# Patient Record
Sex: MALE | Race: WHITE | NOT HISPANIC OR LATINO | Employment: OTHER | URBAN - METROPOLITAN AREA
[De-identification: names, ages, dates, MRNs, and addresses within clinical notes are randomized per-mention and may not be internally consistent; named-entity substitution may affect disease eponyms.]

---

## 2017-01-01 ENCOUNTER — APPOINTMENT (OUTPATIENT)
Dept: RADIOLOGY | Facility: MEDICAL CENTER | Age: 77
End: 2017-01-01
Attending: EMERGENCY MEDICINE
Payer: MEDICARE

## 2017-01-01 ENCOUNTER — HOSPITAL ENCOUNTER (EMERGENCY)
Facility: MEDICAL CENTER | Age: 77
End: 2017-11-06
Attending: EMERGENCY MEDICINE
Payer: MEDICARE

## 2017-01-01 ENCOUNTER — PATIENT OUTREACH (OUTPATIENT)
Dept: HEALTH INFORMATION MANAGEMENT | Facility: OTHER | Age: 77
End: 2017-01-01

## 2017-01-01 ENCOUNTER — HOSPITAL ENCOUNTER (OUTPATIENT)
Dept: RADIOLOGY | Facility: MEDICAL CENTER | Age: 77
End: 2017-04-12
Attending: SPECIALIST
Payer: MEDICARE

## 2017-01-01 ENCOUNTER — OFFICE VISIT (OUTPATIENT)
Dept: MEDICAL GROUP | Age: 77
End: 2017-01-01
Payer: MEDICARE

## 2017-01-01 ENCOUNTER — TELEPHONE (OUTPATIENT)
Dept: MEDICAL GROUP | Age: 77
End: 2017-01-01

## 2017-01-01 ENCOUNTER — OFFICE VISIT (OUTPATIENT)
Dept: URGENT CARE | Facility: CLINIC | Age: 77
End: 2017-01-01
Payer: MEDICARE

## 2017-01-01 VITALS
OXYGEN SATURATION: 96 % | BODY MASS INDEX: 29.2 KG/M2 | DIASTOLIC BLOOD PRESSURE: 70 MMHG | HEART RATE: 71 BPM | HEIGHT: 70 IN | WEIGHT: 204 LBS | SYSTOLIC BLOOD PRESSURE: 132 MMHG | TEMPERATURE: 98.1 F

## 2017-01-01 VITALS
WEIGHT: 200 LBS | SYSTOLIC BLOOD PRESSURE: 104 MMHG | OXYGEN SATURATION: 95 % | DIASTOLIC BLOOD PRESSURE: 70 MMHG | BODY MASS INDEX: 28.63 KG/M2 | HEART RATE: 96 BPM | RESPIRATION RATE: 16 BRPM | TEMPERATURE: 98.9 F | HEIGHT: 70 IN

## 2017-01-01 VITALS
HEART RATE: 73 BPM | WEIGHT: 200.4 LBS | BODY MASS INDEX: 28.69 KG/M2 | OXYGEN SATURATION: 96 % | TEMPERATURE: 97.8 F | RESPIRATION RATE: 16 BRPM | DIASTOLIC BLOOD PRESSURE: 99 MMHG | SYSTOLIC BLOOD PRESSURE: 148 MMHG | HEIGHT: 70 IN

## 2017-01-01 VITALS
SYSTOLIC BLOOD PRESSURE: 132 MMHG | HEIGHT: 70 IN | OXYGEN SATURATION: 95 % | BODY MASS INDEX: 28.49 KG/M2 | DIASTOLIC BLOOD PRESSURE: 80 MMHG | WEIGHT: 199 LBS | TEMPERATURE: 97.5 F | HEART RATE: 87 BPM

## 2017-01-01 DIAGNOSIS — I10 ESSENTIAL HYPERTENSION: ICD-10-CM

## 2017-01-01 DIAGNOSIS — E78.2 MIXED HYPERLIPIDEMIA: ICD-10-CM

## 2017-01-01 DIAGNOSIS — M77.9 TENDINITIS: ICD-10-CM

## 2017-01-01 DIAGNOSIS — C82.58 DIFFUSE FOLLICLE CENTER LYMPHOMA OF LYMPH NODES OF MULTIPLE REGIONS (HCC): ICD-10-CM

## 2017-01-01 DIAGNOSIS — M79.671 FOOT PAIN, RIGHT: ICD-10-CM

## 2017-01-01 DIAGNOSIS — E03.4 HYPOTHYROIDISM DUE TO ACQUIRED ATROPHY OF THYROID: ICD-10-CM

## 2017-01-01 DIAGNOSIS — F40.240 CLAUSTROPHOBIA: ICD-10-CM

## 2017-01-01 DIAGNOSIS — J01.11 ACUTE RECURRENT FRONTAL SINUSITIS: ICD-10-CM

## 2017-01-01 DIAGNOSIS — K21.00 GASTROESOPHAGEAL REFLUX DISEASE WITH ESOPHAGITIS: ICD-10-CM

## 2017-01-01 DIAGNOSIS — C82.93 FOLLICULAR LYMPHOMA OF INTRA-ABDOMINAL LYMPH NODES, UNSPECIFIED GRADE (HCC): ICD-10-CM

## 2017-01-01 LAB
ALBUMIN SERPL-MCNC: 4 G/DL (ref 3.5–4.8)
ALBUMIN SERPL-MCNC: 4.3 G/DL (ref 3.5–4.8)
ALBUMIN/GLOB SERPL: 1.9 {RATIO} (ref 1.2–2.2)
ALBUMIN/GLOB SERPL: 2.5 {RATIO} (ref 1.2–2.2)
ALP SERPL-CCNC: 59 IU/L (ref 39–117)
ALP SERPL-CCNC: 65 IU/L (ref 39–117)
ALT SERPL-CCNC: 18 IU/L (ref 0–44)
ALT SERPL-CCNC: 24 IU/L (ref 0–44)
AST SERPL-CCNC: 31 IU/L (ref 0–40)
AST SERPL-CCNC: 38 IU/L (ref 0–40)
BASOPHILS # BLD AUTO: 0 X10E3/UL (ref 0–0.2)
BASOPHILS # BLD AUTO: 0.1 X10E3/UL (ref 0–0.2)
BASOPHILS NFR BLD AUTO: 1 %
BASOPHILS NFR BLD AUTO: 2 %
BILIRUB SERPL-MCNC: 1.8 MG/DL (ref 0–1.2)
BILIRUB SERPL-MCNC: 1.8 MG/DL (ref 0–1.2)
BUN SERPL-MCNC: 14 MG/DL (ref 8–27)
BUN SERPL-MCNC: 20 MG/DL (ref 8–27)
BUN/CREAT SERPL: 11 (ref 10–24)
BUN/CREAT SERPL: 16 (ref 10–24)
CALCIUM SERPL-MCNC: 8.7 MG/DL (ref 8.6–10.2)
CALCIUM SERPL-MCNC: 8.8 MG/DL (ref 8.6–10.2)
CHLORIDE SERPL-SCNC: 102 MMOL/L (ref 96–106)
CHLORIDE SERPL-SCNC: 103 MMOL/L (ref 96–106)
CHOLEST SERPL-MCNC: 235 MG/DL (ref 100–199)
CHOLEST SERPL-MCNC: 264 MG/DL (ref 100–199)
CO2 SERPL-SCNC: 22 MMOL/L (ref 18–29)
CO2 SERPL-SCNC: 22 MMOL/L (ref 18–29)
COMMENT 011824: ABNORMAL
COMMENT 011824: ABNORMAL
CREAT SERPL-MCNC: 1.29 MG/DL (ref 0.76–1.27)
CREAT SERPL-MCNC: 1.29 MG/DL (ref 0.76–1.27)
EOSINOPHIL # BLD AUTO: 0.4 X10E3/UL (ref 0–0.4)
EOSINOPHIL # BLD AUTO: 0.4 X10E3/UL (ref 0–0.4)
EOSINOPHIL NFR BLD AUTO: 10 %
EOSINOPHIL NFR BLD AUTO: 11 %
ERYTHROCYTE [DISTWIDTH] IN BLOOD BY AUTOMATED COUNT: 14.9 % (ref 12.3–15.4)
ERYTHROCYTE [DISTWIDTH] IN BLOOD BY AUTOMATED COUNT: 16.5 % (ref 12.3–15.4)
GFR SERPLBLD CREATININE-BSD FMLA CKD-EPI: 53 ML/MIN/1.73
GFR SERPLBLD CREATININE-BSD FMLA CKD-EPI: 62 ML/MIN/1.73
GLOBULIN SER CALC-MCNC: 1.7 G/DL (ref 1.5–4.5)
GLOBULIN SER CALC-MCNC: 2.1 G/DL (ref 1.5–4.5)
GLUCOSE SERPL-MCNC: 88 MG/DL (ref 65–99)
GLUCOSE SERPL-MCNC: 89 MG/DL (ref 65–99)
HCT VFR BLD AUTO: 46.6 % (ref 37.5–51)
HCT VFR BLD AUTO: 46.6 % (ref 37.5–51)
HDLC SERPL-MCNC: 54 MG/DL
HDLC SERPL-MCNC: 66 MG/DL
HGB BLD-MCNC: 14.8 G/DL (ref 12.6–17.7)
HGB BLD-MCNC: 15.4 G/DL (ref 12.6–17.7)
IMM GRANULOCYTES # BLD: 0 X10E3/UL (ref 0–0.1)
IMM GRANULOCYTES # BLD: 0 X10E3/UL (ref 0–0.1)
IMM GRANULOCYTES NFR BLD: 1 %
IMM GRANULOCYTES NFR BLD: 1 %
IMMATURE CELLS  115398: ABNORMAL
IMMATURE CELLS  115398: ABNORMAL
LDLC SERPL CALC-MCNC: 159 MG/DL (ref 0–99)
LDLC SERPL CALC-MCNC: 170 MG/DL (ref 0–99)
LYMPHOCYTES # BLD AUTO: 0.9 X10E3/UL (ref 0.7–3.1)
LYMPHOCYTES # BLD AUTO: 0.9 X10E3/UL (ref 0.7–3.1)
LYMPHOCYTES NFR BLD AUTO: 26 %
LYMPHOCYTES NFR BLD AUTO: 27 %
MCH RBC QN AUTO: 28.2 PG (ref 26.6–33)
MCH RBC QN AUTO: 28.5 PG (ref 26.6–33)
MCHC RBC AUTO-ENTMCNC: 31.8 G/DL (ref 31.5–35.7)
MCHC RBC AUTO-ENTMCNC: 33 G/DL (ref 31.5–35.7)
MCV RBC AUTO: 86 FL (ref 79–97)
MCV RBC AUTO: 89 FL (ref 79–97)
MONOCYTES # BLD AUTO: 0.3 X10E3/UL (ref 0.1–0.9)
MONOCYTES # BLD AUTO: 0.4 X10E3/UL (ref 0.1–0.9)
MONOCYTES NFR BLD AUTO: 12 %
MONOCYTES NFR BLD AUTO: 9 %
MORPHOLOGY BLD-IMP: ABNORMAL
MORPHOLOGY BLD-IMP: ABNORMAL
NEUTROPHILS # BLD AUTO: 1.8 X10E3/UL (ref 1.4–7)
NEUTROPHILS # BLD AUTO: 1.8 X10E3/UL (ref 1.4–7)
NEUTROPHILS NFR BLD AUTO: 49 %
NEUTROPHILS NFR BLD AUTO: 51 %
NRBC BLD AUTO-RTO: ABNORMAL %
NRBC BLD AUTO-RTO: ABNORMAL %
PLATELET # BLD AUTO: 144 X10E3/UL (ref 150–379)
PLATELET # BLD AUTO: 151 X10E3/UL (ref 150–379)
POTASSIUM SERPL-SCNC: 4.2 MMOL/L (ref 3.5–5.2)
POTASSIUM SERPL-SCNC: 4.7 MMOL/L (ref 3.5–5.2)
PROT SERPL-MCNC: 6 G/DL (ref 6–8.5)
PROT SERPL-MCNC: 6.1 G/DL (ref 6–8.5)
RBC # BLD AUTO: 5.24 X10E6/UL (ref 4.14–5.8)
RBC # BLD AUTO: 5.4 X10E6/UL (ref 4.14–5.8)
SODIUM SERPL-SCNC: 140 MMOL/L (ref 134–144)
SODIUM SERPL-SCNC: 142 MMOL/L (ref 134–144)
TRIGL SERPL-MCNC: 111 MG/DL (ref 0–149)
TRIGL SERPL-MCNC: 142 MG/DL (ref 0–149)
TSH SERPL DL<=0.005 MIU/L-ACNC: 1.24 UIU/ML (ref 0.45–4.5)
TSH SERPL DL<=0.005 MIU/L-ACNC: 6.56 UIU/ML (ref 0.45–4.5)
VLDLC SERPL CALC-MCNC: 22 MG/DL (ref 5–40)
VLDLC SERPL CALC-MCNC: 28 MG/DL (ref 5–40)
WBC # BLD AUTO: 3.4 X10E3/UL (ref 3.4–10.8)
WBC # BLD AUTO: 3.6 X10E3/UL (ref 3.4–10.8)

## 2017-01-01 PROCEDURE — 99214 OFFICE O/P EST MOD 30 MIN: CPT | Performed by: NURSE PRACTITIONER

## 2017-01-01 PROCEDURE — 99283 EMERGENCY DEPT VISIT LOW MDM: CPT

## 2017-01-01 PROCEDURE — 99214 OFFICE O/P EST MOD 30 MIN: CPT | Performed by: INTERNAL MEDICINE

## 2017-01-01 PROCEDURE — 73620 X-RAY EXAM OF FOOT: CPT | Mod: RT

## 2017-01-01 PROCEDURE — 71260 CT THORAX DX C+: CPT

## 2017-01-01 PROCEDURE — 700117 HCHG RX CONTRAST REV CODE 255: Performed by: SPECIALIST

## 2017-01-01 RX ORDER — PAROXETINE HYDROCHLORIDE 20 MG/1
20 TABLET, FILM COATED ORAL
COMMUNITY

## 2017-01-01 RX ORDER — KETOROLAC TROMETHAMINE 10 MG/1
10 TABLET, FILM COATED ORAL EVERY 6 HOURS PRN
Qty: 22 TAB | Refills: 2 | Status: ON HOLD | OUTPATIENT
Start: 2017-01-01 | End: 2018-01-01

## 2017-01-01 RX ORDER — LEVOTHYROXINE SODIUM 0.1 MG/1
100 TABLET ORAL
Qty: 90 TAB | Refills: 4 | Status: SHIPPED | OUTPATIENT
Start: 2017-01-01

## 2017-01-01 RX ORDER — AMOXICILLIN AND CLAVULANATE POTASSIUM 875; 125 MG/1; MG/1
1 TABLET, FILM COATED ORAL 2 TIMES DAILY
Qty: 20 TAB | Refills: 0 | Status: SHIPPED | OUTPATIENT
Start: 2017-01-01 | End: 2018-01-01

## 2017-01-01 RX ADMIN — IOHEXOL 100 ML: 350 INJECTION, SOLUTION INTRAVENOUS at 13:16

## 2017-01-01 ASSESSMENT — ENCOUNTER SYMPTOMS
SINUS PRESSURE: 1
MUSCULOSKELETAL NEGATIVE: 1
GASTROINTESTINAL NEGATIVE: 1
EYE PAIN: 0
RESPIRATORY NEGATIVE: 1
DIZZINESS: 0
SHORTNESS OF BREATH: 0
PSYCHIATRIC NEGATIVE: 1
VOMITING: 0
HYPERTENSION: 1
RESPIRATORY NEGATIVE: 1
EYES NEGATIVE: 1
NEUROLOGICAL NEGATIVE: 1
HEADACHES: 1
GASTROINTESTINAL NEGATIVE: 1
EYES NEGATIVE: 1
PSYCHIATRIC NEGATIVE: 1
CONSTITUTIONAL NEGATIVE: 1
MYALGIAS: 0
NEUROLOGICAL NEGATIVE: 1
SORE THROAT: 0
FEVER: 0
CHILLS: 1
NAUSEA: 0
CONSTITUTIONAL NEGATIVE: 1
CARDIOVASCULAR NEGATIVE: 1
MUSCULOSKELETAL NEGATIVE: 1
SINUS PAIN: 1
CARDIOVASCULAR NEGATIVE: 1

## 2017-01-01 ASSESSMENT — PAIN SCALES - GENERAL: PAINLEVEL_OUTOF10: 8

## 2017-02-01 RX ORDER — LEVOTHYROXINE SODIUM 0.15 MG/1
TABLET ORAL
Qty: 90 TAB | Refills: 4 | Status: SHIPPED | OUTPATIENT
Start: 2017-02-01 | End: 2017-03-17 | Stop reason: SDUPTHER

## 2017-02-13 RX ORDER — PAROXETINE HYDROCHLORIDE 20 MG/1
TABLET, FILM COATED ORAL
Qty: 90 TAB | Refills: 4 | Status: SHIPPED | OUTPATIENT
Start: 2017-02-13 | End: 2017-03-22 | Stop reason: SDUPTHER

## 2017-02-13 RX ORDER — LISINOPRIL 20 MG/1
TABLET ORAL
Qty: 90 TAB | Refills: 4 | Status: ON HOLD | OUTPATIENT
Start: 2017-02-13 | End: 2018-01-01

## 2017-02-20 ENCOUNTER — OFFICE VISIT (OUTPATIENT)
Dept: URGENT CARE | Facility: CLINIC | Age: 77
End: 2017-02-20
Payer: MEDICARE

## 2017-02-20 VITALS
HEART RATE: 72 BPM | WEIGHT: 200 LBS | OXYGEN SATURATION: 96 % | BODY MASS INDEX: 28.7 KG/M2 | TEMPERATURE: 98.3 F | DIASTOLIC BLOOD PRESSURE: 80 MMHG | RESPIRATION RATE: 18 BRPM | SYSTOLIC BLOOD PRESSURE: 120 MMHG

## 2017-02-20 DIAGNOSIS — R05.9 COUGH: ICD-10-CM

## 2017-02-20 DIAGNOSIS — J06.9 URI, ACUTE: ICD-10-CM

## 2017-02-20 PROCEDURE — 1101F PT FALLS ASSESS-DOCD LE1/YR: CPT | Performed by: NURSE PRACTITIONER

## 2017-02-20 PROCEDURE — G8420 CALC BMI NORM PARAMETERS: HCPCS | Performed by: NURSE PRACTITIONER

## 2017-02-20 PROCEDURE — G8484 FLU IMMUNIZE NO ADMIN: HCPCS | Performed by: NURSE PRACTITIONER

## 2017-02-20 PROCEDURE — 1036F TOBACCO NON-USER: CPT | Performed by: NURSE PRACTITIONER

## 2017-02-20 PROCEDURE — 4040F PNEUMOC VAC/ADMIN/RCVD: CPT | Mod: 8P | Performed by: NURSE PRACTITIONER

## 2017-02-20 PROCEDURE — G8432 DEP SCR NOT DOC, RNG: HCPCS | Performed by: NURSE PRACTITIONER

## 2017-02-20 PROCEDURE — 99214 OFFICE O/P EST MOD 30 MIN: CPT | Performed by: NURSE PRACTITIONER

## 2017-02-20 RX ORDER — CODEINE PHOSPHATE AND GUAIFENESIN 10; 100 MG/5ML; MG/5ML
5 SOLUTION ORAL EVERY 6 HOURS PRN
Qty: 120 ML | Refills: 0 | Status: SHIPPED | OUTPATIENT
Start: 2017-02-20 | End: 2017-01-01

## 2017-02-20 RX ORDER — AMOXICILLIN 875 MG/1
875 TABLET, COATED ORAL 2 TIMES DAILY
Qty: 20 TAB | Refills: 0 | Status: SHIPPED | OUTPATIENT
Start: 2017-02-20 | End: 2017-03-22

## 2017-02-20 ASSESSMENT — ENCOUNTER SYMPTOMS
CARDIOVASCULAR NEGATIVE: 1
CHILLS: 0
FEVER: 0
RESPIRATORY NEGATIVE: 1
SINUS PRESSURE: 1
SORE THROAT: 1
HEADACHES: 1
EYES NEGATIVE: 1

## 2017-02-20 NOTE — MR AVS SNAPSHOT
Chang Unger   2017 8:45 AM   Office Visit   MRN: [id number]    Department:  [facility]   Dept Phone:  [phone]    Description:  Male : 1940   Provider:  Cathey J Hamman, A.P.N.           Reason for Visit     Sinus Problem           Allergies as of 2017     Allergen Noted Reactions    Nitroglycerin 2009       Pulse drops to 40      You were diagnosed with     Cough   [786.2.ICD-9-CM]       URI, acute   [222485]         Vital Signs     Blood Pressure Pulse Temperature Respirations Weight Oxygen Saturation    120/80 mmHg 72 36.8 °C (98.3 °F) 18 90.719 kg (200 lb) 96%    Smoking Status                   Never Smoker            Basic Information     Date Of Birth Sex Race Ethnicity Preferred Language    1940 Male White Non- English      Your appointments     2017  3:00 PM   Established Patient with Hector Burnett M.D.   [address] [id number] [phone]           You will be receiving a confirmation call a few days before your appointment from our automated call confirmation system.              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 Review all of your home medications and newly ordered medications with your provider and/or pharmacist. Follow medication instructions as directed by your provider and/or pharmacist. Please keep your medication list with you and share with your provider. Update the information when medications are discontinued, doses are changed, or new medications (including over-the-counter products) are added; and carry medication information at all times in the event of emergency situations     Allergies:  NITROGLYCERIN - (reactions not documented)               Medications  Valid as of: February 20, 2017 -  9:43 AM    Generic Name Brand Name Tablet Size Instructions for use    Amoxicillin (Tab) AMOXIL 875 MG Take 1 Tab by mouth 2 times a day.        Guaifenesin-Codeine (Solution) ROBITUSSIN -10 mg/5mL Take 5 mL by mouth every 6 hours as needed.        Levothyroxine Sodium (Tab) SYNTHROID 150 MCG TAKE 1 TABLET EVERY MORNING ON AN EMPTY STOMACH        Lisinopril (Tab) PRINIVIL 20 MG TAKE 1 TABLET DAILY        PARoxetine HCl (Tab) PAXIL 20 MG TAKE 1 TABLET DAILY        .                 Medicines prescribed today were sent to:     [facility] - [place] - [address]    [address] [place] [address]    Phone: [phone] Fax: [phone]    Open 24 Hours?: No    [facility] - [place], [place] - [address]    [address] [address]    Phone: [phone] Fax: [phone]    Open 24 Hours?: No      Medication refill instructions:       If your prescription  bottle indicates you have medication refills left, it is not necessary to call your provider’s office. Please contact your pharmacy and they will refill your medication.    If your prescription bottle indicates you do not have any refills left, you may request refills at any time through one of the following ways: The online [employer] system (except Urgent Care), by calling your provider’s office, or by asking your pharmacy to contact your provider’s office with a refill request. Medication refills are processed only during regular business hours and may not be available until the next business day. Your provider may request additional information or to have a follow-up visit with you prior to refilling your medication.   *Please Note: Medication refills are assigned a new Rx number when refilled electronically. Your pharmacy may indicate that no refills were authorized even though a new prescription for the same medication is available at the pharmacy. Please request the medicine by name with the pharmacy before contacting your provider for a refill.           [employer] Access Code: Activation code not generated  Current [employer] Status: Active

## 2017-02-20 NOTE — PROGRESS NOTES
Subjective:      Chang Unger is a 76 y.o. male who presents with Sinus Problem    Past Medical History   Diagnosis Date   • HTN    • Hematuria    • GERD (gastroesophageal reflux disease)    • Lymphoma (CMS-HCC)    • Basal cell cancer      Social History     Social History   • Marital Status:      Spouse Name: N/A   • Number of Children: N/A   • Years of Education: N/A     Occupational History   • Not on file.     Social History Main Topics   • Smoking status: Never Smoker    • Smokeless tobacco: Never Used   • Alcohol Use: No      Comment: etoh abuse,   • Drug Use: No   • Sexual Activity: Not on file      Comment: ,2 kids, retired marine     Other Topics Concern   • Not on file     Social History Narrative     Family History   Problem Relation Age of Onset   • Cancer Mother      pancreatic     Allergies: Nitroglycerin    This patient is a 76-year-old male who presents today with complaint of clear sinus drainage without pain. He has had a dry cough with this, and this does wake him up at night at times. He denies chest congestion or shortness of breath. Denies fever, body aches, or chills.        Sinus Problem  This is a new problem. The current episode started 1 to 4 weeks ago. The problem has been gradually worsening since onset. There has been no fever. The pain is moderate. Associated symptoms include congestion, headaches, sinus pressure and a sore throat. Pertinent negatives include no chills. Past treatments include nothing. The treatment provided no relief.       Review of Systems   Constitutional: Positive for malaise/fatigue. Negative for fever and chills.   HENT: Positive for congestion, sinus pressure and sore throat.    Eyes: Negative.    Respiratory: Negative.    Cardiovascular: Negative.    Skin: Negative.    Neurological: Positive for headaches.       All other systems reviewed and are negative      Objective:     /80 mmHg  Pulse 72  Temp(Src) 36.8 °C (98.3 °F)  Resp 18   Wt 90.719 kg (200 lb)  SpO2 96%     Physical Exam   Constitutional: He is oriented to person, place, and time. He appears well-developed and well-nourished. No distress.   HENT:   Head: Normocephalic.   Right Ear: External ear normal.   Left Ear: External ear normal.   Mouth/Throat: Oropharynx is clear and moist. No oropharyngeal exudate.   Eyes: EOM are normal. Pupils are equal, round, and reactive to light.   Neck: Normal range of motion. Neck supple.   Cardiovascular: Normal rate and regular rhythm.    Pulmonary/Chest: Effort normal. No respiratory distress. He has no wheezes. He has no rales. He exhibits no tenderness.   Dry cough   Musculoskeletal: Normal range of motion.   Neurological: He is alert and oriented to person, place, and time.   Skin: Skin is warm and dry. He is not diaphoretic.   Psychiatric: He has a normal mood and affect. His behavior is normal.   Vitals reviewed.              Assessment/Plan:      Cough  Viral upper respiratory infection  -Cheratussin when necessary cough  -Prescription for amoxicillin given, take only for production of purulent drainage.  -Tylenol as needed  -Follow up otherwise for persistent or worsening of symptoms  There are no diagnoses linked to this encounter.

## 2017-02-23 ENCOUNTER — APPOINTMENT (OUTPATIENT)
Dept: MEDICAL GROUP | Age: 77
End: 2017-02-23
Payer: MEDICARE

## 2017-03-17 ENCOUNTER — TELEPHONE (OUTPATIENT)
Dept: MEDICAL GROUP | Age: 77
End: 2017-03-17

## 2017-03-17 DIAGNOSIS — E03.4 HYPOTHYROIDISM DUE TO ACQUIRED ATROPHY OF THYROID: ICD-10-CM

## 2017-03-17 RX ORDER — LEVOTHYROXINE SODIUM 0.15 MG/1
75 TABLET ORAL
Qty: 90 TAB | Refills: 4
Start: 2017-03-17 | End: 2017-03-22 | Stop reason: SDUPTHER

## 2017-03-17 NOTE — TELEPHONE ENCOUNTER
----- Message from Rosa M Ponce M.D. sent at 3/17/2017  8:11 AM PDT -----  Plan to discuss the results with Dr. Burnett in upcoming appointment on 3/22/17.    Rosa M Ponce M.D.

## 2017-03-17 NOTE — TELEPHONE ENCOUNTER
Please tell patient that his thyroid level is about twice what it should be and that he needs to reduce his current thyroid medication dose from 150 µg daily down to 75 µg daily. He can do this by cutting his current dosage in half, i.e. one half pill daily of the 150 µg pills. We can discuss this further at his office visit next week. I will order a repeat thyroid test when I see him then, to be done in 2-3 months from now.

## 2017-03-20 NOTE — TELEPHONE ENCOUNTER
Phone Number Called: [phone] (home)     Message: Left message for the patient to call us back regarding the note below.    Left Message for patient to call back: yes

## 2017-03-21 ENCOUNTER — TELEPHONE (OUTPATIENT)
Dept: MEDICAL GROUP | Age: 77
End: 2017-03-21

## 2017-03-21 NOTE — TELEPHONE ENCOUNTER
----- Message from Hector Burnett M.D. sent at 3/17/2017 11:40 AM PDT -----  Please tell patient that his thyroid level is about twice what it should be and that he needs to reduce his current thyroid medication dose from 150 µg daily down to 75 µg daily. He can do this by cutting his current dosage in half, i.e. one half pill daily of the 150 µg pills. We can discuss this further at his office visit next week. I will order a repeat thyroid test when I see him then, to be done in 2-3 months from now.

## 2017-03-21 NOTE — TELEPHONE ENCOUNTER
Phone Number Called: [phone]    Message: Spoke with pt, informed him of results as stated below in Dr. Burnett's note, he acknowledged. Pt states he will discuss with Dr. Burnett at his appt tomorrow.    Left Message for patient to call back: N\A

## 2017-03-21 NOTE — TELEPHONE ENCOUNTER
Spoke with pt and informed him of Dr. Burnett's note as stated below, he acknowledge. Pt is coming in to see Dr. Burnett tomorrow.

## 2017-03-22 ENCOUNTER — HOSPITAL ENCOUNTER (OUTPATIENT)
Dept: RADIOLOGY | Facility: MEDICAL CENTER | Age: 77
End: 2017-03-22
Attending: INTERNAL MEDICINE
Payer: MEDICARE

## 2017-03-22 ENCOUNTER — OFFICE VISIT (OUTPATIENT)
Dept: MEDICAL GROUP | Age: 77
End: 2017-03-22
Payer: MEDICARE

## 2017-03-22 VITALS
HEART RATE: 60 BPM | OXYGEN SATURATION: 96 % | TEMPERATURE: 98.1 F | BODY MASS INDEX: 28.06 KG/M2 | WEIGHT: 196 LBS | HEIGHT: 70 IN | DIASTOLIC BLOOD PRESSURE: 82 MMHG | SYSTOLIC BLOOD PRESSURE: 146 MMHG

## 2017-03-22 DIAGNOSIS — E03.4 HYPOTHYROIDISM DUE TO ACQUIRED ATROPHY OF THYROID: ICD-10-CM

## 2017-03-22 DIAGNOSIS — F40.240 CLAUSTROPHOBIA: ICD-10-CM

## 2017-03-22 DIAGNOSIS — C82.58 DIFFUSE FOLLICLE CENTER LYMPHOMA OF LYMPH NODES OF MULTIPLE REGIONS (HCC): ICD-10-CM

## 2017-03-22 DIAGNOSIS — J30.9 CHRONIC ALLERGIC RHINITIS: ICD-10-CM

## 2017-03-22 DIAGNOSIS — I10 ESSENTIAL HYPERTENSION: ICD-10-CM

## 2017-03-22 DIAGNOSIS — E78.2 MIXED HYPERLIPIDEMIA: ICD-10-CM

## 2017-03-22 DIAGNOSIS — K21.00 GASTROESOPHAGEAL REFLUX DISEASE WITH ESOPHAGITIS: ICD-10-CM

## 2017-03-22 DIAGNOSIS — J20.9 ACUTE BRONCHITIS, UNSPECIFIED ORGANISM: ICD-10-CM

## 2017-03-22 DIAGNOSIS — J01.00 ACUTE MAXILLARY SINUSITIS, RECURRENCE NOT SPECIFIED: ICD-10-CM

## 2017-03-22 PROCEDURE — 99215 OFFICE O/P EST HI 40 MIN: CPT | Performed by: INTERNAL MEDICINE

## 2017-03-22 PROCEDURE — 71020 DX-CHEST-2 VIEWS: CPT

## 2017-03-22 RX ORDER — AMLODIPINE BESYLATE 5 MG/1
5 TABLET ORAL DAILY
Qty: 90 TAB | Refills: 4 | Status: SHIPPED | OUTPATIENT
Start: 2017-03-22 | End: 2017-01-01

## 2017-03-22 RX ORDER — PAROXETINE HYDROCHLORIDE 20 MG/1
TABLET, FILM COATED ORAL
Qty: 30 TAB | Refills: 4
Start: 2017-03-22 | End: 2017-01-01

## 2017-03-22 RX ORDER — AMLODIPINE BESYLATE 5 MG/1
5 TABLET ORAL DAILY
Qty: 90 TAB | Refills: 4 | Status: SHIPPED | OUTPATIENT
Start: 2017-03-22 | End: 2017-03-22 | Stop reason: SDUPTHER

## 2017-03-22 RX ORDER — FLUTICASONE PROPIONATE 50 MCG
SPRAY, SUSPENSION (ML) NASAL
Qty: 1 BOTTLE | Refills: 11 | Status: SHIPPED | OUTPATIENT
Start: 2017-03-22 | End: 2017-01-01

## 2017-03-22 RX ORDER — LEVOTHYROXINE SODIUM 0.15 MG/1
75 TABLET ORAL
Qty: 90 TAB | Refills: 4
Start: 2017-03-22 | End: 2017-01-01

## 2017-03-22 RX ORDER — AMOXICILLIN AND CLAVULANATE POTASSIUM 875; 125 MG/1; MG/1
1 TABLET, FILM COATED ORAL 2 TIMES DAILY
Qty: 42 TAB | Refills: 1 | Status: SHIPPED | OUTPATIENT
Start: 2017-03-22 | End: 2017-01-01

## 2017-03-22 ASSESSMENT — ENCOUNTER SYMPTOMS
GASTROINTESTINAL NEGATIVE: 1
EYES NEGATIVE: 1
CARDIOVASCULAR NEGATIVE: 1
CONSTITUTIONAL NEGATIVE: 1
MUSCULOSKELETAL NEGATIVE: 1
RESPIRATORY NEGATIVE: 1
PSYCHIATRIC NEGATIVE: 1
NEUROLOGICAL NEGATIVE: 1

## 2017-03-22 ASSESSMENT — PATIENT HEALTH QUESTIONNAIRE - PHQ9: CLINICAL INTERPRETATION OF PHQ2 SCORE: 0

## 2017-03-22 NOTE — PROGRESS NOTES
"Subjective:      Chang Unger is a 76 y.o. male who presents with Follow-Up  and recurrent cough prod of yellow sputum and yellow nasal discharge  And   The patient is here for followup of chronic medical problems listed below. The patient is compliant with medications and having no side effects from them. Denies chest pain, abdominal pain, dyspnea, myalgias, or cough.   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    Outpatient Prescriptions Prior to Visit   Medication Sig Dispense Refill   • lisinopril (PRINIVIL) 20 MG Tab TAKE 1 TABLET DAILY 90 Tab 4   • levothyroxine (SYNTHROID) 150 MCG Tab Take 0.5 Tabs by mouth Every morning on an empty stomach. 90 Tab 4   • guaifenesin-codeine (CHERATUSSIN AC) Solution oral solution Take 5 mL by mouth every 6 hours as needed. 120 mL 0   • amoxicillin (AMOXIL) 875 MG tablet Take 1 Tab by mouth 2 times a day. 20 Tab 0   • paroxetine (PAXIL) 20 MG Tab TAKE 1 TABLET DAILY 90 Tab 4     No facility-administered medications prior to visit.             HPI    Review of Systems   Constitutional: Negative.    HENT: Negative.    Eyes: Negative.    Respiratory: Negative.    Cardiovascular: Negative.    Gastrointestinal: Negative.    Genitourinary: Negative.    Musculoskeletal: Negative.    Skin: Negative.    Neurological: Negative.    Endo/Heme/Allergies: Negative.    Psychiatric/Behavioral: Negative.           Objective:     /82 mmHg  Pulse 60  Temp(Src) 36.7 °C (98.1 °F)  Ht 1.778 m (5' 10\")  " Wt 88.905 kg (196 lb)  BMI 28.12 kg/m2  SpO2 96%     Physical Exam   Constitutional: He is oriented to person, place, and time. He appears well-developed and well-nourished. No distress.   HENT:   Head: Normocephalic and atraumatic.   Right Ear: External ear normal.   Left Ear: External ear normal.   Mouth/Throat: Oropharynx is clear and moist. No oropharyngeal exudate.   Eyes: Conjunctivae and EOM are normal. Pupils are equal, round, and reactive to light. Right eye exhibits no discharge.   Neck: Normal range of motion. Neck supple. No JVD present. No tracheal deviation present. No thyromegaly present.   Cardiovascular: Normal rate, regular rhythm, normal heart sounds and intact distal pulses.  Exam reveals no gallop.    Pulmonary/Chest: Effort normal and breath sounds normal. No respiratory distress. He has no wheezes. He has no rales. He exhibits no tenderness.   Abdominal: Soft. Bowel sounds are normal. He exhibits no distension and no mass. There is no tenderness. There is no rebound and no guarding. No hernia.   Genitourinary: Guaiac negative stool. No penile tenderness.   Musculoskeletal: He exhibits no edema or tenderness.   Lymphadenopathy:     He has no cervical adenopathy.   Neurological: He is alert and oriented to person, place, and time. He has normal reflexes. He displays normal reflexes. No cranial nerve deficit. He exhibits normal muscle tone. Coordination normal.   Skin: Skin is warm and dry. No rash noted. He is not diaphoretic. No erythema. No pallor.   Psychiatric: He has a normal mood and affect. His behavior is normal. Judgment and thought content normal.   Nursing note and vitals reviewed.  No visits with results within 1 Month(s) from this visit.  Latest known visit with results is:    Orders Only on 11/21/2016   Component Date Value   • WBC 11/21/2016 4.1    • RBC 11/21/2016 5.48    • Hemoglobin 11/21/2016 15.6    • Hematocrit 11/21/2016 46.2    • MCV 11/21/2016 84    • MCH 11/21/2016  28.5    • MCHC 11/21/2016 33.8    • RDW 11/21/2016 14.3    • Platelet Count 11/21/2016 151    • Neutrophils-Polys 11/21/2016 46    • Lymphocytes 11/21/2016 26    • Monocytes 11/21/2016 11    • Eosinophils 11/21/2016 15    • Basophils 11/21/2016 2    • Immature Cells 11/21/2016 CANCELED    • Neutrophils (Absolute) 11/21/2016 1.9    • Lymphs (Absolute) 11/21/2016 1.1    • Monos (Absolute) 11/21/2016 0.5    • Eos (Absolute) 11/21/2016 0.6*   • Baso (Absolute) 11/21/2016 0.1    • Immature Granulocytes 11/21/2016 0    • Immature Granulocytes (a* 11/21/2016 0.0    • Nucleated RBC 11/21/2016 CANCELED    • Comments-Diff 11/21/2016 CANCELED    • Glucose 11/21/2016 95    • Bun 11/21/2016 23    • Creatinine 11/21/2016 1.29*   • GFR If Non  Ameri* 11/21/2016 53*   • GFR If  11/21/2016 62    • Bun-Creatinine Ratio 11/21/2016 18    • Sodium 11/21/2016 141    • Potassium 11/21/2016 4.9    • Chloride 11/21/2016 103    • Co2 11/21/2016 23    • Calcium 11/21/2016 9.1    • Total Protein 11/21/2016 5.9*   • Albumin 11/21/2016 4.2    • Globulin 11/21/2016 1.7    • A-G Ratio 11/21/2016 2.5    • Total Bilirubin 11/21/2016 1.4*   • Alkaline Phosphatase 11/21/2016 69    • AST(SGOT) 11/21/2016 23    • ALT(SGPT) 11/21/2016 16    • Cholesterol,Tot 11/21/2016 193    • Triglycerides 11/21/2016 101    • HDL 11/21/2016 54    • VLDL Cholesterol Calc 11/21/2016 20    • LDL 11/21/2016 119*   • Comment: 11/21/2016 CANCELED    • Free T-4 11/21/2016 2.55*   • TSH 11/21/2016 0.009*   • Glucose 03/16/2017 93    • Bun 03/16/2017 21    • Creatinine 03/16/2017 1.33*   • GFR If Non  Ameri* 03/16/2017 52*   • GFR If  03/16/2017 60    • Bun-Creatinine Ratio 03/16/2017 16    • Sodium 03/16/2017 140    • Potassium 03/16/2017 4.3    • Chloride 03/16/2017 101    • Co2 03/16/2017 22    • Calcium 03/16/2017 8.8    • Total Protein 03/16/2017 6.1    • Albumin 03/16/2017 4.1    • Globulin 03/16/2017 2.0    • A-G Ratio  03/16/2017 2.1    • Total Bilirubin 03/16/2017 1.5*   • Alkaline Phosphatase 03/16/2017 75    • AST(SGOT) 03/16/2017 25    • ALT(SGPT) 03/16/2017 15    • Cholesterol,Tot 03/16/2017 223*   • Triglycerides 03/16/2017 101    • HDL 03/16/2017 53    • VLDL Cholesterol Calc 03/16/2017 20    • LDL 03/16/2017 150*   • Comment: 03/16/2017 CANCELED    • Free T-4 03/16/2017 2.66*   • TSH 03/16/2017 0.010*      No results found for: HBA1C  Lab Results   Component Value Date/Time    SODIUM 140 03/16/2017 08:01 AM    POTASSIUM 4.3 03/16/2017 08:01 AM    CHLORIDE 101 03/16/2017 08:01 AM    CO2 22 03/16/2017 08:01 AM    GLUCOSE 93 03/16/2017 08:01 AM    BUN 21 03/16/2017 08:01 AM    CREATININE 1.33* 03/16/2017 08:01 AM    BUN-CREATININE RATIO 16 03/16/2017 08:01 AM    ALKALINE PHOSPHATASE 75 03/16/2017 08:01 AM    AST(SGOT) 25 03/16/2017 08:01 AM    ALT(SGPT) 15 03/16/2017 08:01 AM    TOTAL BILIRUBIN 1.5* 03/16/2017 08:01 AM     Lab Results   Component Value Date/Time    INR 1.09 08/27/2009 11:02 AM    INR 0.97 02/19/2009 11:36 AM     Lab Results   Component Value Date/Time    CHOLESTEROL,* 03/16/2017 08:01 AM    * 03/16/2017 08:01 AM    HDL 53 03/16/2017 08:01 AM    TRIGLYCERIDES 101 03/16/2017 08:01 AM       No results found for: TESTOSTERONE  Lab Results   Component Value Date/Time    TSH 0.010* 03/16/2017 08:01 AM    TSH 0.009* 11/21/2016 11:03 AM     No results found for: FREET4  Lab Results   Component Value Date/Time    URIC ACID 4.8 06/02/2010 09:46 AM     No components found for: VITB12  Lab Results   Component Value Date/Time    25-HYDROXY   VITAMIN D 25 22.5* 10/06/2010 11:45 AM                   Assessment/Plan:     1. Hypothyroidism due to acquired atrophy of thyroid- overtreated- synthroid reduced to 75 mcg qd      - levothyroxine (SYNTHROID) 150 MCG Tab; Take 0.5 Tabs by mouth Every morning on an empty stomach.  Dispense: 90 Tab; Refill: 4  - TSH; Future    2. Mixed hyperlipidemia    Not at goal\  diet/exercise/lose 15 lbs.; patient counseled- no meds yet    - COMP METABOLIC PANEL; Future  - LIPID PROFILE; Future  - CBC WITH DIFFERENTIAL; Future    3. Essential hypertension  Not at goal- add :     - amlodipine (NORVASC) 5 MG Tab; Take 1 Tab by mouth every day.  Dispense: 90 Tab; Refill: 4    Cont lisinopril 20m qd but take at night since bp is so high in am  4. Claustrophobia    Under good control. Continue same regimen.    - paroxetine (PAXIL) 20 MG Tab; Take 1 twice a wk  Dispense: 30 Tab; Refill: 4    5. Gastroesophageal reflux disease with esophagitis    Under good control. Continue same regimen. ppi prn otc  6. 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 Continue same regimen.    7. Acute bronchitis, unspecified organism       - amoxicillin-clavulanate (AUGMENTIN) 875-125 MG Tab; Take 1 Tab by mouth 2 times a day.  Dispense: 42 Tab; Refill: 1  - DX-CHEST-2 VIEWS; Future    8. Acute maxillary sinusitis, recurrence not specified     - amoxicillin-clavulanate (AUGMENTIN) 875-125 MG Tab; Take 1 Tab by mouth 2 times a day.  Dispense: 42 Tab; Refill: 1    9. Hypothyroidism due to acquired atrophy of thyroid   aabove  - levothyroxine (SYNTHROID) 150 MCG Tab; Take 0.5 Tabs by mouth Every morning on an empty stomach.  Dispense: 90 Tab; Refill: 4  - TSH; Future    10. Chronic allergic rhinitis- new flare up      - fluticasone (FLONASE) 50 MCG/ACT nasal spray; 2 sprays in each nostril qd  Dispense: 1 Bottle; Refill: 11      40 minute face-to-face encounter took place today.  More than half of this time was spent in the coordination of care of the above problems, as well as counseling.

## 2017-03-22 NOTE — MR AVS SNAPSHOT
"        Chang Unger   3/22/2017 8:00 AM   Office Visit   MRN: [id number]    Department:  [address] [place] [address]   Dept Phone:  [phone]    Description:  Male : 1940   Provider:  Hector Burnett M.D.           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)   [4959361]       Acute bronchitis, unspecified organism   [3283278]       Acute maxillary sinusitis, recurrence not specified   [8402580]       Hypothyroidism due to acquired atrophy of thyroid   [0341528]       Chronic allergic rhinitis   [699396]         Vital Signs     Blood Pressure Pulse Temperature Height Weight Body Mass Index    146/82 mmHg 60 36.7 °C (98.1 °F) 1.778 m (5' 10\") 88.905 kg (196 lb) 28.12 kg/m2    Oxygen Saturation Smoking Status                96% Never Smoker           Basic Information     Date Of Birth Sex Race Ethnicity Preferred Language    1940 Male White Non- English      Your appointments     2017  2:20 PM   Established Patient with Hector Burnett M.D.   [address] [id number] [phone]           You will be receiving a confirmation call a few days before your appointment from our automated call confirmation system.              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-, RT/chemo rx-  dr" conrath  C82.58   Unknown - Present    Essential hypertension I10   Unknown - Present    GERD (gastroesophageal reflux disease) K21.9   Unknown - Present    Claustrophobia F40.240   12/16/2013 - Present    Hypothyroidism due to acquired atrophy of thyroid E03.4   1/20/2014 - Present    Mixed hyperlipidemia E78.2   1/20/2014 - Present    Dry cough R05   3/9/2016 - Present      Health Maintenance        Date Due Completion Dates    IMM ZOSTER VACCINE 11/7/2000 ---    IMM PNEUMOCOCCAL 65+ (ADULT) HIGH/HIGHEST RISK SERIES (1 of 2 - PCV13) 11/7/2005 ---    IMM INFLUENZA (1) 9/1/2016 ---    COLONOSCOPY 1/30/2019 1/30/2014    IMM DTaP/Tdap/Td Vaccine (2 - Td) 7/13/2023 7/13/2013            Current Immunizations     Tdap Vaccine 7/13/2013      Below and/or attached are the medications your provider expects you to take. Review all of your home medications and newly ordered medications with your provider and/or pharmacist. Follow medication instructions as directed by your provider and/or pharmacist. Please keep your medication list with you and share with your provider. Update the information when medications are discontinued, doses are changed, or new medications (including over-the-counter products) are added; and carry medication information at all times in the event of emergency situations     Allergies:  NITROGLYCERIN - (reactions not documented)     LOSARTAN - (reactions not documented)               Medications  Valid as of: March 22, 2017 -  8:45 AM    Generic Name Brand Name Tablet Size Instructions for use    AmLODIPine Besylate (Tab) NORVASC 5 MG Take 1 Tab by mouth every day.        Amoxicillin-Pot Clavulanate (Tab) AUGMENTIN 875-125 MG Take 1 Tab by mouth 2 times a day.        Fluticasone Propionate (Suspension) FLONASE 50 MCG/ACT 2 sprays in each nostril qd        Guaifenesin-Codeine (Solution) ROBITUSSIN -10 mg/5mL Take 5 mL by mouth every 6 hours as needed.        Levothyroxine Sodium (Tab) SYNTHROID  150 MCG Take 0.5 Tabs by mouth Every morning on an empty stomach.        Lisinopril (Tab) PRINIVIL 20 MG TAKE 1 TABLET DAILY        PARoxetine HCl (Tab) PAXIL 20 MG Take 1 twice a wk        .                 Medicines prescribed today were sent to:     [facility] - [place], [place] - [address]    [address] [place] [address]    Phone: [phone] Fax: [phone]    Open 24 Hours?: No    [facility] - [place], [place] - [address] [facility]    [address] [facility] [address]    Phone: [phone] Fax: [phone]    Open 24 Hours?: No      Medication refill instructions:       If your prescription bottle indicates you have medication refills left, it is not necessary to call your provider’s office. Please contact your pharmacy and they will refill your medication.    If your prescription bottle indicates you do not have any refills left, you may request refills at any time through one of the following ways: The online [employer] system (except Urgent Care), by calling your provider’s office, or by asking your pharmacy to contact your provider’s office with a refill request. Medication refills are processed only during regular business hours and may not be available until the next business day. Your provider may request additional information or to have a follow-up visit with you prior to refilling your medication.   *Please Note: Medication refills are assigned a new Rx number when refilled electronically. Your pharmacy may indicate that no refills were authorized even though a new prescription for the same medication is available at the pharmacy. Please request the medicine by name with the pharmacy before contacting your provider for a refill.        Your To Do List     Future Labs/Procedures Complete By Expires    CBC WITH DIFFERENTIAL  As directed 3/22/2018    COMP METABOLIC PANEL  As directed 3/22/2018    DX-CHEST-2 VIEWS  As directed 9/21/2017    LIPID PROFILE   As directed 3/22/2018    TSH  As directed 3/22/2018         [employer] Access Code: Activation code not generated  Current [employer] Status: Active

## 2017-06-22 NOTE — PROGRESS NOTES
Subjective:      Chang Unger is a 76 y.o. male who presents with Hypertension  The patient is here for followup of chronic medical problems listed below. The patient is compliant with medications and having no side effects from them. Denies chest pain, abdominal pain, dyspnea, myalgias, or cough.   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 90 Tab 4   • lisinopril (PRINIVIL) 20 MG Tab TAKE 1 TABLET DAILY 90 Tab 4   • amoxicillin-clavulanate (AUGMENTIN) 875-125 MG Tab Take 1 Tab by mouth 2 times a day. (Patient not taking: Reported on 6/22/2017) 42 Tab 1   • levothyroxine (SYNTHROID) 150 MCG Tab Take 0.5 Tabs by mouth Every morning on an empty stomach. 90 Tab 4   • fluticasone (FLONASE) 50 MCG/ACT nasal spray 2 sprays in each nostril qd (Patient not taking: Reported on 6/22/2017) 1 Bottle 11   • guaifenesin-codeine (CHERATUSSIN AC) Solution oral solution Take 5 mL by mouth every 6 hours as needed. 120 mL 0     No facility-administered medications prior to visit.               Hypertension        Review of Systems   Constitutional: Negative.    HENT: Negative.    Eyes: Negative.    Respiratory: Negative.    Cardiovascular: Negative.    Gastrointestinal: Negative.    Genitourinary: Negative.    Musculoskeletal: Negative.   "  Skin: Negative.    Neurological: Negative.    Endo/Heme/Allergies: Negative.    Psychiatric/Behavioral: Negative.           Objective:     /80 mmHg  Pulse 87  Temp(Src) 36.4 °C (97.5 °F)  Ht 1.788 m (5' 10.39\")  Wt 90.266 kg (199 lb)  BMI 28.24 kg/m2  SpO2 95%     Physical Exam   Constitutional: He is oriented to person, place, and time. He appears well-developed and well-nourished. No distress.   HENT:   Head: Normocephalic and atraumatic.   Right Ear: External ear normal.   Left Ear: External ear normal.   Mouth/Throat: Oropharynx is clear and moist. No oropharyngeal exudate.   Eyes: Conjunctivae and EOM are normal. Pupils are equal, round, and reactive to light. Right eye exhibits no discharge.   Neck: Normal range of motion. Neck supple. No JVD present. No tracheal deviation present. No thyromegaly present.   Cardiovascular: Normal rate, regular rhythm, normal heart sounds and intact distal pulses.  Exam reveals no gallop.    Pulmonary/Chest: Effort normal and breath sounds normal. No respiratory distress. He has no wheezes. He has no rales. He exhibits no tenderness.   Abdominal: Soft. Bowel sounds are normal. He exhibits no distension and no mass. There is no tenderness. There is no rebound and no guarding. No hernia.   Genitourinary: Guaiac negative stool. No penile tenderness.   Musculoskeletal: He exhibits no edema or tenderness.   Lymphadenopathy:     He has no cervical adenopathy.   Neurological: He is alert and oriented to person, place, and time. He has normal reflexes. He displays normal reflexes. No cranial nerve deficit. He exhibits normal muscle tone. Coordination normal.   Skin: Skin is warm and dry. No rash noted. He is not diaphoretic. No erythema. No pallor.   Psychiatric: He has a normal mood and affect. His behavior is normal. Judgment and thought content normal.   Nursing note and vitals reviewed.    Orders Only on 06/19/2017   Component Date Value   • WBC 06/19/2017 3.4    • " RBC 06/19/2017 5.24    • Hemoglobin 06/19/2017 14.8    • Hematocrit 06/19/2017 46.6    • MCV 06/19/2017 89    • MCH 06/19/2017 28.2    • MCHC 06/19/2017 31.8    • RDW 06/19/2017 16.5*   • Platelet Count 06/19/2017 151    • Neutrophils-Polys 06/19/2017 51    • Lymphocytes 06/19/2017 27    • Monocytes 06/19/2017 9    • Eosinophils 06/19/2017 10    • Basophils 06/19/2017 2    • Immature Cells 06/19/2017 CANCELED    • Neutrophils (Absolute) 06/19/2017 1.8    • Lymphs (Absolute) 06/19/2017 0.9    • Monos (Absolute) 06/19/2017 0.3    • Eos (Absolute) 06/19/2017 0.4    • Baso (Absolute) 06/19/2017 0.1    • Immature Granulocytes 06/19/2017 1    • Immature Granulocytes (a* 06/19/2017 0.0    • Nucleated RBC 06/19/2017 CANCELED    • Comments-Diff 06/19/2017 CANCELED    • Glucose 06/19/2017 89    • Bun 06/19/2017 14    • Creatinine 06/19/2017 1.29*   • GFR If Non  Ameri* 06/19/2017 53*   • GFR If  06/19/2017 62    • Bun-Creatinine Ratio 06/19/2017 11    • Sodium 06/19/2017 142    • Potassium 06/19/2017 4.7    • Chloride 06/19/2017 102    • Co2 06/19/2017 22    • Calcium 06/19/2017 8.7    • Total Protein 06/19/2017 6.0    • Albumin 06/19/2017 4.3    • Globulin 06/19/2017 1.7    • A-G Ratio 06/19/2017 2.5*   • Total Bilirubin 06/19/2017 1.8*   • Alkaline Phosphatase 06/19/2017 65    • AST(SGOT) 06/19/2017 31    • ALT(SGPT) 06/19/2017 18    • Cholesterol,Tot 06/19/2017 264*   • Triglycerides 06/19/2017 142    • HDL 06/19/2017 66    • VLDL Cholesterol Calc 06/19/2017 28    • LDL 06/19/2017 170*   • Comment: 06/19/2017 CANCELED    • TSH 06/19/2017 6.560*      No results found for: HBA1C  Lab Results   Component Value Date/Time    SODIUM 142 06/19/2017 10:30 AM    POTASSIUM 4.7 06/19/2017 10:30 AM    CHLORIDE 102 06/19/2017 10:30 AM    CO2 22 06/19/2017 10:30 AM    GLUCOSE 89 06/19/2017 10:30 AM    BUN 14 06/19/2017 10:30 AM    CREATININE 1.29* 06/19/2017 10:30 AM    BUN-CREATININE RATIO 11 06/19/2017 10:30 AM     ALKALINE PHOSPHATASE 65 06/19/2017 10:30 AM    AST(SGOT) 31 06/19/2017 10:30 AM    ALT(SGPT) 18 06/19/2017 10:30 AM    TOTAL BILIRUBIN 1.8* 06/19/2017 10:30 AM     Lab Results   Component Value Date/Time    INR 1.09 08/27/2009 11:02 AM    INR 0.97 02/19/2009 11:36 AM     Lab Results   Component Value Date/Time    CHOLESTEROL,* 06/19/2017 10:30 AM    * 06/19/2017 10:30 AM    HDL 66 06/19/2017 10:30 AM    TRIGLYCERIDES 142 06/19/2017 10:30 AM       No results found for: TESTOSTERONE  Lab Results   Component Value Date/Time    TSH 6.560* 06/19/2017 10:30 AM    TSH 0.010* 03/16/2017 08:01 AM     No results found for: FREET4  Lab Results   Component Value Date/Time    URIC ACID 4.8 06/02/2010 09:46 AM     No components found for: VITB12  Lab Results   Component Value Date/Time    25-HYDROXY   VITAMIN D 25 22.5* 10/06/2010 11:45 AM                  Assessment/Plan:     1. Hypothyroidism due to acquired atrophy of thyroid-this is not a goal. His thyroid has been reduced too much. We had reduced it from 150 down to 75 µg a day. He'll now increase it to 100 mg a day and recheck again in 3-6 months.  - levothyroxine (SYNTHROID) 100 MCG Tab; Take 1 Tab by mouth Every morning on an empty stomach.  Dispense: 90 Tab; Refill: 4  - TSH; Future    2. Essential hypertension    Under good control. Continue same regimen.  3. Gastroesophageal reflux disease with esophagitis   Under good control. Continue same regimen.  4. Mixed hyperlipidemia   Under good control. Continue same regimen.  - COMP METABOLIC PANEL; Future  - LIPID PROFILE; Future  - CBC WITH DIFFERENTIAL; Future    5. Claustrophobia- paxil rx; no depression   Under good control. Continue same regimen.  6. 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 Continue same regimen.  7. Mixed hyperlipidemia- mild no meds    Under good control. Continue same  regimen.- COMP METABOLIC PANEL; Future  - LIPID PROFILE; Future  - CBC WITH DIFFERENTIAL; Future      30 minute face-to-face encounter took place today.  More than half of this time was spent in the coordination of care of the above problems, as well as counseling.

## 2017-06-22 NOTE — MR AVS SNAPSHOT
"        Chang Unger   2017 2:20 PM   Office Visit   MRN: [id number]    Department:  [address] [place] [address]   Dept Phone:  [phone]    Description:  Male : 1940   Provider:  Hector Burnett M.D.           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   [address] [id number] [phone]           You will be receiving a confirmation call a few days before your appointment from our automated call confirmation system.              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-, RT/chemo rx-  dr roman  C82.58   Unknown - Present    Essential hypertension I10   Unknown - Present    GERD (gastroesophageal reflux disease) K21.9   Unknown - Present   " Claustrophobia- paxil rx; no depression F40.240   12/16/2013 - Present    Hypothyroidism due to acquired atrophy of thyroid E03.4   1/20/2014 - Present    Mixed hyperlipidemia- mild no meds E78.2   1/20/2014 - Present      Health Maintenance        Date Due Completion Dates    IMM ZOSTER VACCINE 11/7/2000 ---    IMM PNEUMOCOCCAL 65+ (ADULT) HIGH/HIGHEST RISK SERIES (1 of 2 - PCV13) 11/7/2005 ---    COLONOSCOPY 1/30/2019 1/30/2014    IMM DTaP/Tdap/Td Vaccine (2 - Td) 7/13/2023 7/13/2013            Current Immunizations     Tdap Vaccine 7/13/2013      Below and/or attached are the medications your provider expects you to take. Review all of your home medications and newly ordered medications with your provider and/or pharmacist. Follow medication instructions as directed by your provider and/or pharmacist. Please keep your medication list with you and share with your provider. Update the information when medications are discontinued, doses are changed, or new medications (including over-the-counter products) are added; and carry medication information at all times in the event of emergency situations     Allergies:  NITROGLYCERIN - (reactions not documented)     LOSARTAN - (reactions not documented)               Medications  Valid as of: June 22, 2017 -  2:52 PM    Generic Name Brand Name Tablet Size Instructions for use    AmLODIPine Besylate (Tab) NORVASC 5 MG Take 1 Tab by mouth every day.        Guaifenesin-Codeine (Solution) ROBITUSSIN -10 mg/5mL Take 5 mL by mouth every 6 hours as needed.        Levothyroxine Sodium (Tab) SYNTHROID 150 MCG Take 0.5 Tabs by mouth Every morning on an empty stomach.        Levothyroxine Sodium (Tab) SYNTHROID 100 MCG Take 1 Tab by mouth Every morning on an empty stomach.        Lisinopril (Tab) PRINIVIL 20 MG TAKE 1 TABLET DAILY        PARoxetine HCl (Tab) PAXIL 20 MG Take 1 twice a wk        .                 Medicines prescribed today were sent to:     [facility] - [place], [place] - [address]    [address] [place] [address]    Phone: [phone] Fax: [phone]    Open 24 Hours?: No    [facility] - [place], [place] - [address] [facility]    [address] [facility] [address]    Phone: [phone] Fax: [phone]    Open 24 Hours?: No      Medication refill instructions:       If your prescription bottle indicates you have medication refills left, it is not necessary to call your provider’s office. Please contact your pharmacy and they will refill your medication.    If your prescription bottle indicates you do not have any refills left, you may request refills at any time through one of the following ways: The online [employer] system (except Urgent Care), by calling your provider’s office, or by asking your pharmacy to contact your provider’s office with a refill request. Medication refills are processed only during regular business hours and may not be available until the next business day. Your provider may request additional information or to have a follow-up visit with you prior to refilling your medication.   *Please Note: Medication refills are assigned a new Rx number when refilled electronically. Your pharmacy may indicate that no refills were authorized even though a new prescription for the same medication is available at the pharmacy. Please request the medicine by name with the pharmacy before contacting your provider for a refill.        Your To Do List     Future Labs/Procedures Complete By Expires    CBC WITH DIFFERENTIAL  As directed 6/22/2018    COMP METABOLIC PANEL  As directed 6/22/2018    LIPID PROFILE  As directed 6/22/2018    TSH  As directed 6/22/2018         [employer] Access Code: Activation code not generated  Current [employer] Status: Active

## 2017-07-27 PROBLEM — D04.61 CARCINOMA IN SITU OF SKIN OF RIGHT UPPER LIMB, INCLUDING SHOULDER: Status: ACTIVE | Noted: 2017-07-27

## 2017-07-27 PROBLEM — C44.519 BASAL CELL CARCINOMA OF SKIN OF OTHER PART OF TRUNK: Status: ACTIVE | Noted: 2017-07-27

## 2017-07-27 PROBLEM — Z85.828 PERSONAL HISTORY OF OTHER MALIGNANT NEOPLASM OF SKIN: Status: ACTIVE | Noted: 2017-07-27

## 2017-10-25 NOTE — TELEPHONE ENCOUNTER
ESTABLISHED PATIENT PRE-VISIT PLANNING     Note: Patient will not be contacted if there is no indication to call.     1.  Reviewed notes from the last few office visits within the medical group: Yes    2.  If any orders were placed at last visit or intended to be done for this visit (i.e. 6 mos follow-up), do we have Results/Consult Notes?        •  Labs - Labs ordered, completed on 10/25 and results are in chart.   Note: If patient appointment is for lab review and patient did not complete labs,                check with provider if OK to reschedule patient until labs completed.       •  Imaging - Imaging was not ordered at last office visit.       •  Referrals - No referrals were ordered at last office visit.    3. Is this appointment scheduled as a Hospital Follow-Up? No    4.  Immunizations were updated in [place] using [employer]?: Yes       •  [employer] Recommendations: FLU, PREVNAR (PCV13) , TD and ZOSTAVAX (Shingles)    5.  Patient is due for the following Health Maintenance Topics:   Health Maintenance Due   Topic Date Due   • Annual Wellness Visit  1940   • IMM ZOSTER VACCINE  11/07/2000   • IMM PNEUMOCOCCAL 65+ (ADULT) LOW/MEDIUM RISK SERIES (1 of 2 - PCV13) 11/07/2005   • IMM INFLUENZA (1) 09/01/2017       - Patient has completed  and HMR Letter(s) faxed to:       6.  Patient was NOT informed to arrive 15 min prior to their scheduled appointment and bring in their medication bottles.

## 2017-10-27 NOTE — PROGRESS NOTES
"Subjective:      Chang Unger is a 76 y.o. male who presents with Hypothyroidism (evaluation on blood work results )  The patient is here for followup of chronic medical problems listed below. The patient is compliant with medications and having no side effects from them. Denies chest pain, abdominal pain, dyspnea, myalgias, or cough.   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 90 Tab 4   • paroxetine (PAXIL) 20 MG Tab Take 1 twice a wk 30 Tab 4   • lisinopril (PRINIVIL) 20 MG Tab TAKE 1 TABLET DAILY 90 Tab 4   • amlodipine (NORVASC) 5 MG Tab Take 1 Tab by mouth every day. (Patient not taking: Reported on 10/26/2017) 90 Tab 4     No facility-administered medications prior to visit.                HPI    Review of Systems   Constitutional: Negative.    HENT: Negative.    Eyes: Negative.    Respiratory: Negative.    Cardiovascular: Negative.    Gastrointestinal: Negative.    Genitourinary: Negative.    Musculoskeletal: Negative.    Skin: Negative.    Neurological: Negative.    Endo/Heme/Allergies: Negative.    Psychiatric/Behavioral: Negative.           Objective:     /70   Pulse 71   Temp 36.7 °C (98.1 °F)   Ht 1.788 m (5' 10.39\")   Wt 92.5 kg (204 lb)   SpO2 96%   BMI 28.94 kg/m²      Physical Exam  Normal heent chest heart abd ext          Orders Only on 10/24/2017   Component Date " Value   • WBC 10/25/2017 3.6    • RBC 10/25/2017 5.40    • Hemoglobin 10/25/2017 15.4    • Hematocrit 10/25/2017 46.6    • MCV 10/25/2017 86    • MCH 10/25/2017 28.5    • MCHC 10/25/2017 33.0    • RDW 10/25/2017 14.9    • Platelet Count 10/25/2017 144*   • Neutrophils-Polys 10/25/2017 49    • Lymphocytes 10/25/2017 26    • Monocytes 10/25/2017 12    • Eosinophils 10/25/2017 11    • Basophils 10/25/2017 1    • Immature Cells 10/25/2017 CANCELED    • Neutrophils (Absolute) 10/25/2017 1.8    • Lymphs (Absolute) 10/25/2017 0.9    • Monos (Absolute) 10/25/2017 0.4    • Eos (Absolute) 10/25/2017 0.4    • Baso (Absolute) 10/25/2017 0.0    • Immature Granulocytes 10/25/2017 1    • Immature Granulocytes (a* 10/25/2017 0.0    • Nucleated RBC 10/25/2017 CANCELED    • Comments-Diff 10/25/2017 CANCELED    • Glucose 10/25/2017 88    • Bun 10/25/2017 20    • Creatinine 10/25/2017 1.29*   • GFR If Non  Ameri* 10/25/2017 53*   • GFR If  10/25/2017 62    • Bun-Creatinine Ratio 10/25/2017 16    • Sodium 10/25/2017 140    • Potassium 10/25/2017 4.2    • Chloride 10/25/2017 103    • Co2 10/25/2017 22    • Calcium 10/25/2017 8.8    • Total Protein 10/25/2017 6.1    • Albumin 10/25/2017 4.0    • Globulin 10/25/2017 2.1    • A-G Ratio 10/25/2017 1.9    • Total Bilirubin 10/25/2017 1.8*   • Alkaline Phosphatase 10/25/2017 59    • AST(SGOT) 10/25/2017 38    • ALT(SGPT) 10/25/2017 24    • Cholesterol,Tot 10/25/2017 235*   • Triglycerides 10/25/2017 111    • HDL 10/25/2017 54    • VLDL Cholesterol Calc 10/25/2017 22    • LDL 10/25/2017 159*   • Comment: 10/25/2017 CANCELED    • TSH 10/25/2017 1.240       No results found for: HBA1C  Lab Results   Component Value Date/Time    SODIUM 140 10/24/2017 09:44 AM    SODIUM 138 01/14/2014 08:54 AM    POTASSIUM 4.2 10/24/2017 09:44 AM    POTASSIUM 4.1 01/14/2014 08:54 AM    CHLORIDE 103 10/24/2017 09:44 AM    CHLORIDE 104 01/14/2014 08:54 AM    CO2 22 10/24/2017 09:44 AM    CO2 28  01/14/2014 08:54 AM    GLUCOSE 88 10/24/2017 09:44 AM    GLUCOSE 86 01/14/2014 08:54 AM    BUN 20 10/24/2017 09:44 AM    BUN 20 01/14/2014 08:54 AM    CREATININE 1.29 (H) 10/24/2017 09:44 AM    CREATININE 1.63 (H) 01/14/2014 08:54 AM    CREATININE 1.0 04/20/2009 12:30 PM    BUNCREATRAT 16 10/24/2017 09:44 AM    ALKPHOSPHAT 59 10/24/2017 09:44 AM    ALKPHOSPHAT 43 01/14/2014 08:54 AM    ASTSGOT 38 10/24/2017 09:44 AM    ASTSGOT 130 (H) 01/14/2014 08:54 AM    ALTSGPT 24 10/24/2017 09:44 AM    ALTSGPT 58 (H) 01/14/2014 08:54 AM    TBILIRUBIN 1.8 (H) 10/24/2017 09:44 AM    TBILIRUBIN 1.4 01/14/2014 08:54 AM     Lab Results   Component Value Date/Time    INR 1.09 08/27/2009 11:02 AM    INR 0.97 02/19/2009 11:36 AM     Lab Results   Component Value Date/Time    CHOLSTRLTOT 235 (H) 10/24/2017 09:44 AM    CHOLSTRLTOT 392 (H) 01/14/2014 08:54 AM     (H) 10/24/2017 09:44 AM     (H) 01/14/2014 08:54 AM    HDL 54 10/24/2017 09:44 AM    HDL 65 01/14/2014 08:54 AM    TRIGLYCERIDE 111 10/24/2017 09:44 AM    TRIGLYCERIDE 204 (H) 01/14/2014 08:54 AM       No results found for: TESTOSTERONE  Lab Results   Component Value Date/Time    TSH 1.240 10/24/2017 09:44 AM    TSH 6.560 (H) 06/19/2017 10:30 AM     No results found for: FREET4  Lab Results   Component Value Date/Time    URICACID 4.8 06/02/2010 09:46 AM     No components found for: VITB12  Lab Results   Component Value Date/Time    25HYDROXY 22.5 (L) 10/06/2010 11:45 AM       Assessment/Plan:     1. Hypothyroidism due to acquired atrophy of thyroid    Under good control. Continue same regimen.    - TSH; Future    2. Mixed hyperlipidemia- mild no meds   Under good control. Continue same regimen.    - COMP METABOLIC PANEL; Future  - LIPID PROFILE; Future  - CBC WITH DIFFERENTIAL; Future    3. Essential hypertension    Under good control. Continue same regimen.'    30 minute face-to-face encounter took place today.  More than half of this time was spent in the  coordination of care of the above problems, as well as counseling.

## 2017-11-06 NOTE — ED PROVIDER NOTES
"ED Provider Note    CHIEF COMPLAINT  Chief Complaint   Patient presents with   • Foot Pain     right ankle and heel pain and swelling started 3 days ago       HPI  Chang Unger is a 76 y.o. male who presents with right foot pain, right heel pain, for the last 3 or 4 days, gradually getting worse. Pain with walking, more medial than lateral. No trauma no fever no chills no nausea no vomiting. No pain when he is not walking    REVIEW OF SYSTEMS  See HPI for further details. 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 History of hypertension, hematuria, basal cell cancer All other systems are negative.     PAST MEDICAL HISTORY  Past Medical History:   Diagnosis Date   • Basal cell cancer    • GERD (gastroesophageal reflux disease)    • Hematuria    • HTN    • Lymphoma (CMS-HCC)        FAMILY HISTORY  Family History   Problem Relation Age of Onset   • Cancer Mother      pancreatic       SOCIAL HISTORY  Social History     Social History   • Marital status:      Spouse name: N/A   • Number of children: N/A   • Years of education: N/A     Social History Main Topics   • Smoking status: Never Smoker   • Smokeless tobacco: Never Used   • Alcohol use No      Comment: etoh abuse,   • Drug use: No   • Sexual activity: Not on file      Comment: ,2 kids, retired marine     Other Topics Concern   • Not on file     Social History Narrative   • No narrative on file       SURGICAL HISTORY  Past Surgical History:   Procedure Laterality Date   • TONSILLECTOMY         CURRENT MEDICATIONS  Home Medications    **Home medications have not yet been reviewed for this encounter**         ALLERGIES  Allergies   Allergen Reactions   • Nitroglycerin      Pulse drops to 40   • Losartan      Leg swelling       PHYSICAL EXAM  VITAL SIGNS: /99   Pulse 73   Temp 36.6 °C (97.8 °F)   Resp 16   Ht 1.778 m (5' 10\")   Wt 90.9 kg (200 lb 6.4 oz)   SpO2 96%   BMI 28.75 kg/m²   Constitutional: Well " developed, Well nourished, No acute distress, Non-toxic appearance.   Extremities: Intact distal pulses,Examination of his right foot no heat no redness neurovascular status intact skin is intact. On the medial aspect of the right heel he has some minimal tenderness, but no redness no swelling. Achilles tendon is intact ankle malleoli are nontender., No cyanosis, No clubbing.   Musculoskeletal: Good range of motion in all major joints. No tenderness to palpation or major deformities noted.   Neurologic: Alert & oriented x 3, Normal motor function, Normal sensory function, No focal deficits noted.   Psychiatric: Affect normal, Judgment normal, Mood normal.       RADIOLOGY/PROCEDURES  DX-FOOT-2- RIGHT   Final Result      1.  No acute findings.      2.  Focal calcification in the Achilles tendon may represent tendinosis.              COURSE & MEDICAL DECISION MAKING  Pertinent Labs & Imaging studies reviewed. (See chart for details)    He has had right foot pain, for 3 or 4 days, I am not seeing any evidence of infection. There is no heat no redness minimal tenderness.. Pain is only present with walking. I therefore am going to give him Toradol. I'll have him follow-up on call orthopedist here, Dr. Sneed. There is one area that may demonstrate some Achilles tendon tendinitis  FINAL IMPRESSION  1.   1. Foot pain, right        2.   3.     Disposition  Discharge instructions are understood. This patient is to return if fever vomiting or no better in 12 hours. Follow up with the [facility] or private physician. Information sheets onFoot pain  Electronically signed by: Guzman Adler, 11/6/2017 10:23 AM

## 2017-11-06 NOTE — ED NOTES
Discharge and f/u instructions discussed and understanding verbalized.  Ambulatory out of ED.  RX to [facility].

## 2017-11-06 NOTE — DISCHARGE INSTRUCTIONS
Return if fever, vomiting or if no better in 12 hours.Tendinitis  Tendinitis is swelling and inflammation of the tendons. Tendons are band-like tissues that connect muscle to bone. Tendinitis commonly occurs in the:   · Shoulders (rotator cuff).  · Heels (Achilles tendon).  · Elbows (triceps tendon).  CAUSES  Tendinitis is usually caused by overusing the tendon, muscles, and joints involved. When the tissue surrounding a tendon (synovium) becomes inflamed, it is called tenosynovitis. Tendinitis commonly develops in people whose jobs require repetitive motions.  SYMPTOMS  · Pain.  · Tenderness.  · Mild swelling.  DIAGNOSIS  Tendinitis is usually diagnosed by physical exam. Your health care provider may also order X-rays or other imaging tests.  TREATMENT  Your health care provider may recommend certain medicines or exercises for your treatment.  HOME CARE INSTRUCTIONS   · Use a sling or splint for as long as directed by your health care provider until the pain decreases.  · Put ice on the injured area.  ¨ Put ice in a plastic bag.  ¨ Place a towel between your skin and the bag.  ¨ Leave the ice on for 15-20 minutes, 3-4 times a day, or as directed by your health care provider.  · Avoid using the limb while the tendon is painful. Perform gentle range of motion exercises only as directed by your health care provider. Stop exercises if pain or discomfort increase, unless directed otherwise by your health care provider.  · Only take over-the-counter or prescription medicines for pain, discomfort, or fever as directed by your health care provider.  SEEK MEDICAL CARE IF:   · Your pain and swelling increase.  · You develop new, unexplained symptoms, especially increased numbness in the hands.  MAKE SURE YOU:   · Understand these instructions.  · Will watch your condition.  · Will get help right away if you are not doing well or get worse.     This information is not intended to replace advice given to you by your health  care provider. Make sure you discuss any questions you have with your health care provider.     Document Released: 12/15/2001 Document Revised: 01/08/2016 Document Reviewed: 03/05/2012  [employer] Interactive Patient Education ©2016 [employer]

## 2017-12-23 NOTE — PROGRESS NOTES
Subjective:     Chang Unger is a 77 y.o. male who presents for Sinus Problem (sinus pain and pressure, post nasal and cough, swelling on forehead, sob x3wks, has taken augmentin for sinus) and Otalgia (sharp pain left and right)       Sinus Problem   This is a chronic problem. The current episode started 1 to 4 weeks ago. The problem has been gradually worsening since onset. There has been no fever. His pain is at a severity of 6/10. The pain is moderate. Associated symptoms include chills, congestion, ear pain, headaches and sinus pressure. Pertinent negatives include no shortness of breath or sore throat. Past treatments include acetaminophen. The treatment provided no relief.   Otalgia    There is pain in both ears. This is a new problem. The current episode started 1 to 4 weeks ago. The problem occurs constantly. The problem has been unchanged. There has been no fever. The pain is at a severity of 5/10. The pain is moderate. Associated symptoms include headaches. Pertinent negatives include no ear discharge, rash, sore throat or vomiting. He has tried acetaminophen for the symptoms. The treatment provided no relief. chronic sinus infections     Past Medical History:   Diagnosis Date   • Basal cell cancer    • GERD (gastroesophageal reflux disease)    • Hematuria    • HTN    • Lymphoma (CMS-HCC)      Past Surgical History:   Procedure Laterality Date   • TONSILLECTOMY       Social History     Social History   • Marital status:      Spouse name: N/A   • Number of children: N/A   • Years of education: N/A     Occupational History   • Not on file.     Social History Main Topics   • Smoking status: Never Smoker   • Smokeless tobacco: Never Used   • Alcohol use No      Comment: etoh abuse,   • Drug use: No   • Sexual activity: Not on file      Comment: ,2 kids, retired marine     Other Topics Concern   • Not on file     Social History Narrative   • No narrative on file      Family History  "  Problem Relation Age of Onset   • Cancer Mother      pancreatic    Review of Systems   Constitutional: Positive for chills and malaise/fatigue. Negative for fever.   HENT: Positive for congestion, ear pain, sinus pain and sinus pressure. Negative for ear discharge and sore throat.    Eyes: Negative for pain.   Respiratory: Negative for shortness of breath.    Cardiovascular: Negative for chest pain.   Gastrointestinal: Negative for nausea and vomiting.   Genitourinary: Negative for hematuria.   Musculoskeletal: Negative for myalgias.   Skin: Negative for rash.   Neurological: Positive for headaches. Negative for dizziness.     Allergies   Allergen Reactions   • Nitroglycerin      Pulse drops to 40   • Losartan      Leg swelling      Objective:   /70   Pulse 96   Temp 37.2 °C (98.9 °F)   Resp 16   Ht 1.778 m (5' 10\")   Wt 90.7 kg (200 lb)   SpO2 95%   BMI 28.70 kg/m²   Physical Exam   Constitutional: He is oriented to person, place, and time. He appears well-developed and well-nourished. No distress.   HENT:   Head: Normocephalic and atraumatic.   Right Ear: Tympanic membrane normal.   Left Ear: Tympanic membrane normal.   Nose: Mucosal edema present. Right sinus exhibits frontal sinus tenderness. Right sinus exhibits no maxillary sinus tenderness. Left sinus exhibits frontal sinus tenderness. Left sinus exhibits no maxillary sinus tenderness.   Mouth/Throat: Uvula is midline, oropharynx is clear and moist and mucous membranes are normal. No posterior oropharyngeal edema, posterior oropharyngeal erythema or tonsillar abscesses. No tonsillar exudate.   Eyes: Conjunctivae and EOM are normal. Pupils are equal, round, and reactive to light. Right eye exhibits no discharge. Left eye exhibits no discharge.   Cardiovascular: Normal rate and regular rhythm.    No murmur heard.  Pulmonary/Chest: Effort normal and breath sounds normal. No respiratory distress.   Abdominal: Soft. He exhibits no distension. There " is no tenderness.   Neurological: He is alert and oriented to person, place, and time. He has normal reflexes. No sensory deficit.   Skin: Skin is warm, dry and intact.   Psychiatric: He has a normal mood and affect.         Assessment/Plan:   Assessment    1. Acute recurrent frontal sinusitis  - amoxicillin-clavulanate (AUGMENTIN) 875-125 MG Tab; Take 1 Tab by mouth 2 times a day for 10 days.  Dispense: 20 Tab; Refill: 0  - REFERRAL TO ENT       Patient having recurrent sinus infections. Patient does not have an ENT specialist that he sees. Will refer to ENT for further management and evaluation of chronic sinus infections. We'll treat today with an intact therapy. Advised supportive care of Tylenol and/or ibuprofen for fever and comfort. Recommended over-the-counter Flonase    Patient given precautionary s/sx that mandate immediate follow up and evaluation in the ED. Advised of risks of not doing so.    DDX, Supportive care, and indications for immediate follow-up discussed with patient.    Instructed to return to clinic or nearest emergency department if we are not available for any change in condition, further concerns, or worsening of symptoms.    The patient demonstrated a good understanding and agreed with the treatment plan.  .

## 2018-01-01 ENCOUNTER — APPOINTMENT (OUTPATIENT)
Dept: RADIOLOGY | Facility: MEDICAL CENTER | Age: 78
DRG: 840 | End: 2018-01-01
Attending: INTERNAL MEDICINE
Payer: MEDICARE

## 2018-01-01 ENCOUNTER — HOSPITAL ENCOUNTER (OUTPATIENT)
Facility: MEDICAL CENTER | Age: 78
DRG: 840 | End: 2018-03-04
Payer: MEDICARE

## 2018-01-01 ENCOUNTER — APPOINTMENT (OUTPATIENT)
Dept: RADIOLOGY | Facility: MEDICAL CENTER | Age: 78
DRG: 841 | End: 2018-01-01
Attending: HOSPITALIST
Payer: MEDICARE

## 2018-01-01 ENCOUNTER — APPOINTMENT (OUTPATIENT)
Dept: RADIOLOGY | Facility: MEDICAL CENTER | Age: 78
DRG: 840 | End: 2018-01-01
Attending: HOSPITALIST
Payer: MEDICARE

## 2018-01-01 ENCOUNTER — HOSPITAL ENCOUNTER (INPATIENT)
Facility: MEDICAL CENTER | Age: 78
LOS: 27 days | DRG: 840 | End: 2018-03-31
Attending: HOSPITALIST | Admitting: HOSPITALIST
Payer: MEDICARE

## 2018-01-01 ENCOUNTER — APPOINTMENT (OUTPATIENT)
Dept: RADIOLOGY | Facility: MEDICAL CENTER | Age: 78
DRG: 841 | End: 2018-01-01
Attending: EMERGENCY MEDICINE
Payer: MEDICARE

## 2018-01-01 ENCOUNTER — HOSPITAL ENCOUNTER (INPATIENT)
Facility: MEDICAL CENTER | Age: 78
LOS: 9 days | DRG: 841 | End: 2018-03-04
Attending: EMERGENCY MEDICINE | Admitting: HOSPITALIST
Payer: MEDICARE

## 2018-01-01 ENCOUNTER — PATIENT OUTREACH (OUTPATIENT)
Dept: HEALTH INFORMATION MANAGEMENT | Facility: OTHER | Age: 78
End: 2018-01-01

## 2018-01-01 ENCOUNTER — RESOLUTE PROFESSIONAL BILLING HOSPITAL PROF FEE (OUTPATIENT)
Dept: HOSPITALIST | Facility: MEDICAL CENTER | Age: 78
End: 2018-01-01
Payer: MEDICARE

## 2018-01-01 ENCOUNTER — APPOINTMENT (OUTPATIENT)
Dept: MEDICAL GROUP | Age: 78
End: 2018-01-01
Payer: MEDICARE

## 2018-01-01 ENCOUNTER — APPOINTMENT (OUTPATIENT)
Dept: RADIOLOGY | Facility: MEDICAL CENTER | Age: 78
End: 2018-01-01
Attending: SPECIALIST
Payer: MEDICARE

## 2018-01-01 ENCOUNTER — APPOINTMENT (OUTPATIENT)
Dept: RADIOLOGY | Facility: MEDICAL CENTER | Age: 78
DRG: 841 | End: 2018-01-01
Attending: INTERNAL MEDICINE
Payer: MEDICARE

## 2018-01-01 ENCOUNTER — HOSPITAL ENCOUNTER (OUTPATIENT)
Facility: MEDICAL CENTER | Age: 78
End: 2018-01-01
Attending: HOSPITALIST | Admitting: HOSPITALIST
Payer: MEDICARE

## 2018-01-01 ENCOUNTER — OFFICE VISIT (OUTPATIENT)
Dept: MEDICAL GROUP | Age: 78
End: 2018-01-01
Payer: MEDICARE

## 2018-01-01 VITALS
WEIGHT: 196.43 LBS | SYSTOLIC BLOOD PRESSURE: 101 MMHG | TEMPERATURE: 98.9 F | OXYGEN SATURATION: 83 % | HEART RATE: 105 BPM | HEIGHT: 70 IN | RESPIRATION RATE: 28 BRPM | BODY MASS INDEX: 28.12 KG/M2 | DIASTOLIC BLOOD PRESSURE: 60 MMHG

## 2018-01-01 VITALS
RESPIRATION RATE: 16 BRPM | TEMPERATURE: 98.4 F | WEIGHT: 195.77 LBS | OXYGEN SATURATION: 93 % | HEIGHT: 70 IN | BODY MASS INDEX: 28.03 KG/M2 | DIASTOLIC BLOOD PRESSURE: 83 MMHG | SYSTOLIC BLOOD PRESSURE: 137 MMHG | HEART RATE: 89 BPM

## 2018-01-01 VITALS
WEIGHT: 193 LBS | BODY MASS INDEX: 27.63 KG/M2 | SYSTOLIC BLOOD PRESSURE: 112 MMHG | HEART RATE: 91 BPM | TEMPERATURE: 98.6 F | DIASTOLIC BLOOD PRESSURE: 68 MMHG | HEIGHT: 70 IN | OXYGEN SATURATION: 98 %

## 2018-01-01 DIAGNOSIS — C82.58 DIFFUSE FOLLICLE CENTER LYMPHOMA OF LYMPH NODES OF MULTIPLE REGIONS (HCC): ICD-10-CM

## 2018-01-01 DIAGNOSIS — J01.00 SUBACUTE MAXILLARY SINUSITIS: ICD-10-CM

## 2018-01-01 DIAGNOSIS — R05.9 COUGH: ICD-10-CM

## 2018-01-01 DIAGNOSIS — D70.9 NEUTROPENIC FEVER (HCC): ICD-10-CM

## 2018-01-01 DIAGNOSIS — R50.81 NEUTROPENIC FEVER (HCC): ICD-10-CM

## 2018-01-01 DIAGNOSIS — M54.2 NECK PAIN: ICD-10-CM

## 2018-01-01 LAB
ABO GROUP BLD: NORMAL
ALBUMIN SERPL BCP-MCNC: 2.5 G/DL (ref 3.2–4.9)
ALBUMIN SERPL BCP-MCNC: 2.7 G/DL (ref 3.2–4.9)
ALBUMIN SERPL BCP-MCNC: 2.8 G/DL (ref 3.2–4.9)
ALBUMIN SERPL BCP-MCNC: 2.9 G/DL (ref 3.2–4.9)
ALBUMIN SERPL BCP-MCNC: 3 G/DL (ref 3.2–4.9)
ALBUMIN SERPL BCP-MCNC: 3.4 G/DL (ref 3.2–4.9)
ALBUMIN SERPL BCP-MCNC: 3.6 G/DL (ref 3.2–4.9)
ALBUMIN SERPL BCP-MCNC: 3.6 G/DL (ref 3.2–4.9)
ALBUMIN SERPL-MCNC: 5.7 G/DL (ref 3.75–5.01)
ALBUMIN/GLOB SERPL: 1.4 G/DL
ALBUMIN/GLOB SERPL: 1.4 G/DL
ALBUMIN/GLOB SERPL: 1.5 G/DL
ALBUMIN/GLOB SERPL: 1.7 G/DL
ALBUMIN/GLOB SERPL: 1.9 G/DL
ALBUMIN/GLOB SERPL: 2 G/DL
ALBUMIN/GLOB SERPL: 2.1 G/DL
ALBUMIN/GLOB SERPL: 2.1 G/DL
ALBUMIN/GLOB SERPL: 2.2 G/DL
ALBUMIN/GLOB SERPL: 2.2 G/DL
ALBUMIN/GLOB SERPL: 2.3 G/DL
ALBUMIN/GLOB SERPL: 2.3 G/DL
ALBUMIN/GLOB SERPL: 2.4 G/DL
ALBUMIN/GLOB SERPL: 2.6 G/DL
ALP SERPL-CCNC: 100 U/L (ref 30–99)
ALP SERPL-CCNC: 103 U/L (ref 30–99)
ALP SERPL-CCNC: 104 U/L (ref 30–99)
ALP SERPL-CCNC: 36 U/L (ref 30–99)
ALP SERPL-CCNC: 50 U/L (ref 30–99)
ALP SERPL-CCNC: 53 U/L (ref 30–99)
ALP SERPL-CCNC: 54 U/L (ref 30–99)
ALP SERPL-CCNC: 61 U/L (ref 30–99)
ALP SERPL-CCNC: 73 U/L (ref 30–99)
ALP SERPL-CCNC: 77 U/L (ref 30–99)
ALP SERPL-CCNC: 79 U/L (ref 30–99)
ALP SERPL-CCNC: 82 U/L (ref 30–99)
ALP SERPL-CCNC: 87 U/L (ref 30–99)
ALP SERPL-CCNC: 88 U/L (ref 30–99)
ALP SERPL-CCNC: 90 U/L (ref 30–99)
ALP SERPL-CCNC: 92 U/L (ref 30–99)
ALPHA1 GLOB SERPL ELPH-MCNC: 0.73 G/DL (ref 0.19–0.46)
ALPHA2 GLOB SERPL ELPH-MCNC: 0.86 G/DL (ref 0.48–1.05)
ALT SERPL-CCNC: 102 U/L (ref 2–50)
ALT SERPL-CCNC: 25 U/L (ref 2–50)
ALT SERPL-CCNC: 26 U/L (ref 2–50)
ALT SERPL-CCNC: 26 U/L (ref 2–50)
ALT SERPL-CCNC: 28 U/L (ref 2–50)
ALT SERPL-CCNC: 29 U/L (ref 2–50)
ALT SERPL-CCNC: 29 U/L (ref 2–50)
ALT SERPL-CCNC: 32 U/L (ref 2–50)
ALT SERPL-CCNC: 37 U/L (ref 2–50)
ALT SERPL-CCNC: 42 U/L (ref 2–50)
ALT SERPL-CCNC: 58 U/L (ref 2–50)
ALT SERPL-CCNC: 60 U/L (ref 2–50)
AMMONIA PLAS-SCNC: 27 UMOL/L (ref 11–45)
ANION GAP SERPL CALC-SCNC: 10 MMOL/L (ref 0–11.9)
ANION GAP SERPL CALC-SCNC: 10 MMOL/L (ref 0–11.9)
ANION GAP SERPL CALC-SCNC: 11 MMOL/L (ref 0–11.9)
ANION GAP SERPL CALC-SCNC: 11 MMOL/L (ref 0–11.9)
ANION GAP SERPL CALC-SCNC: 13 MMOL/L (ref 0–11.9)
ANION GAP SERPL CALC-SCNC: 15 MMOL/L (ref 0–11.9)
ANION GAP SERPL CALC-SCNC: 17 MMOL/L (ref 0–11.9)
ANION GAP SERPL CALC-SCNC: 4 MMOL/L (ref 0–11.9)
ANION GAP SERPL CALC-SCNC: 5 MMOL/L (ref 0–11.9)
ANION GAP SERPL CALC-SCNC: 6 MMOL/L (ref 0–11.9)
ANION GAP SERPL CALC-SCNC: 7 MMOL/L (ref 0–11.9)
ANION GAP SERPL CALC-SCNC: 8 MMOL/L (ref 0–11.9)
ANION GAP SERPL CALC-SCNC: 9 MMOL/L (ref 0–11.9)
ANISOCYTOSIS BLD QL SMEAR: ABNORMAL
APPEARANCE UR: ABNORMAL
APPEARANCE UR: CLEAR
APTT PPP: 50.2 SEC (ref 24.7–36)
AST SERPL-CCNC: 124 U/L (ref 12–45)
AST SERPL-CCNC: 17 U/L (ref 12–45)
AST SERPL-CCNC: 19 U/L (ref 12–45)
AST SERPL-CCNC: 20 U/L (ref 12–45)
AST SERPL-CCNC: 20 U/L (ref 12–45)
AST SERPL-CCNC: 22 U/L (ref 12–45)
AST SERPL-CCNC: 23 U/L (ref 12–45)
AST SERPL-CCNC: 23 U/L (ref 12–45)
AST SERPL-CCNC: 24 U/L (ref 12–45)
AST SERPL-CCNC: 28 U/L (ref 12–45)
AST SERPL-CCNC: 28 U/L (ref 12–45)
AST SERPL-CCNC: 31 U/L (ref 12–45)
AST SERPL-CCNC: 39 U/L (ref 12–45)
AST SERPL-CCNC: 42 U/L (ref 12–45)
AST SERPL-CCNC: 42 U/L (ref 12–45)
AST SERPL-CCNC: 49 U/L (ref 12–45)
B-GLOBULIN SERPL ELPH-MCNC: 0.74 G/DL (ref 0.48–1.1)
BACTERIA #/AREA URNS HPF: NEGATIVE /HPF
BACTERIA BLD CULT: NORMAL
BACTERIA SPEC RESP CULT: NORMAL
BACTERIA UR CULT: NORMAL
BARCODED ABORH UBTYP: 5100
BARCODED ABORH UBTYP: 6200
BARCODED PRD CODE UBPRD: NORMAL
BARCODED UNIT NUM UBUNT: NORMAL
BASOPHILS # BLD AUTO: 0 % (ref 0–1.8)
BASOPHILS # BLD AUTO: 0.9 % (ref 0–1.8)
BASOPHILS # BLD AUTO: 1.1 % (ref 0–1.8)
BASOPHILS # BLD AUTO: 2.2 % (ref 0–1.8)
BASOPHILS # BLD AUTO: ABNORMAL % (ref 0–1.8)
BASOPHILS # BLD: 0 K/UL (ref 0–0.12)
BASOPHILS # BLD: 0.01 K/UL (ref 0–0.12)
BASOPHILS # BLD: 0.01 K/UL (ref 0–0.12)
BASOPHILS # BLD: 0.02 K/UL (ref 0–0.12)
BASOPHILS # BLD: ABNORMAL K/UL (ref 0–0.12)
BILIRUB SERPL-MCNC: 1 MG/DL (ref 0.1–1.5)
BILIRUB SERPL-MCNC: 1.1 MG/DL (ref 0.1–1.5)
BILIRUB SERPL-MCNC: 1.2 MG/DL (ref 0.1–1.5)
BILIRUB SERPL-MCNC: 1.5 MG/DL (ref 0.1–1.5)
BILIRUB SERPL-MCNC: 1.6 MG/DL (ref 0.1–1.5)
BILIRUB SERPL-MCNC: 1.9 MG/DL (ref 0.1–1.5)
BILIRUB SERPL-MCNC: 1.9 MG/DL (ref 0.1–1.5)
BILIRUB SERPL-MCNC: 2 MG/DL (ref 0.1–1.5)
BILIRUB SERPL-MCNC: 2.1 MG/DL (ref 0.1–1.5)
BILIRUB SERPL-MCNC: 2.1 MG/DL (ref 0.1–1.5)
BILIRUB SERPL-MCNC: 2.2 MG/DL (ref 0.1–1.5)
BILIRUB SERPL-MCNC: 2.5 MG/DL (ref 0.1–1.5)
BILIRUB SERPL-MCNC: 2.7 MG/DL (ref 0.1–1.5)
BILIRUB SERPL-MCNC: 2.8 MG/DL (ref 0.1–1.5)
BILIRUB UR QL STRIP.AUTO: NEGATIVE
BLD GP AB SCN SERPL QL: NORMAL
BNP SERPL-MCNC: 161 PG/ML (ref 0–100)
BUN SERPL-MCNC: 11 MG/DL (ref 8–22)
BUN SERPL-MCNC: 12 MG/DL (ref 8–22)
BUN SERPL-MCNC: 13 MG/DL (ref 8–22)
BUN SERPL-MCNC: 15 MG/DL (ref 8–22)
BUN SERPL-MCNC: 16 MG/DL (ref 8–22)
BUN SERPL-MCNC: 17 MG/DL (ref 8–22)
BUN SERPL-MCNC: 18 MG/DL (ref 8–22)
BUN SERPL-MCNC: 21 MG/DL (ref 8–22)
BUN SERPL-MCNC: 22 MG/DL (ref 8–22)
BUN SERPL-MCNC: 22 MG/DL (ref 8–22)
BUN SERPL-MCNC: 24 MG/DL (ref 8–22)
BUN SERPL-MCNC: 32 MG/DL (ref 8–22)
BUN SERPL-MCNC: 33 MG/DL (ref 8–22)
BUN SERPL-MCNC: 34 MG/DL (ref 8–22)
BUN SERPL-MCNC: 34 MG/DL (ref 8–22)
BUN SERPL-MCNC: 35 MG/DL (ref 8–22)
BUN SERPL-MCNC: 36 MG/DL (ref 8–22)
BUN SERPL-MCNC: 40 MG/DL (ref 8–22)
BUN SERPL-MCNC: 45 MG/DL (ref 8–22)
BUN SERPL-MCNC: 46 MG/DL (ref 8–22)
C DIFF DNA SPEC QL NAA+PROBE: NEGATIVE
C DIFF DNA SPEC QL NAA+PROBE: NEGATIVE
C DIFF TOX A+B STL QL IA: POSITIVE
C DIFF TOX GENS STL QL NAA+PROBE: NEGATIVE
C DIFF TOX GENS STL QL NAA+PROBE: NORMAL
CALCIUM SERPL-MCNC: 6.4 MG/DL (ref 8.5–10.5)
CALCIUM SERPL-MCNC: 6.5 MG/DL (ref 8.5–10.5)
CALCIUM SERPL-MCNC: 6.6 MG/DL (ref 8.5–10.5)
CALCIUM SERPL-MCNC: 6.8 MG/DL (ref 8.5–10.5)
CALCIUM SERPL-MCNC: 6.9 MG/DL (ref 8.5–10.5)
CALCIUM SERPL-MCNC: 7.1 MG/DL (ref 8.4–10.2)
CALCIUM SERPL-MCNC: 7.1 MG/DL (ref 8.5–10.5)
CALCIUM SERPL-MCNC: 7.2 MG/DL (ref 8.4–10.2)
CALCIUM SERPL-MCNC: 7.3 MG/DL (ref 8.4–10.2)
CALCIUM SERPL-MCNC: 7.3 MG/DL (ref 8.4–10.2)
CALCIUM SERPL-MCNC: 7.3 MG/DL (ref 8.5–10.5)
CALCIUM SERPL-MCNC: 7.4 MG/DL (ref 8.4–10.2)
CALCIUM SERPL-MCNC: 7.4 MG/DL (ref 8.5–10.5)
CALCIUM SERPL-MCNC: 7.5 MG/DL (ref 8.4–10.2)
CALCIUM SERPL-MCNC: 7.5 MG/DL (ref 8.4–10.2)
CALCIUM SERPL-MCNC: 7.5 MG/DL (ref 8.5–10.5)
CALCIUM SERPL-MCNC: 7.6 MG/DL (ref 8.5–10.5)
CALCIUM SERPL-MCNC: 7.9 MG/DL (ref 8.5–10.5)
CALCIUM SERPL-MCNC: 7.9 MG/DL (ref 8.5–10.5)
CALCIUM SERPL-MCNC: 8.2 MG/DL (ref 8.4–10.2)
CHLORIDE SERPL-SCNC: 100 MMOL/L (ref 96–112)
CHLORIDE SERPL-SCNC: 102 MMOL/L (ref 96–112)
CHLORIDE SERPL-SCNC: 103 MMOL/L (ref 96–112)
CHLORIDE SERPL-SCNC: 104 MMOL/L (ref 96–112)
CHLORIDE SERPL-SCNC: 105 MMOL/L (ref 96–112)
CHLORIDE SERPL-SCNC: 106 MMOL/L (ref 96–112)
CHLORIDE SERPL-SCNC: 107 MMOL/L (ref 96–112)
CHLORIDE SERPL-SCNC: 108 MMOL/L (ref 96–112)
CHLORIDE SERPL-SCNC: 108 MMOL/L (ref 96–112)
CHLORIDE SERPL-SCNC: 109 MMOL/L (ref 96–112)
CHLORIDE SERPL-SCNC: 111 MMOL/L (ref 96–112)
CHLORIDE SERPL-SCNC: 112 MMOL/L (ref 96–112)
CHLORIDE SERPL-SCNC: 97 MMOL/L (ref 96–112)
CHLORIDE SERPL-SCNC: 99 MMOL/L (ref 96–112)
CMV DNA # SERPL NAA+PROBE: <390 CPY/ML
CMV DNA SERPL NAA+PROBE-ACNC: <227 IU/ML
CMV DNA SERPL NAA+PROBE-LOG IU: <2.4 LOG IU/ML
CMV DNA SERPL NAA+PROBE-LOG#: <2.6 LOG CPY/ML
CMV GENE MUT DET ISLT: NOT DETECTED
CMV IGG SERPL IA-ACNC: <0.2 U/ML
CMV IGM SERPL IA-ACNC: <8 AU/ML
CMV PCR SOURCE Q4398: NORMAL
CO2 SERPL-SCNC: 14 MMOL/L (ref 20–33)
CO2 SERPL-SCNC: 16 MMOL/L (ref 20–33)
CO2 SERPL-SCNC: 16 MMOL/L (ref 20–33)
CO2 SERPL-SCNC: 17 MMOL/L (ref 20–33)
CO2 SERPL-SCNC: 17 MMOL/L (ref 20–33)
CO2 SERPL-SCNC: 18 MMOL/L (ref 20–33)
CO2 SERPL-SCNC: 19 MMOL/L (ref 20–33)
CO2 SERPL-SCNC: 20 MMOL/L (ref 20–33)
CO2 SERPL-SCNC: 21 MMOL/L (ref 20–33)
CO2 SERPL-SCNC: 22 MMOL/L (ref 20–33)
CO2 SERPL-SCNC: 23 MMOL/L (ref 20–33)
COLOR UR: YELLOW
COMMENT 1642: NORMAL
COMPONENT P 8504P: NORMAL
COMPONENT R 8504R: NORMAL
CORTIS SERPL-MCNC: 20 UG/DL (ref 0–23)
CREAT SERPL-MCNC: 1.21 MG/DL (ref 0.5–1.4)
CREAT SERPL-MCNC: 1.22 MG/DL (ref 0.5–1.4)
CREAT SERPL-MCNC: 1.22 MG/DL (ref 0.5–1.4)
CREAT SERPL-MCNC: 1.25 MG/DL (ref 0.5–1.4)
CREAT SERPL-MCNC: 1.29 MG/DL (ref 0.5–1.4)
CREAT SERPL-MCNC: 1.33 MG/DL (ref 0.5–1.4)
CREAT SERPL-MCNC: 1.35 MG/DL (ref 0.5–1.4)
CREAT SERPL-MCNC: 1.36 MG/DL (ref 0.5–1.4)
CREAT SERPL-MCNC: 1.38 MG/DL (ref 0.5–1.4)
CREAT SERPL-MCNC: 1.39 MG/DL (ref 0.5–1.4)
CREAT SERPL-MCNC: 1.43 MG/DL (ref 0.5–1.4)
CREAT SERPL-MCNC: 1.44 MG/DL (ref 0.5–1.4)
CREAT SERPL-MCNC: 1.44 MG/DL (ref 0.5–1.4)
CREAT SERPL-MCNC: 1.45 MG/DL (ref 0.5–1.4)
CREAT SERPL-MCNC: 1.48 MG/DL (ref 0.5–1.4)
CREAT SERPL-MCNC: 1.49 MG/DL (ref 0.5–1.4)
CREAT SERPL-MCNC: 1.54 MG/DL (ref 0.5–1.4)
CREAT SERPL-MCNC: 1.55 MG/DL (ref 0.5–1.4)
CREAT SERPL-MCNC: 1.59 MG/DL (ref 0.5–1.4)
CREAT SERPL-MCNC: 1.67 MG/DL (ref 0.5–1.4)
CREAT SERPL-MCNC: 1.68 MG/DL (ref 0.5–1.4)
CREAT SERPL-MCNC: 1.72 MG/DL (ref 0.5–1.4)
CREAT SERPL-MCNC: 1.74 MG/DL (ref 0.5–1.4)
CREAT SERPL-MCNC: 1.74 MG/DL (ref 0.5–1.4)
CREAT SERPL-MCNC: 1.77 MG/DL (ref 0.5–1.4)
CREAT SERPL-MCNC: 1.78 MG/DL (ref 0.5–1.4)
CREAT SERPL-MCNC: 2.02 MG/DL (ref 0.5–1.4)
CREAT SERPL-MCNC: 2.28 MG/DL (ref 0.5–1.4)
CREAT SERPL-MCNC: 2.36 MG/DL (ref 0.5–1.4)
CREAT SERPL-MCNC: 2.49 MG/DL (ref 0.5–1.4)
DAT C3D-SP REAG RBC QL: NORMAL
DAT C3D-SP REAG RBC QL: NORMAL
DAT IGG-SP REAG RBC QL: NORMAL
DAT IGG-SP REAG RBC QL: NORMAL
DOHLE BOD BLD QL SMEAR: NORMAL
EKG IMPRESSION: NORMAL
EOSINOPHIL # BLD AUTO: 0 K/UL (ref 0–0.51)
EOSINOPHIL # BLD AUTO: ABNORMAL K/UL (ref 0–0.51)
EOSINOPHIL NFR BLD: 0 % (ref 0–6.9)
EOSINOPHIL NFR BLD: ABNORMAL % (ref 0–6.9)
EPI CELLS #/AREA URNS HPF: ABNORMAL /HPF
EPI CELLS #/AREA URNS HPF: NEGATIVE /HPF
ERYTHROCYTE [DISTWIDTH] IN BLOOD BY AUTOMATED COUNT: 39.8 FL (ref 35.9–50)
ERYTHROCYTE [DISTWIDTH] IN BLOOD BY AUTOMATED COUNT: 42.1 FL (ref 35.9–50)
ERYTHROCYTE [DISTWIDTH] IN BLOOD BY AUTOMATED COUNT: 42.5 FL (ref 35.9–50)
ERYTHROCYTE [DISTWIDTH] IN BLOOD BY AUTOMATED COUNT: 42.5 FL (ref 35.9–50)
ERYTHROCYTE [DISTWIDTH] IN BLOOD BY AUTOMATED COUNT: 42.7 FL (ref 35.9–50)
ERYTHROCYTE [DISTWIDTH] IN BLOOD BY AUTOMATED COUNT: 42.9 FL (ref 35.9–50)
ERYTHROCYTE [DISTWIDTH] IN BLOOD BY AUTOMATED COUNT: 43.3 FL (ref 35.9–50)
ERYTHROCYTE [DISTWIDTH] IN BLOOD BY AUTOMATED COUNT: 43.5 FL (ref 35.9–50)
ERYTHROCYTE [DISTWIDTH] IN BLOOD BY AUTOMATED COUNT: 43.8 FL (ref 35.9–50)
ERYTHROCYTE [DISTWIDTH] IN BLOOD BY AUTOMATED COUNT: 44.1 FL (ref 35.9–50)
ERYTHROCYTE [DISTWIDTH] IN BLOOD BY AUTOMATED COUNT: 44.6 FL (ref 35.9–50)
ERYTHROCYTE [DISTWIDTH] IN BLOOD BY AUTOMATED COUNT: 45.2 FL (ref 35.9–50)
ERYTHROCYTE [DISTWIDTH] IN BLOOD BY AUTOMATED COUNT: 45.7 FL (ref 35.9–50)
ERYTHROCYTE [DISTWIDTH] IN BLOOD BY AUTOMATED COUNT: 46 FL (ref 35.9–50)
ERYTHROCYTE [DISTWIDTH] IN BLOOD BY AUTOMATED COUNT: 46.2 FL (ref 35.9–50)
ERYTHROCYTE [DISTWIDTH] IN BLOOD BY AUTOMATED COUNT: 46.3 FL (ref 35.9–50)
ERYTHROCYTE [DISTWIDTH] IN BLOOD BY AUTOMATED COUNT: 46.3 FL (ref 35.9–50)
ERYTHROCYTE [DISTWIDTH] IN BLOOD BY AUTOMATED COUNT: 46.5 FL (ref 35.9–50)
ERYTHROCYTE [DISTWIDTH] IN BLOOD BY AUTOMATED COUNT: 46.5 FL (ref 35.9–50)
ERYTHROCYTE [DISTWIDTH] IN BLOOD BY AUTOMATED COUNT: 46.8 FL (ref 35.9–50)
ERYTHROCYTE [DISTWIDTH] IN BLOOD BY AUTOMATED COUNT: 47 FL (ref 35.9–50)
ERYTHROCYTE [DISTWIDTH] IN BLOOD BY AUTOMATED COUNT: 47.3 FL (ref 35.9–50)
ERYTHROCYTE [DISTWIDTH] IN BLOOD BY AUTOMATED COUNT: 47.5 FL (ref 35.9–50)
ERYTHROCYTE [DISTWIDTH] IN BLOOD BY AUTOMATED COUNT: 47.6 FL (ref 35.9–50)
ERYTHROCYTE [DISTWIDTH] IN BLOOD BY AUTOMATED COUNT: 47.8 FL (ref 35.9–50)
ERYTHROCYTE [DISTWIDTH] IN BLOOD BY AUTOMATED COUNT: 47.9 FL (ref 35.9–50)
ERYTHROCYTE [DISTWIDTH] IN BLOOD BY AUTOMATED COUNT: 48 FL (ref 35.9–50)
ERYTHROCYTE [DISTWIDTH] IN BLOOD BY AUTOMATED COUNT: 48.5 FL (ref 35.9–50)
ERYTHROCYTE [DISTWIDTH] IN BLOOD BY AUTOMATED COUNT: 48.7 FL (ref 35.9–50)
ERYTHROCYTE [DISTWIDTH] IN BLOOD BY AUTOMATED COUNT: 48.7 FL (ref 35.9–50)
ERYTHROCYTE [DISTWIDTH] IN BLOOD BY AUTOMATED COUNT: 48.9 FL (ref 35.9–50)
ERYTHROCYTE [DISTWIDTH] IN BLOOD BY AUTOMATED COUNT: 49.1 FL (ref 35.9–50)
ERYTHROCYTE [DISTWIDTH] IN BLOOD BY AUTOMATED COUNT: 49.4 FL (ref 35.9–50)
ERYTHROCYTE [DISTWIDTH] IN BLOOD BY AUTOMATED COUNT: 49.5 FL (ref 35.9–50)
ERYTHROCYTE [DISTWIDTH] IN BLOOD BY AUTOMATED COUNT: 49.7 FL (ref 35.9–50)
ERYTHROCYTE [DISTWIDTH] IN BLOOD BY AUTOMATED COUNT: 50.4 FL (ref 35.9–50)
ERYTHROCYTE [DISTWIDTH] IN BLOOD BY AUTOMATED COUNT: 50.6 FL (ref 35.9–50)
FLUAV RNA SPEC QL NAA+PROBE: NEGATIVE
FLUAV+FLUBV AG SPEC QL IA: NORMAL
FLUBV RNA SPEC QL NAA+PROBE: NEGATIVE
GAMMA GLOB SERPL ELPH-MCNC: 0.48 G/DL (ref 0.62–1.51)
GIANT PLATELETS BLD QL SMEAR: NORMAL
GLOBULIN SER CALC-MCNC: 1.3 G/DL (ref 1.9–3.5)
GLOBULIN SER CALC-MCNC: 1.4 G/DL (ref 1.9–3.5)
GLOBULIN SER CALC-MCNC: 1.6 G/DL (ref 1.9–3.5)
GLOBULIN SER CALC-MCNC: 1.7 G/DL (ref 1.9–3.5)
GLOBULIN SER CALC-MCNC: 1.8 G/DL (ref 1.9–3.5)
GLOBULIN SER CALC-MCNC: 1.8 G/DL (ref 1.9–3.5)
GLOBULIN SER CALC-MCNC: 1.9 G/DL (ref 1.9–3.5)
GLOBULIN SER CALC-MCNC: 2 G/DL (ref 1.9–3.5)
GLOBULIN SER CALC-MCNC: 2 G/DL (ref 1.9–3.5)
GLUCOSE SERPL-MCNC: 101 MG/DL (ref 65–99)
GLUCOSE SERPL-MCNC: 102 MG/DL (ref 65–99)
GLUCOSE SERPL-MCNC: 103 MG/DL (ref 65–99)
GLUCOSE SERPL-MCNC: 104 MG/DL (ref 65–99)
GLUCOSE SERPL-MCNC: 105 MG/DL (ref 65–99)
GLUCOSE SERPL-MCNC: 106 MG/DL (ref 65–99)
GLUCOSE SERPL-MCNC: 106 MG/DL (ref 65–99)
GLUCOSE SERPL-MCNC: 108 MG/DL (ref 65–99)
GLUCOSE SERPL-MCNC: 108 MG/DL (ref 65–99)
GLUCOSE SERPL-MCNC: 112 MG/DL (ref 65–99)
GLUCOSE SERPL-MCNC: 114 MG/DL (ref 65–99)
GLUCOSE SERPL-MCNC: 115 MG/DL (ref 65–99)
GLUCOSE SERPL-MCNC: 115 MG/DL (ref 65–99)
GLUCOSE SERPL-MCNC: 116 MG/DL (ref 65–99)
GLUCOSE SERPL-MCNC: 122 MG/DL (ref 65–99)
GLUCOSE SERPL-MCNC: 125 MG/DL (ref 65–99)
GLUCOSE SERPL-MCNC: 129 MG/DL (ref 65–99)
GLUCOSE SERPL-MCNC: 133 MG/DL (ref 65–99)
GLUCOSE SERPL-MCNC: 138 MG/DL (ref 65–99)
GLUCOSE SERPL-MCNC: 66 MG/DL (ref 65–99)
GLUCOSE SERPL-MCNC: 84 MG/DL (ref 65–99)
GLUCOSE SERPL-MCNC: 88 MG/DL (ref 65–99)
GLUCOSE SERPL-MCNC: 93 MG/DL (ref 65–99)
GLUCOSE SERPL-MCNC: 93 MG/DL (ref 65–99)
GLUCOSE SERPL-MCNC: 95 MG/DL (ref 65–99)
GLUCOSE SERPL-MCNC: 99 MG/DL (ref 65–99)
GLUCOSE UR STRIP.AUTO-MCNC: NEGATIVE MG/DL
GRAM STN SPEC: NORMAL
GRAM STN SPEC: NORMAL
HAPTOGLOB SERPL-MCNC: 46 MG/DL (ref 30–200)
HAV IGM SERPL QL IA: NEGATIVE
HBV CORE IGM SER QL: NEGATIVE
HBV SURFACE AG SER QL: NEGATIVE
HCT VFR BLD AUTO: 16.8 % (ref 42–52)
HCT VFR BLD AUTO: 17.2 % (ref 42–52)
HCT VFR BLD AUTO: 18.1 % (ref 42–52)
HCT VFR BLD AUTO: 18.3 % (ref 42–52)
HCT VFR BLD AUTO: 20.5 % (ref 42–52)
HCT VFR BLD AUTO: 21 % (ref 42–52)
HCT VFR BLD AUTO: 21.2 % (ref 42–52)
HCT VFR BLD AUTO: 21.3 % (ref 42–52)
HCT VFR BLD AUTO: 22 % (ref 42–52)
HCT VFR BLD AUTO: 22 % (ref 42–52)
HCT VFR BLD AUTO: 22.1 % (ref 42–52)
HCT VFR BLD AUTO: 22.2 % (ref 42–52)
HCT VFR BLD AUTO: 22.5 % (ref 42–52)
HCT VFR BLD AUTO: 22.5 % (ref 42–52)
HCT VFR BLD AUTO: 22.6 % (ref 42–52)
HCT VFR BLD AUTO: 22.7 % (ref 42–52)
HCT VFR BLD AUTO: 22.8 % (ref 42–52)
HCT VFR BLD AUTO: 22.9 % (ref 42–52)
HCT VFR BLD AUTO: 23.2 % (ref 42–52)
HCT VFR BLD AUTO: 23.4 % (ref 42–52)
HCT VFR BLD AUTO: 23.5 % (ref 42–52)
HCT VFR BLD AUTO: 23.6 % (ref 42–52)
HCT VFR BLD AUTO: 23.7 % (ref 42–52)
HCT VFR BLD AUTO: 24.2 % (ref 42–52)
HCT VFR BLD AUTO: 24.4 % (ref 42–52)
HCT VFR BLD AUTO: 24.5 % (ref 42–52)
HCT VFR BLD AUTO: 24.8 % (ref 42–52)
HCT VFR BLD AUTO: 25 % (ref 42–52)
HCT VFR BLD AUTO: 25.8 % (ref 42–52)
HCT VFR BLD AUTO: 26.6 % (ref 42–52)
HCT VFR BLD AUTO: 27.8 % (ref 42–52)
HCT VFR BLD AUTO: 28.9 % (ref 42–52)
HCT VFR BLD AUTO: 33.2 % (ref 42–52)
HCT VFR BLD AUTO: 33.7 % (ref 42–52)
HCT VFR BLD AUTO: 34.7 % (ref 42–52)
HCV AB SER QL: NEGATIVE
HGB BLD-MCNC: 10.1 G/DL (ref 14–18)
HGB BLD-MCNC: 11.4 G/DL (ref 14–18)
HGB BLD-MCNC: 11.4 G/DL (ref 14–18)
HGB BLD-MCNC: 12.5 G/DL (ref 14–18)
HGB BLD-MCNC: 5.8 G/DL (ref 14–18)
HGB BLD-MCNC: 5.9 G/DL (ref 14–18)
HGB BLD-MCNC: 6.3 G/DL (ref 14–18)
HGB BLD-MCNC: 6.5 G/DL (ref 14–18)
HGB BLD-MCNC: 7 G/DL (ref 14–18)
HGB BLD-MCNC: 7.1 G/DL (ref 14–18)
HGB BLD-MCNC: 7.1 G/DL (ref 14–18)
HGB BLD-MCNC: 7.3 G/DL (ref 14–18)
HGB BLD-MCNC: 7.3 G/DL (ref 14–18)
HGB BLD-MCNC: 7.4 G/DL (ref 14–18)
HGB BLD-MCNC: 7.4 G/DL (ref 14–18)
HGB BLD-MCNC: 7.5 G/DL (ref 14–18)
HGB BLD-MCNC: 7.5 G/DL (ref 14–18)
HGB BLD-MCNC: 7.6 G/DL (ref 14–18)
HGB BLD-MCNC: 7.6 G/DL (ref 14–18)
HGB BLD-MCNC: 7.7 G/DL (ref 14–18)
HGB BLD-MCNC: 7.8 G/DL (ref 14–18)
HGB BLD-MCNC: 7.8 G/DL (ref 14–18)
HGB BLD-MCNC: 7.9 G/DL (ref 14–18)
HGB BLD-MCNC: 8 G/DL (ref 14–18)
HGB BLD-MCNC: 8 G/DL (ref 14–18)
HGB BLD-MCNC: 8.1 G/DL (ref 14–18)
HGB BLD-MCNC: 8.1 G/DL (ref 14–18)
HGB BLD-MCNC: 8.2 G/DL (ref 14–18)
HGB BLD-MCNC: 8.3 G/DL (ref 14–18)
HGB BLD-MCNC: 8.4 G/DL (ref 14–18)
HGB BLD-MCNC: 8.4 G/DL (ref 14–18)
HGB BLD-MCNC: 8.5 G/DL (ref 14–18)
HGB BLD-MCNC: 8.5 G/DL (ref 14–18)
HGB BLD-MCNC: 9.1 G/DL (ref 14–18)
HGB BLD-MCNC: 9.4 G/DL (ref 14–18)
HGB BLD-MCNC: 9.6 G/DL (ref 14–18)
HGB RETIC QN AUTO: 33.9 PG/CELL (ref 29–35)
HGB RETIC QN AUTO: 35.5 PG/CELL (ref 29–35)
HIV 1+2 AB+HIV1 P24 AG SERPL QL IA: NON REACTIVE
HOLDING TUBE BB 8507: NORMAL
HYALINE CASTS #/AREA URNS LPF: ABNORMAL /LPF
HYPOCHROMIA BLD QL SMEAR: ABNORMAL
IGA SERPL-MCNC: 14 MG/DL (ref 68–408)
IGG SERPL-MCNC: 305 MG/DL (ref 768–1632)
IGM SERPL-MCNC: 5 MG/DL (ref 35–263)
IMM GRANULOCYTES # BLD AUTO: 0 K/UL (ref 0–0.11)
IMM GRANULOCYTES # BLD AUTO: 0.01 K/UL (ref 0–0.11)
IMM GRANULOCYTES # BLD AUTO: 0.01 K/UL (ref 0–0.11)
IMM GRANULOCYTES # BLD AUTO: ABNORMAL K/UL (ref 0–0.11)
IMM GRANULOCYTES NFR BLD AUTO: 0 % (ref 0–0.9)
IMM GRANULOCYTES NFR BLD AUTO: 3.2 % (ref 0–0.9)
IMM GRANULOCYTES NFR BLD AUTO: 5.3 % (ref 0–0.9)
IMM GRANULOCYTES NFR BLD AUTO: ABNORMAL % (ref 0–0.9)
IMM RETICS NFR: 18 % (ref 9.3–17.4)
IMM RETICS NFR: 6 % (ref 9.3–17.4)
INR PPP: 1.54 (ref 0.87–1.13)
INR PPP: 2.28 (ref 0.87–1.13)
INTERPRETATION SERPL IFE-IMP: ABNORMAL
KETONES UR STRIP.AUTO-MCNC: ABNORMAL MG/DL
KETONES UR STRIP.AUTO-MCNC: NEGATIVE MG/DL
LACTATE BLD-SCNC: 1.31 MMOL/L (ref 0.5–2)
LACTATE BLD-SCNC: 1.57 MMOL/L (ref 0.5–2)
LACTATE BLD-SCNC: 1.8 MMOL/L (ref 0.5–2)
LACTATE BLD-SCNC: 1.82 MMOL/L (ref 0.5–2)
LACTATE BLD-SCNC: 2.09 MMOL/L (ref 0.5–2)
LACTATE BLD-SCNC: 2.46 MMOL/L (ref 0.5–2)
LACTATE BLD-SCNC: 2.68 MMOL/L (ref 0.5–2)
LACTATE BLD-SCNC: 2.8 MMOL/L (ref 0.5–2)
LACTATE BLD-SCNC: 2.96 MMOL/L (ref 0.5–2)
LACTATE BLD-SCNC: 3.1 MMOL/L (ref 0.5–2)
LACTATE BLD-SCNC: 3.56 MMOL/L (ref 0.5–2)
LACTATE BLD-SCNC: 4.1 MMOL/L (ref 0.5–2)
LACTATE BLD-SCNC: 4.4 MMOL/L (ref 0.5–2)
LACTATE BLD-SCNC: 5.2 MMOL/L (ref 0.5–2)
LACTATE BLD-SCNC: 5.4 MMOL/L (ref 0.5–2)
LACTATE BLD-SCNC: 6.5 MMOL/L (ref 0.5–2)
LACTATE BLD-SCNC: 6.5 MMOL/L (ref 0.5–2)
LACTATE BLD-SCNC: 9.5 MMOL/L (ref 0.5–2)
LDH SERPL L TO P-CCNC: 282 U/L (ref 107–266)
LDH SERPL L TO P-CCNC: 671 U/L (ref 107–266)
LEUKOCYTE ESTERASE UR QL STRIP.AUTO: NEGATIVE
LG PLATELETS BLD QL SMEAR: NORMAL
LV EJECT FRACT  99904: 65
LV EJECT FRACT MOD 2C 99903: 63.27
LV EJECT FRACT MOD 4C 99902: 69.38
LV EJECT FRACT MOD BP 99901: 65.41
LYMPHOCYTES # BLD AUTO: 0.01 K/UL (ref 1–4.8)
LYMPHOCYTES # BLD AUTO: 0.02 K/UL (ref 1–4.8)
LYMPHOCYTES # BLD AUTO: 0.12 K/UL (ref 1–4.8)
LYMPHOCYTES # BLD AUTO: 0.13 K/UL (ref 1–4.8)
LYMPHOCYTES # BLD AUTO: 0.16 K/UL (ref 1–4.8)
LYMPHOCYTES # BLD AUTO: 0.16 K/UL (ref 1–4.8)
LYMPHOCYTES # BLD AUTO: 0.18 K/UL (ref 1–4.8)
LYMPHOCYTES # BLD AUTO: 0.18 K/UL (ref 1–4.8)
LYMPHOCYTES # BLD AUTO: 0.23 K/UL (ref 1–4.8)
LYMPHOCYTES # BLD AUTO: 0.25 K/UL (ref 1–4.8)
LYMPHOCYTES # BLD AUTO: 0.25 K/UL (ref 1–4.8)
LYMPHOCYTES # BLD AUTO: 0.28 K/UL (ref 1–4.8)
LYMPHOCYTES # BLD AUTO: 0.32 K/UL (ref 1–4.8)
LYMPHOCYTES # BLD AUTO: 0.37 K/UL (ref 1–4.8)
LYMPHOCYTES # BLD AUTO: ABNORMAL K/UL (ref 1–4.8)
LYMPHOCYTES NFR BLD: 1.8 % (ref 22–41)
LYMPHOCYTES NFR BLD: 2.7 % (ref 22–41)
LYMPHOCYTES NFR BLD: 23.2 % (ref 22–41)
LYMPHOCYTES NFR BLD: 25.4 % (ref 22–41)
LYMPHOCYTES NFR BLD: 31.2 % (ref 22–41)
LYMPHOCYTES NFR BLD: 40.9 % (ref 22–41)
LYMPHOCYTES NFR BLD: 46.2 % (ref 22–41)
LYMPHOCYTES NFR BLD: 46.2 % (ref 22–41)
LYMPHOCYTES NFR BLD: 50.9 % (ref 22–41)
LYMPHOCYTES NFR BLD: 58.1 % (ref 22–41)
LYMPHOCYTES NFR BLD: 63.2 % (ref 22–41)
LYMPHOCYTES NFR BLD: 72.2 % (ref 22–41)
LYMPHOCYTES NFR BLD: 76.7 % (ref 22–41)
LYMPHOCYTES NFR BLD: 80 % (ref 22–41)
LYMPHOCYTES NFR BLD: ABNORMAL % (ref 22–41)
MACROCYTES BLD QL SMEAR: ABNORMAL
MAGNESIUM SERPL-MCNC: 1.8 MG/DL (ref 1.5–2.5)
MAGNESIUM SERPL-MCNC: 1.8 MG/DL (ref 1.5–2.5)
MAGNESIUM SERPL-MCNC: 1.9 MG/DL (ref 1.5–2.5)
MANUAL DIFF BLD: NORMAL
MCH RBC QN AUTO: 27 PG (ref 27–33)
MCH RBC QN AUTO: 27.2 PG (ref 27–33)
MCH RBC QN AUTO: 27.2 PG (ref 27–33)
MCH RBC QN AUTO: 27.3 PG (ref 27–33)
MCH RBC QN AUTO: 27.4 PG (ref 27–33)
MCH RBC QN AUTO: 27.4 PG (ref 27–33)
MCH RBC QN AUTO: 27.7 PG (ref 27–33)
MCH RBC QN AUTO: 27.8 PG (ref 27–33)
MCH RBC QN AUTO: 27.9 PG (ref 27–33)
MCH RBC QN AUTO: 28.1 PG (ref 27–33)
MCH RBC QN AUTO: 28.1 PG (ref 27–33)
MCH RBC QN AUTO: 28.2 PG (ref 27–33)
MCH RBC QN AUTO: 28.3 PG (ref 27–33)
MCH RBC QN AUTO: 28.4 PG (ref 27–33)
MCH RBC QN AUTO: 28.5 PG (ref 27–33)
MCH RBC QN AUTO: 28.6 PG (ref 27–33)
MCH RBC QN AUTO: 28.8 PG (ref 27–33)
MCH RBC QN AUTO: 28.9 PG (ref 27–33)
MCH RBC QN AUTO: 28.9 PG (ref 27–33)
MCH RBC QN AUTO: 29.1 PG (ref 27–33)
MCH RBC QN AUTO: 29.7 PG (ref 27–33)
MCHC RBC AUTO-ENTMCNC: 32.6 G/DL (ref 33.7–35.3)
MCHC RBC AUTO-ENTMCNC: 32.9 G/DL (ref 33.7–35.3)
MCHC RBC AUTO-ENTMCNC: 32.9 G/DL (ref 33.7–35.3)
MCHC RBC AUTO-ENTMCNC: 33 G/DL (ref 33.7–35.3)
MCHC RBC AUTO-ENTMCNC: 33.2 G/DL (ref 33.7–35.3)
MCHC RBC AUTO-ENTMCNC: 33.3 G/DL (ref 33.7–35.3)
MCHC RBC AUTO-ENTMCNC: 33.6 G/DL (ref 33.7–35.3)
MCHC RBC AUTO-ENTMCNC: 33.6 G/DL (ref 33.7–35.3)
MCHC RBC AUTO-ENTMCNC: 33.7 G/DL (ref 33.7–35.3)
MCHC RBC AUTO-ENTMCNC: 33.8 G/DL (ref 33.7–35.3)
MCHC RBC AUTO-ENTMCNC: 33.9 G/DL (ref 33.7–35.3)
MCHC RBC AUTO-ENTMCNC: 34 G/DL (ref 33.7–35.3)
MCHC RBC AUTO-ENTMCNC: 34.2 G/DL (ref 33.7–35.3)
MCHC RBC AUTO-ENTMCNC: 34.3 G/DL (ref 33.7–35.3)
MCHC RBC AUTO-ENTMCNC: 34.4 G/DL (ref 33.7–35.3)
MCHC RBC AUTO-ENTMCNC: 34.5 G/DL (ref 33.7–35.3)
MCHC RBC AUTO-ENTMCNC: 34.5 G/DL (ref 33.7–35.3)
MCHC RBC AUTO-ENTMCNC: 34.6 G/DL (ref 33.7–35.3)
MCHC RBC AUTO-ENTMCNC: 34.7 G/DL (ref 33.7–35.3)
MCHC RBC AUTO-ENTMCNC: 34.9 G/DL (ref 33.7–35.3)
MCHC RBC AUTO-ENTMCNC: 35 G/DL (ref 33.7–35.3)
MCHC RBC AUTO-ENTMCNC: 35.1 G/DL (ref 33.7–35.3)
MCHC RBC AUTO-ENTMCNC: 35.2 G/DL (ref 33.7–35.3)
MCHC RBC AUTO-ENTMCNC: 35.3 G/DL (ref 33.7–35.3)
MCHC RBC AUTO-ENTMCNC: 35.3 G/DL (ref 33.7–35.3)
MCHC RBC AUTO-ENTMCNC: 35.5 G/DL (ref 33.7–35.3)
MCHC RBC AUTO-ENTMCNC: 36 G/DL (ref 33.7–35.3)
MCV RBC AUTO: 79.2 FL (ref 81.4–97.8)
MCV RBC AUTO: 79.6 FL (ref 81.4–97.8)
MCV RBC AUTO: 79.9 FL (ref 81.4–97.8)
MCV RBC AUTO: 80.1 FL (ref 81.4–97.8)
MCV RBC AUTO: 80.3 FL (ref 81.4–97.8)
MCV RBC AUTO: 80.3 FL (ref 81.4–97.8)
MCV RBC AUTO: 80.4 FL (ref 81.4–97.8)
MCV RBC AUTO: 80.7 FL (ref 81.4–97.8)
MCV RBC AUTO: 80.8 FL (ref 81.4–97.8)
MCV RBC AUTO: 80.9 FL (ref 81.4–97.8)
MCV RBC AUTO: 81.3 FL (ref 81.4–97.8)
MCV RBC AUTO: 81.3 FL (ref 81.4–97.8)
MCV RBC AUTO: 81.4 FL (ref 81.4–97.8)
MCV RBC AUTO: 81.5 FL (ref 81.4–97.8)
MCV RBC AUTO: 81.6 FL (ref 81.4–97.8)
MCV RBC AUTO: 81.8 FL (ref 81.4–97.8)
MCV RBC AUTO: 81.9 FL (ref 81.4–97.8)
MCV RBC AUTO: 81.9 FL (ref 81.4–97.8)
MCV RBC AUTO: 82.1 FL (ref 81.4–97.8)
MCV RBC AUTO: 82.2 FL (ref 81.4–97.8)
MCV RBC AUTO: 82.4 FL (ref 81.4–97.8)
MCV RBC AUTO: 82.4 FL (ref 81.4–97.8)
MCV RBC AUTO: 82.5 FL (ref 81.4–97.8)
MCV RBC AUTO: 82.7 FL (ref 81.4–97.8)
MCV RBC AUTO: 82.8 FL (ref 81.4–97.8)
MCV RBC AUTO: 83 FL (ref 81.4–97.8)
MCV RBC AUTO: 83.3 FL (ref 81.4–97.8)
MCV RBC AUTO: 83.6 FL (ref 81.4–97.8)
MCV RBC AUTO: 83.7 FL (ref 81.4–97.8)
MCV RBC AUTO: 83.8 FL (ref 81.4–97.8)
MCV RBC AUTO: 83.8 FL (ref 81.4–97.8)
MCV RBC AUTO: 84 FL (ref 81.4–97.8)
MCV RBC AUTO: 84.3 FL (ref 81.4–97.8)
MCV RBC AUTO: 84.5 FL (ref 81.4–97.8)
MCV RBC AUTO: 84.9 FL (ref 81.4–97.8)
METAMYELOCYTES NFR BLD MANUAL: 0.5 %
METAMYELOCYTES NFR BLD MANUAL: 1.3 %
MICRO URNS: ABNORMAL
MICROCYTES BLD QL SMEAR: ABNORMAL
MONOCLON BAND OBS SERPL: ABNORMAL
MONOCYTES # BLD AUTO: 0 K/UL (ref 0–0.85)
MONOCYTES # BLD AUTO: 0.01 K/UL (ref 0–0.85)
MONOCYTES # BLD AUTO: 0.01 K/UL (ref 0–0.85)
MONOCYTES # BLD AUTO: 0.02 K/UL (ref 0–0.85)
MONOCYTES # BLD AUTO: 0.03 K/UL (ref 0–0.85)
MONOCYTES # BLD AUTO: 0.04 K/UL (ref 0–0.85)
MONOCYTES # BLD AUTO: 0.04 K/UL (ref 0–0.85)
MONOCYTES # BLD AUTO: 0.05 K/UL (ref 0–0.85)
MONOCYTES # BLD AUTO: 0.07 K/UL (ref 0–0.85)
MONOCYTES # BLD AUTO: ABNORMAL K/UL (ref 0–0.85)
MONOCYTES NFR BLD AUTO: 0 % (ref 0–13.4)
MONOCYTES NFR BLD AUTO: 0 % (ref 0–13.4)
MONOCYTES NFR BLD AUTO: 0.9 % (ref 0–13.4)
MONOCYTES NFR BLD AUTO: 1 % (ref 0–13.4)
MONOCYTES NFR BLD AUTO: 1 % (ref 0–13.4)
MONOCYTES NFR BLD AUTO: 1.7 % (ref 0–13.4)
MONOCYTES NFR BLD AUTO: 16.7 % (ref 0–13.4)
MONOCYTES NFR BLD AUTO: 21.1 % (ref 0–13.4)
MONOCYTES NFR BLD AUTO: 22.6 % (ref 0–13.4)
MONOCYTES NFR BLD AUTO: 3.7 % (ref 0–13.4)
MONOCYTES NFR BLD AUTO: 3.9 % (ref 0–13.4)
MONOCYTES NFR BLD AUTO: 5.9 % (ref 0–13.4)
MONOCYTES NFR BLD AUTO: 6.6 % (ref 0–13.4)
MONOCYTES NFR BLD AUTO: 8 % (ref 0–13.4)
MONOCYTES NFR BLD AUTO: ABNORMAL % (ref 0–13.4)
MORPHOLOGY BLD-IMP: NORMAL
MUCOUS THREADS #/AREA URNS HPF: ABNORMAL /HPF
MYELOCYTES NFR BLD MANUAL: 0.5 %
MYELOCYTES NFR BLD MANUAL: 1.9 %
MYELOCYTES NFR BLD MANUAL: 2.9 %
MYELOCYTES NFR BLD MANUAL: 3.5 %
NEUTROPHILS # BLD AUTO: 0.02 K/UL (ref 1.82–7.42)
NEUTROPHILS # BLD AUTO: 0.02 K/UL (ref 1.82–7.42)
NEUTROPHILS # BLD AUTO: 0.05 K/UL (ref 1.82–7.42)
NEUTROPHILS # BLD AUTO: 0.05 K/UL (ref 1.82–7.42)
NEUTROPHILS # BLD AUTO: 0.22 K/UL (ref 1.82–7.42)
NEUTROPHILS # BLD AUTO: 0.29 K/UL (ref 1.82–7.42)
NEUTROPHILS # BLD AUTO: 0.31 K/UL (ref 1.82–7.42)
NEUTROPHILS # BLD AUTO: 0.31 K/UL (ref 1.82–7.42)
NEUTROPHILS # BLD AUTO: 0.32 K/UL (ref 1.82–7.42)
NEUTROPHILS # BLD AUTO: 0.37 K/UL (ref 1.82–7.42)
NEUTROPHILS # BLD AUTO: 0.48 K/UL (ref 1.82–7.42)
NEUTROPHILS # BLD AUTO: 0.53 K/UL (ref 1.82–7.42)
NEUTROPHILS # BLD AUTO: 0.58 K/UL (ref 1.82–7.42)
NEUTROPHILS # BLD AUTO: ABNORMAL K/UL (ref 1.82–7.42)
NEUTROPHILS NFR BLD: 10.4 % (ref 44–72)
NEUTROPHILS NFR BLD: 11.1 % (ref 44–72)
NEUTROPHILS NFR BLD: 16.1 % (ref 44–72)
NEUTROPHILS NFR BLD: 20.4 % (ref 44–72)
NEUTROPHILS NFR BLD: 43.9 % (ref 44–72)
NEUTROPHILS NFR BLD: 46.1 % (ref 44–72)
NEUTROPHILS NFR BLD: 49 % (ref 44–72)
NEUTROPHILS NFR BLD: 52.2 % (ref 44–72)
NEUTROPHILS NFR BLD: 62 % (ref 44–72)
NEUTROPHILS NFR BLD: 62.3 % (ref 44–72)
NEUTROPHILS NFR BLD: 75.8 % (ref 44–72)
NEUTROPHILS NFR BLD: 8 % (ref 44–72)
NEUTROPHILS NFR BLD: 92.8 % (ref 44–72)
NEUTROPHILS NFR BLD: 93.6 % (ref 44–72)
NEUTROPHILS NFR BLD: ABNORMAL % (ref 44–72)
NEUTS BAND NFR BLD MANUAL: 1.3 % (ref 0–10)
NEUTS BAND NFR BLD MANUAL: 1.9 % (ref 0–10)
NEUTS BAND NFR BLD MANUAL: 3.6 % (ref 0–10)
NEUTS BAND NFR BLD MANUAL: 3.7 % (ref 0–10)
NEUTS BAND NFR BLD MANUAL: 4 % (ref 0–10)
NEUTS BAND NFR BLD MANUAL: 6.7 % (ref 0–10)
NITRITE UR QL STRIP.AUTO: NEGATIVE
NRBC # BLD AUTO: 0 K/UL
NRBC # BLD AUTO: ABNORMAL K/UL
NRBC BLD-RTO: 0 /100 WBC
OVALOCYTES BLD QL SMEAR: NORMAL
PATHOLOGY STUDY: ABNORMAL
PH UR STRIP.AUTO: 5 [PH]
PH UR STRIP.AUTO: 5.5 [PH]
PHOSPHATE SERPL-MCNC: 2 MG/DL (ref 2.5–4.5)
PLATELET # BLD AUTO: 10 K/UL (ref 164–446)
PLATELET # BLD AUTO: 10 K/UL (ref 164–446)
PLATELET # BLD AUTO: 14 K/UL (ref 164–446)
PLATELET # BLD AUTO: 15 K/UL (ref 164–446)
PLATELET # BLD AUTO: 16 K/UL (ref 164–446)
PLATELET # BLD AUTO: 16 K/UL (ref 164–446)
PLATELET # BLD AUTO: 17 K/UL (ref 164–446)
PLATELET # BLD AUTO: 17 K/UL (ref 164–446)
PLATELET # BLD AUTO: 18 K/UL (ref 164–446)
PLATELET # BLD AUTO: 18 K/UL (ref 164–446)
PLATELET # BLD AUTO: 19 K/UL (ref 164–446)
PLATELET # BLD AUTO: 19 K/UL (ref 164–446)
PLATELET # BLD AUTO: 20 K/UL (ref 164–446)
PLATELET # BLD AUTO: 20 K/UL (ref 164–446)
PLATELET # BLD AUTO: 21 K/UL (ref 164–446)
PLATELET # BLD AUTO: 22 K/UL (ref 164–446)
PLATELET # BLD AUTO: 23 K/UL (ref 164–446)
PLATELET # BLD AUTO: 24 K/UL (ref 164–446)
PLATELET # BLD AUTO: 25 K/UL (ref 164–446)
PLATELET # BLD AUTO: 26 K/UL (ref 164–446)
PLATELET # BLD AUTO: 26 K/UL (ref 164–446)
PLATELET # BLD AUTO: 29 K/UL (ref 164–446)
PLATELET # BLD AUTO: 36 K/UL (ref 164–446)
PLATELET # BLD AUTO: 40 K/UL (ref 164–446)
PLATELET # BLD AUTO: 44 K/UL (ref 164–446)
PLATELET # BLD AUTO: 53 K/UL (ref 164–446)
PLATELET # BLD AUTO: 70 K/UL (ref 164–446)
PLATELET # BLD AUTO: 75 K/UL (ref 164–446)
PLATELET # BLD AUTO: 9 K/UL (ref 164–446)
PLATELET BLD QL SMEAR: NORMAL
PLATELETS.RETICULATED NFR BLD AUTO: 10 K/UL (ref 0.6–13.1)
PLATELETS.RETICULATED NFR BLD AUTO: 10.1 K/UL (ref 0.6–13.1)
PLATELETS.RETICULATED NFR BLD AUTO: 10.3 K/UL (ref 0.6–13.1)
PLATELETS.RETICULATED NFR BLD AUTO: 10.4 K/UL (ref 0.6–13.1)
PLATELETS.RETICULATED NFR BLD AUTO: 10.4 K/UL (ref 0.6–13.1)
PLATELETS.RETICULATED NFR BLD AUTO: 10.9 K/UL (ref 0.6–13.1)
PLATELETS.RETICULATED NFR BLD AUTO: 13 K/UL (ref 0.6–13.1)
PLATELETS.RETICULATED NFR BLD AUTO: 13.4 K/UL (ref 0.6–13.1)
PLATELETS.RETICULATED NFR BLD AUTO: 15.6 K/UL (ref 0.6–13.1)
PLATELETS.RETICULATED NFR BLD AUTO: 16.4 K/UL (ref 0.6–13.1)
PLATELETS.RETICULATED NFR BLD AUTO: 5.6 K/UL (ref 0.6–13.1)
PLATELETS.RETICULATED NFR BLD AUTO: 8.4 K/UL (ref 0.6–13.1)
PLATELETS.RETICULATED NFR BLD AUTO: 8.5 K/UL (ref 0.6–13.1)
PLATELETS.RETICULATED NFR BLD AUTO: 9.3 K/UL (ref 0.6–13.1)
PLATELETS.RETICULATED NFR BLD AUTO: 9.4 K/UL (ref 0.6–13.1)
PLATELETS.RETICULATED NFR BLD AUTO: 9.4 K/UL (ref 0.6–13.1)
PMV BLD AUTO: 10.7 FL (ref 9–12.9)
PMV BLD AUTO: 10.9 FL (ref 9–12.9)
PMV BLD AUTO: 11 FL (ref 9–12.9)
PMV BLD AUTO: 11.1 FL (ref 9–12.9)
PMV BLD AUTO: 11.1 FL (ref 9–12.9)
PMV BLD AUTO: 11.2 FL (ref 9–12.9)
PMV BLD AUTO: 11.3 FL (ref 9–12.9)
PMV BLD AUTO: 11.4 FL (ref 9–12.9)
PMV BLD AUTO: 11.4 FL (ref 9–12.9)
PMV BLD AUTO: 11.5 FL (ref 9–12.9)
PMV BLD AUTO: 11.6 FL (ref 9–12.9)
PMV BLD AUTO: 11.7 FL (ref 9–12.9)
PMV BLD AUTO: 11.8 FL (ref 9–12.9)
PMV BLD AUTO: 12.1 FL (ref 9–12.9)
PMV BLD AUTO: 12.2 FL (ref 9–12.9)
PMV BLD AUTO: 12.3 FL (ref 9–12.9)
PMV BLD AUTO: 12.6 FL (ref 9–12.9)
PMV BLD AUTO: 12.9 FL (ref 9–12.9)
PMV BLD AUTO: 12.9 FL (ref 9–12.9)
PMV BLD AUTO: 13.3 FL (ref 9–12.9)
PMV BLD AUTO: 13.8 FL (ref 9–12.9)
POIKILOCYTOSIS BLD QL SMEAR: NORMAL
POLYCHROMASIA BLD QL SMEAR: NORMAL
POTASSIUM SERPL-SCNC: 3.2 MMOL/L (ref 3.6–5.5)
POTASSIUM SERPL-SCNC: 3.3 MMOL/L (ref 3.6–5.5)
POTASSIUM SERPL-SCNC: 3.3 MMOL/L (ref 3.6–5.5)
POTASSIUM SERPL-SCNC: 3.6 MMOL/L (ref 3.6–5.5)
POTASSIUM SERPL-SCNC: 3.6 MMOL/L (ref 3.6–5.5)
POTASSIUM SERPL-SCNC: 3.7 MMOL/L (ref 3.6–5.5)
POTASSIUM SERPL-SCNC: 3.8 MMOL/L (ref 3.6–5.5)
POTASSIUM SERPL-SCNC: 3.9 MMOL/L (ref 3.6–5.5)
POTASSIUM SERPL-SCNC: 4 MMOL/L (ref 3.6–5.5)
POTASSIUM SERPL-SCNC: 4.1 MMOL/L (ref 3.6–5.5)
POTASSIUM SERPL-SCNC: 4.1 MMOL/L (ref 3.6–5.5)
POTASSIUM SERPL-SCNC: 4.2 MMOL/L (ref 3.6–5.5)
POTASSIUM SERPL-SCNC: 4.2 MMOL/L (ref 3.6–5.5)
POTASSIUM SERPL-SCNC: 4.3 MMOL/L (ref 3.6–5.5)
POTASSIUM SERPL-SCNC: 4.4 MMOL/L (ref 3.6–5.5)
POTASSIUM SERPL-SCNC: 4.5 MMOL/L (ref 3.6–5.5)
POTASSIUM SERPL-SCNC: 4.6 MMOL/L (ref 3.6–5.5)
POTASSIUM SERPL-SCNC: 4.7 MMOL/L (ref 3.6–5.5)
POTASSIUM SERPL-SCNC: 4.7 MMOL/L (ref 3.6–5.5)
POTASSIUM SERPL-SCNC: 4.9 MMOL/L (ref 3.6–5.5)
PROCALCITONIN SERPL-MCNC: 0.54 NG/ML
PRODUCT TYPE UPROD: NORMAL
PROT SERPL-MCNC: 4.1 G/DL (ref 6–8.2)
PROT SERPL-MCNC: 4.2 G/DL (ref 6–8.2)
PROT SERPL-MCNC: 4.3 G/DL (ref 6–8.2)
PROT SERPL-MCNC: 4.5 G/DL (ref 6–8.2)
PROT SERPL-MCNC: 4.6 G/DL (ref 6–8.2)
PROT SERPL-MCNC: 4.8 G/DL (ref 6–8.2)
PROT SERPL-MCNC: 4.9 G/DL (ref 6–8.2)
PROT SERPL-MCNC: 5 G/DL (ref 6–8.2)
PROT SERPL-MCNC: 5 G/DL (ref 6–8.2)
PROT SERPL-MCNC: 5.3 G/DL (ref 6–8.2)
PROT SERPL-MCNC: 8.5 G/DL (ref 6–8.3)
PROT UR QL STRIP: 100 MG/DL
PROT UR QL STRIP: 30 MG/DL
PROT UR QL STRIP: 30 MG/DL
PROT UR QL STRIP: NEGATIVE MG/DL
PROTHROMBIN TIME: 18.2 SEC (ref 12–14.6)
PROTHROMBIN TIME: 24.8 SEC (ref 12–14.6)
RBC # BLD AUTO: 2.03 M/UL (ref 4.7–6.1)
RBC # BLD AUTO: 2.04 M/UL (ref 4.7–6.1)
RBC # BLD AUTO: 2.19 M/UL (ref 4.7–6.1)
RBC # BLD AUTO: 2.28 M/UL (ref 4.7–6.1)
RBC # BLD AUTO: 2.46 M/UL (ref 4.7–6.1)
RBC # BLD AUTO: 2.59 M/UL (ref 4.7–6.1)
RBC # BLD AUTO: 2.62 M/UL (ref 4.7–6.1)
RBC # BLD AUTO: 2.62 M/UL (ref 4.7–6.1)
RBC # BLD AUTO: 2.65 M/UL (ref 4.7–6.1)
RBC # BLD AUTO: 2.67 M/UL (ref 4.7–6.1)
RBC # BLD AUTO: 2.71 M/UL (ref 4.7–6.1)
RBC # BLD AUTO: 2.73 M/UL (ref 4.7–6.1)
RBC # BLD AUTO: 2.74 M/UL (ref 4.7–6.1)
RBC # BLD AUTO: 2.77 M/UL (ref 4.7–6.1)
RBC # BLD AUTO: 2.81 M/UL (ref 4.7–6.1)
RBC # BLD AUTO: 2.82 M/UL (ref 4.7–6.1)
RBC # BLD AUTO: 2.83 M/UL (ref 4.7–6.1)
RBC # BLD AUTO: 2.83 M/UL (ref 4.7–6.1)
RBC # BLD AUTO: 2.85 M/UL (ref 4.7–6.1)
RBC # BLD AUTO: 2.87 M/UL (ref 4.7–6.1)
RBC # BLD AUTO: 2.87 M/UL (ref 4.7–6.1)
RBC # BLD AUTO: 2.88 M/UL (ref 4.7–6.1)
RBC # BLD AUTO: 2.91 M/UL (ref 4.7–6.1)
RBC # BLD AUTO: 2.94 M/UL (ref 4.7–6.1)
RBC # BLD AUTO: 2.96 M/UL (ref 4.7–6.1)
RBC # BLD AUTO: 3.06 M/UL (ref 4.7–6.1)
RBC # BLD AUTO: 3.07 M/UL (ref 4.7–6.1)
RBC # BLD AUTO: 3.11 M/UL (ref 4.7–6.1)
RBC # BLD AUTO: 3.23 M/UL (ref 4.7–6.1)
RBC # BLD AUTO: 3.34 M/UL (ref 4.7–6.1)
RBC # BLD AUTO: 3.41 M/UL (ref 4.7–6.1)
RBC # BLD AUTO: 3.6 M/UL (ref 4.7–6.1)
RBC # BLD AUTO: 4 M/UL (ref 4.7–6.1)
RBC # BLD AUTO: 4.1 M/UL (ref 4.7–6.1)
RBC # BLD AUTO: 4.38 M/UL (ref 4.7–6.1)
RBC # URNS HPF: ABNORMAL /HPF
RBC BLD AUTO: PRESENT
RBC UR QL AUTO: ABNORMAL
RETICS # AUTO: 0.01 M/UL (ref 0.04–0.06)
RETICS # AUTO: 0.02 M/UL (ref 0.04–0.06)
RETICS/RBC NFR: 0.6 % (ref 0.8–2.1)
RETICS/RBC NFR: 0.7 % (ref 0.8–2.1)
RH BLD: NORMAL
SIGNIFICANT IND 70042: NORMAL
SITE SITE: NORMAL
SODIUM SERPL-SCNC: 127 MMOL/L (ref 135–145)
SODIUM SERPL-SCNC: 130 MMOL/L (ref 135–145)
SODIUM SERPL-SCNC: 132 MMOL/L (ref 135–145)
SODIUM SERPL-SCNC: 132 MMOL/L (ref 135–145)
SODIUM SERPL-SCNC: 133 MMOL/L (ref 135–145)
SODIUM SERPL-SCNC: 134 MMOL/L (ref 135–145)
SODIUM SERPL-SCNC: 135 MMOL/L (ref 135–145)
SODIUM SERPL-SCNC: 136 MMOL/L (ref 135–145)
SODIUM SERPL-SCNC: 137 MMOL/L (ref 135–145)
SODIUM SERPL-SCNC: 137 MMOL/L (ref 135–145)
SOURCE SOURCE: NORMAL
SP GR UR STRIP.AUTO: 1.01
SP GR UR STRIP.AUTO: <=1.005
STAT TRANS INVEST 8506STI: NORMAL
T3FREE SERPL-MCNC: 1.38 PG/ML (ref 2.4–4.2)
T4 FREE SERPL-MCNC: 0.89 NG/DL (ref 0.53–1.43)
TROPONIN I SERPL-MCNC: <0.02 NG/ML (ref 0–0.04)
TSH SERPL DL<=0.005 MIU/L-ACNC: 0.22 UIU/ML (ref 0.38–5.33)
TSH SERPL DL<=0.005 MIU/L-ACNC: 11.4 UIU/ML (ref 0.38–5.33)
TSH SERPL DL<=0.005 MIU/L-ACNC: 5.73 UIU/ML (ref 0.38–5.33)
TSH SERPL DL<=0.005 MIU/L-ACNC: 6.71 UIU/ML (ref 0.38–5.33)
UNIT STATUS USTAT: NORMAL
URATE SERPL-MCNC: 3.1 MG/DL (ref 2.5–8.3)
URATE SERPL-MCNC: 3.2 MG/DL (ref 2.5–8.3)
URATE SERPL-MCNC: 3.2 MG/DL (ref 2.5–8.3)
UROBILINOGEN UR STRIP.AUTO-MCNC: 0.2 MG/DL
VARIANT LYMPHS BLD QL SMEAR: NORMAL
VARIANT LYMPHS BLD QL SMEAR: NORMAL
WBC # BLD AUTO: 0.1 K/UL (ref 4.8–10.8)
WBC # BLD AUTO: 0.2 K/UL (ref 4.8–10.8)
WBC # BLD AUTO: 0.3 K/UL (ref 4.8–10.8)
WBC # BLD AUTO: 0.4 K/UL (ref 4.8–10.8)
WBC # BLD AUTO: 0.5 K/UL (ref 4.8–10.8)
WBC # BLD AUTO: 0.6 K/UL (ref 4.8–10.8)
WBC # BLD AUTO: 0.7 K/UL (ref 4.8–10.8)
WBC # BLD AUTO: 0.7 K/UL (ref 4.8–10.8)
WBC # BLD AUTO: 0.8 K/UL (ref 4.8–10.8)
WBC #/AREA URNS HPF: ABNORMAL /HPF

## 2018-01-01 PROCEDURE — A9270 NON-COVERED ITEM OR SERVICE: HCPCS | Performed by: FAMILY MEDICINE

## 2018-01-01 PROCEDURE — 85055 RETICULATED PLATELET ASSAY: CPT

## 2018-01-01 PROCEDURE — A9270 NON-COVERED ITEM OR SERVICE: HCPCS | Performed by: INTERNAL MEDICINE

## 2018-01-01 PROCEDURE — 80048 BASIC METABOLIC PNL TOTAL CA: CPT

## 2018-01-01 PROCEDURE — 700101 HCHG RX REV CODE 250: Performed by: HOSPITALIST

## 2018-01-01 PROCEDURE — 700102 HCHG RX REV CODE 250 W/ 637 OVERRIDE(OP): Performed by: INTERNAL MEDICINE

## 2018-01-01 PROCEDURE — 94640 AIRWAY INHALATION TREATMENT: CPT

## 2018-01-01 PROCEDURE — 86900 BLOOD TYPING SEROLOGIC ABO: CPT

## 2018-01-01 PROCEDURE — 85025 COMPLETE CBC W/AUTO DIFF WBC: CPT

## 2018-01-01 PROCEDURE — 770021 HCHG ROOM/CARE - ISO PRIVATE

## 2018-01-01 PROCEDURE — 700105 HCHG RX REV CODE 258: Performed by: INTERNAL MEDICINE

## 2018-01-01 PROCEDURE — 770004 HCHG ROOM/CARE - ONCOLOGY PRIVATE *

## 2018-01-01 PROCEDURE — 700111 HCHG RX REV CODE 636 W/ 250 OVERRIDE (IP): Performed by: NURSE PRACTITIONER

## 2018-01-01 PROCEDURE — 97161 PT EVAL LOW COMPLEX 20 MIN: CPT

## 2018-01-01 PROCEDURE — 770022 HCHG ROOM/CARE - ICU (200)

## 2018-01-01 PROCEDURE — 700111 HCHG RX REV CODE 636 W/ 250 OVERRIDE (IP): Performed by: INTERNAL MEDICINE

## 2018-01-01 PROCEDURE — 88313 SPECIAL STAINS GROUP 2: CPT | Mod: 91

## 2018-01-01 PROCEDURE — 86644 CMV ANTIBODY: CPT

## 2018-01-01 PROCEDURE — 700105 HCHG RX REV CODE 258: Performed by: FAMILY MEDICINE

## 2018-01-01 PROCEDURE — 86334 IMMUNOFIX E-PHORESIS SERUM: CPT

## 2018-01-01 PROCEDURE — 36415 COLL VENOUS BLD VENIPUNCTURE: CPT

## 2018-01-01 PROCEDURE — 700111 HCHG RX REV CODE 636 W/ 250 OVERRIDE (IP): Performed by: HOSPITALIST

## 2018-01-01 PROCEDURE — 770020 HCHG ROOM/CARE - TELE (206)

## 2018-01-01 PROCEDURE — 700105 HCHG RX REV CODE 258: Performed by: HOSPITALIST

## 2018-01-01 PROCEDURE — 700102 HCHG RX REV CODE 250 W/ 637 OVERRIDE(OP): Performed by: HOSPITALIST

## 2018-01-01 PROCEDURE — 86901 BLOOD TYPING SEROLOGIC RH(D): CPT

## 2018-01-01 PROCEDURE — 74176 CT ABD & PELVIS W/O CONTRAST: CPT

## 2018-01-01 PROCEDURE — 700102 HCHG RX REV CODE 250 W/ 637 OVERRIDE(OP): Performed by: FAMILY MEDICINE

## 2018-01-01 PROCEDURE — 87493 C DIFF AMPLIFIED PROBE: CPT

## 2018-01-01 PROCEDURE — 80053 COMPREHEN METABOLIC PANEL: CPT

## 2018-01-01 PROCEDURE — A9270 NON-COVERED ITEM OR SERVICE: HCPCS | Performed by: HOSPITALIST

## 2018-01-01 PROCEDURE — 36430 TRANSFUSION BLD/BLD COMPNT: CPT

## 2018-01-01 PROCEDURE — 99291 CRITICAL CARE FIRST HOUR: CPT | Performed by: INTERNAL MEDICINE

## 2018-01-01 PROCEDURE — 80074 ACUTE HEPATITIS PANEL: CPT

## 2018-01-01 PROCEDURE — P9016 RBC LEUKOCYTES REDUCED: HCPCS

## 2018-01-01 PROCEDURE — 85046 RETICYTE/HGB CONCENTRATE: CPT

## 2018-01-01 PROCEDURE — 99233 SBSQ HOSP IP/OBS HIGH 50: CPT | Performed by: INTERNAL MEDICINE

## 2018-01-01 PROCEDURE — 99233 SBSQ HOSP IP/OBS HIGH 50: CPT | Performed by: FAMILY MEDICINE

## 2018-01-01 PROCEDURE — 97535 SELF CARE MNGMENT TRAINING: CPT

## 2018-01-01 PROCEDURE — 85027 COMPLETE CBC AUTOMATED: CPT

## 2018-01-01 PROCEDURE — 99214 OFFICE O/P EST MOD 30 MIN: CPT | Performed by: FAMILY MEDICINE

## 2018-01-01 PROCEDURE — 86850 RBC ANTIBODY SCREEN: CPT

## 2018-01-01 PROCEDURE — 38221 DX BONE MARROW BIOPSIES: CPT | Performed by: HOSPITALIST

## 2018-01-01 PROCEDURE — G8979 MOBILITY GOAL STATUS: HCPCS | Mod: CI

## 2018-01-01 PROCEDURE — 88184 FLOWCYTOMETRY/ TC 1 MARKER: CPT

## 2018-01-01 PROCEDURE — 700111 HCHG RX REV CODE 636 W/ 250 OVERRIDE (IP): Performed by: FAMILY MEDICINE

## 2018-01-01 PROCEDURE — 99233 SBSQ HOSP IP/OBS HIGH 50: CPT | Performed by: HOSPITALIST

## 2018-01-01 PROCEDURE — 07DR3ZX EXTRACTION OF ILIAC BONE MARROW, PERCUTANEOUS APPROACH, DIAGNOSTIC: ICD-10-PCS | Performed by: HOSPITALIST

## 2018-01-01 PROCEDURE — P9034 PLATELETS, PHERESIS: HCPCS

## 2018-01-01 PROCEDURE — 97165 OT EVAL LOW COMPLEX 30 MIN: CPT

## 2018-01-01 PROCEDURE — 86923 COMPATIBILITY TEST ELECTRIC: CPT

## 2018-01-01 PROCEDURE — 700111 HCHG RX REV CODE 636 W/ 250 OVERRIDE (IP): Mod: JG | Performed by: HOSPITALIST

## 2018-01-01 PROCEDURE — 83605 ASSAY OF LACTIC ACID: CPT

## 2018-01-01 PROCEDURE — 70450 CT HEAD/BRAIN W/O DYE: CPT

## 2018-01-01 PROCEDURE — 71045 X-RAY EXAM CHEST 1 VIEW: CPT

## 2018-01-01 PROCEDURE — 770001 HCHG ROOM/CARE - MED/SURG/GYN PRIV*

## 2018-01-01 PROCEDURE — 81001 URINALYSIS AUTO W/SCOPE: CPT

## 2018-01-01 PROCEDURE — 700101 HCHG RX REV CODE 250: Performed by: FAMILY MEDICINE

## 2018-01-01 PROCEDURE — 87040 BLOOD CULTURE FOR BACTERIA: CPT

## 2018-01-01 PROCEDURE — 84443 ASSAY THYROID STIM HORMONE: CPT

## 2018-01-01 PROCEDURE — 99232 SBSQ HOSP IP/OBS MODERATE 35: CPT | Performed by: HOSPITALIST

## 2018-01-01 PROCEDURE — 84550 ASSAY OF BLOOD/URIC ACID: CPT

## 2018-01-01 PROCEDURE — 85007 BL SMEAR W/DIFF WBC COUNT: CPT

## 2018-01-01 PROCEDURE — 88311 DECALCIFY TISSUE: CPT

## 2018-01-01 PROCEDURE — 99232 SBSQ HOSP IP/OBS MODERATE 35: CPT | Performed by: INTERNAL MEDICINE

## 2018-01-01 PROCEDURE — 30233S1 TRANSFUSION OF NONAUTOLOGOUS GLOBULIN INTO PERIPHERAL VEIN, PERCUTANEOUS APPROACH: ICD-10-PCS | Performed by: INTERNAL MEDICINE

## 2018-01-01 PROCEDURE — 99223 1ST HOSP IP/OBS HIGH 75: CPT | Mod: AI | Performed by: HOSPITALIST

## 2018-01-01 PROCEDURE — 99285 EMERGENCY DEPT VISIT HI MDM: CPT

## 2018-01-01 PROCEDURE — 700101 HCHG RX REV CODE 250: Performed by: INTERNAL MEDICINE

## 2018-01-01 PROCEDURE — 82040 ASSAY OF SERUM ALBUMIN: CPT

## 2018-01-01 PROCEDURE — A9270 NON-COVERED ITEM OR SERVICE: HCPCS | Performed by: STUDENT IN AN ORGANIZED HEALTH CARE EDUCATION/TRAINING PROGRAM

## 2018-01-01 PROCEDURE — 84145 PROCALCITONIN (PCT): CPT

## 2018-01-01 PROCEDURE — 700102 HCHG RX REV CODE 250 W/ 637 OVERRIDE(OP): Performed by: STUDENT IN AN ORGANIZED HEALTH CARE EDUCATION/TRAINING PROGRAM

## 2018-01-01 PROCEDURE — 87502 INFLUENZA DNA AMP PROBE: CPT

## 2018-01-01 PROCEDURE — 71046 X-RAY EXAM CHEST 2 VIEWS: CPT

## 2018-01-01 PROCEDURE — 88341 IMHCHEM/IMCYTCHM EA ADD ANTB: CPT | Mod: 91

## 2018-01-01 PROCEDURE — 83735 ASSAY OF MAGNESIUM: CPT

## 2018-01-01 PROCEDURE — 700105 HCHG RX REV CODE 258

## 2018-01-01 PROCEDURE — 85610 PROTHROMBIN TIME: CPT

## 2018-01-01 PROCEDURE — 99239 HOSP IP/OBS DSCHRG MGMT >30: CPT | Performed by: HOSPITALIST

## 2018-01-01 PROCEDURE — 83615 LACTATE (LD) (LDH) ENZYME: CPT

## 2018-01-01 PROCEDURE — 84160 ASSAY OF PROTEIN ANY SOURCE: CPT

## 2018-01-01 PROCEDURE — 302118 SHAMPOO,NO RINSE: Performed by: FAMILY MEDICINE

## 2018-01-01 PROCEDURE — 700101 HCHG RX REV CODE 250: Performed by: EMERGENCY MEDICINE

## 2018-01-01 PROCEDURE — 88342 IMHCHEM/IMCYTCHM 1ST ANTB: CPT

## 2018-01-01 PROCEDURE — 770006 HCHG ROOM/CARE - MED/SURG/GYN SEMI*

## 2018-01-01 PROCEDURE — 86923 COMPATIBILITY TEST ELECTRIC: CPT | Mod: 91

## 2018-01-01 PROCEDURE — 88305 TISSUE EXAM BY PATHOLOGIST: CPT

## 2018-01-01 PROCEDURE — 700111 HCHG RX REV CODE 636 W/ 250 OVERRIDE (IP): Performed by: EMERGENCY MEDICINE

## 2018-01-01 PROCEDURE — 86880 COOMBS TEST DIRECT: CPT | Mod: 91

## 2018-01-01 PROCEDURE — 94760 N-INVAS EAR/PLS OXIMETRY 1: CPT

## 2018-01-01 PROCEDURE — 84484 ASSAY OF TROPONIN QUANT: CPT

## 2018-01-01 PROCEDURE — 30233R1 TRANSFUSION OF NONAUTOLOGOUS PLATELETS INTO PERIPHERAL VEIN, PERCUTANEOUS APPROACH: ICD-10-PCS | Performed by: HOSPITALIST

## 2018-01-01 PROCEDURE — 85018 HEMOGLOBIN: CPT

## 2018-01-01 PROCEDURE — 96365 THER/PROPH/DIAG IV INF INIT: CPT

## 2018-01-01 PROCEDURE — 86644 CMV ANTIBODY: CPT | Mod: 91

## 2018-01-01 PROCEDURE — B54NZZA ULTRASONOGRAPHY OF LEFT UPPER EXTREMITY VEINS, GUIDANCE: ICD-10-PCS | Performed by: INTERNAL MEDICINE

## 2018-01-01 PROCEDURE — 84100 ASSAY OF PHOSPHORUS: CPT

## 2018-01-01 PROCEDURE — 88312 SPECIAL STAINS GROUP 1: CPT

## 2018-01-01 PROCEDURE — 700105 HCHG RX REV CODE 258: Performed by: EMERGENCY MEDICINE

## 2018-01-01 PROCEDURE — 81342 TRG GENE REARRANGEMENT ANAL: CPT

## 2018-01-01 PROCEDURE — A9270 NON-COVERED ITEM OR SERVICE: HCPCS | Performed by: EMERGENCY MEDICINE

## 2018-01-01 PROCEDURE — 85730 THROMBOPLASTIN TIME PARTIAL: CPT

## 2018-01-01 PROCEDURE — 700102 HCHG RX REV CODE 250 W/ 637 OVERRIDE(OP): Performed by: EMERGENCY MEDICINE

## 2018-01-01 PROCEDURE — 93306 TTE W/DOPPLER COMPLETE: CPT | Mod: 26 | Performed by: INTERNAL MEDICINE

## 2018-01-01 PROCEDURE — 700111 HCHG RX REV CODE 636 W/ 250 OVERRIDE (IP)

## 2018-01-01 PROCEDURE — 87040 BLOOD CULTURE FOR BACTERIA: CPT | Mod: 91

## 2018-01-01 PROCEDURE — G8978 MOBILITY CURRENT STATUS: HCPCS | Mod: CJ

## 2018-01-01 PROCEDURE — 82140 ASSAY OF AMMONIA: CPT

## 2018-01-01 PROCEDURE — 96368 THER/DIAG CONCURRENT INF: CPT

## 2018-01-01 PROCEDURE — 85014 HEMATOCRIT: CPT

## 2018-01-01 PROCEDURE — 700111 HCHG RX REV CODE 636 W/ 250 OVERRIDE (IP): Mod: JG | Performed by: INTERNAL MEDICINE

## 2018-01-01 PROCEDURE — 97530 THERAPEUTIC ACTIVITIES: CPT

## 2018-01-01 PROCEDURE — 93005 ELECTROCARDIOGRAM TRACING: CPT | Performed by: EMERGENCY MEDICINE

## 2018-01-01 PROCEDURE — 36569 INSJ PICC 5 YR+ W/O IMAGING: CPT

## 2018-01-01 PROCEDURE — 93970 EXTREMITY STUDY: CPT | Mod: 26 | Performed by: SURGERY

## 2018-01-01 PROCEDURE — P9047 ALBUMIN (HUMAN), 25%, 50ML: HCPCS | Mod: JG | Performed by: INTERNAL MEDICINE

## 2018-01-01 PROCEDURE — G0475 HIV COMBINATION ASSAY: HCPCS

## 2018-01-01 PROCEDURE — 93970 EXTREMITY STUDY: CPT

## 2018-01-01 PROCEDURE — 82784 ASSAY IGA/IGD/IGG/IGM EACH: CPT

## 2018-01-01 PROCEDURE — 88323 CONSLTJ&REPRT MATRL PREP SLD: CPT

## 2018-01-01 PROCEDURE — 02HV33Z INSERTION OF INFUSION DEVICE INTO SUPERIOR VENA CAVA, PERCUTANEOUS APPROACH: ICD-10-PCS | Performed by: INTERNAL MEDICINE

## 2018-01-01 PROCEDURE — 88185 FLOWCYTOMETRY/TC ADD-ON: CPT

## 2018-01-01 PROCEDURE — 85025 COMPLETE CBC W/AUTO DIFF WBC: CPT | Mod: 91

## 2018-01-01 PROCEDURE — 84165 PROTEIN E-PHORESIS SERUM: CPT

## 2018-01-01 PROCEDURE — 87070 CULTURE OTHR SPECIMN AEROBIC: CPT

## 2018-01-01 PROCEDURE — 96361 HYDRATE IV INFUSION ADD-ON: CPT

## 2018-01-01 PROCEDURE — 71250 CT THORAX DX C-: CPT

## 2018-01-01 PROCEDURE — 83880 ASSAY OF NATRIURETIC PEPTIDE: CPT

## 2018-01-01 PROCEDURE — 87324 CLOSTRIDIUM AG IA: CPT

## 2018-01-01 PROCEDURE — G8980 MOBILITY D/C STATUS: HCPCS | Mod: CI

## 2018-01-01 PROCEDURE — G8988 SELF CARE GOAL STATUS: HCPCS | Mod: CH

## 2018-01-01 PROCEDURE — 82533 TOTAL CORTISOL: CPT

## 2018-01-01 PROCEDURE — 83010 ASSAY OF HAPTOGLOBIN QUANT: CPT

## 2018-01-01 PROCEDURE — 88237 TISSUE CULTURE BONE MARROW: CPT

## 2018-01-01 PROCEDURE — 3E04305 INTRODUCTION OF OTHER ANTINEOPLASTIC INTO CENTRAL VEIN, PERCUTANEOUS APPROACH: ICD-10-PCS | Performed by: INTERNAL MEDICINE

## 2018-01-01 PROCEDURE — G8987 SELF CARE CURRENT STATUS: HCPCS | Mod: CI

## 2018-01-01 PROCEDURE — 70486 CT MAXILLOFACIAL W/O DYE: CPT

## 2018-01-01 PROCEDURE — 30233N1 TRANSFUSION OF NONAUTOLOGOUS RED BLOOD CELLS INTO PERIPHERAL VEIN, PERCUTANEOUS APPROACH: ICD-10-PCS | Performed by: FAMILY MEDICINE

## 2018-01-01 PROCEDURE — 84481 FREE ASSAY (FT-3): CPT

## 2018-01-01 PROCEDURE — 700117 HCHG RX CONTRAST REV CODE 255: Performed by: HOSPITALIST

## 2018-01-01 PROCEDURE — 84439 ASSAY OF FREE THYROXINE: CPT

## 2018-01-01 PROCEDURE — 83605 ASSAY OF LACTIC ACID: CPT | Mod: 91

## 2018-01-01 PROCEDURE — 85049 AUTOMATED PLATELET COUNT: CPT

## 2018-01-01 PROCEDURE — 87086 URINE CULTURE/COLONY COUNT: CPT

## 2018-01-01 PROCEDURE — 87400 INFLUENZA A/B EACH AG IA: CPT

## 2018-01-01 PROCEDURE — 93306 TTE W/DOPPLER COMPLETE: CPT

## 2018-01-01 PROCEDURE — 302136 NUTRITION PUMP: Performed by: INTERNAL MEDICINE

## 2018-01-01 PROCEDURE — 87205 SMEAR GRAM STAIN: CPT

## 2018-01-01 PROCEDURE — 86645 CMV ANTIBODY IGM: CPT

## 2018-01-01 RX ORDER — POTASSIUM CHLORIDE 20 MEQ/1
40 TABLET, EXTENDED RELEASE ORAL ONCE
Status: COMPLETED | OUTPATIENT
Start: 2018-01-01 | End: 2018-01-01

## 2018-01-01 RX ORDER — PREDNISONE 20 MG/1
20 TABLET ORAL DAILY
Status: DISCONTINUED | OUTPATIENT
Start: 2018-01-01 | End: 2018-01-01

## 2018-01-01 RX ORDER — CALCIUM CARBONATE 500 MG/1
1000 TABLET, CHEWABLE ORAL
Status: DISCONTINUED | OUTPATIENT
Start: 2018-01-01 | End: 2018-01-01

## 2018-01-01 RX ORDER — FAMOTIDINE 20 MG/1
20 TABLET, FILM COATED ORAL DAILY
Status: DISCONTINUED | OUTPATIENT
Start: 2018-01-01 | End: 2018-01-01

## 2018-01-01 RX ORDER — ONDANSETRON 4 MG/1
4 TABLET, ORALLY DISINTEGRATING ORAL EVERY 4 HOURS PRN
Status: DISCONTINUED | OUTPATIENT
Start: 2018-01-01 | End: 2018-01-01 | Stop reason: HOSPADM

## 2018-01-01 RX ORDER — LABETALOL HYDROCHLORIDE 5 MG/ML
10 INJECTION, SOLUTION INTRAVENOUS EVERY 4 HOURS PRN
Status: DISCONTINUED | OUTPATIENT
Start: 2018-01-01 | End: 2018-01-01 | Stop reason: HOSPADM

## 2018-01-01 RX ORDER — FAMOTIDINE 20 MG/1
20 TABLET, FILM COATED ORAL 2 TIMES DAILY
Status: DISCONTINUED | OUTPATIENT
Start: 2018-01-01 | End: 2018-01-01

## 2018-01-01 RX ORDER — BENZONATATE 100 MG/1
100 CAPSULE ORAL EVERY 4 HOURS PRN
Status: DISCONTINUED | OUTPATIENT
Start: 2018-01-01 | End: 2018-01-01 | Stop reason: HOSPADM

## 2018-01-01 RX ORDER — FLUCONAZOLE 100 MG/1
100 TABLET ORAL DAILY
Status: DISCONTINUED | OUTPATIENT
Start: 2018-01-01 | End: 2018-01-01

## 2018-01-01 RX ORDER — LEVOTHYROXINE SODIUM 0.1 MG/1
100 TABLET ORAL
Status: DISCONTINUED | OUTPATIENT
Start: 2018-01-01 | End: 2018-01-01

## 2018-01-01 RX ORDER — SODIUM BICARBONATE 650 MG/1
650 TABLET ORAL 3 TIMES DAILY
Status: DISCONTINUED | OUTPATIENT
Start: 2018-01-01 | End: 2018-01-01

## 2018-01-01 RX ORDER — FLUCONAZOLE 2 MG/ML
200 INJECTION, SOLUTION INTRAVENOUS EVERY 24 HOURS
Status: DISCONTINUED | OUTPATIENT
Start: 2018-01-01 | End: 2018-01-01

## 2018-01-01 RX ORDER — IMMUNE GLOBULIN 50 MG/ML
10 SOLUTION INTRAVENOUS ONCE
Status: COMPLETED | OUTPATIENT
Start: 2018-01-01 | End: 2018-01-01

## 2018-01-01 RX ORDER — ACETAMINOPHEN 325 MG/1
650 TABLET ORAL EVERY 4 HOURS PRN
Status: DISCONTINUED | OUTPATIENT
Start: 2018-01-01 | End: 2018-01-01

## 2018-01-01 RX ORDER — AMOXICILLIN 250 MG
2 CAPSULE ORAL 2 TIMES DAILY
Status: DISCONTINUED | OUTPATIENT
Start: 2018-01-01 | End: 2018-01-01 | Stop reason: HOSPADM

## 2018-01-01 RX ORDER — ECHINACEA PURPUREA EXTRACT 125 MG
2 TABLET ORAL PRN
Status: DISCONTINUED | OUTPATIENT
Start: 2018-01-01 | End: 2018-01-01

## 2018-01-01 RX ORDER — OXYCODONE HYDROCHLORIDE 5 MG/1
2.5-5 TABLET ORAL EVERY 4 HOURS PRN
Status: DISCONTINUED | OUTPATIENT
Start: 2018-01-01 | End: 2018-01-01

## 2018-01-01 RX ORDER — ACETAMINOPHEN 325 MG/1
650 TABLET ORAL EVERY 6 HOURS PRN
Status: DISCONTINUED | OUTPATIENT
Start: 2018-01-01 | End: 2018-01-01 | Stop reason: HOSPADM

## 2018-01-01 RX ORDER — MIDAZOLAM HYDROCHLORIDE 1 MG/ML
.5-2 INJECTION INTRAMUSCULAR; INTRAVENOUS PRN
Status: DISPENSED | OUTPATIENT
Start: 2018-01-01 | End: 2018-01-01

## 2018-01-01 RX ORDER — PAROXETINE HYDROCHLORIDE 20 MG/1
20 TABLET, FILM COATED ORAL
Status: DISCONTINUED | OUTPATIENT
Start: 2018-01-01 | End: 2018-01-01

## 2018-01-01 RX ORDER — DIPHENHYDRAMINE HYDROCHLORIDE 50 MG/ML
50 INJECTION INTRAMUSCULAR; INTRAVENOUS DAILY
Status: COMPLETED | OUTPATIENT
Start: 2018-01-01 | End: 2018-01-01

## 2018-01-01 RX ORDER — SODIUM CHLORIDE 9 MG/ML
500 INJECTION, SOLUTION INTRAVENOUS
Status: DISCONTINUED | OUTPATIENT
Start: 2018-01-01 | End: 2018-01-01 | Stop reason: HOSPADM

## 2018-01-01 RX ORDER — LEVOTHYROXINE SODIUM 0.1 MG/1
100 TABLET ORAL
Status: DISCONTINUED | OUTPATIENT
Start: 2018-01-01 | End: 2018-01-01 | Stop reason: HOSPADM

## 2018-01-01 RX ORDER — SODIUM CHLORIDE 9 MG/ML
INJECTION, SOLUTION INTRAVENOUS
Status: ACTIVE
Start: 2018-01-01 | End: 2018-01-01

## 2018-01-01 RX ORDER — BENZONATATE 100 MG/1
100 CAPSULE ORAL EVERY 4 HOURS PRN
Status: DISCONTINUED | OUTPATIENT
Start: 2018-01-01 | End: 2018-01-01

## 2018-01-01 RX ORDER — ONDANSETRON 2 MG/ML
4 INJECTION INTRAMUSCULAR; INTRAVENOUS EVERY 4 HOURS PRN
Status: DISCONTINUED | OUTPATIENT
Start: 2018-01-01 | End: 2018-01-01

## 2018-01-01 RX ORDER — FLUCONAZOLE 100 MG/1
200 TABLET ORAL DAILY
Status: DISCONTINUED | OUTPATIENT
Start: 2018-01-01 | End: 2018-01-01

## 2018-01-01 RX ORDER — SODIUM CHLORIDE 9 MG/ML
250 INJECTION, SOLUTION INTRAVENOUS CONTINUOUS
Status: DISCONTINUED | OUTPATIENT
Start: 2018-01-01 | End: 2018-01-01

## 2018-01-01 RX ORDER — POLYVINYL ALCOHOL 14 MG/ML
2 SOLUTION/ DROPS OPHTHALMIC EVERY 6 HOURS PRN
Status: DISCONTINUED | OUTPATIENT
Start: 2018-01-01 | End: 2018-01-01 | Stop reason: HOSPADM

## 2018-01-01 RX ORDER — SODIUM CHLORIDE 9 MG/ML
500 INJECTION, SOLUTION INTRAVENOUS
Status: ACTIVE | OUTPATIENT
Start: 2018-01-01 | End: 2018-01-01

## 2018-01-01 RX ORDER — SODIUM CHLORIDE 9 MG/ML
500 INJECTION, SOLUTION INTRAVENOUS
Status: DISCONTINUED | OUTPATIENT
Start: 2018-01-01 | End: 2018-01-01

## 2018-01-01 RX ORDER — ACETAMINOPHEN 325 MG/1
650 TABLET ORAL ONCE
Status: COMPLETED | OUTPATIENT
Start: 2018-01-01 | End: 2018-01-01

## 2018-01-01 RX ORDER — DIPHENHYDRAMINE HCL 25 MG
25 TABLET ORAL
Status: DISCONTINUED | OUTPATIENT
Start: 2018-01-01 | End: 2018-01-01

## 2018-01-01 RX ORDER — LABETALOL HYDROCHLORIDE 5 MG/ML
10 INJECTION, SOLUTION INTRAVENOUS EVERY 4 HOURS PRN
Status: DISCONTINUED | OUTPATIENT
Start: 2018-01-01 | End: 2018-01-01

## 2018-01-01 RX ORDER — HALOPERIDOL 5 MG/ML
5 INJECTION INTRAMUSCULAR EVERY 4 HOURS PRN
Status: DISCONTINUED | OUTPATIENT
Start: 2018-01-01 | End: 2018-01-01

## 2018-01-01 RX ORDER — DIPHENHYDRAMINE HYDROCHLORIDE 50 MG/ML
50 INJECTION INTRAMUSCULAR; INTRAVENOUS ONCE
Status: COMPLETED | OUTPATIENT
Start: 2018-01-01 | End: 2018-01-01

## 2018-01-01 RX ORDER — SODIUM CHLORIDE 9 MG/ML
30 INJECTION, SOLUTION INTRAVENOUS
Status: DISCONTINUED | OUTPATIENT
Start: 2018-01-01 | End: 2018-01-01 | Stop reason: HOSPADM

## 2018-01-01 RX ORDER — ONDANSETRON 2 MG/ML
4 INJECTION INTRAMUSCULAR; INTRAVENOUS EVERY 4 HOURS PRN
Status: DISCONTINUED | OUTPATIENT
Start: 2018-01-01 | End: 2018-01-01 | Stop reason: HOSPADM

## 2018-01-01 RX ORDER — BISACODYL 10 MG
10 SUPPOSITORY, RECTAL RECTAL
Status: DISCONTINUED | OUTPATIENT
Start: 2018-01-01 | End: 2018-01-01 | Stop reason: HOSPADM

## 2018-01-01 RX ORDER — ACYCLOVIR 200 MG/1
400 CAPSULE ORAL 2 TIMES DAILY
Status: DISCONTINUED | OUTPATIENT
Start: 2018-01-01 | End: 2018-01-01

## 2018-01-01 RX ORDER — ALBUTEROL SULFATE 90 UG/1
2 AEROSOL, METERED RESPIRATORY (INHALATION)
Status: DISCONTINUED | OUTPATIENT
Start: 2018-01-01 | End: 2018-01-01

## 2018-01-01 RX ORDER — CYCLOBENZAPRINE HCL 10 MG
10 TABLET ORAL 3 TIMES DAILY PRN
Qty: 30 TAB | Refills: 0 | Status: ON HOLD | OUTPATIENT
Start: 2018-01-01 | End: 2018-01-01

## 2018-01-01 RX ORDER — LISINOPRIL 20 MG/1
20 TABLET ORAL EVERY EVENING
COMMUNITY

## 2018-01-01 RX ORDER — SODIUM CHLORIDE 9 MG/ML
30 INJECTION, SOLUTION INTRAVENOUS
Status: COMPLETED | OUTPATIENT
Start: 2018-01-01 | End: 2018-01-01

## 2018-01-01 RX ORDER — MORPHINE SULFATE 100 MG/5ML
10 SOLUTION ORAL
Status: DISCONTINUED | OUTPATIENT
Start: 2018-01-01 | End: 2018-01-01 | Stop reason: HOSPADM

## 2018-01-01 RX ORDER — SODIUM CHLORIDE 9 MG/ML
INJECTION, SOLUTION INTRAVENOUS CONTINUOUS
Status: DISCONTINUED | OUTPATIENT
Start: 2018-01-01 | End: 2018-01-01 | Stop reason: HOSPADM

## 2018-01-01 RX ORDER — POTASSIUM CHLORIDE 20 MEQ/1
40 TABLET, EXTENDED RELEASE ORAL DAILY
Status: DISCONTINUED | OUTPATIENT
Start: 2018-01-01 | End: 2018-01-01

## 2018-01-01 RX ORDER — HYDROCODONE BITARTRATE AND ACETAMINOPHEN 7.5; 325 MG/1; MG/1
1 TABLET ORAL 2 TIMES DAILY PRN
Qty: 30 TAB | Refills: 0 | Status: ON HOLD | OUTPATIENT
Start: 2018-01-01 | End: 2018-01-01

## 2018-01-01 RX ORDER — DIPHENHYDRAMINE HCL 25 MG
50 TABLET ORAL ONCE
Status: COMPLETED | OUTPATIENT
Start: 2018-01-01 | End: 2018-01-01

## 2018-01-01 RX ORDER — POTASSIUM CHLORIDE 20 MEQ/1
40 TABLET, EXTENDED RELEASE ORAL 2 TIMES DAILY
Status: DISCONTINUED | OUTPATIENT
Start: 2018-01-01 | End: 2018-01-01 | Stop reason: HOSPADM

## 2018-01-01 RX ORDER — AMOXICILLIN 250 MG
2 CAPSULE ORAL 2 TIMES DAILY
Status: DISCONTINUED | OUTPATIENT
Start: 2018-01-01 | End: 2018-01-01

## 2018-01-01 RX ORDER — ONDANSETRON 2 MG/ML
8 INJECTION INTRAMUSCULAR; INTRAVENOUS ONCE
Status: COMPLETED | OUTPATIENT
Start: 2018-01-01 | End: 2018-01-01

## 2018-01-01 RX ORDER — ZOLPIDEM TARTRATE 5 MG/1
5 TABLET ORAL
Status: DISCONTINUED | OUTPATIENT
Start: 2018-01-01 | End: 2018-01-01 | Stop reason: HOSPADM

## 2018-01-01 RX ORDER — POLYETHYLENE GLYCOL 3350 17 G/17G
1 POWDER, FOR SOLUTION ORAL
Status: DISCONTINUED | OUTPATIENT
Start: 2018-01-01 | End: 2018-01-01 | Stop reason: HOSPADM

## 2018-01-01 RX ORDER — FLUCONAZOLE 2 MG/ML
200 INJECTION, SOLUTION INTRAVENOUS ONCE
Status: COMPLETED | OUTPATIENT
Start: 2018-01-01 | End: 2018-01-01

## 2018-01-01 RX ORDER — PAROXETINE HYDROCHLORIDE 20 MG/1
20 TABLET, FILM COATED ORAL
Status: DISCONTINUED | OUTPATIENT
Start: 2018-01-01 | End: 2018-01-01 | Stop reason: HOSPADM

## 2018-01-01 RX ORDER — SODIUM CHLORIDE 9 MG/ML
30 INJECTION, SOLUTION INTRAVENOUS ONCE
Status: COMPLETED | OUTPATIENT
Start: 2018-01-01 | End: 2018-01-01

## 2018-01-01 RX ORDER — FLUCONAZOLE 200 MG/1
200 TABLET ORAL DAILY
Status: DISCONTINUED | OUTPATIENT
Start: 2018-01-01 | End: 2018-01-01 | Stop reason: HOSPADM

## 2018-01-01 RX ORDER — MORPHINE SULFATE 10 MG/ML
10 INJECTION, SOLUTION INTRAMUSCULAR; INTRAVENOUS
Status: DISCONTINUED | OUTPATIENT
Start: 2018-01-01 | End: 2018-01-01 | Stop reason: HOSPADM

## 2018-01-01 RX ORDER — FUROSEMIDE 40 MG/1
40 TABLET ORAL
Status: DISCONTINUED | OUTPATIENT
Start: 2018-01-01 | End: 2018-01-01

## 2018-01-01 RX ORDER — ALBUMIN (HUMAN) 12.5 G/50ML
50 SOLUTION INTRAVENOUS EVERY 6 HOURS
Status: DISCONTINUED | OUTPATIENT
Start: 2018-01-01 | End: 2018-01-01

## 2018-01-01 RX ORDER — POLYETHYLENE GLYCOL 3350 17 G/17G
1 POWDER, FOR SOLUTION ORAL
Status: DISCONTINUED | OUTPATIENT
Start: 2018-01-01 | End: 2018-01-01

## 2018-01-01 RX ORDER — FLUCONAZOLE 2 MG/ML
INJECTION, SOLUTION INTRAVENOUS
Status: COMPLETED
Start: 2018-01-01 | End: 2018-01-01

## 2018-01-01 RX ORDER — SODIUM CHLORIDE 9 MG/ML
INJECTION, SOLUTION INTRAVENOUS CONTINUOUS
Status: DISCONTINUED | OUTPATIENT
Start: 2018-01-01 | End: 2018-01-01

## 2018-01-01 RX ORDER — ATROPINE SULFATE 10 MG/ML
2 SOLUTION/ DROPS OPHTHALMIC EVERY 4 HOURS PRN
Status: DISCONTINUED | OUTPATIENT
Start: 2018-01-01 | End: 2018-01-01 | Stop reason: HOSPADM

## 2018-01-01 RX ORDER — ACETAMINOPHEN 325 MG/1
650 TABLET ORAL EVERY 6 HOURS PRN
Status: DISCONTINUED | OUTPATIENT
Start: 2018-01-01 | End: 2018-01-01

## 2018-01-01 RX ORDER — ZOLPIDEM TARTRATE 5 MG/1
5 TABLET ORAL NIGHTLY PRN
Status: DISCONTINUED | OUTPATIENT
Start: 2018-01-01 | End: 2018-01-01

## 2018-01-01 RX ORDER — ATOVAQUONE 750 MG/5ML
750 SUSPENSION ORAL 2 TIMES DAILY
Status: DISCONTINUED | OUTPATIENT
Start: 2018-01-01 | End: 2018-01-01

## 2018-01-01 RX ORDER — ONDANSETRON 2 MG/ML
8 INJECTION INTRAMUSCULAR; INTRAVENOUS DAILY
Status: COMPLETED | OUTPATIENT
Start: 2018-01-01 | End: 2018-01-01

## 2018-01-01 RX ORDER — ALLOPURINOL 100 MG/1
100 TABLET ORAL DAILY
Status: DISCONTINUED | OUTPATIENT
Start: 2018-01-01 | End: 2018-01-01

## 2018-01-01 RX ORDER — SULFAMETHOXAZOLE AND TRIMETHOPRIM 800; 160 MG/1; MG/1
1 TABLET ORAL
Status: DISCONTINUED | OUTPATIENT
Start: 2018-01-01 | End: 2018-01-01

## 2018-01-01 RX ORDER — DIPHENHYDRAMINE HCL 25 MG
25 TABLET ORAL ONCE
Status: COMPLETED | OUTPATIENT
Start: 2018-01-01 | End: 2018-01-01

## 2018-01-01 RX ORDER — IMMUNE GLOBULIN 50 MG/ML
500 SOLUTION INTRAVENOUS ONCE
Status: DISCONTINUED | OUTPATIENT
Start: 2018-01-01 | End: 2018-01-01

## 2018-01-01 RX ORDER — LORAZEPAM 2 MG/ML
2-4 CONCENTRATE ORAL
Status: DISCONTINUED | OUTPATIENT
Start: 2018-01-01 | End: 2018-01-01 | Stop reason: HOSPADM

## 2018-01-01 RX ORDER — POTASSIUM CHLORIDE 20 MEQ/1
40 TABLET, EXTENDED RELEASE ORAL 2 TIMES DAILY
Status: DISCONTINUED | OUTPATIENT
Start: 2018-01-01 | End: 2018-01-01

## 2018-01-01 RX ORDER — CALCIUM GLUCONATE 94 MG/ML
1 INJECTION, SOLUTION INTRAVENOUS ONCE
Status: DISCONTINUED | OUTPATIENT
Start: 2018-01-01 | End: 2018-01-01

## 2018-01-01 RX ORDER — ACETAMINOPHEN 325 MG/1
650 TABLET ORAL DAILY
Status: COMPLETED | OUTPATIENT
Start: 2018-01-01 | End: 2018-01-01

## 2018-01-01 RX ORDER — LORAZEPAM 2 MG/ML
2-4 INJECTION INTRAMUSCULAR
Status: DISCONTINUED | OUTPATIENT
Start: 2018-01-01 | End: 2018-01-01 | Stop reason: HOSPADM

## 2018-01-01 RX ORDER — BISACODYL 10 MG
10 SUPPOSITORY, RECTAL RECTAL
Status: DISCONTINUED | OUTPATIENT
Start: 2018-01-01 | End: 2018-01-01

## 2018-01-01 RX ORDER — MORPHINE SULFATE 10 MG/ML
5 INJECTION, SOLUTION INTRAMUSCULAR; INTRAVENOUS
Status: DISCONTINUED | OUTPATIENT
Start: 2018-01-01 | End: 2018-01-01 | Stop reason: HOSPADM

## 2018-01-01 RX ORDER — AMOXICILLIN AND CLAVULANATE POTASSIUM 875; 125 MG/1; MG/1
1 TABLET, FILM COATED ORAL EVERY 12 HOURS
Status: DISCONTINUED | OUTPATIENT
Start: 2018-01-01 | End: 2018-01-01

## 2018-01-01 RX ORDER — DEXAMETHASONE SODIUM PHOSPHATE 1 MG/ML
1 SOLUTION/ DROPS OPHTHALMIC 2 TIMES DAILY
Status: DISCONTINUED | OUTPATIENT
Start: 2018-01-01 | End: 2018-01-01

## 2018-01-01 RX ADMIN — BENZONATATE 100 MG: 100 CAPSULE ORAL at 04:45

## 2018-01-01 RX ADMIN — BENZONATATE 100 MG: 100 CAPSULE ORAL at 19:55

## 2018-01-01 RX ADMIN — ACYCLOVIR 400 MG: 200 CAPSULE ORAL at 09:07

## 2018-01-01 RX ADMIN — VANCOMYCIN HYDROCHLORIDE 125 MG: 10 INJECTION, POWDER, LYOPHILIZED, FOR SOLUTION INTRAVENOUS at 09:06

## 2018-01-01 RX ADMIN — ACYCLOVIR 400 MG: 200 CAPSULE ORAL at 08:18

## 2018-01-01 RX ADMIN — PIPERACILLIN SODIUM AND TAZOBACTAM SODIUM: 3; .375 INJECTION, POWDER, FOR SOLUTION INTRAVENOUS at 00:48

## 2018-01-01 RX ADMIN — BENZONATATE 100 MG: 100 CAPSULE ORAL at 13:12

## 2018-01-01 RX ADMIN — LEVOTHYROXINE SODIUM 100 MCG: 100 TABLET ORAL at 06:16

## 2018-01-01 RX ADMIN — LEVOTHYROXINE SODIUM 100 MCG: 100 TABLET ORAL at 05:31

## 2018-01-01 RX ADMIN — VANCOMYCIN HYDROCHLORIDE 125 MG: 10 INJECTION, POWDER, LYOPHILIZED, FOR SOLUTION INTRAVENOUS at 23:25

## 2018-01-01 RX ADMIN — FLUCONAZOLE 200 MG: 200 TABLET ORAL at 08:11

## 2018-01-01 RX ADMIN — POTASSIUM CHLORIDE 40 MEQ: 1500 TABLET, EXTENDED RELEASE ORAL at 20:34

## 2018-01-01 RX ADMIN — DEXAMETHASONE SODIUM PHOSPHATE 1 DROP: 1 SOLUTION/ DROPS OPHTHALMIC at 22:18

## 2018-01-01 RX ADMIN — CEFEPIME 2 G: 2 INJECTION, POWDER, FOR SOLUTION INTRAVENOUS at 20:27

## 2018-01-01 RX ADMIN — POTASSIUM CHLORIDE 40 MEQ: 1500 TABLET, EXTENDED RELEASE ORAL at 21:05

## 2018-01-01 RX ADMIN — LEVOTHYROXINE SODIUM 100 MCG: 100 TABLET ORAL at 06:36

## 2018-01-01 RX ADMIN — CEFEPIME 2 G: 2 INJECTION, POWDER, FOR SOLUTION INTRAMUSCULAR; INTRAVENOUS at 09:44

## 2018-01-01 RX ADMIN — ATOVAQUONE 750 MG: 750 SUSPENSION ORAL at 08:45

## 2018-01-01 RX ADMIN — FLUCONAZOLE 100 MG: 2 INJECTION INTRAVENOUS at 08:36

## 2018-01-01 RX ADMIN — SODIUM CHLORIDE: 9 INJECTION, SOLUTION INTRAVENOUS at 21:12

## 2018-01-01 RX ADMIN — ACETAMINOPHEN 650 MG: 325 TABLET, FILM COATED ORAL at 12:43

## 2018-01-01 RX ADMIN — PAROXETINE HYDROCHLORIDE 20 MG: 20 TABLET, FILM COATED ORAL at 08:39

## 2018-01-01 RX ADMIN — TBO-FILGRASTIM 480 MCG: 480 INJECTION, SOLUTION SUBCUTANEOUS at 09:40

## 2018-01-01 RX ADMIN — ONDANSETRON HYDROCHLORIDE 4 MG: 2 INJECTION, SOLUTION INTRAMUSCULAR; INTRAVENOUS at 15:35

## 2018-01-01 RX ADMIN — BENZONATATE 100 MG: 100 CAPSULE ORAL at 21:03

## 2018-01-01 RX ADMIN — TBO-FILGRASTIM 480 MCG: 480 INJECTION, SOLUTION SUBCUTANEOUS at 09:52

## 2018-01-01 RX ADMIN — CEFEPIME 2 G: 2 INJECTION, POWDER, FOR SOLUTION INTRAMUSCULAR; INTRAVENOUS at 23:42

## 2018-01-01 RX ADMIN — LEVOTHYROXINE SODIUM 100 MCG: 100 TABLET ORAL at 06:10

## 2018-01-01 RX ADMIN — ACETAMINOPHEN 650 MG: 325 TABLET, FILM COATED ORAL at 03:06

## 2018-01-01 RX ADMIN — ACYCLOVIR 400 MG: 200 CAPSULE ORAL at 20:39

## 2018-01-01 RX ADMIN — BENZONATATE 100 MG: 100 CAPSULE ORAL at 20:32

## 2018-01-01 RX ADMIN — CEFEPIME 2 G: 2 INJECTION, POWDER, FOR SOLUTION INTRAMUSCULAR; INTRAVENOUS at 09:15

## 2018-01-01 RX ADMIN — IMMUNE GLOBULIN 10 G: 50 SOLUTION INTRAVENOUS at 17:39

## 2018-01-01 RX ADMIN — FAMOTIDINE 20 MG: 20 TABLET, FILM COATED ORAL at 09:55

## 2018-01-01 RX ADMIN — FAMOTIDINE 20 MG: 20 TABLET, FILM COATED ORAL at 12:43

## 2018-01-01 RX ADMIN — POTASSIUM CHLORIDE 40 MEQ: 1500 TABLET, EXTENDED RELEASE ORAL at 09:07

## 2018-01-01 RX ADMIN — SODIUM CHLORIDE: 9 INJECTION, SOLUTION INTRAVENOUS at 12:21

## 2018-01-01 RX ADMIN — ALLOPURINOL 100 MG: 100 TABLET ORAL at 12:00

## 2018-01-01 RX ADMIN — FLUCONAZOLE 100 MG: 100 TABLET ORAL at 09:47

## 2018-01-01 RX ADMIN — ATOVAQUONE 750 MG: 750 SUSPENSION ORAL at 07:48

## 2018-01-01 RX ADMIN — DEXAMETHASONE SODIUM PHOSPHATE 1 DROP: 1 SOLUTION/ DROPS OPHTHALMIC at 09:50

## 2018-01-01 RX ADMIN — POTASSIUM CHLORIDE 40 MEQ: 1500 TABLET, EXTENDED RELEASE ORAL at 21:50

## 2018-01-01 RX ADMIN — FLUCONAZOLE 100 MG: 100 TABLET ORAL at 08:27

## 2018-01-01 RX ADMIN — BENZONATATE 100 MG: 100 CAPSULE ORAL at 17:18

## 2018-01-01 RX ADMIN — FAMOTIDINE 20 MG: 20 TABLET, FILM COATED ORAL at 07:47

## 2018-01-01 RX ADMIN — PAROXETINE HYDROCHLORIDE 20 MG: 20 TABLET, FILM COATED ORAL at 08:02

## 2018-01-01 RX ADMIN — ACYCLOVIR 400 MG: 200 CAPSULE ORAL at 20:34

## 2018-01-01 RX ADMIN — LEVOTHYROXINE SODIUM 100 MCG: 100 TABLET ORAL at 06:26

## 2018-01-01 RX ADMIN — SODIUM BICARBONATE 650 MG: 650 TABLET ORAL at 21:35

## 2018-01-01 RX ADMIN — BENZOCAINE AND MENTHOL 1 LOZENGE: 15; 3.6 LOZENGE ORAL at 02:48

## 2018-01-01 RX ADMIN — SODIUM BICARBONATE 150 MEQ: 84 INJECTION, SOLUTION INTRAVENOUS at 10:56

## 2018-01-01 RX ADMIN — DEXAMETHASONE SODIUM PHOSPHATE 1 DROP: 1 SOLUTION/ DROPS OPHTHALMIC at 11:17

## 2018-01-01 RX ADMIN — CEFEPIME 2 G: 2 INJECTION, POWDER, FOR SOLUTION INTRAMUSCULAR; INTRAVENOUS at 08:55

## 2018-01-01 RX ADMIN — CEFEPIME 2 G: 2 INJECTION, POWDER, FOR SOLUTION INTRAMUSCULAR; INTRAVENOUS at 09:05

## 2018-01-01 RX ADMIN — CEFEPIME 2 G: 2 INJECTION, POWDER, FOR SOLUTION INTRAMUSCULAR; INTRAVENOUS at 08:18

## 2018-01-01 RX ADMIN — VANCOMYCIN HYDROCHLORIDE 125 MG: 10 INJECTION, POWDER, LYOPHILIZED, FOR SOLUTION INTRAVENOUS at 00:29

## 2018-01-01 RX ADMIN — ACYCLOVIR 400 MG: 200 CAPSULE ORAL at 10:56

## 2018-01-01 RX ADMIN — BENZONATATE 100 MG: 100 CAPSULE ORAL at 13:04

## 2018-01-01 RX ADMIN — ALLOPURINOL 100 MG: 100 TABLET ORAL at 09:24

## 2018-01-01 RX ADMIN — ACYCLOVIR 400 MG: 200 CAPSULE ORAL at 09:41

## 2018-01-01 RX ADMIN — VANCOMYCIN HYDROCHLORIDE 125 MG: 10 INJECTION, POWDER, LYOPHILIZED, FOR SOLUTION INTRAVENOUS at 00:50

## 2018-01-01 RX ADMIN — POTASSIUM CHLORIDE 40 MEQ: 1500 TABLET, EXTENDED RELEASE ORAL at 09:56

## 2018-01-01 RX ADMIN — SODIUM CHLORIDE: 9 INJECTION, SOLUTION INTRAVENOUS at 04:56

## 2018-01-01 RX ADMIN — CEFEPIME 2 G: 2 INJECTION, POWDER, FOR SOLUTION INTRAMUSCULAR; INTRAVENOUS at 08:44

## 2018-01-01 RX ADMIN — ALEMTUZUMAB: 30 INJECTION INTRAVENOUS at 13:38

## 2018-01-01 RX ADMIN — SODIUM BICARBONATE 650 MG: 650 TABLET ORAL at 09:56

## 2018-01-01 RX ADMIN — VANCOMYCIN HYDROCHLORIDE 125 MG: 10 INJECTION, POWDER, LYOPHILIZED, FOR SOLUTION INTRAVENOUS at 17:38

## 2018-01-01 RX ADMIN — PAROXETINE HYDROCHLORIDE 20 MG: 20 TABLET, FILM COATED ORAL at 09:18

## 2018-01-01 RX ADMIN — ACETAMINOPHEN 650 MG: 325 TABLET, FILM COATED ORAL at 04:19

## 2018-01-01 RX ADMIN — FLUCONAZOLE 100 MG: 100 TABLET ORAL at 09:45

## 2018-01-01 RX ADMIN — BENZONATATE 100 MG: 100 CAPSULE ORAL at 04:42

## 2018-01-01 RX ADMIN — POTASSIUM CHLORIDE 40 MEQ: 1500 TABLET, EXTENDED RELEASE ORAL at 21:01

## 2018-01-01 RX ADMIN — CEFEPIME 2 G: 2 INJECTION, POWDER, FOR SOLUTION INTRAMUSCULAR; INTRAVENOUS at 21:05

## 2018-01-01 RX ADMIN — DIPHENHYDRAMINE HCL 25 MG: 25 TABLET ORAL at 09:55

## 2018-01-01 RX ADMIN — ALLOPURINOL 100 MG: 100 TABLET ORAL at 08:50

## 2018-01-01 RX ADMIN — ONDANSETRON HYDROCHLORIDE 8 MG: 2 INJECTION, SOLUTION INTRAMUSCULAR; INTRAVENOUS at 10:30

## 2018-01-01 RX ADMIN — MORPHINE SULFATE 5 MG: 10 INJECTION INTRAVENOUS at 03:34

## 2018-01-01 RX ADMIN — ATOVAQUONE 750 MG: 750 SUSPENSION ORAL at 20:39

## 2018-01-01 RX ADMIN — CEFEPIME 2 G: 2 INJECTION, POWDER, FOR SOLUTION INTRAMUSCULAR; INTRAVENOUS at 21:34

## 2018-01-01 RX ADMIN — SODIUM CHLORIDE: 9 INJECTION, SOLUTION INTRAVENOUS at 22:12

## 2018-01-01 RX ADMIN — PAROXETINE HYDROCHLORIDE 20 MG: 20 TABLET, FILM COATED ORAL at 09:27

## 2018-01-01 RX ADMIN — DEXAMETHASONE SODIUM PHOSPHATE 1 DROP: 1 SOLUTION/ DROPS OPHTHALMIC at 21:03

## 2018-01-01 RX ADMIN — DIPHENHYDRAMINE HYDROCHLORIDE 50 MG: 50 INJECTION, SOLUTION INTRAMUSCULAR; INTRAVENOUS at 17:47

## 2018-01-01 RX ADMIN — FAMOTIDINE 20 MG: 20 TABLET, FILM COATED ORAL at 21:52

## 2018-01-01 RX ADMIN — TBO-FILGRASTIM 480 MCG: 480 INJECTION, SOLUTION SUBCUTANEOUS at 08:53

## 2018-01-01 RX ADMIN — GUAIFENESIN 200 MG: 100 SOLUTION ORAL at 18:41

## 2018-01-01 RX ADMIN — LEVOTHYROXINE SODIUM 100 MCG: 100 TABLET ORAL at 06:24

## 2018-01-01 RX ADMIN — ACETAMINOPHEN 650 MG: 325 TABLET, FILM COATED ORAL at 12:38

## 2018-01-01 RX ADMIN — DIPHENHYDRAMINE HCL 25 MG: 25 TABLET ORAL at 03:06

## 2018-01-01 RX ADMIN — GUAIFENESIN 200 MG: 100 SOLUTION ORAL at 00:29

## 2018-01-01 RX ADMIN — DEXAMETHASONE SODIUM PHOSPHATE 1 DROP: 1 SOLUTION/ DROPS OPHTHALMIC at 00:03

## 2018-01-01 RX ADMIN — CEFEPIME 2 G: 2 INJECTION, POWDER, FOR SOLUTION INTRAMUSCULAR; INTRAVENOUS at 08:52

## 2018-01-01 RX ADMIN — POTASSIUM CHLORIDE 40 MEQ: 1500 TABLET, EXTENDED RELEASE ORAL at 08:33

## 2018-01-01 RX ADMIN — ACETAMINOPHEN 650 MG: 325 TABLET, FILM COATED ORAL at 20:52

## 2018-01-01 RX ADMIN — SODIUM BICARBONATE 650 MG: 650 TABLET ORAL at 21:48

## 2018-01-01 RX ADMIN — FLUCONAZOLE 100 MG: 100 TABLET ORAL at 08:44

## 2018-01-01 RX ADMIN — ALBUTEROL SULFATE 2.5 MG: 2.5 SOLUTION RESPIRATORY (INHALATION) at 07:58

## 2018-01-01 RX ADMIN — ALLOPURINOL 100 MG: 100 TABLET ORAL at 10:56

## 2018-01-01 RX ADMIN — CEFEPIME 2 G: 2 INJECTION, POWDER, FOR SOLUTION INTRAVENOUS at 22:22

## 2018-01-01 RX ADMIN — ACYCLOVIR 400 MG: 200 CAPSULE ORAL at 09:47

## 2018-01-01 RX ADMIN — ALEMTUZUMAB: 30 INJECTION INTRAVENOUS at 13:35

## 2018-01-01 RX ADMIN — CEFEPIME 2 G: 2 INJECTION, POWDER, FOR SOLUTION INTRAVENOUS at 09:24

## 2018-01-01 RX ADMIN — LEVOTHYROXINE SODIUM 100 MCG: 100 TABLET ORAL at 05:42

## 2018-01-01 RX ADMIN — ACETAMINOPHEN 650 MG: 325 TABLET, FILM COATED ORAL at 12:30

## 2018-01-01 RX ADMIN — DEXAMETHASONE SODIUM PHOSPHATE 1 DROP: 1 SOLUTION/ DROPS OPHTHALMIC at 10:04

## 2018-01-01 RX ADMIN — CEFEPIME 2 G: 2 INJECTION, POWDER, FOR SOLUTION INTRAVENOUS at 12:31

## 2018-01-01 RX ADMIN — PREDNISONE 20 MG: 20 TABLET ORAL at 10:56

## 2018-01-01 RX ADMIN — DIPHENHYDRAMINE HYDROCHLORIDE 50 MG: 50 INJECTION, SOLUTION INTRAMUSCULAR; INTRAVENOUS at 12:38

## 2018-01-01 RX ADMIN — FUROSEMIDE 40 MG: 40 TABLET ORAL at 16:11

## 2018-01-01 RX ADMIN — POTASSIUM CHLORIDE 40 MEQ: 1500 TABLET, EXTENDED RELEASE ORAL at 08:27

## 2018-01-01 RX ADMIN — TBO-FILGRASTIM 480 MCG: 480 INJECTION, SOLUTION SUBCUTANEOUS at 08:34

## 2018-01-01 RX ADMIN — ACYCLOVIR 400 MG: 200 CAPSULE ORAL at 09:17

## 2018-01-01 RX ADMIN — SODIUM BICARBONATE: 84 INJECTION, SOLUTION INTRAVENOUS at 23:53

## 2018-01-01 RX ADMIN — POTASSIUM CHLORIDE 40 MEQ: 1500 TABLET, EXTENDED RELEASE ORAL at 08:50

## 2018-01-01 RX ADMIN — BENZONATATE 100 MG: 100 CAPSULE ORAL at 09:50

## 2018-01-01 RX ADMIN — DEXAMETHASONE SODIUM PHOSPHATE 1 DROP: 1 SOLUTION/ DROPS OPHTHALMIC at 22:05

## 2018-01-01 RX ADMIN — VANCOMYCIN HYDROCHLORIDE 125 MG: 10 INJECTION, POWDER, LYOPHILIZED, FOR SOLUTION INTRAVENOUS at 17:00

## 2018-01-01 RX ADMIN — POTASSIUM CHLORIDE 40 MEQ: 1500 TABLET, EXTENDED RELEASE ORAL at 21:23

## 2018-01-01 RX ADMIN — DIPHENHYDRAMINE HYDROCHLORIDE 50 MG: 50 INJECTION, SOLUTION INTRAMUSCULAR; INTRAVENOUS at 14:38

## 2018-01-01 RX ADMIN — POTASSIUM CHLORIDE 40 MEQ: 1500 TABLET, EXTENDED RELEASE ORAL at 21:37

## 2018-01-01 RX ADMIN — FLUCONAZOLE 100 MG: 100 TABLET ORAL at 09:27

## 2018-01-01 RX ADMIN — PAROXETINE HYDROCHLORIDE 20 MG: 20 TABLET, FILM COATED ORAL at 08:44

## 2018-01-01 RX ADMIN — AMOXICILLIN AND CLAVULANATE POTASSIUM 1 TABLET: 875; 125 TABLET, FILM COATED ORAL at 09:22

## 2018-01-01 RX ADMIN — ATOVAQUONE 750 MG: 750 SUSPENSION ORAL at 22:04

## 2018-01-01 RX ADMIN — SODIUM CHLORIDE: 9 INJECTION, SOLUTION INTRAVENOUS at 00:49

## 2018-01-01 RX ADMIN — ACETAMINOPHEN 650 MG: 325 TABLET, FILM COATED ORAL at 17:47

## 2018-01-01 RX ADMIN — ACETAMINOPHEN 650 MG: 325 TABLET, FILM COATED ORAL at 16:45

## 2018-01-01 RX ADMIN — ACYCLOVIR 400 MG: 200 CAPSULE ORAL at 21:05

## 2018-01-01 RX ADMIN — SODIUM CHLORIDE: 9 INJECTION, SOLUTION INTRAVENOUS at 09:53

## 2018-01-01 RX ADMIN — POTASSIUM CHLORIDE 40 MEQ: 1500 TABLET, EXTENDED RELEASE ORAL at 21:02

## 2018-01-01 RX ADMIN — POTASSIUM CHLORIDE 40 MEQ: 1500 TABLET, EXTENDED RELEASE ORAL at 09:40

## 2018-01-01 RX ADMIN — HYDROCORTISONE SODIUM SUCCINATE 100 MG: 100 INJECTION, POWDER, FOR SOLUTION INTRAMUSCULAR; INTRAVENOUS at 12:49

## 2018-01-01 RX ADMIN — VANCOMYCIN HYDROCHLORIDE 125 MG: 10 INJECTION, POWDER, LYOPHILIZED, FOR SOLUTION INTRAVENOUS at 06:36

## 2018-01-01 RX ADMIN — FAMOTIDINE 20 MG: 20 TABLET, FILM COATED ORAL at 09:05

## 2018-01-01 RX ADMIN — BENZONATATE 100 MG: 100 CAPSULE ORAL at 13:01

## 2018-01-01 RX ADMIN — POTASSIUM CHLORIDE 40 MEQ: 1500 TABLET, EXTENDED RELEASE ORAL at 20:39

## 2018-01-01 RX ADMIN — BENZONATATE 100 MG: 100 CAPSULE ORAL at 23:56

## 2018-01-01 RX ADMIN — TBO-FILGRASTIM 480 MCG: 480 INJECTION, SOLUTION SUBCUTANEOUS at 12:31

## 2018-01-01 RX ADMIN — ACYCLOVIR 400 MG: 200 CAPSULE ORAL at 08:31

## 2018-01-01 RX ADMIN — CEFEPIME 2 G: 2 INJECTION, POWDER, FOR SOLUTION INTRAMUSCULAR; INTRAVENOUS at 09:33

## 2018-01-01 RX ADMIN — LEVOTHYROXINE SODIUM 100 MCG: 100 TABLET ORAL at 06:52

## 2018-01-01 RX ADMIN — FUROSEMIDE 40 MG: 40 TABLET ORAL at 05:19

## 2018-01-01 RX ADMIN — ACETAMINOPHEN 650 MG: 325 TABLET, FILM COATED ORAL at 12:50

## 2018-01-01 RX ADMIN — SODIUM BICARBONATE 650 MG: 650 TABLET ORAL at 21:51

## 2018-01-01 RX ADMIN — PAROXETINE HYDROCHLORIDE 20 MG: 20 TABLET, FILM COATED ORAL at 12:32

## 2018-01-01 RX ADMIN — DIPHENHYDRAMINE HYDROCHLORIDE 50 MG: 50 INJECTION, SOLUTION INTRAMUSCULAR; INTRAVENOUS at 12:59

## 2018-01-01 RX ADMIN — VANCOMYCIN HYDROCHLORIDE 125 MG: 10 INJECTION, POWDER, LYOPHILIZED, FOR SOLUTION INTRAVENOUS at 12:54

## 2018-01-01 RX ADMIN — POTASSIUM CHLORIDE 40 MEQ: 1500 TABLET, EXTENDED RELEASE ORAL at 08:18

## 2018-01-01 RX ADMIN — CEFEPIME 2 G: 2 INJECTION, POWDER, FOR SOLUTION INTRAMUSCULAR; INTRAVENOUS at 08:05

## 2018-01-01 RX ADMIN — SODIUM CHLORIDE: 9 INJECTION, SOLUTION INTRAVENOUS at 15:15

## 2018-01-01 RX ADMIN — LEVOTHYROXINE SODIUM 100 MCG: 100 TABLET ORAL at 06:27

## 2018-01-01 RX ADMIN — LEVOTHYROXINE SODIUM 100 MCG: 100 TABLET ORAL at 05:27

## 2018-01-01 RX ADMIN — DEXAMETHASONE SODIUM PHOSPHATE 1 DROP: 1 SOLUTION/ DROPS OPHTHALMIC at 21:20

## 2018-01-01 RX ADMIN — DEXAMETHASONE SODIUM PHOSPHATE 1 DROP: 1 SOLUTION/ DROPS OPHTHALMIC at 22:00

## 2018-01-01 RX ADMIN — BENZONATATE 100 MG: 100 CAPSULE ORAL at 22:11

## 2018-01-01 RX ADMIN — TBO-FILGRASTIM 480 MCG: 480 INJECTION, SOLUTION SUBCUTANEOUS at 09:26

## 2018-01-01 RX ADMIN — POTASSIUM CHLORIDE 40 MEQ: 1500 TABLET, EXTENDED RELEASE ORAL at 21:48

## 2018-01-01 RX ADMIN — LABETALOL HYDROCHLORIDE 10 MG: 5 INJECTION, SOLUTION INTRAVENOUS at 12:27

## 2018-01-01 RX ADMIN — DIPHENHYDRAMINE HCL 25 MG: 25 TABLET ORAL at 10:09

## 2018-01-01 RX ADMIN — ACYCLOVIR 400 MG: 200 CAPSULE ORAL at 08:01

## 2018-01-01 RX ADMIN — DIPHENHYDRAMINE HCL 25 MG: 25 TABLET ORAL at 10:04

## 2018-01-01 RX ADMIN — ACETAMINOPHEN 650 MG: 325 TABLET, FILM COATED ORAL at 10:03

## 2018-01-01 RX ADMIN — ATOVAQUONE 750 MG: 750 SUSPENSION ORAL at 20:53

## 2018-01-01 RX ADMIN — ALBUTEROL SULFATE 2.5 MG: 2.5 SOLUTION RESPIRATORY (INHALATION) at 18:34

## 2018-01-01 RX ADMIN — FLUCONAZOLE 100 MG: 100 TABLET ORAL at 09:56

## 2018-01-01 RX ADMIN — CEFEPIME 2 G: 2 INJECTION, POWDER, FOR SOLUTION INTRAMUSCULAR; INTRAVENOUS at 09:51

## 2018-01-01 RX ADMIN — ACYCLOVIR 400 MG: 200 CAPSULE ORAL at 08:44

## 2018-01-01 RX ADMIN — MORPHINE SULFATE 10 MG: 10 INJECTION INTRAVENOUS at 22:38

## 2018-01-01 RX ADMIN — AMOXICILLIN AND CLAVULANATE POTASSIUM 1 TABLET: 875; 125 TABLET, FILM COATED ORAL at 08:42

## 2018-01-01 RX ADMIN — VANCOMYCIN HYDROCHLORIDE 125 MG: 10 INJECTION, POWDER, LYOPHILIZED, FOR SOLUTION INTRAVENOUS at 05:47

## 2018-01-01 RX ADMIN — CEFEPIME 2 G: 2 INJECTION, POWDER, FOR SOLUTION INTRAMUSCULAR; INTRAVENOUS at 21:10

## 2018-01-01 RX ADMIN — POTASSIUM CHLORIDE 40 MEQ: 1500 TABLET, EXTENDED RELEASE ORAL at 08:39

## 2018-01-01 RX ADMIN — CEFEPIME 2 G: 2 INJECTION, POWDER, FOR SOLUTION INTRAVENOUS at 06:52

## 2018-01-01 RX ADMIN — DEXAMETHASONE SODIUM PHOSPHATE 1 DROP: 1 SOLUTION/ DROPS OPHTHALMIC at 09:35

## 2018-01-01 RX ADMIN — ONDANSETRON HYDROCHLORIDE 8 MG: 2 INJECTION, SOLUTION INTRAMUSCULAR; INTRAVENOUS at 12:44

## 2018-01-01 RX ADMIN — SODIUM CHLORIDE 500 ML: 9 INJECTION, SOLUTION INTRAVENOUS at 18:58

## 2018-01-01 RX ADMIN — VANCOMYCIN HYDROCHLORIDE 125 MG: 10 INJECTION, POWDER, LYOPHILIZED, FOR SOLUTION INTRAVENOUS at 23:53

## 2018-01-01 RX ADMIN — SODIUM CHLORIDE: 9 INJECTION, SOLUTION INTRAVENOUS at 05:32

## 2018-01-01 RX ADMIN — ATOVAQUONE 750 MG: 750 SUSPENSION ORAL at 21:50

## 2018-01-01 RX ADMIN — PREDNISONE 20 MG: 20 TABLET ORAL at 17:08

## 2018-01-01 RX ADMIN — BENZONATATE 100 MG: 100 CAPSULE ORAL at 04:19

## 2018-01-01 RX ADMIN — ONDANSETRON HYDROCHLORIDE 8 MG: 2 INJECTION, SOLUTION INTRAMUSCULAR; INTRAVENOUS at 12:30

## 2018-01-01 RX ADMIN — ACYCLOVIR 400 MG: 200 CAPSULE ORAL at 21:50

## 2018-01-01 RX ADMIN — VANCOMYCIN HYDROCHLORIDE 125 MG: 10 INJECTION, POWDER, LYOPHILIZED, FOR SOLUTION INTRAVENOUS at 17:49

## 2018-01-01 RX ADMIN — POTASSIUM CHLORIDE 40 MEQ: 1500 TABLET, EXTENDED RELEASE ORAL at 20:53

## 2018-01-01 RX ADMIN — POTASSIUM CHLORIDE 40 MEQ: 1500 TABLET, EXTENDED RELEASE ORAL at 20:27

## 2018-01-01 RX ADMIN — DIPHENHYDRAMINE HYDROCHLORIDE 50 MG: 50 INJECTION, SOLUTION INTRAMUSCULAR; INTRAVENOUS at 10:56

## 2018-01-01 RX ADMIN — CEFEPIME 2 G: 2 INJECTION, POWDER, FOR SOLUTION INTRAVENOUS at 09:21

## 2018-01-01 RX ADMIN — FENTANYL CITRATE 50 MCG: 50 INJECTION INTRAMUSCULAR; INTRAVENOUS at 10:10

## 2018-01-01 RX ADMIN — DIPHENHYDRAMINE HYDROCHLORIDE 50 MG: 50 INJECTION, SOLUTION INTRAMUSCULAR; INTRAVENOUS at 12:40

## 2018-01-01 RX ADMIN — ONDANSETRON HYDROCHLORIDE 8 MG: 2 INJECTION, SOLUTION INTRAMUSCULAR; INTRAVENOUS at 12:50

## 2018-01-01 RX ADMIN — HYDROCORTISONE SODIUM SUCCINATE 100 MG: 100 INJECTION, POWDER, FOR SOLUTION INTRAMUSCULAR; INTRAVENOUS at 12:27

## 2018-01-01 RX ADMIN — FUROSEMIDE 40 MG: 40 TABLET ORAL at 15:00

## 2018-01-01 RX ADMIN — ACETAMINOPHEN 650 MG: 325 TABLET, FILM COATED ORAL at 12:27

## 2018-01-01 RX ADMIN — VANCOMYCIN HYDROCHLORIDE 125 MG: 10 INJECTION, POWDER, LYOPHILIZED, FOR SOLUTION INTRAVENOUS at 13:20

## 2018-01-01 RX ADMIN — ALEMTUZUMAB: 30 INJECTION INTRAVENOUS at 13:19

## 2018-01-01 RX ADMIN — POTASSIUM CHLORIDE 40 MEQ: 1500 TABLET, EXTENDED RELEASE ORAL at 22:03

## 2018-01-01 RX ADMIN — PAROXETINE HYDROCHLORIDE 20 MG: 20 TABLET, FILM COATED ORAL at 08:33

## 2018-01-01 RX ADMIN — ALEMTUZUMAB: 30 INJECTION INTRAVENOUS at 18:37

## 2018-01-01 RX ADMIN — CEFEPIME 2 G: 2 INJECTION, POWDER, FOR SOLUTION INTRAMUSCULAR; INTRAVENOUS at 08:40

## 2018-01-01 RX ADMIN — ATOVAQUONE 750 MG: 750 SUSPENSION ORAL at 10:56

## 2018-01-01 RX ADMIN — SODIUM BICARBONATE: 84 INJECTION, SOLUTION INTRAVENOUS at 12:39

## 2018-01-01 RX ADMIN — DEXAMETHASONE SODIUM PHOSPHATE 1 DROP: 1 SOLUTION/ DROPS OPHTHALMIC at 10:00

## 2018-01-01 RX ADMIN — IMMUNE GLOBULIN 10 G: 50 SOLUTION INTRAVENOUS at 13:43

## 2018-01-01 RX ADMIN — TBO-FILGRASTIM 480 MCG: 480 INJECTION, SOLUTION SUBCUTANEOUS at 09:19

## 2018-01-01 RX ADMIN — HYDROCORTISONE SODIUM SUCCINATE 100 MG: 100 INJECTION, POWDER, FOR SOLUTION INTRAMUSCULAR; INTRAVENOUS at 17:46

## 2018-01-01 RX ADMIN — SODIUM CHLORIDE 250 ML: 9 INJECTION, SOLUTION INTRAVENOUS at 11:16

## 2018-01-01 RX ADMIN — SODIUM CHLORIDE: 9 INJECTION, SOLUTION INTRAVENOUS at 06:27

## 2018-01-01 RX ADMIN — PREDNISONE 20 MG: 20 TABLET ORAL at 09:47

## 2018-01-01 RX ADMIN — SODIUM CHLORIDE: 9 INJECTION, SOLUTION INTRAVENOUS at 09:49

## 2018-01-01 RX ADMIN — IMMUNE GLOBULIN 10 G: 50 SOLUTION INTRAVENOUS at 20:03

## 2018-01-01 RX ADMIN — ONDANSETRON HYDROCHLORIDE 8 MG: 2 INJECTION, SOLUTION INTRAMUSCULAR; INTRAVENOUS at 12:40

## 2018-01-01 RX ADMIN — BENZOCAINE AND MENTHOL 1 LOZENGE: 15; 3.6 LOZENGE ORAL at 08:45

## 2018-01-01 RX ADMIN — PREDNISONE 20 MG: 20 TABLET ORAL at 08:31

## 2018-01-01 RX ADMIN — ACYCLOVIR 400 MG: 200 CAPSULE ORAL at 20:07

## 2018-01-01 RX ADMIN — DIPHENHYDRAMINE HYDROCHLORIDE 50 MG: 50 INJECTION, SOLUTION INTRAMUSCULAR; INTRAVENOUS at 12:30

## 2018-01-01 RX ADMIN — ACYCLOVIR 400 MG: 200 CAPSULE ORAL at 08:39

## 2018-01-01 RX ADMIN — POTASSIUM CHLORIDE 40 MEQ: 1500 TABLET, EXTENDED RELEASE ORAL at 09:34

## 2018-01-01 RX ADMIN — SODIUM CHLORIDE: 9 INJECTION, SOLUTION INTRAVENOUS at 04:32

## 2018-01-01 RX ADMIN — VANCOMYCIN HYDROCHLORIDE 125 MG: 10 INJECTION, POWDER, LYOPHILIZED, FOR SOLUTION INTRAVENOUS at 05:19

## 2018-01-01 RX ADMIN — CEFEPIME 2 G: 2 INJECTION, POWDER, FOR SOLUTION INTRAMUSCULAR; INTRAVENOUS at 22:03

## 2018-01-01 RX ADMIN — LEVOTHYROXINE SODIUM 100 MCG: 100 TABLET ORAL at 05:50

## 2018-01-01 RX ADMIN — BENZONATATE 100 MG: 100 CAPSULE ORAL at 23:53

## 2018-01-01 RX ADMIN — VANCOMYCIN HYDROCHLORIDE 125 MG: 10 INJECTION, POWDER, LYOPHILIZED, FOR SOLUTION INTRAVENOUS at 18:18

## 2018-01-01 RX ADMIN — FLUCONAZOLE 100 MG: 100 TABLET ORAL at 09:34

## 2018-01-01 RX ADMIN — GUAIFENESIN 200 MG: 100 SOLUTION ORAL at 17:00

## 2018-01-01 RX ADMIN — LEVOTHYROXINE SODIUM 100 MCG: 100 TABLET ORAL at 05:37

## 2018-01-01 RX ADMIN — DEXAMETHASONE SODIUM PHOSPHATE 1 DROP: 1 SOLUTION/ DROPS OPHTHALMIC at 21:39

## 2018-01-01 RX ADMIN — POTASSIUM CHLORIDE 40 MEQ: 1500 TABLET, EXTENDED RELEASE ORAL at 09:23

## 2018-01-01 RX ADMIN — LEVOTHYROXINE SODIUM 100 MCG: 100 TABLET ORAL at 06:23

## 2018-01-01 RX ADMIN — MORPHINE SULFATE 5 MG: 10 INJECTION INTRAVENOUS at 09:00

## 2018-01-01 RX ADMIN — ATOVAQUONE 750 MG: 750 SUSPENSION ORAL at 21:35

## 2018-01-01 RX ADMIN — POTASSIUM CHLORIDE 40 MEQ: 1500 TABLET, EXTENDED RELEASE ORAL at 08:59

## 2018-01-01 RX ADMIN — PIPERACILLIN AND TAZOBACTAM: 3; .375 INJECTION, POWDER, LYOPHILIZED, FOR SOLUTION INTRAVENOUS; PARENTERAL at 19:15

## 2018-01-01 RX ADMIN — FLUCONAZOLE 100 MG: 100 TABLET ORAL at 09:07

## 2018-01-01 RX ADMIN — CEFEPIME 2 G: 2 INJECTION, POWDER, FOR SOLUTION INTRAMUSCULAR; INTRAVENOUS at 21:03

## 2018-01-01 RX ADMIN — CEFEPIME 2 G: 2 INJECTION, POWDER, FOR SOLUTION INTRAMUSCULAR; INTRAVENOUS at 20:07

## 2018-01-01 RX ADMIN — ACYCLOVIR 400 MG: 200 CAPSULE ORAL at 00:02

## 2018-01-01 RX ADMIN — FLUCONAZOLE 100 MG: 100 TABLET ORAL at 08:31

## 2018-01-01 RX ADMIN — FLUCONAZOLE 100 MG: 100 TABLET ORAL at 09:17

## 2018-01-01 RX ADMIN — CEFEPIME 2 G: 2 INJECTION, POWDER, FOR SOLUTION INTRAMUSCULAR; INTRAVENOUS at 09:23

## 2018-01-01 RX ADMIN — ACETAMINOPHEN 650 MG: 325 TABLET, FILM COATED ORAL at 05:41

## 2018-01-01 RX ADMIN — CEFEPIME 2 G: 2 INJECTION, POWDER, FOR SOLUTION INTRAVENOUS at 08:58

## 2018-01-01 RX ADMIN — VANCOMYCIN HYDROCHLORIDE 125 MG: 10 INJECTION, POWDER, LYOPHILIZED, FOR SOLUTION INTRAVENOUS at 01:48

## 2018-01-01 RX ADMIN — CEFEPIME 2 G: 2 INJECTION, POWDER, FOR SOLUTION INTRAVENOUS at 20:42

## 2018-01-01 RX ADMIN — CEFEPIME 2 G: 2 INJECTION, POWDER, FOR SOLUTION INTRAVENOUS at 19:55

## 2018-01-01 RX ADMIN — PAROXETINE HYDROCHLORIDE 20 MG: 20 TABLET, FILM COATED ORAL at 08:42

## 2018-01-01 RX ADMIN — ACYCLOVIR 400 MG: 200 CAPSULE ORAL at 10:02

## 2018-01-01 RX ADMIN — DIPHENHYDRAMINE HYDROCHLORIDE 50 MG: 50 INJECTION, SOLUTION INTRAMUSCULAR; INTRAVENOUS at 12:49

## 2018-01-01 RX ADMIN — SODIUM CHLORIDE: 9 INJECTION, SOLUTION INTRAVENOUS at 03:19

## 2018-01-01 RX ADMIN — CEFEPIME 2 G: 2 INJECTION, POWDER, FOR SOLUTION INTRAVENOUS at 21:02

## 2018-01-01 RX ADMIN — CEFEPIME 2 G: 2 INJECTION, POWDER, FOR SOLUTION INTRAMUSCULAR; INTRAVENOUS at 22:00

## 2018-01-01 RX ADMIN — DEXAMETHASONE SODIUM PHOSPHATE 1 DROP: 1 SOLUTION/ DROPS OPHTHALMIC at 10:55

## 2018-01-01 RX ADMIN — BENZONATATE 100 MG: 100 CAPSULE ORAL at 16:39

## 2018-01-01 RX ADMIN — ACYCLOVIR 400 MG: 200 CAPSULE ORAL at 22:17

## 2018-01-01 RX ADMIN — ATOVAQUONE 750 MG: 750 SUSPENSION ORAL at 09:18

## 2018-01-01 RX ADMIN — CEFEPIME 2 G: 2 INJECTION, POWDER, FOR SOLUTION INTRAMUSCULAR; INTRAVENOUS at 21:19

## 2018-01-01 RX ADMIN — HYDROCORTISONE SODIUM SUCCINATE 100 MG: 100 INJECTION, POWDER, FOR SOLUTION INTRAMUSCULAR; INTRAVENOUS at 12:51

## 2018-01-01 RX ADMIN — ACYCLOVIR 400 MG: 200 CAPSULE ORAL at 09:55

## 2018-01-01 RX ADMIN — MORPHINE SULFATE 10 MG: 10 INJECTION INTRAVENOUS at 17:08

## 2018-01-01 RX ADMIN — SODIUM CHLORIDE 1000 ML: 9 INJECTION, SOLUTION INTRAVENOUS at 18:25

## 2018-01-01 RX ADMIN — TBO-FILGRASTIM 480 MCG: 480 INJECTION, SOLUTION SUBCUTANEOUS at 08:11

## 2018-01-01 RX ADMIN — SODIUM BICARBONATE: 84 INJECTION, SOLUTION INTRAVENOUS at 12:25

## 2018-01-01 RX ADMIN — POTASSIUM CHLORIDE 40 MEQ: 1500 TABLET, EXTENDED RELEASE ORAL at 21:31

## 2018-01-01 RX ADMIN — FLUCONAZOLE 200 MG: 200 TABLET ORAL at 09:51

## 2018-01-01 RX ADMIN — BENZONATATE 100 MG: 100 CAPSULE ORAL at 02:44

## 2018-01-01 RX ADMIN — BENZONATATE 100 MG: 100 CAPSULE ORAL at 05:47

## 2018-01-01 RX ADMIN — POTASSIUM CHLORIDE 40 MEQ: 1500 TABLET, EXTENDED RELEASE ORAL at 20:07

## 2018-01-01 RX ADMIN — ACYCLOVIR 400 MG: 200 CAPSULE ORAL at 09:34

## 2018-01-01 RX ADMIN — HYDROCORTISONE SODIUM SUCCINATE 100 MG: 100 INJECTION, POWDER, FOR SOLUTION INTRAMUSCULAR; INTRAVENOUS at 12:38

## 2018-01-01 RX ADMIN — LEVOTHYROXINE SODIUM 100 MCG: 100 TABLET ORAL at 05:19

## 2018-01-01 RX ADMIN — SODIUM BICARBONATE 650 MG: 650 TABLET ORAL at 16:10

## 2018-01-01 RX ADMIN — LEVOTHYROXINE SODIUM 100 MCG: 100 TABLET ORAL at 05:41

## 2018-01-01 RX ADMIN — BENZOCAINE AND MENTHOL 1 LOZENGE: 15; 3.6 LOZENGE ORAL at 22:10

## 2018-01-01 RX ADMIN — SODIUM CHLORIDE: 9 INJECTION, SOLUTION INTRAVENOUS at 14:03

## 2018-01-01 RX ADMIN — LEVOTHYROXINE SODIUM 100 MCG: 100 TABLET ORAL at 05:46

## 2018-01-01 RX ADMIN — ACYCLOVIR 400 MG: 200 CAPSULE ORAL at 21:17

## 2018-01-01 RX ADMIN — POTASSIUM CHLORIDE 40 MEQ: 1500 TABLET, EXTENDED RELEASE ORAL at 20:42

## 2018-01-01 RX ADMIN — DEXAMETHASONE SODIUM PHOSPHATE 1 DROP: 1 SOLUTION/ DROPS OPHTHALMIC at 21:49

## 2018-01-01 RX ADMIN — DEXAMETHASONE SODIUM PHOSPHATE 1 DROP: 1 SOLUTION/ DROPS OPHTHALMIC at 14:01

## 2018-01-01 RX ADMIN — CEFEPIME 2 G: 2 INJECTION, POWDER, FOR SOLUTION INTRAMUSCULAR; INTRAVENOUS at 20:46

## 2018-01-01 RX ADMIN — CEFEPIME 2 G: 2 INJECTION, POWDER, FOR SOLUTION INTRAMUSCULAR; INTRAVENOUS at 20:04

## 2018-01-01 RX ADMIN — CEFEPIME 2 G: 2 INJECTION, POWDER, FOR SOLUTION INTRAMUSCULAR; INTRAVENOUS at 21:23

## 2018-01-01 RX ADMIN — CEFEPIME 2 G: 2 INJECTION, POWDER, FOR SOLUTION INTRAMUSCULAR; INTRAVENOUS at 22:18

## 2018-01-01 RX ADMIN — CEFEPIME 2 G: 2 INJECTION, POWDER, FOR SOLUTION INTRAMUSCULAR; INTRAVENOUS at 21:01

## 2018-01-01 RX ADMIN — VANCOMYCIN HYDROCHLORIDE 2200 MG: 10 INJECTION, POWDER, LYOPHILIZED, FOR SOLUTION INTRAVENOUS at 19:15

## 2018-01-01 RX ADMIN — ACYCLOVIR 400 MG: 200 CAPSULE ORAL at 21:10

## 2018-01-01 RX ADMIN — SODIUM CHLORIDE: 9 INJECTION, SOLUTION INTRAVENOUS at 22:56

## 2018-01-01 RX ADMIN — LEVOTHYROXINE SODIUM 100 MCG: 100 TABLET ORAL at 05:47

## 2018-01-01 RX ADMIN — VANCOMYCIN HYDROCHLORIDE 125 MG: 10 INJECTION, POWDER, LYOPHILIZED, FOR SOLUTION INTRAVENOUS at 12:14

## 2018-01-01 RX ADMIN — POTASSIUM CHLORIDE 40 MEQ: 1500 TABLET, EXTENDED RELEASE ORAL at 09:17

## 2018-01-01 RX ADMIN — ACYCLOVIR 400 MG: 200 CAPSULE ORAL at 21:59

## 2018-01-01 RX ADMIN — ALLOPURINOL 100 MG: 100 TABLET ORAL at 08:18

## 2018-01-01 RX ADMIN — ACYCLOVIR 400 MG: 200 CAPSULE ORAL at 08:49

## 2018-01-01 RX ADMIN — LEVOTHYROXINE SODIUM 100 MCG: 100 TABLET ORAL at 08:00

## 2018-01-01 RX ADMIN — FLUCONAZOLE 200 MG: 200 TABLET ORAL at 09:24

## 2018-01-01 RX ADMIN — FLUCONAZOLE 200 MG: 200 TABLET ORAL at 09:18

## 2018-01-01 RX ADMIN — POTASSIUM CHLORIDE 40 MEQ: 1500 TABLET, EXTENDED RELEASE ORAL at 12:32

## 2018-01-01 RX ADMIN — SODIUM BICARBONATE 650 MG: 650 TABLET ORAL at 07:47

## 2018-01-01 RX ADMIN — DEXAMETHASONE SODIUM PHOSPHATE 1 DROP: 1 SOLUTION/ DROPS OPHTHALMIC at 20:40

## 2018-01-01 RX ADMIN — ACYCLOVIR 400 MG: 200 CAPSULE ORAL at 22:03

## 2018-01-01 RX ADMIN — SODIUM CHLORIDE: 9 INJECTION, SOLUTION INTRAVENOUS at 22:27

## 2018-01-01 RX ADMIN — GUAIFENESIN 200 MG: 100 SOLUTION ORAL at 14:18

## 2018-01-01 RX ADMIN — SODIUM CHLORIDE: 9 INJECTION, SOLUTION INTRAVENOUS at 16:03

## 2018-01-01 RX ADMIN — ACYCLOVIR 400 MG: 200 CAPSULE ORAL at 21:48

## 2018-01-01 RX ADMIN — VANCOMYCIN HYDROCHLORIDE 125 MG: 10 INJECTION, POWDER, LYOPHILIZED, FOR SOLUTION INTRAVENOUS at 05:31

## 2018-01-01 RX ADMIN — BENZONATATE 100 MG: 100 CAPSULE ORAL at 22:04

## 2018-01-01 RX ADMIN — VANCOMYCIN HYDROCHLORIDE 125 MG: 10 INJECTION, POWDER, LYOPHILIZED, FOR SOLUTION INTRAVENOUS at 12:53

## 2018-01-01 RX ADMIN — SODIUM CHLORIDE: 9 INJECTION, SOLUTION INTRAVENOUS at 11:16

## 2018-01-01 RX ADMIN — FLUCONAZOLE 100 MG: 100 TABLET ORAL at 08:23

## 2018-01-01 RX ADMIN — SODIUM CHLORIDE: 9 INJECTION, SOLUTION INTRAVENOUS at 23:37

## 2018-01-01 RX ADMIN — POTASSIUM CHLORIDE 40 MEQ: 1500 TABLET, EXTENDED RELEASE ORAL at 20:46

## 2018-01-01 RX ADMIN — PAROXETINE HYDROCHLORIDE 20 MG: 20 TABLET, FILM COATED ORAL at 09:47

## 2018-01-01 RX ADMIN — SODIUM CHLORIDE 1000 ML: 9 INJECTION, SOLUTION INTRAVENOUS at 16:40

## 2018-01-01 RX ADMIN — POTASSIUM CHLORIDE 40 MEQ: 1500 TABLET, EXTENDED RELEASE ORAL at 09:46

## 2018-01-01 RX ADMIN — SODIUM CHLORIDE: 9 INJECTION, SOLUTION INTRAVENOUS at 20:47

## 2018-01-01 RX ADMIN — CEFEPIME 2 G: 2 INJECTION, POWDER, FOR SOLUTION INTRAMUSCULAR; INTRAVENOUS at 11:41

## 2018-01-01 RX ADMIN — BENZONATATE 100 MG: 100 CAPSULE ORAL at 12:14

## 2018-01-01 RX ADMIN — HYDROCORTISONE SODIUM SUCCINATE 100 MG: 100 INJECTION, POWDER, FOR SOLUTION INTRAMUSCULAR; INTRAVENOUS at 12:40

## 2018-01-01 RX ADMIN — CEFEPIME 2 G: 2 INJECTION, POWDER, FOR SOLUTION INTRAVENOUS at 20:25

## 2018-01-01 RX ADMIN — CEFEPIME 2 G: 2 INJECTION, POWDER, FOR SOLUTION INTRAVENOUS at 20:32

## 2018-01-01 RX ADMIN — DEXAMETHASONE SODIUM PHOSPHATE 1 DROP: 1 SOLUTION/ DROPS OPHTHALMIC at 12:15

## 2018-01-01 RX ADMIN — AMOXICILLIN AND CLAVULANATE POTASSIUM 1 TABLET: 875; 125 TABLET, FILM COATED ORAL at 19:55

## 2018-01-01 RX ADMIN — ACETAMINOPHEN 650 MG: 325 TABLET, FILM COATED ORAL at 20:47

## 2018-01-01 RX ADMIN — SODIUM BICARBONATE 650 MG: 650 TABLET ORAL at 16:39

## 2018-01-01 RX ADMIN — ALLOPURINOL 100 MG: 100 TABLET ORAL at 08:44

## 2018-01-01 RX ADMIN — FLUCONAZOLE 200 MG: 200 TABLET ORAL at 08:59

## 2018-01-01 RX ADMIN — ACETAMINOPHEN 650 MG: 325 TABLET, FILM COATED ORAL at 18:22

## 2018-01-01 RX ADMIN — BENZONATATE 100 MG: 100 CAPSULE ORAL at 16:17

## 2018-01-01 RX ADMIN — OXYCODONE HYDROCHLORIDE 2.5 MG: 5 TABLET ORAL at 18:39

## 2018-01-01 RX ADMIN — CEFEPIME 2 G: 2 INJECTION, POWDER, FOR SOLUTION INTRAMUSCULAR; INTRAVENOUS at 22:04

## 2018-01-01 RX ADMIN — BENZOCAINE AND MENTHOL 1 LOZENGE: 15; 3.6 LOZENGE ORAL at 13:12

## 2018-01-01 RX ADMIN — FLUCONAZOLE 100 MG: 100 TABLET ORAL at 09:24

## 2018-01-01 RX ADMIN — DIPHENHYDRAMINE HYDROCHLORIDE 50 MG: 50 INJECTION, SOLUTION INTRAMUSCULAR; INTRAVENOUS at 12:44

## 2018-01-01 RX ADMIN — ALLOPURINOL 100 MG: 100 TABLET ORAL at 09:34

## 2018-01-01 RX ADMIN — CEFEPIME 2 G: 2 INJECTION, POWDER, FOR SOLUTION INTRAMUSCULAR; INTRAVENOUS at 08:28

## 2018-01-01 RX ADMIN — ALLOPURINOL 100 MG: 100 TABLET ORAL at 08:01

## 2018-01-01 RX ADMIN — CEFEPIME 2 G: 2 INJECTION, POWDER, FOR SOLUTION INTRAMUSCULAR; INTRAVENOUS at 22:02

## 2018-01-01 RX ADMIN — DEXAMETHASONE SODIUM PHOSPHATE 1 DROP: 1 SOLUTION/ DROPS OPHTHALMIC at 21:53

## 2018-01-01 RX ADMIN — ACYCLOVIR 400 MG: 200 CAPSULE ORAL at 20:04

## 2018-01-01 RX ADMIN — ATOVAQUONE 750 MG: 750 SUSPENSION ORAL at 08:18

## 2018-01-01 RX ADMIN — FLUCONAZOLE 100 MG: 100 TABLET ORAL at 10:56

## 2018-01-01 RX ADMIN — ALBUMIN (HUMAN) 50 G: 0.25 INJECTION, SOLUTION INTRAVENOUS at 05:51

## 2018-01-01 RX ADMIN — BENZONATATE 100 MG: 100 CAPSULE ORAL at 17:48

## 2018-01-01 RX ADMIN — PREDNISONE 20 MG: 20 TABLET ORAL at 09:45

## 2018-01-01 RX ADMIN — SODIUM CHLORIDE: 9 INJECTION, SOLUTION INTRAVENOUS at 09:20

## 2018-01-01 RX ADMIN — PREDNISONE 20 MG: 20 TABLET ORAL at 08:27

## 2018-01-01 RX ADMIN — ACETAMINOPHEN 650 MG: 325 TABLET, FILM COATED ORAL at 18:48

## 2018-01-01 RX ADMIN — PREDNISONE 20 MG: 20 TABLET ORAL at 08:02

## 2018-01-01 RX ADMIN — POTASSIUM CHLORIDE 40 MEQ: 1500 TABLET, EXTENDED RELEASE ORAL at 22:18

## 2018-01-01 RX ADMIN — FLUCONAZOLE 100 MG: 100 TABLET ORAL at 08:50

## 2018-01-01 RX ADMIN — LEVOTHYROXINE SODIUM 100 MCG: 100 TABLET ORAL at 06:44

## 2018-01-01 RX ADMIN — POTASSIUM CHLORIDE 40 MEQ: 1500 TABLET, EXTENDED RELEASE ORAL at 09:05

## 2018-01-01 RX ADMIN — DEXAMETHASONE SODIUM PHOSPHATE 1 DROP: 1 SOLUTION/ DROPS OPHTHALMIC at 10:37

## 2018-01-01 RX ADMIN — DEXAMETHASONE SODIUM PHOSPHATE 1 DROP: 1 SOLUTION/ DROPS OPHTHALMIC at 21:01

## 2018-01-01 RX ADMIN — SODIUM CHLORIDE: 9 INJECTION, SOLUTION INTRAVENOUS at 19:15

## 2018-01-01 RX ADMIN — ALBUMIN (HUMAN) 50 G: 0.25 INJECTION, SOLUTION INTRAVENOUS at 11:52

## 2018-01-01 RX ADMIN — DEXAMETHASONE SODIUM PHOSPHATE 1 DROP: 1 SOLUTION/ DROPS OPHTHALMIC at 11:11

## 2018-01-01 RX ADMIN — ACYCLOVIR 400 MG: 200 CAPSULE ORAL at 20:52

## 2018-01-01 RX ADMIN — CEFEPIME 2 G: 2 INJECTION, POWDER, FOR SOLUTION INTRAMUSCULAR; INTRAVENOUS at 09:07

## 2018-01-01 RX ADMIN — DEXAMETHASONE SODIUM PHOSPHATE 1 DROP: 1 SOLUTION/ DROPS OPHTHALMIC at 20:58

## 2018-01-01 RX ADMIN — POTASSIUM CHLORIDE 40 MEQ: 1500 TABLET, EXTENDED RELEASE ORAL at 08:45

## 2018-01-01 RX ADMIN — ONDANSETRON HYDROCHLORIDE 8 MG: 2 INJECTION, SOLUTION INTRAMUSCULAR; INTRAVENOUS at 17:46

## 2018-01-01 RX ADMIN — BENZONATATE 100 MG: 100 CAPSULE ORAL at 09:25

## 2018-01-01 RX ADMIN — PAROXETINE HYDROCHLORIDE 20 MG: 20 TABLET, FILM COATED ORAL at 09:56

## 2018-01-01 RX ADMIN — ACYCLOVIR 400 MG: 200 CAPSULE ORAL at 21:03

## 2018-01-01 RX ADMIN — POTASSIUM CHLORIDE 40 MEQ: 1500 TABLET, EXTENDED RELEASE ORAL at 21:34

## 2018-01-01 RX ADMIN — ALLOPURINOL 100 MG: 100 TABLET ORAL at 09:56

## 2018-01-01 RX ADMIN — POTASSIUM CHLORIDE 40 MEQ: 1500 TABLET, EXTENDED RELEASE ORAL at 21:11

## 2018-01-01 RX ADMIN — MORPHINE SULFATE 10 MG: 10 INJECTION INTRAVENOUS at 05:27

## 2018-01-01 RX ADMIN — CEFEPIME 2 G: 2 INJECTION, POWDER, FOR SOLUTION INTRAMUSCULAR; INTRAVENOUS at 09:34

## 2018-01-01 RX ADMIN — POTASSIUM CHLORIDE 40 MEQ: 1500 TABLET, EXTENDED RELEASE ORAL at 08:01

## 2018-01-01 RX ADMIN — DEXAMETHASONE SODIUM PHOSPHATE 1 DROP: 1 SOLUTION/ DROPS OPHTHALMIC at 20:55

## 2018-01-01 RX ADMIN — FLUCONAZOLE 100 MG: 2 INJECTION INTRAVENOUS at 08:42

## 2018-01-01 RX ADMIN — IMMUNE GLOBULIN 10 G: 50 SOLUTION INTRAVENOUS at 16:50

## 2018-01-01 RX ADMIN — SODIUM CHLORIDE: 9 INJECTION, SOLUTION INTRAVENOUS at 19:42

## 2018-01-01 RX ADMIN — SODIUM BICARBONATE 650 MG: 650 TABLET ORAL at 21:58

## 2018-01-01 RX ADMIN — SODIUM CHLORIDE: 9 INJECTION, SOLUTION INTRAVENOUS at 11:56

## 2018-01-01 RX ADMIN — SODIUM PHOSPHATE, MONOBASIC, MONOHYDRATE AND SODIUM PHOSPHATE, DIBASIC, ANHYDROUS 20 MMOL: 276; 142 INJECTION, SOLUTION INTRAVENOUS at 09:48

## 2018-01-01 RX ADMIN — ACYCLOVIR 400 MG: 200 CAPSULE ORAL at 21:35

## 2018-01-01 RX ADMIN — DEXAMETHASONE SODIUM PHOSPHATE 1 DROP: 1 SOLUTION/ DROPS OPHTHALMIC at 21:56

## 2018-01-01 RX ADMIN — BENZONATATE 100 MG: 100 CAPSULE ORAL at 08:42

## 2018-01-01 RX ADMIN — POTASSIUM CHLORIDE 40 MEQ: 1500 TABLET, EXTENDED RELEASE ORAL at 08:24

## 2018-01-01 RX ADMIN — ALLOPURINOL 100 MG: 100 TABLET ORAL at 08:27

## 2018-01-01 RX ADMIN — BENZONATATE 100 MG: 100 CAPSULE ORAL at 20:09

## 2018-01-01 RX ADMIN — SODIUM CHLORIDE: 9 INJECTION, SOLUTION INTRAVENOUS at 13:49

## 2018-01-01 RX ADMIN — ONDANSETRON HYDROCHLORIDE 8 MG: 2 INJECTION, SOLUTION INTRAMUSCULAR; INTRAVENOUS at 12:39

## 2018-01-01 RX ADMIN — VANCOMYCIN HYDROCHLORIDE 125 MG: 10 INJECTION, POWDER, LYOPHILIZED, FOR SOLUTION INTRAVENOUS at 22:02

## 2018-01-01 RX ADMIN — POTASSIUM CHLORIDE 40 MEQ: 1500 TABLET, EXTENDED RELEASE ORAL at 09:25

## 2018-01-01 RX ADMIN — DIPHENHYDRAMINE HCL 25 MG: 25 TABLET ORAL at 03:48

## 2018-01-01 RX ADMIN — ACYCLOVIR 400 MG: 200 CAPSULE ORAL at 07:47

## 2018-01-01 RX ADMIN — TBO-FILGRASTIM 480 MCG: 480 INJECTION, SOLUTION SUBCUTANEOUS at 08:59

## 2018-01-01 RX ADMIN — FLUCONAZOLE 200 MG: 200 TABLET ORAL at 09:25

## 2018-01-01 RX ADMIN — BENZONATATE 100 MG: 100 CAPSULE ORAL at 15:39

## 2018-01-01 RX ADMIN — LEVOTHYROXINE SODIUM 100 MCG: 100 TABLET ORAL at 09:08

## 2018-01-01 RX ADMIN — FLUCONAZOLE 200 MG: 200 TABLET ORAL at 08:53

## 2018-01-01 RX ADMIN — VANCOMYCIN HYDROCHLORIDE 125 MG: 10 INJECTION, POWDER, LYOPHILIZED, FOR SOLUTION INTRAVENOUS at 18:00

## 2018-01-01 RX ADMIN — CEFEPIME 2 G: 2 INJECTION, POWDER, FOR SOLUTION INTRAMUSCULAR; INTRAVENOUS at 00:03

## 2018-01-01 RX ADMIN — ACETAMINOPHEN 650 MG: 325 TABLET, FILM COATED ORAL at 09:41

## 2018-01-01 RX ADMIN — SODIUM CHLORIDE: 9 INJECTION, SOLUTION INTRAVENOUS at 13:51

## 2018-01-01 RX ADMIN — ALBUMIN (HUMAN) 50 G: 0.25 INJECTION, SOLUTION INTRAVENOUS at 17:49

## 2018-01-01 RX ADMIN — POTASSIUM CHLORIDE 40 MEQ: 1500 TABLET, EXTENDED RELEASE ORAL at 09:18

## 2018-01-01 RX ADMIN — HALOPERIDOL LACTATE 5 MG: 5 INJECTION, SOLUTION INTRAMUSCULAR at 14:00

## 2018-01-01 RX ADMIN — ALBUMIN (HUMAN) 50 G: 0.25 INJECTION, SOLUTION INTRAVENOUS at 19:35

## 2018-01-01 RX ADMIN — ALEMTUZUMAB: 30 INJECTION INTRAVENOUS at 13:04

## 2018-01-01 RX ADMIN — DIPHENHYDRAMINE HYDROCHLORIDE 50 MG: 50 INJECTION, SOLUTION INTRAMUSCULAR; INTRAVENOUS at 12:51

## 2018-01-01 RX ADMIN — CEFEPIME 2 G: 2 INJECTION, POWDER, FOR SOLUTION INTRAMUSCULAR; INTRAVENOUS at 09:57

## 2018-01-01 RX ADMIN — CEFEPIME 2 G: 2 INJECTION, POWDER, FOR SOLUTION INTRAMUSCULAR; INTRAVENOUS at 20:34

## 2018-01-01 RX ADMIN — ACYCLOVIR 400 MG: 200 CAPSULE ORAL at 21:01

## 2018-01-01 RX ADMIN — POTASSIUM CHLORIDE 40 MEQ: 1500 TABLET, EXTENDED RELEASE ORAL at 21:03

## 2018-01-01 RX ADMIN — DIPHENHYDRAMINE HYDROCHLORIDE 50 MG: 50 INJECTION, SOLUTION INTRAMUSCULAR; INTRAVENOUS at 12:27

## 2018-01-01 RX ADMIN — DEXAMETHASONE SODIUM PHOSPHATE 1 DROP: 1 SOLUTION/ DROPS OPHTHALMIC at 21:35

## 2018-01-01 RX ADMIN — SODIUM CHLORIDE: 9 INJECTION, SOLUTION INTRAVENOUS at 21:02

## 2018-01-01 RX ADMIN — CEFEPIME 2 G: 2 INJECTION, POWDER, FOR SOLUTION INTRAMUSCULAR; INTRAVENOUS at 20:53

## 2018-01-01 RX ADMIN — FAMOTIDINE 20 MG: 20 TABLET, FILM COATED ORAL at 09:17

## 2018-01-01 RX ADMIN — POTASSIUM CHLORIDE 40 MEQ: 1500 TABLET, EXTENDED RELEASE ORAL at 08:31

## 2018-01-01 RX ADMIN — FLUCONAZOLE 200 MG: 200 TABLET ORAL at 08:33

## 2018-01-01 RX ADMIN — POTASSIUM CHLORIDE 40 MEQ: 1500 TABLET, EXTENDED RELEASE ORAL at 08:11

## 2018-01-01 RX ADMIN — CEFEPIME 2 G: 2 INJECTION, POWDER, FOR SOLUTION INTRAMUSCULAR; INTRAVENOUS at 09:17

## 2018-01-01 RX ADMIN — LEVOTHYROXINE SODIUM 100 MCG: 100 TABLET ORAL at 07:09

## 2018-01-01 RX ADMIN — SODIUM BICARBONATE 650 MG: 650 TABLET ORAL at 15:00

## 2018-01-01 RX ADMIN — POTASSIUM CHLORIDE 40 MEQ: 1500 TABLET, EXTENDED RELEASE ORAL at 09:47

## 2018-01-01 RX ADMIN — CEFEPIME 2 G: 2 INJECTION, POWDER, FOR SOLUTION INTRAMUSCULAR; INTRAVENOUS at 21:48

## 2018-01-01 RX ADMIN — ALLOPURINOL 100 MG: 100 TABLET ORAL at 09:48

## 2018-01-01 RX ADMIN — CEFEPIME 2 G: 2 INJECTION, POWDER, FOR SOLUTION INTRAMUSCULAR; INTRAVENOUS at 08:11

## 2018-01-01 RX ADMIN — LEVOTHYROXINE SODIUM 100 MCG: 100 TABLET ORAL at 06:38

## 2018-01-01 RX ADMIN — FLUCONAZOLE 100 MG: 2 INJECTION INTRAVENOUS at 10:22

## 2018-01-01 RX ADMIN — ACYCLOVIR 400 MG: 200 CAPSULE ORAL at 08:27

## 2018-01-01 RX ADMIN — VANCOMYCIN HYDROCHLORIDE 125 MG: 10 INJECTION, POWDER, LYOPHILIZED, FOR SOLUTION INTRAVENOUS at 18:01

## 2018-01-01 RX ADMIN — ACYCLOVIR 400 MG: 200 CAPSULE ORAL at 09:27

## 2018-01-01 RX ADMIN — VANCOMYCIN HYDROCHLORIDE 125 MG: 10 INJECTION, POWDER, LYOPHILIZED, FOR SOLUTION INTRAVENOUS at 20:34

## 2018-01-01 RX ADMIN — ACETAMINOPHEN 650 MG: 325 TABLET, FILM COATED ORAL at 04:42

## 2018-01-01 RX ADMIN — ATOVAQUONE 750 MG: 750 SUSPENSION ORAL at 21:48

## 2018-01-01 RX ADMIN — ALEMTUZUMAB: 30 INJECTION INTRAVENOUS at 13:24

## 2018-01-01 RX ADMIN — CEFEPIME 2 G: 2 INJECTION, POWDER, FOR SOLUTION INTRAMUSCULAR; INTRAVENOUS at 09:41

## 2018-01-01 RX ADMIN — POTASSIUM CHLORIDE 40 MEQ: 1500 TABLET, EXTENDED RELEASE ORAL at 20:52

## 2018-01-01 RX ADMIN — SODIUM CHLORIDE: 9 INJECTION, SOLUTION INTRAVENOUS at 02:59

## 2018-01-01 RX ADMIN — ACYCLOVIR 400 MG: 200 CAPSULE ORAL at 20:46

## 2018-01-01 RX ADMIN — MIDAZOLAM 3 MG: 1 INJECTION INTRAMUSCULAR; INTRAVENOUS at 10:10

## 2018-01-01 RX ADMIN — DEXAMETHASONE SODIUM PHOSPHATE 1 DROP: 1 SOLUTION/ DROPS OPHTHALMIC at 11:51

## 2018-01-01 RX ADMIN — POTASSIUM CHLORIDE 40 MEQ: 1500 TABLET, EXTENDED RELEASE ORAL at 08:00

## 2018-01-01 RX ADMIN — ALEMTUZUMAB: 30 INJECTION INTRAVENOUS at 13:49

## 2018-01-01 RX ADMIN — HYDROCORTISONE SODIUM SUCCINATE 100 MG: 100 INJECTION, POWDER, FOR SOLUTION INTRAMUSCULAR; INTRAVENOUS at 10:56

## 2018-01-01 RX ADMIN — BENZONATATE 100 MG: 100 CAPSULE ORAL at 15:17

## 2018-01-01 RX ADMIN — DEXAMETHASONE SODIUM PHOSPHATE 1 DROP: 1 SOLUTION/ DROPS OPHTHALMIC at 09:47

## 2018-01-01 RX ADMIN — HYDROCORTISONE SODIUM SUCCINATE 100 MG: 100 INJECTION, POWDER, FOR SOLUTION INTRAMUSCULAR; INTRAVENOUS at 12:30

## 2018-01-01 RX ADMIN — DEXAMETHASONE SODIUM PHOSPHATE 1 DROP: 1 SOLUTION/ DROPS OPHTHALMIC at 10:24

## 2018-01-01 RX ADMIN — SODIUM BICARBONATE 650 MG: 650 TABLET ORAL at 09:16

## 2018-01-01 RX ADMIN — ALLOPURINOL 100 MG: 100 TABLET ORAL at 09:05

## 2018-01-01 RX ADMIN — ACETAMINOPHEN 650 MG: 325 TABLET, FILM COATED ORAL at 10:56

## 2018-01-01 RX ADMIN — SODIUM CHLORIDE: 9 INJECTION, SOLUTION INTRAVENOUS at 15:06

## 2018-01-01 RX ADMIN — POTASSIUM CHLORIDE 40 MEQ: 1500 TABLET, EXTENDED RELEASE ORAL at 23:45

## 2018-01-01 RX ADMIN — SODIUM CHLORIDE: 9 INJECTION, SOLUTION INTRAVENOUS at 02:53

## 2018-01-01 RX ADMIN — ACETAMINOPHEN 650 MG: 325 TABLET, FILM COATED ORAL at 15:18

## 2018-01-01 RX ADMIN — BENZONATATE 100 MG: 100 CAPSULE ORAL at 08:59

## 2018-01-01 RX ADMIN — SODIUM CHLORIDE: 9 INJECTION, SOLUTION INTRAVENOUS at 21:50

## 2018-01-01 RX ADMIN — ONDANSETRON HYDROCHLORIDE 8 MG: 2 INJECTION, SOLUTION INTRAMUSCULAR; INTRAVENOUS at 12:27

## 2018-01-01 RX ADMIN — VANCOMYCIN HYDROCHLORIDE 125 MG: 10 INJECTION, POWDER, LYOPHILIZED, FOR SOLUTION INTRAVENOUS at 12:31

## 2018-01-01 RX ADMIN — POTASSIUM CHLORIDE 40 MEQ: 1500 TABLET, EXTENDED RELEASE ORAL at 09:41

## 2018-01-01 RX ADMIN — DEXAMETHASONE SODIUM PHOSPHATE 1 DROP: 1 SOLUTION/ DROPS OPHTHALMIC at 21:02

## 2018-01-01 RX ADMIN — POTASSIUM CHLORIDE 40 MEQ: 1500 TABLET, EXTENDED RELEASE ORAL at 09:27

## 2018-01-01 RX ADMIN — VANCOMYCIN HYDROCHLORIDE 125 MG: 10 INJECTION, POWDER, LYOPHILIZED, FOR SOLUTION INTRAVENOUS at 06:50

## 2018-01-01 RX ADMIN — ALLOPURINOL 100 MG: 100 TABLET ORAL at 09:45

## 2018-01-01 RX ADMIN — SODIUM CHLORIDE: 9 INJECTION, SOLUTION INTRAVENOUS at 10:11

## 2018-01-01 RX ADMIN — ACYCLOVIR 400 MG: 200 CAPSULE ORAL at 20:54

## 2018-01-01 RX ADMIN — CEFEPIME 2 G: 2 INJECTION, POWDER, FOR SOLUTION INTRAMUSCULAR; INTRAVENOUS at 21:00

## 2018-01-01 RX ADMIN — HYDROCORTISONE SODIUM SUCCINATE 100 MG: 100 INJECTION, POWDER, FOR SOLUTION INTRAMUSCULAR; INTRAVENOUS at 12:44

## 2018-01-01 RX ADMIN — VANCOMYCIN HYDROCHLORIDE 125 MG: 10 INJECTION, POWDER, LYOPHILIZED, FOR SOLUTION INTRAVENOUS at 00:09

## 2018-01-01 RX ADMIN — ACYCLOVIR 400 MG: 200 CAPSULE ORAL at 09:23

## 2018-01-01 RX ADMIN — MORPHINE SULFATE 5 MG: 10 INJECTION INTRAVENOUS at 14:00

## 2018-01-01 RX ADMIN — SODIUM BICARBONATE 650 MG: 650 TABLET ORAL at 20:52

## 2018-01-01 RX ADMIN — BENZONATATE 100 MG: 100 CAPSULE ORAL at 05:31

## 2018-01-01 RX ADMIN — MORPHINE SULFATE 5 MG: 10 INJECTION INTRAVENOUS at 23:38

## 2018-01-01 RX ADMIN — FLUCONAZOLE 200 MG: 200 TABLET ORAL at 09:41

## 2018-01-01 RX ADMIN — ACETAMINOPHEN 650 MG: 325 TABLET, FILM COATED ORAL at 10:35

## 2018-01-01 RX ADMIN — LEVOTHYROXINE SODIUM 100 MCG: 100 TABLET ORAL at 05:52

## 2018-01-01 RX ADMIN — ACETAMINOPHEN 650 MG: 325 TABLET, FILM COATED ORAL at 12:58

## 2018-01-01 RX ADMIN — LEVOTHYROXINE SODIUM 100 MCG: 100 TABLET ORAL at 06:19

## 2018-01-01 RX ADMIN — IOHEXOL 50 ML: 240 INJECTION, SOLUTION INTRATHECAL; INTRAVASCULAR; INTRAVENOUS; ORAL at 06:10

## 2018-01-01 RX ADMIN — PREDNISONE 20 MG: 20 TABLET ORAL at 08:50

## 2018-01-01 RX ADMIN — FLUCONAZOLE 200 MG: 2 INJECTION INTRAVENOUS at 22:15

## 2018-01-01 RX ADMIN — CEFEPIME 2 G: 2 INJECTION, POWDER, FOR SOLUTION INTRAMUSCULAR; INTRAVENOUS at 10:00

## 2018-01-01 RX ADMIN — DEXAMETHASONE SODIUM PHOSPHATE 1 DROP: 1 SOLUTION/ DROPS OPHTHALMIC at 20:07

## 2018-01-01 RX ADMIN — ACETAMINOPHEN 650 MG: 325 TABLET, FILM COATED ORAL at 09:46

## 2018-01-01 RX ADMIN — FLUCONAZOLE 200 MG: 200 TABLET ORAL at 12:32

## 2018-01-01 RX ADMIN — VANCOMYCIN HYDROCHLORIDE 125 MG: 10 INJECTION, POWDER, LYOPHILIZED, FOR SOLUTION INTRAVENOUS at 12:25

## 2018-01-01 RX ADMIN — POTASSIUM CHLORIDE 40 MEQ: 1500 TABLET, EXTENDED RELEASE ORAL at 17:13

## 2018-01-01 RX ADMIN — PREDNISONE 20 MG: 20 TABLET ORAL at 09:34

## 2018-01-01 RX ADMIN — LEVOTHYROXINE SODIUM 100 MCG: 100 TABLET ORAL at 06:37

## 2018-01-01 RX ADMIN — CEFEPIME 2 G: 2 INJECTION, POWDER, FOR SOLUTION INTRAMUSCULAR; INTRAVENOUS at 07:47

## 2018-01-01 RX ADMIN — SODIUM CHLORIDE: 9 INJECTION, SOLUTION INTRAVENOUS at 05:43

## 2018-01-01 RX ADMIN — ACETAMINOPHEN 650 MG: 325 TABLET, FILM COATED ORAL at 12:40

## 2018-01-01 RX ADMIN — BENZONATATE 100 MG: 100 CAPSULE ORAL at 20:07

## 2018-01-01 RX ADMIN — ACETAMINOPHEN 325 MG: 325 TABLET, FILM COATED ORAL at 12:48

## 2018-01-01 RX ADMIN — VANCOMYCIN HYDROCHLORIDE 125 MG: 10 INJECTION, POWDER, LYOPHILIZED, FOR SOLUTION INTRAVENOUS at 00:53

## 2018-01-01 RX ADMIN — FLUCONAZOLE 100 MG: 100 TABLET ORAL at 08:39

## 2018-01-01 RX ADMIN — CEFEPIME 2 G: 2 INJECTION, POWDER, FOR SOLUTION INTRAVENOUS at 09:25

## 2018-01-01 RX ADMIN — POTASSIUM CHLORIDE 40 MEQ: 1500 TABLET, EXTENDED RELEASE ORAL at 10:53

## 2018-01-01 RX ADMIN — ATOVAQUONE 750 MG: 750 SUSPENSION ORAL at 21:58

## 2018-01-01 RX ADMIN — SODIUM CHLORIDE: 9 INJECTION, SOLUTION INTRAVENOUS at 13:38

## 2018-01-01 RX ADMIN — DIPHENHYDRAMINE HCL 25 MG: 25 TABLET ORAL at 09:42

## 2018-01-01 RX ADMIN — PREDNISONE 20 MG: 20 TABLET ORAL at 09:07

## 2018-01-01 RX ADMIN — CEFEPIME 2 G: 2 INJECTION, POWDER, FOR SOLUTION INTRAVENOUS at 21:16

## 2018-01-01 RX ADMIN — FLUCONAZOLE 100 MG: 100 TABLET ORAL at 08:18

## 2018-01-01 RX ADMIN — SODIUM CHLORIDE: 9 INJECTION, SOLUTION INTRAVENOUS at 21:26

## 2018-01-01 RX ADMIN — ACYCLOVIR 400 MG: 200 CAPSULE ORAL at 21:38

## 2018-01-01 RX ADMIN — ONDANSETRON HYDROCHLORIDE 8 MG: 2 INJECTION, SOLUTION INTRAMUSCULAR; INTRAVENOUS at 12:59

## 2018-01-01 RX ADMIN — BENZONATATE 100 MG: 100 CAPSULE ORAL at 08:18

## 2018-01-01 RX ADMIN — ATOVAQUONE 750 MG: 750 SUSPENSION ORAL at 10:23

## 2018-01-01 RX ADMIN — VANCOMYCIN HYDROCHLORIDE 125 MG: 10 INJECTION, POWDER, LYOPHILIZED, FOR SOLUTION INTRAVENOUS at 13:00

## 2018-01-01 RX ADMIN — FLUCONAZOLE 100 MG: 100 TABLET ORAL at 09:41

## 2018-01-01 RX ADMIN — PAROXETINE HYDROCHLORIDE 20 MG: 20 TABLET, FILM COATED ORAL at 09:41

## 2018-01-01 RX ADMIN — ALLOPURINOL 100 MG: 100 TABLET ORAL at 09:27

## 2018-01-01 RX ADMIN — POTASSIUM CHLORIDE 40 MEQ: 1500 TABLET, EXTENDED RELEASE ORAL at 21:19

## 2018-01-01 RX ADMIN — LEVOTHYROXINE SODIUM 100 MCG: 100 TABLET ORAL at 04:44

## 2018-01-01 RX ADMIN — ALBUMIN (HUMAN) 50 G: 0.25 INJECTION, SOLUTION INTRAVENOUS at 23:25

## 2018-01-01 RX ADMIN — SODIUM CHLORIDE: 9 INJECTION, SOLUTION INTRAVENOUS at 12:57

## 2018-01-01 RX ADMIN — POTASSIUM CHLORIDE 40 MEQ: 1500 TABLET, EXTENDED RELEASE ORAL at 00:02

## 2018-01-01 RX ADMIN — ATOVAQUONE 750 MG: 750 SUSPENSION ORAL at 09:23

## 2018-01-01 RX ADMIN — SODIUM BICARBONATE 650 MG: 650 TABLET ORAL at 09:09

## 2018-01-01 RX ADMIN — LEVOTHYROXINE SODIUM 100 MCG: 100 TABLET ORAL at 05:57

## 2018-01-01 RX ADMIN — BENZONATATE 100 MG: 100 CAPSULE ORAL at 18:01

## 2018-01-01 RX ADMIN — HYDROCORTISONE SODIUM SUCCINATE 100 MG: 100 INJECTION, POWDER, FOR SOLUTION INTRAMUSCULAR; INTRAVENOUS at 12:59

## 2018-01-01 RX ADMIN — SODIUM CHLORIDE: 9 INJECTION, SOLUTION INTRAVENOUS at 13:22

## 2018-01-01 RX ADMIN — ALEMTUZUMAB: 30 INJECTION INTRAVENOUS at 13:06

## 2018-01-01 RX ADMIN — ACETAMINOPHEN 650 MG: 325 TABLET, FILM COATED ORAL at 03:48

## 2018-01-01 RX ADMIN — IMMUNE GLOBULIN 10 G: 50 SOLUTION INTRAVENOUS at 19:15

## 2018-01-01 RX ADMIN — ACYCLOVIR 400 MG: 200 CAPSULE ORAL at 10:55

## 2018-01-01 RX ADMIN — ACETAMINOPHEN 650 MG: 325 TABLET, FILM COATED ORAL at 11:52

## 2018-01-01 RX ADMIN — SODIUM CHLORIDE: 9 INJECTION, SOLUTION INTRAVENOUS at 03:57

## 2018-01-01 RX ADMIN — SODIUM CHLORIDE 2550 ML: 9 INJECTION, SOLUTION INTRAVENOUS at 12:41

## 2018-01-01 RX ADMIN — LEVOTHYROXINE SODIUM 100 MCG: 100 TABLET ORAL at 06:50

## 2018-01-01 RX ADMIN — LEVOTHYROXINE SODIUM 100 MCG: 100 TABLET ORAL at 06:11

## 2018-01-01 RX ADMIN — VANCOMYCIN HYDROCHLORIDE 125 MG: 10 INJECTION, POWDER, LYOPHILIZED, FOR SOLUTION INTRAVENOUS at 04:44

## 2018-01-01 RX ADMIN — POTASSIUM CHLORIDE 40 MEQ: 1500 TABLET, EXTENDED RELEASE ORAL at 09:51

## 2018-01-01 RX ADMIN — CALCIUM GLUCONATE 1 G: 94 INJECTION, SOLUTION INTRAVENOUS at 05:51

## 2018-01-01 RX ADMIN — ALLOPURINOL 100 MG: 100 TABLET ORAL at 09:17

## 2018-01-01 RX ADMIN — ACYCLOVIR 400 MG: 200 CAPSULE ORAL at 08:23

## 2018-01-01 RX ADMIN — POTASSIUM CHLORIDE 40 MEQ: 1500 TABLET, EXTENDED RELEASE ORAL at 09:00

## 2018-01-01 RX ADMIN — PIPERACILLIN AND TAZOBACTAM 3.38 G: 3; .375 INJECTION, POWDER, FOR SOLUTION INTRAVENOUS at 13:09

## 2018-01-01 RX ADMIN — DEXAMETHASONE SODIUM PHOSPHATE 1 DROP: 1 SOLUTION/ DROPS OPHTHALMIC at 12:31

## 2018-01-01 RX ADMIN — DEXAMETHASONE SODIUM PHOSPHATE 1 DROP: 1 SOLUTION/ DROPS OPHTHALMIC at 20:52

## 2018-01-01 RX ADMIN — CEFEPIME 2 G: 2 INJECTION, POWDER, FOR SOLUTION INTRAMUSCULAR; INTRAVENOUS at 09:53

## 2018-01-01 RX ADMIN — CEFEPIME 2 G: 2 INJECTION, POWDER, FOR SOLUTION INTRAVENOUS at 09:41

## 2018-01-01 RX ADMIN — CEFEPIME 2 G: 2 INJECTION, POWDER, FOR SOLUTION INTRAMUSCULAR; INTRAVENOUS at 10:58

## 2018-01-01 RX ADMIN — FUROSEMIDE 40 MG: 40 TABLET ORAL at 04:43

## 2018-01-01 RX ADMIN — ALEMTUZUMAB: 30 INJECTION INTRAVENOUS at 13:40

## 2018-01-01 RX ADMIN — VANCOMYCIN HYDROCHLORIDE 125 MG: 10 INJECTION, POWDER, LYOPHILIZED, FOR SOLUTION INTRAVENOUS at 12:40

## 2018-01-01 RX ADMIN — ATOVAQUONE 750 MG: 750 SUSPENSION ORAL at 09:54

## 2018-01-01 RX ADMIN — CEFEPIME 2 G: 2 INJECTION, POWDER, FOR SOLUTION INTRAMUSCULAR; INTRAVENOUS at 20:40

## 2018-01-01 RX ADMIN — POTASSIUM CHLORIDE 40 MEQ: 1500 TABLET, EXTENDED RELEASE ORAL at 20:04

## 2018-01-01 RX ADMIN — ACETAMINOPHEN 650 MG: 325 TABLET, FILM COATED ORAL at 09:56

## 2018-01-01 RX ADMIN — ACYCLOVIR 400 MG: 200 CAPSULE ORAL at 09:08

## 2018-01-01 RX ADMIN — BENZOCAINE AND MENTHOL 1 LOZENGE: 15; 3.6 LOZENGE ORAL at 20:09

## 2018-01-01 RX ADMIN — FLUCONAZOLE 100 MG: 100 TABLET ORAL at 10:54

## 2018-01-01 RX ADMIN — TBO-FILGRASTIM 480 MCG: 480 INJECTION, SOLUTION SUBCUTANEOUS at 16:34

## 2018-01-01 RX ADMIN — ALLOPURINOL 100 MG: 100 TABLET ORAL at 08:31

## 2018-01-01 RX ADMIN — FLUCONAZOLE 100 MG: 100 TABLET ORAL at 08:01

## 2018-01-01 RX ADMIN — VANCOMYCIN HYDROCHLORIDE 125 MG: 10 INJECTION, POWDER, LYOPHILIZED, FOR SOLUTION INTRAVENOUS at 06:27

## 2018-01-01 RX ADMIN — POTASSIUM CHLORIDE 40 MEQ: 1500 TABLET, EXTENDED RELEASE ORAL at 21:17

## 2018-01-01 RX ADMIN — ACETAMINOPHEN 650 MG: 325 TABLET, FILM COATED ORAL at 04:37

## 2018-01-01 RX ADMIN — SULFAMETHOXAZOLE AND TRIMETHOPRIM 1 TABLET: 800; 160 TABLET ORAL at 09:27

## 2018-01-01 RX ADMIN — ALLOPURINOL 100 MG: 100 TABLET ORAL at 08:23

## 2018-01-01 RX ADMIN — POTASSIUM CHLORIDE 40 MEQ: 1500 TABLET, EXTENDED RELEASE ORAL at 10:56

## 2018-01-01 RX ADMIN — TBO-FILGRASTIM 480 MCG: 480 INJECTION, SOLUTION SUBCUTANEOUS at 09:25

## 2018-01-01 RX ADMIN — CEFEPIME 2 G: 2 INJECTION, POWDER, FOR SOLUTION INTRAVENOUS at 08:33

## 2018-01-01 RX ADMIN — FLUCONAZOLE 100 MG: 100 TABLET ORAL at 07:47

## 2018-01-01 RX ADMIN — ALLOPURINOL 100 MG: 100 TABLET ORAL at 07:47

## 2018-01-01 RX ADMIN — VANCOMYCIN HYDROCHLORIDE 125 MG: 10 INJECTION, POWDER, LYOPHILIZED, FOR SOLUTION INTRAVENOUS at 18:24

## 2018-01-01 RX ADMIN — BENZONATATE 100 MG: 100 CAPSULE ORAL at 02:51

## 2018-01-01 RX ADMIN — SODIUM CHLORIDE: 9 INJECTION, SOLUTION INTRAVENOUS at 09:30

## 2018-01-01 ASSESSMENT — ENCOUNTER SYMPTOMS
SORE THROAT: 0
VOMITING: 0
SORE THROAT: 0
DIZZINESS: 0
SORE THROAT: 0
WEIGHT LOSS: 0
SPUTUM PRODUCTION: 0
PALPITATIONS: 0
DIAPHORESIS: 0
COUGH: 1
CHILLS: 0
HEADACHES: 0
WEAKNESS: 1
WEIGHT LOSS: 1
BACK PAIN: 0
FEVER: 0
NERVOUS/ANXIOUS: 0
WEAKNESS: 1
ORTHOPNEA: 0
HEADACHES: 0
PALPITATIONS: 0
MYALGIAS: 0
SPUTUM PRODUCTION: 0
COUGH: 1
SHORTNESS OF BREATH: 0
MYALGIAS: 0
WEAKNESS: 1
INSOMNIA: 0
CLAUDICATION: 0
DOUBLE VISION: 0
SHORTNESS OF BREATH: 0
WEIGHT LOSS: 0
HEADACHES: 0
VOMITING: 0
DOUBLE VISION: 0
NERVOUS/ANXIOUS: 0
DIARRHEA: 0
ABDOMINAL PAIN: 1
EYE REDNESS: 0
VOMITING: 0
COUGH: 0
PHOTOPHOBIA: 0
COUGH: 1
MYALGIAS: 0
HALLUCINATIONS: 0
CHILLS: 0
DIAPHORESIS: 0
HEARTBURN: 0
ORTHOPNEA: 0
PALPITATIONS: 0
DOUBLE VISION: 0
HEMOPTYSIS: 0
TREMORS: 0
HEARTBURN: 0
HEADACHES: 0
HEADACHES: 0
INSOMNIA: 0
COUGH: 0
WHEEZING: 0
HEADACHES: 0
WEAKNESS: 0
NAUSEA: 0
BLURRED VISION: 0
FLANK PAIN: 0
NECK PAIN: 0
HEADACHES: 0
SHORTNESS OF BREATH: 0
ABDOMINAL PAIN: 0
SHORTNESS OF BREATH: 0
SPUTUM PRODUCTION: 1
BACK PAIN: 0
CONSTIPATION: 0
SPUTUM PRODUCTION: 0
SPEECH CHANGE: 0
SPEECH CHANGE: 0
COUGH: 0
SHORTNESS OF BREATH: 0
NERVOUS/ANXIOUS: 0
DIZZINESS: 0
VOMITING: 0
VOMITING: 0
WEAKNESS: 1
SENSORY CHANGE: 0
VOMITING: 0
NAUSEA: 0
ABDOMINAL PAIN: 0
FEVER: 0
DIARRHEA: 0
BLURRED VISION: 0
FEVER: 0
CHILLS: 0
CONSTIPATION: 0
HALLUCINATIONS: 0
MYALGIAS: 0
SPEECH CHANGE: 0
VOMITING: 0
SHORTNESS OF BREATH: 0
PHOTOPHOBIA: 0
WEAKNESS: 1
MYALGIAS: 0
INSOMNIA: 0
CLAUDICATION: 0
DEPRESSION: 0
DIAPHORESIS: 0
DIARRHEA: 0
ORTHOPNEA: 0
SPEECH CHANGE: 0
INSOMNIA: 0
SHORTNESS OF BREATH: 0
WEAKNESS: 0
SPUTUM PRODUCTION: 0
PHOTOPHOBIA: 0
DIZZINESS: 0
ABDOMINAL PAIN: 0
VOMITING: 0
CLAUDICATION: 0
WEAKNESS: 0
NAUSEA: 0
SORE THROAT: 0
CLAUDICATION: 0
SENSORY CHANGE: 0
COUGH: 1
SORE THROAT: 0
COUGH: 1
COUGH: 0
BLURRED VISION: 0
FEVER: 1
COUGH: 0
FEVER: 0
CHILLS: 0
ABDOMINAL PAIN: 1
SPUTUM PRODUCTION: 0
PHOTOPHOBIA: 0
DIARRHEA: 1
SENSORY CHANGE: 0
PHOTOPHOBIA: 0
CLAUDICATION: 0
NAUSEA: 0
HEADACHES: 0
HEMOPTYSIS: 0
TREMORS: 0
WEAKNESS: 1
WEAKNESS: 1
HEADACHES: 0
CLAUDICATION: 0
HEMOPTYSIS: 0
DIZZINESS: 0
ROS GI COMMENTS: IMPROVED
WEAKNESS: 1
DIARRHEA: 1
CONSTIPATION: 0
ORTHOPNEA: 0
MYALGIAS: 0
NERVOUS/ANXIOUS: 0
VOMITING: 0
FEVER: 0
NAUSEA: 0
CHILLS: 0
DIARRHEA: 0
COUGH: 0
WEAKNESS: 0
PHOTOPHOBIA: 0
SHORTNESS OF BREATH: 0
SHORTNESS OF BREATH: 0
SENSORY CHANGE: 0
HEMOPTYSIS: 0
HEARTBURN: 0
DIARRHEA: 0
SENSORY CHANGE: 0
ABDOMINAL PAIN: 0
NERVOUS/ANXIOUS: 0
BLURRED VISION: 0
PALPITATIONS: 0
FEVER: 0
CONSTIPATION: 0
FEVER: 0
SPUTUM PRODUCTION: 1
BLURRED VISION: 0
HEARTBURN: 0
NAUSEA: 0
SPUTUM PRODUCTION: 1
HEMOPTYSIS: 0
DIZZINESS: 0
INSOMNIA: 0
PHOTOPHOBIA: 0
DEPRESSION: 0
COUGH: 0
DOUBLE VISION: 0
HEMOPTYSIS: 0
BACK PAIN: 0
MYALGIAS: 0
COUGH: 1
PALPITATIONS: 0
CHILLS: 0
CLAUDICATION: 0
ABDOMINAL PAIN: 1
DOUBLE VISION: 0
BLURRED VISION: 0
DIZZINESS: 0
WEAKNESS: 1
PHOTOPHOBIA: 0
FEVER: 0
CHILLS: 0
SENSORY CHANGE: 0
NAUSEA: 0
WEAKNESS: 1
WEIGHT LOSS: 0
NECK PAIN: 0
VOMITING: 0
NERVOUS/ANXIOUS: 0
DEPRESSION: 0
MYALGIAS: 0
SHORTNESS OF BREATH: 1
ABDOMINAL PAIN: 0
MEMORY LOSS: 1
WEAKNESS: 1
FEVER: 0
PALPITATIONS: 0
SHORTNESS OF BREATH: 1
CHILLS: 0
TREMORS: 0
SPUTUM PRODUCTION: 0
PALPITATIONS: 0
FEVER: 0
DIAPHORESIS: 1
SHORTNESS OF BREATH: 0
ABDOMINAL PAIN: 0
NAUSEA: 0
DEPRESSION: 0
DOUBLE VISION: 0
SHORTNESS OF BREATH: 1
BLURRED VISION: 0
FEVER: 0
COUGH: 1
PALPITATIONS: 0
HEARTBURN: 0
COUGH: 0
DIARRHEA: 0
ABDOMINAL PAIN: 0
PALPITATIONS: 0
ABDOMINAL PAIN: 0
DIAPHORESIS: 0
FEVER: 0
TINGLING: 0
SENSORY CHANGE: 0
EYE REDNESS: 0
FEVER: 1
INSOMNIA: 0
BLURRED VISION: 0
SHORTNESS OF BREATH: 0
HEARTBURN: 0
SENSORY CHANGE: 0
CONSTIPATION: 0
COUGH: 1
WEIGHT LOSS: 0
ABDOMINAL PAIN: 0
MYALGIAS: 0
BLOOD IN STOOL: 0
PALPITATIONS: 0
PHOTOPHOBIA: 0
CHILLS: 1
DIARRHEA: 0
VOMITING: 0
MYALGIAS: 1
DEPRESSION: 0
FOCAL WEAKNESS: 0
DIZZINESS: 0
WEAKNESS: 1
HEARTBURN: 0
DIZZINESS: 0
SHORTNESS OF BREATH: 0
BLURRED VISION: 0
DIZZINESS: 0
DOUBLE VISION: 0
DOUBLE VISION: 0
ABDOMINAL PAIN: 0
PALPITATIONS: 0
NAUSEA: 1
PHOTOPHOBIA: 0
SHORTNESS OF BREATH: 1
CHILLS: 0
DIARRHEA: 0
NECK PAIN: 0
CONSTIPATION: 0
BLURRED VISION: 0
HEMOPTYSIS: 0
HEMOPTYSIS: 0
CONSTIPATION: 0
DEPRESSION: 0
NAUSEA: 0
WEAKNESS: 1
PALPITATIONS: 0
WEIGHT LOSS: 0
DIARRHEA: 0
SPEECH CHANGE: 0
COUGH: 0
CLAUDICATION: 0
MYALGIAS: 0
ORTHOPNEA: 0
NERVOUS/ANXIOUS: 0
DIZZINESS: 0
INSOMNIA: 0
NERVOUS/ANXIOUS: 0
COUGH: 0
SORE THROAT: 0
VOMITING: 0
FOCAL WEAKNESS: 0
HEMOPTYSIS: 0
CHILLS: 0
DIARRHEA: 0
DOUBLE VISION: 0
COUGH: 0
CLAUDICATION: 0
FEVER: 0
BACK PAIN: 0
NECK PAIN: 0
WEAKNESS: 1
WEAKNESS: 1
CLAUDICATION: 0
SPUTUM PRODUCTION: 1
COUGH: 1
WEAKNESS: 1
WEAKNESS: 0
PHOTOPHOBIA: 0
BACK PAIN: 0
ORTHOPNEA: 0
VOMITING: 0
ABDOMINAL PAIN: 0
TINGLING: 0
ROS GI COMMENTS: IMPROVED
SPEECH CHANGE: 0
SENSORY CHANGE: 0
ABDOMINAL PAIN: 0
NERVOUS/ANXIOUS: 0
WEIGHT LOSS: 0
DIZZINESS: 0
NERVOUS/ANXIOUS: 0
EYE PAIN: 0
ROS GI COMMENTS: IMPROVED
SORE THROAT: 0
SPEECH CHANGE: 0
BLOOD IN STOOL: 0
VOMITING: 0
HALLUCINATIONS: 0
ABDOMINAL PAIN: 0
DEPRESSION: 0
DIZZINESS: 0
DOUBLE VISION: 0
ABDOMINAL PAIN: 1
BLURRED VISION: 0
PALPITATIONS: 0
SHORTNESS OF BREATH: 0
FEVER: 0
DIAPHORESIS: 0
HEADACHES: 0
HEMOPTYSIS: 0
SPUTUM PRODUCTION: 0
FEVER: 0
SORE THROAT: 0
SPEECH CHANGE: 0
DEPRESSION: 0
COUGH: 0
DIAPHORESIS: 0
SPEECH CHANGE: 0
SENSORY CHANGE: 0
DIARRHEA: 0
BACK PAIN: 0
SORE THROAT: 0
VOMITING: 0
HEMOPTYSIS: 0
MYALGIAS: 0
NAUSEA: 0
ABDOMINAL PAIN: 0
VOMITING: 0
SHORTNESS OF BREATH: 0
CLAUDICATION: 0
FOCAL WEAKNESS: 0
ORTHOPNEA: 0
VOMITING: 0
COUGH: 1
HALLUCINATIONS: 0
ORTHOPNEA: 0
ABDOMINAL PAIN: 0
COUGH: 0
ABDOMINAL PAIN: 0
WEIGHT LOSS: 0
VOMITING: 0
BLURRED VISION: 0
WEAKNESS: 1
FEVER: 0
BACK PAIN: 0
SHORTNESS OF BREATH: 0
ABDOMINAL PAIN: 0
BACK PAIN: 0
FEVER: 1
PALPITATIONS: 0
HEARTBURN: 0
SHORTNESS OF BREATH: 0
PHOTOPHOBIA: 0
SPUTUM PRODUCTION: 0
PHOTOPHOBIA: 0
SENSORY CHANGE: 0
FEVER: 0
HEADACHES: 0
CLAUDICATION: 0
DIARRHEA: 0
COUGH: 1
CONSTIPATION: 0
NECK PAIN: 0
BACK PAIN: 0
WEAKNESS: 1
SENSORY CHANGE: 0
CONSTIPATION: 0
WEAKNESS: 1
BLURRED VISION: 0
HEADACHES: 0
WEAKNESS: 1
BACK PAIN: 0
CHILLS: 0
PALPITATIONS: 0
DIZZINESS: 0
HEADACHES: 0
VOMITING: 0
EYE REDNESS: 0
DOUBLE VISION: 0
FLANK PAIN: 0
PALPITATIONS: 0
CHILLS: 0
VOMITING: 0
INSOMNIA: 0
HEARTBURN: 0
BACK PAIN: 0
EYE PAIN: 0
DIARRHEA: 0
COUGH: 0
DIARRHEA: 0
HEARTBURN: 0
PHOTOPHOBIA: 0
PHOTOPHOBIA: 0
NAUSEA: 0
BLURRED VISION: 0
SHORTNESS OF BREATH: 0
FLANK PAIN: 0
DIARRHEA: 0
ABDOMINAL PAIN: 0
FLANK PAIN: 0
VOMITING: 0
WEAKNESS: 1
SPUTUM PRODUCTION: 1
FEVER: 0
DIARRHEA: 1
VOMITING: 0
ORTHOPNEA: 0
CLAUDICATION: 0
HALLUCINATIONS: 0
SHORTNESS OF BREATH: 0
NAUSEA: 0
HEADACHES: 0
SORE THROAT: 0
HEMOPTYSIS: 0
CHILLS: 0
NAUSEA: 0
CHILLS: 0
INSOMNIA: 0
CHILLS: 0
DIARRHEA: 0
COUGH: 0
BLURRED VISION: 0
DIARRHEA: 1
CHILLS: 0
HEADACHES: 0
VOMITING: 0
FEVER: 0
PHOTOPHOBIA: 0
HEARTBURN: 0
ROS GI COMMENTS: IMPROVED
VOMITING: 0
PALPITATIONS: 0
PHOTOPHOBIA: 0
NAUSEA: 0
SENSORY CHANGE: 0
MYALGIAS: 0
WEAKNESS: 1
ABDOMINAL PAIN: 0
DOUBLE VISION: 0
NAUSEA: 0
CHILLS: 0
SORE THROAT: 0
EYES NEGATIVE: 1
NAUSEA: 0
VOMITING: 0
FEVER: 1
DEPRESSION: 0
SPUTUM PRODUCTION: 1
MYALGIAS: 0
FOCAL WEAKNESS: 0
DIARRHEA: 1
FEVER: 1
MYALGIAS: 0
ABDOMINAL PAIN: 0
HEARTBURN: 0
NAUSEA: 0
DEPRESSION: 0
COUGH: 0
PALPITATIONS: 0
DIARRHEA: 0
LOSS OF CONSCIOUSNESS: 0
SHORTNESS OF BREATH: 0
HEARTBURN: 0
WEIGHT LOSS: 0
VOMITING: 0
WEIGHT LOSS: 0
DEPRESSION: 0
COUGH: 0
VOMITING: 0
SPUTUM PRODUCTION: 1
EYE DISCHARGE: 0
ORTHOPNEA: 0
CONSTIPATION: 0
NECK PAIN: 0
SENSORY CHANGE: 0
MYALGIAS: 0
BLOOD IN STOOL: 0
NERVOUS/ANXIOUS: 0
COUGH: 0
ABDOMINAL PAIN: 0
ABDOMINAL PAIN: 0
SHORTNESS OF BREATH: 1
NAUSEA: 1
ABDOMINAL PAIN: 0
VOMITING: 0
VOMITING: 0
COUGH: 1
FLANK PAIN: 0
MYALGIAS: 0
COUGH: 0
ABDOMINAL PAIN: 0
WEAKNESS: 0
WEIGHT LOSS: 0
DIARRHEA: 0
WEAKNESS: 1
DEPRESSION: 0
VOMITING: 0
DEPRESSION: 0
MYALGIAS: 0
CHILLS: 0
DOUBLE VISION: 0
CONSTIPATION: 0
NERVOUS/ANXIOUS: 1
BACK PAIN: 0
SPUTUM PRODUCTION: 0
DIARRHEA: 0
BLURRED VISION: 0
INSOMNIA: 0
VOMITING: 0
BACK PAIN: 0
WEAKNESS: 1
PALPITATIONS: 0
SHORTNESS OF BREATH: 0
BLOOD IN STOOL: 0
HEMOPTYSIS: 0
HEADACHES: 0
HEARTBURN: 0
ORTHOPNEA: 0
NECK PAIN: 0
COUGH: 0
DEPRESSION: 0
NECK PAIN: 0
DIZZINESS: 0
INSOMNIA: 0
BLURRED VISION: 0
PSYCHIATRIC NEGATIVE: 1
INSOMNIA: 0
WHEEZING: 0
CLAUDICATION: 0
HEADACHES: 0
WEAKNESS: 0
HEADACHES: 0
COUGH: 0
NAUSEA: 0
NAUSEA: 0
FEVER: 1
CONSTIPATION: 0
WEAKNESS: 0
NECK PAIN: 0
NAUSEA: 0
SHORTNESS OF BREATH: 0
BLURRED VISION: 0
SPEECH CHANGE: 0
DIARRHEA: 1
SHORTNESS OF BREATH: 0
SPUTUM PRODUCTION: 1
FEVER: 0
SPUTUM PRODUCTION: 0
DIZZINESS: 0
WEAKNESS: 0
VOMITING: 0
NAUSEA: 0
DIARRHEA: 0
HEMOPTYSIS: 0
HEARTBURN: 0
DIARRHEA: 0
WEIGHT LOSS: 1
SHORTNESS OF BREATH: 0
WEAKNESS: 1
WEAKNESS: 1
DEPRESSION: 0
DEPRESSION: 0
MYALGIAS: 0
HEADACHES: 0
SPEECH CHANGE: 0
CHILLS: 0
MYALGIAS: 0
NAUSEA: 0
HALLUCINATIONS: 0
HEADACHES: 0
FEVER: 0
VOMITING: 0
CHILLS: 0
EYE REDNESS: 0
WHEEZING: 0
DIZZINESS: 0
NERVOUS/ANXIOUS: 0
CHILLS: 0
BRUISES/BLEEDS EASILY: 0
NECK PAIN: 0
HEMOPTYSIS: 0
DIZZINESS: 0
NAUSEA: 0
ORTHOPNEA: 0
SPEECH CHANGE: 0
MYALGIAS: 0
SHORTNESS OF BREATH: 0
INSOMNIA: 0
WEAKNESS: 0
FLANK PAIN: 0
SHORTNESS OF BREATH: 1
HEMOPTYSIS: 0
HEMOPTYSIS: 0
PALPITATIONS: 0
WEAKNESS: 0
VOMITING: 0
SENSORY CHANGE: 0
FEVER: 1
SPEECH CHANGE: 0
NAUSEA: 0
FEVER: 0
DEPRESSION: 0
ORTHOPNEA: 0
HEADACHES: 0
CARDIOVASCULAR NEGATIVE: 1
NAUSEA: 0
COUGH: 0
HEMOPTYSIS: 0
HALLUCINATIONS: 0
INSOMNIA: 0
DIZZINESS: 0
WEAKNESS: 1
WHEEZING: 0
WEAKNESS: 0
DEPRESSION: 0
DIZZINESS: 0
SHORTNESS OF BREATH: 1
TINGLING: 0
NERVOUS/ANXIOUS: 0
WEAKNESS: 1
SHORTNESS OF BREATH: 1
FEVER: 1
EYE DISCHARGE: 0
WHEEZING: 0
NAUSEA: 0
COUGH: 1
ABDOMINAL PAIN: 0
FEVER: 0
DIZZINESS: 0
DIAPHORESIS: 0
WEAKNESS: 1
SPEECH CHANGE: 0
FOCAL WEAKNESS: 0
COUGH: 1
MUSCULOSKELETAL NEGATIVE: 1
WEAKNESS: 1
ABDOMINAL PAIN: 0
WEAKNESS: 0
MYALGIAS: 0
CHILLS: 0
FEVER: 0
NERVOUS/ANXIOUS: 0
SPUTUM PRODUCTION: 0
SPEECH CHANGE: 0
DEPRESSION: 0
HEADACHES: 0
WEAKNESS: 0
COUGH: 1
PHOTOPHOBIA: 0
BACK PAIN: 0
COUGH: 1
ABDOMINAL PAIN: 0
SENSORY CHANGE: 0
FEVER: 0
SPUTUM PRODUCTION: 1
WHEEZING: 0
TREMORS: 0
ABDOMINAL PAIN: 0
SPEECH CHANGE: 0
INSOMNIA: 0
HALLUCINATIONS: 0
NERVOUS/ANXIOUS: 0
NERVOUS/ANXIOUS: 0
NAUSEA: 0
PHOTOPHOBIA: 0
DEPRESSION: 0
MYALGIAS: 1
PALPITATIONS: 0
BACK PAIN: 0
MYALGIAS: 0
PALPITATIONS: 0
SORE THROAT: 0
DOUBLE VISION: 0
NAUSEA: 0
NAUSEA: 0
MYALGIAS: 0
DIARRHEA: 1
SPEECH CHANGE: 0
FOCAL WEAKNESS: 0
DIZZINESS: 0
CLAUDICATION: 0
SENSORY CHANGE: 0
WEAKNESS: 1
HALLUCINATIONS: 0
CONSTIPATION: 0
BLURRED VISION: 0
HEMOPTYSIS: 0
FEVER: 0
DIARRHEA: 1
CONSTIPATION: 0
NERVOUS/ANXIOUS: 0
SENSORY CHANGE: 0
CONSTIPATION: 0
WEAKNESS: 1
CLAUDICATION: 0
BLOOD IN STOOL: 0
BLURRED VISION: 0
DOUBLE VISION: 0
EYE DISCHARGE: 0
FOCAL WEAKNESS: 0
FEVER: 1
DOUBLE VISION: 0
SENSORY CHANGE: 0
INSOMNIA: 0
NAUSEA: 0
CLAUDICATION: 0
HEADACHES: 0
SEIZURES: 0
DEPRESSION: 0
VOMITING: 0
TINGLING: 0
CHILLS: 0
NAUSEA: 0
HEARTBURN: 0
COUGH: 1
MYALGIAS: 0
EYE DISCHARGE: 0
HALLUCINATIONS: 0
SPUTUM PRODUCTION: 0
SORE THROAT: 0
EYE PAIN: 0
ABDOMINAL PAIN: 0
FEVER: 0
ABDOMINAL PAIN: 1
BLURRED VISION: 0
NAUSEA: 0
SPEECH CHANGE: 0
DIZZINESS: 0
PALPITATIONS: 0
DIZZINESS: 0
SPUTUM PRODUCTION: 1
HEADACHES: 0
CONSTIPATION: 0
HEMOPTYSIS: 0
COUGH: 0
DOUBLE VISION: 0
DEPRESSION: 0
PALPITATIONS: 0
VOMITING: 0
FEVER: 0
DOUBLE VISION: 0
VOMITING: 0
HEARTBURN: 0
SPUTUM PRODUCTION: 1
SHORTNESS OF BREATH: 0
FLANK PAIN: 0
COUGH: 0
SPEECH CHANGE: 0
ABDOMINAL PAIN: 0
SORE THROAT: 0
CHILLS: 0
HALLUCINATIONS: 0
HEMOPTYSIS: 0
NERVOUS/ANXIOUS: 0
STRIDOR: 0
ORTHOPNEA: 0
PHOTOPHOBIA: 0
CLAUDICATION: 0
DOUBLE VISION: 0
SHORTNESS OF BREATH: 0
COUGH: 1
NECK PAIN: 0
HEARTBURN: 0
HEMOPTYSIS: 0
DOUBLE VISION: 0
HEADACHES: 0
SPEECH CHANGE: 0
CONSTIPATION: 0
SPEECH CHANGE: 0
DIARRHEA: 1
DOUBLE VISION: 0
HEADACHES: 0
INSOMNIA: 0
MYALGIAS: 0
MYALGIAS: 1
HEADACHES: 0
CHILLS: 0
HEADACHES: 0
HEMOPTYSIS: 0
BLURRED VISION: 0
INSOMNIA: 0
DOUBLE VISION: 0
DEPRESSION: 0
PALPITATIONS: 0
COUGH: 1
DOUBLE VISION: 0
COUGH: 1
SHORTNESS OF BREATH: 0
MYALGIAS: 1
BLURRED VISION: 0
NAUSEA: 0
CONSTIPATION: 0
WEAKNESS: 1
FEVER: 1
FEVER: 0
SORE THROAT: 0
DIAPHORESIS: 0
HEMOPTYSIS: 0
WEIGHT LOSS: 0
BLURRED VISION: 0
PHOTOPHOBIA: 0
PALPITATIONS: 0
MYALGIAS: 0
WEIGHT LOSS: 0
SPEECH CHANGE: 0
CHILLS: 0
HEADACHES: 0
FLANK PAIN: 0
NAUSEA: 1
ABDOMINAL PAIN: 0
SPEECH CHANGE: 0
ROS GI COMMENTS: IMPROVED
DIZZINESS: 0
FEVER: 0
FEVER: 0
DIARRHEA: 0
SHORTNESS OF BREATH: 0
EYE PAIN: 0
CHILLS: 0
PHOTOPHOBIA: 0
BLURRED VISION: 0
SHORTNESS OF BREATH: 0
BLURRED VISION: 0
WEIGHT LOSS: 0
BACK PAIN: 0
HALLUCINATIONS: 0
TINGLING: 0
MYALGIAS: 0
FEVER: 0
DIZZINESS: 0
BLOOD IN STOOL: 0
CONSTIPATION: 0
ORTHOPNEA: 0
SHORTNESS OF BREATH: 0
PHOTOPHOBIA: 0
ORTHOPNEA: 0
SPUTUM PRODUCTION: 1
COUGH: 0
DEPRESSION: 0
ABDOMINAL PAIN: 0
ABDOMINAL PAIN: 0
COUGH: 1
PHOTOPHOBIA: 0
COUGH: 0
PHOTOPHOBIA: 0
ABDOMINAL PAIN: 0
HEARTBURN: 0
TINGLING: 0
SHORTNESS OF BREATH: 0
SORE THROAT: 0
CHILLS: 0
NECK PAIN: 0
DIZZINESS: 0
CHILLS: 0

## 2018-01-01 ASSESSMENT — PAIN SCALES - GENERAL
PAINLEVEL_OUTOF10: 0
PAINLEVEL_OUTOF10: 8
PAINLEVEL_OUTOF10: 0
PAINLEVEL_OUTOF10: 2
PAINLEVEL_OUTOF10: 0
PAINLEVEL_OUTOF10: 2
PAINLEVEL_OUTOF10: 0

## 2018-01-01 ASSESSMENT — COGNITIVE AND FUNCTIONAL STATUS - GENERAL
MOVING FROM LYING ON BACK TO SITTING ON SIDE OF FLAT BED: A LITTLE
DAILY ACTIVITIY SCORE: 19
WALKING IN HOSPITAL ROOM: A LITTLE
HELP NEEDED FOR BATHING: A LITTLE
CLIMB 3 TO 5 STEPS WITH RAILING: A LITTLE
MOBILITY SCORE: 19
DRESSING REGULAR LOWER BODY CLOTHING: A LITTLE
DAILY ACTIVITIY SCORE: 24
TOILETING: A LITTLE
SUGGESTED CMS G CODE MODIFIER MOBILITY: CK
PERSONAL GROOMING: A LITTLE
DAILY ACTIVITIY SCORE: 23
SUGGESTED CMS G CODE MODIFIER DAILY ACTIVITY: CK
HELP NEEDED FOR BATHING: A LITTLE
SUGGESTED CMS G CODE MODIFIER DAILY ACTIVITY: CI
DRESSING REGULAR UPPER BODY CLOTHING: A LITTLE
MOVING TO AND FROM BED TO CHAIR: A LITTLE
SUGGESTED CMS G CODE MODIFIER DAILY ACTIVITY: CH
STANDING UP FROM CHAIR USING ARMS: A LITTLE

## 2018-01-01 ASSESSMENT — PATIENT HEALTH QUESTIONNAIRE - PHQ9
SUM OF ALL RESPONSES TO PHQ9 QUESTIONS 1 AND 2: 0
2. FEELING DOWN, DEPRESSED, IRRITABLE, OR HOPELESS: NOT AT ALL
SUM OF ALL RESPONSES TO PHQ QUESTIONS 1-9: 0
SUM OF ALL RESPONSES TO PHQ QUESTIONS 1-9: 0
1. LITTLE INTEREST OR PLEASURE IN DOING THINGS: NOT AT ALL
2. FEELING DOWN, DEPRESSED, IRRITABLE, OR HOPELESS: NOT AT ALL
SUM OF ALL RESPONSES TO PHQ9 QUESTIONS 1 AND 2: 0
2. FEELING DOWN, DEPRESSED, IRRITABLE, OR HOPELESS: NOT AT ALL
SUM OF ALL RESPONSES TO PHQ9 QUESTIONS 1 AND 2: 0
1. LITTLE INTEREST OR PLEASURE IN DOING THINGS: NOT AT ALL
2. FEELING DOWN, DEPRESSED, IRRITABLE, OR HOPELESS: NOT AT ALL
1. LITTLE INTEREST OR PLEASURE IN DOING THINGS: NOT AT ALL
2. FEELING DOWN, DEPRESSED, IRRITABLE, OR HOPELESS: NOT AT ALL
SUM OF ALL RESPONSES TO PHQ QUESTIONS 1-9: 0
SUM OF ALL RESPONSES TO PHQ QUESTIONS 1-9: 0
SUM OF ALL RESPONSES TO PHQ9 QUESTIONS 1 AND 2: 0
2. FEELING DOWN, DEPRESSED, IRRITABLE, OR HOPELESS: NOT AT ALL
SUM OF ALL RESPONSES TO PHQ9 QUESTIONS 1 AND 2: 0
2. FEELING DOWN, DEPRESSED, IRRITABLE, OR HOPELESS: NOT AT ALL
SUM OF ALL RESPONSES TO PHQ QUESTIONS 1-9: 0
1. LITTLE INTEREST OR PLEASURE IN DOING THINGS: NOT AT ALL
1. LITTLE INTEREST OR PLEASURE IN DOING THINGS: NOT AT ALL
SUM OF ALL RESPONSES TO PHQ9 QUESTIONS 1 AND 2: 0
SUM OF ALL RESPONSES TO PHQ9 QUESTIONS 1 AND 2: 0
1. LITTLE INTEREST OR PLEASURE IN DOING THINGS: NOT AT ALL
SUM OF ALL RESPONSES TO PHQ9 QUESTIONS 1 AND 2: 0
1. LITTLE INTEREST OR PLEASURE IN DOING THINGS: NOT AT ALL
1. LITTLE INTEREST OR PLEASURE IN DOING THINGS: NOT AT ALL
SUM OF ALL RESPONSES TO PHQ9 QUESTIONS 1 AND 2: 0
1. LITTLE INTEREST OR PLEASURE IN DOING THINGS: NOT AT ALL

## 2018-01-01 ASSESSMENT — LIFESTYLE VARIABLES
SUBSTANCE_ABUSE: 0
DO YOU DRINK ALCOHOL: NO
SUBSTANCE_ABUSE: 0
DO YOU DRINK ALCOHOL: NO
SUBSTANCE_ABUSE: 0
DO YOU DRINK ALCOHOL: NO
SUBSTANCE_ABUSE: 0
DO YOU DRINK ALCOHOL: NO
SUBSTANCE_ABUSE: 0
EVER_SMOKED: NEVER
DO YOU DRINK ALCOHOL: NO
EVER_SMOKED: NEVER
DO YOU DRINK ALCOHOL: NO
SUBSTANCE_ABUSE: 0
ALCOHOL_USE: NO
ALCOHOL_USE: NO
EVER_SMOKED: NEVER
ALCOHOL_USE: NO
EVER_SMOKED: NEVER
DO YOU DRINK ALCOHOL: NO
SUBSTANCE_ABUSE: 0
SUBSTANCE_ABUSE: 0
DO YOU DRINK ALCOHOL: NO
DO YOU DRINK ALCOHOL: NO
EVER_SMOKED: NEVER
SUBSTANCE_ABUSE: 0
SUBSTANCE_ABUSE: 0

## 2018-01-01 ASSESSMENT — GAIT ASSESSMENTS
GAIT LEVEL OF ASSIST: CONTACT GUARD ASSIST
DEVIATION: DECREASED BASE OF SUPPORT;BRADYKINETIC;SHUFFLED GAIT;DECREASED HEEL STRIKE;DECREASED TOE OFF
ASSISTIVE DEVICE: FRONT WHEEL WALKER
DISTANCE (FEET): 200

## 2018-01-01 ASSESSMENT — COPD QUESTIONNAIRES
COPD SCREENING SCORE: 5
HAVE YOU SMOKED AT LEAST 100 CIGARETTES IN YOUR ENTIRE LIFE: NO/DON'T KNOW
DO YOU EVER COUGH UP ANY MUCUS OR PHLEGM?: YES, A FEW DAYS A WEEK OR MONTH
DURING THE PAST 4 WEEKS HOW MUCH DID YOU FEEL SHORT OF BREATH: SOME OF THE TIME

## 2018-01-01 ASSESSMENT — ACTIVITIES OF DAILY LIVING (ADL): TOILETING: INDEPENDENT

## 2018-02-14 PROBLEM — J32.0 MAXILLARY SINUSITIS: Status: ACTIVE | Noted: 2018-01-01

## 2018-02-14 PROBLEM — M54.2 NECK PAIN: Status: ACTIVE | Noted: 2018-01-01

## 2018-02-15 ENCOUNTER — APPOINTMENT (RX ONLY)
Dept: URBAN - METROPOLITAN AREA CLINIC 31 | Facility: CLINIC | Age: 78
Setting detail: DERMATOLOGY
End: 2018-02-15

## 2018-02-15 DIAGNOSIS — Z71.89 OTHER SPECIFIED COUNSELING: ICD-10-CM

## 2018-02-15 DIAGNOSIS — D22 MELANOCYTIC NEVI: ICD-10-CM

## 2018-02-15 DIAGNOSIS — L81.4 OTHER MELANIN HYPERPIGMENTATION: ICD-10-CM

## 2018-02-15 DIAGNOSIS — L82.1 OTHER SEBORRHEIC KERATOSIS: ICD-10-CM

## 2018-02-15 DIAGNOSIS — L57.0 ACTINIC KERATOSIS: ICD-10-CM

## 2018-02-15 DIAGNOSIS — D18.0 HEMANGIOMA: ICD-10-CM

## 2018-02-15 PROBLEM — D22.9 MELANOCYTIC NEVI, UNSPECIFIED: Status: ACTIVE | Noted: 2018-02-15

## 2018-02-15 PROBLEM — D18.01 HEMANGIOMA OF SKIN AND SUBCUTANEOUS TISSUE: Status: ACTIVE | Noted: 2018-02-15

## 2018-02-15 PROCEDURE — ? COUNSELING

## 2018-02-15 PROCEDURE — 99213 OFFICE O/P EST LOW 20 MIN: CPT

## 2018-02-15 PROCEDURE — ? ADDITIONAL NOTES

## 2018-02-15 PROCEDURE — ? OBSERVATION AND MEASURE

## 2018-02-15 ASSESSMENT — LOCATION ZONE DERM: LOCATION ZONE: FACE

## 2018-02-15 ASSESSMENT — LOCATION DETAILED DESCRIPTION DERM: LOCATION DETAILED: LEFT INFERIOR LATERAL FOREHEAD

## 2018-02-15 ASSESSMENT — LOCATION SIMPLE DESCRIPTION DERM: LOCATION SIMPLE: LEFT FOREHEAD

## 2018-02-15 NOTE — ASSESSMENT & PLAN NOTE
For about 10 days now patient's main complain of neck pain and left trapezius muscle pain which radiates down his shoulder tenderness elbow. He states the pain is sharp pain 3 out of 10 associated with tightness and difficulty sleeping on his side. He denies any chest pain or shortness of breath and dyspnea on exertion.

## 2018-02-15 NOTE — HPI: SKIN LESION (ACTINIC KERATOSES)
Is This A New Presentation, Or A Follow-Up?: Follow Up Actinic Keratoses
Additional History: He has not noticed any changes with his skin , no tender spots.   He went to his oncologist and has a WBC count of 1700, and is being treated for a lung infection.  He is also scheduled for a PET scan due to his history of lymphoma.  He would prefer not to do any procedures today.

## 2018-02-15 NOTE — PROGRESS NOTES
This medical record contains text that has been entered with the assistance of computer voice recognition and dictation software.  Therefore, it may contain unintended errors in text, spelling, punctuation, or grammar    Chief Complaint   Patient presents with   • Sinusitis     sinus infection    • Arm Pain     left arm        Chang Unger is a 77 y.o. male here evaluation and management of: Neck pain, sinusitis      HPI:     Maxillary sinusitis  He could not lay still at CT because of coughDue to sinusitis. As a result his oncologist placed him on Augmentin. He's having a CT done for surveillance of his lymphoma.      Neck pain  For about 10 days now patient's main complain of neck pain and left trapezius muscle pain which radiates down his shoulder tenderness elbow. He states the pain is sharp pain 3 out of 10 associated with tightness and difficulty sleeping on his side. He denies any chest pain or shortness of breath and dyspnea on exertion.    Current medicines (including changes today)  Current Outpatient Prescriptions   Medication Sig Dispense Refill   • cyclobenzaprine (FLEXERIL) 10 MG Tab Take 1 Tab by mouth 3 times a day as needed. 30 Tab 0   • HYDROcodone-acetaminophen (NORCO) 7.5-325 MG per tablet Take 1 Tab by mouth 2 times a day as needed for up to 30 days. 30 Tab 0   • paroxetine (PAXIL) 20 MG Tab Take 20 mg by mouth See Admin Instructions. Pt takes on Sunday and Wed     • Coenzyme Q10 (CO Q 10 PO) Take 1 Cap by mouth 2 Times a Day.     • levothyroxine (SYNTHROID) 100 MCG Tab Take 1 Tab by mouth Every morning on an empty stomach. 90 Tab 4   • lisinopril (PRINIVIL) 20 MG Tab TAKE 1 TABLET DAILY 90 Tab 4   • TURMERIC CURCUMIN PO Take 1 Cap by mouth 2 Times a Day.     • ketorolac (TORADOL) 10 MG Tab Take 1 Tab by mouth every 6 hours as needed for up to 22 doses. 22 Tab 2   • ketorolac (TORADOL) 10 MG Tab Take 1 Tab by mouth every 6 hours as needed for up to 22 doses. 22 Tab 2     No current  "facility-administered medications for this visit.      He  has a past medical history of Basal cell cancer; GERD (gastroesophageal reflux disease); Hematuria; HTN; and Lymphoma (CMS-HCC).  He  has a past surgical history that includes tonsillectomy.  Social History   Substance Use Topics   • Smoking status: Never Smoker   • Smokeless tobacco: Never Used   • Alcohol use No      Comment: etoh abuse,     Social History     Social History Narrative   • No narrative on file     Family History   Problem Relation Age of Onset   • Cancer Mother      pancreatic     Family Status   Relation Status   • Mother    • Father          ROS    Please see hpi     All other systems reviewed and are negative     Objective:     Blood pressure 112/68, pulse 91, temperature 37 °C (98.6 °F), height 1.778 m (5' 10\"), weight 87.5 kg (193 lb), SpO2 98 %. Body mass index is 27.69 kg/m².  Physical Exam:    Constitutional: Alert, no distress.  Skin: Warm, dry, good turgor, no rashes in visible areas.  Eye: Equal, round and reactive, conjunctiva clear, lids normal.  ENMT: Lips without lesions, good dentition, oropharynx clear.  Neck: Trachea midline, no masses, no thyromegaly. No cervical or supraclavicular lymphadenopathy.  Respiratory: Unlabored respiratory effort, lungs clear to auscultation, no wheezes, no ronchi.  Cardiovascular: Normal S1, S2, no murmur, no edema.  Abdomen: Soft, non-tender, no masses, no hepatosplenomegaly.  Psych: Alert and oriented x3, normal affect and mood.  Neck--tender to touch over left trapezius muscle, normal range of motion, normal flexion, normal extension normal hand squeeze bilaterally.        Assessment and Plan:   The following treatment plan was discussed      1. Subacute maxillary sinusitis  He was placed on augmentin by his oncologist    2. Neck pain    Patient was instructed on activity modification ×2 weeks  NSAIDs when necessary  Heat  when necessary and compression  We reviewed " isometric exercises in clinic    - cyclobenzaprine (FLEXERIL) 10 MG Tab; Take 1 Tab by mouth 3 times a day as needed.  Dispense: 30 Tab; Refill: 0  - HYDROcodone-acetaminophen (NORCO) 7.5-325 MG per tablet; Take 1 Tab by mouth 2 times a day as needed for up to 30 days.  Dispense: 30 Tab; Refill: 0  - REFERRAL TO PHYSICAL THERAPY Reason for Therapy: Eval/Treat/Report        HEALTH MAINTENANCE:    Instructed to Follow up in clinic or ER for worsening symptoms, difficulty breathing, lack of expected recovery, or should new symptoms or problems arise.    Followup: Return in about 4 weeks (around 3/14/2018) for Reevaluation.       Once again this medical record contains text that has been entered with the assistance of computer voice recognition and dictation software.  Therefore, it may contain unintended errors in text, spelling, punctuation, or grammar

## 2018-02-15 NOTE — ASSESSMENT & PLAN NOTE
He could not lay still at CT because of coughDue to sinusitis. As a result his oncologist placed him on Augmentin. He's having a CT done for surveillance of his lymphoma.

## 2018-02-23 PROBLEM — E87.1 HYPONATREMIA: Status: ACTIVE | Noted: 2018-01-01

## 2018-02-23 PROBLEM — D64.9 NORMOCHROMIC NORMOCYTIC ANEMIA: Status: ACTIVE | Noted: 2018-01-01

## 2018-02-23 PROBLEM — D70.9 NEUTROPENIC FEVER (HCC): Status: ACTIVE | Noted: 2018-01-01

## 2018-02-23 PROBLEM — R50.81 NEUTROPENIC FEVER (HCC): Status: ACTIVE | Noted: 2018-01-01

## 2018-02-23 PROBLEM — N17.9 ACUTE RENAL FAILURE (ARF) (HCC): Status: ACTIVE | Noted: 2018-01-01

## 2018-02-24 PROBLEM — A41.9 SEPSIS (HCC): Status: ACTIVE | Noted: 2018-01-01

## 2018-02-24 NOTE — PROGRESS NOTES
Assumed care of patient. Bedside report received. Assessment completed. Patient complains of no pain at this time. Patient complains of no lightheadedness, dizziness or nausea at this time. Patient complains of no CP or SOB at this time. Patient is 1 HCW assist to commode. All needs are met at this time. Fall precautions are in place, bed is locked and in low position, call light is within reach, and bed alarm is on. Will continue to monitor.

## 2018-02-24 NOTE — PROGRESS NOTES
Pt with temp of 102.4 and constant coughing.  Notified Dr. Gotti on pt's temp and request for cough medicine.  New orders received.

## 2018-02-24 NOTE — PROGRESS NOTES
Telemetry Report: pt has been SR/ST.  HR: 60s-110  Measurements: (.16/.08/.40)  Ectopy: r PVC, r PAC    Measurements and updates per Gladis NAVA    For tele interpretation only, patient care is sole responsibility of primary nurse.

## 2018-02-24 NOTE — PROGRESS NOTES
Pt c/o of coughing and requesting cough drops.  Notified Dr. Martínez.  Dr. Martínez said he will put in orders for cough drops.

## 2018-02-24 NOTE — H&P
Hospital Medicine History and Physical    Date of Service  2/23/2018    Chief Complaint  Chief Complaint   Patient presents with   • Cough       History of Presenting Illness  77 y.o. male who presented 2/23/2018 with fevers and generalized weakness. Patient is found to be neutropenic with his history of non-Hodgkin's lymphoma. Patient at this point will be aggressively treated in reverse isolation will be given antibiotics with vancomycin and Zosyn and Diflucan. Patient will at this point be evaluated with blood cultures urine cultures sputum cultures. We will also do imaging studies to ensure that the patient's lymphoma has not recurred. Patient at this point will be for admission to the medical floor.      Primary Care Physician  Hector Burnett M.D.    Consultants  None    Code Status  Full code    Review of Systems  Review of Systems   Constitutional: Negative for chills, diaphoresis and fever.        Weight loss   HENT: Negative.    Eyes: Negative.  Negative for double vision.   Respiratory: Positive for shortness of breath. Negative for cough, hemoptysis and wheezing.    Cardiovascular: Negative.  Negative for chest pain, palpitations and leg swelling.   Gastrointestinal: Negative for abdominal pain, blood in stool, constipation, diarrhea, heartburn, nausea and vomiting.   Genitourinary: Negative.  Negative for frequency, hematuria and urgency.   Musculoskeletal: Negative.  Negative for joint pain.   Skin: Negative.  Negative for itching and rash.   Neurological: Positive for weakness. Negative for dizziness, focal weakness, seizures, loss of consciousness and headaches.   Endo/Heme/Allergies: Negative.  Does not bruise/bleed easily.   Psychiatric/Behavioral: Negative.  Negative for depression, substance abuse and suicidal ideas. The patient is not nervous/anxious.         Past Medical History  Past Medical History:   Diagnosis Date   • Basal cell cancer    • GERD (gastroesophageal reflux disease)    •  Hematuria    • HTN    • Lymphoma (CMS-HCC)        Surgical History  Past Surgical History:   Procedure Laterality Date   • TONSILLECTOMY         Medications  No current facility-administered medications on file prior to encounter.      Current Outpatient Prescriptions on File Prior to Encounter   Medication Sig Dispense Refill   • cyclobenzaprine (FLEXERIL) 10 MG Tab Take 1 Tab by mouth 3 times a day as needed. 30 Tab 0   • HYDROcodone-acetaminophen (NORCO) 7.5-325 MG per tablet Take 1 Tab by mouth 2 times a day as needed for up to 30 days. 30 Tab 0   • paroxetine (PAXIL) 20 MG Tab Take 20 mg by mouth See Admin Instructions. Pt takes on  and Wed     • Coenzyme Q10 (CO Q 10 PO) Take 1 Cap by mouth 2 Times a Day.     • TURMERIC CURCUMIN PO Take 1 Cap by mouth 2 Times a Day.     • ketorolac (TORADOL) 10 MG Tab Take 1 Tab by mouth every 6 hours as needed for up to 22 doses. 22 Tab 2   • ketorolac (TORADOL) 10 MG Tab Take 1 Tab by mouth every 6 hours as needed for up to 22 doses. 22 Tab 2   • levothyroxine (SYNTHROID) 100 MCG Tab Take 1 Tab by mouth Every morning on an empty stomach. 90 Tab 4   • lisinopril (PRINIVIL) 20 MG Tab TAKE 1 TABLET DAILY 90 Tab 4       Family History  Family History   Problem Relation Age of Onset   • Cancer Mother      pancreatic       Social History  Social History   Substance Use Topics   • Smoking status: Never Smoker   • Smokeless tobacco: Never Used   • Alcohol use No      Comment: etoh abuse,       Allergies  Allergies   Allergen Reactions   • Nitroglycerin      Pulse drops to 40   • Losartan      Leg swelling        Physical Exam  Laboratory   Hemodynamics  Temp (24hrs), Av.2 °C (100.7 °F), Min:38.2 °C (100.7 °F), Max:38.2 °C (100.7 °F)   Temperature: (!) 38.2 °C (100.7 °F)  Pulse  Av.4  Min: 103  Max: 115    Blood Pressure : 145/84, NIBP: (!) 94/57      Respiratory      Respiration: 16, Pulse Oximetry: 93 %, O2 Daily Delivery Respiratory : Room Air with O2 Available      Given By:: Mouthpiece  RUL Breath Sounds: Clear;Diminished, RML Breath Sounds: Clear;Diminished, RLL Breath Sounds: Diminished, GREGORIA Breath Sounds: Clear;Diminished, LLL Breath Sounds: Diminished    Physical Exam   Constitutional: He is oriented to person, place, and time. He appears well-developed and well-nourished. He is not intubated.   HENT:   Head: Normocephalic and atraumatic.   Right Ear: External ear normal.   Left Ear: External ear normal.   Nose: Nose normal.   Mouth/Throat: Oropharynx is clear and moist.   Eyes: Conjunctivae and EOM are normal. Pupils are equal, round, and reactive to light. Right eye exhibits no discharge. Left eye exhibits no discharge.   Neck: Normal range of motion. Neck supple. No JVD present. No thyromegaly present.   Cardiovascular: Normal rate and regular rhythm.    No murmur heard.  Pulmonary/Chest: No accessory muscle usage. No apnea, no tachypnea and no bradypnea. He is not intubated. No respiratory distress. He has decreased breath sounds in the right lower field and the left lower field. He has no wheezes. He has no rales. He exhibits no tenderness.   Abdominal: He exhibits no distension and no mass. There is no tenderness. There is no rebound and no guarding.   Musculoskeletal: Normal range of motion. He exhibits no edema or tenderness.   Lymphadenopathy:     He has no cervical adenopathy.   Neurological: He is alert and oriented to person, place, and time. He has normal reflexes. No cranial nerve deficit.   Skin: Skin is warm and dry. No rash noted. No erythema.   Psychiatric: He has a normal mood and affect. His behavior is normal. Thought content normal.   Nursing note and vitals reviewed.      Recent Labs      02/23/18   1800   WBC  0.3*   RBC  4.38*   HEMOGLOBIN  12.5*   HEMATOCRIT  34.7*   MCV  79.2*   MCH  28.5   MCHC  36.0*   RDW  39.8   PLATELETCT  75*   MPV  11.0     Recent Labs      02/23/18   1800   SODIUM  127*   POTASSIUM  3.6   CHLORIDE  97   CO2  19*    GLUCOSE  114*   BUN  24*   CREATININE  1.78*   CALCIUM  8.2*     Recent Labs      02/23/18   1800   ALTSGPT  26   ASTSGOT  42   ALKPHOSPHAT  54   TBILIRUBIN  2.7*   GLUCOSE  114*                 Lab Results   Component Value Date    TROPONINI <0.02 02/23/2018     Urinalysis:  No results found for: SPECGRAVITY, GLUCOSEUR, KETONES, NITRITE, WBCURINE, RBCURINE, BACTERIA, EPITHELCELL     Imaging  DX-CHEST-PORTABLE (1 VIEW)   Final Result      Hypoinflation without other evidence for acute cardiopulmonary disease.      CT-HEAD W/O    (Results Pending)   CT-ABDOMEN-PELVIS W/O    (Results Pending)      Assessment/Plan     I anticipate this patient will require at least two midnights for appropriate medical management, necessitating inpatient admission.    * Neutropenic fever (CMS-HCC)- (present on admission)   Assessment & Plan    Patient since 2010 has been stable from his non-Hodgkin's lymphoma treatment. The patient has been having elevated white blood cell counts as of recent however his white blood cell count dropped. The patient then developed generalized fatigue and fevers that he was not even aware of. His most recent white blood cell count 0.3. At this point the patient will need aggressive treatment with broad-spectrum antibiotics including vancomycin and Zosyn which will offer pseudomonas coverage as well as gram-positive coverage in addition I will also add Diflucan for possible yeast coverage. Patient does not seem to be exhibiting any viral symptoms. Patient will need at this point pain management as well as fluid resuscitation. Blood culture urine culture and sputum culture will be carefully followed. Will at this point repeat CAT scans of his abdomen and pelvis chest and head to make sure that there is no lymphoma recurrence. At this point contrast cannot be utilized as he has acute renal failure as well.        Maxillary sinusitis- (present on admission)   Assessment & Plan    With his history of  maxillary sinusitis this certainly could be the source for his infection not his white blood cell counts are still low. At this point aggressive management will be utilized to get rid of the sinusitis.        Acute renal failure (ARF) (CMS-HCC)- (present on admission)   Assessment & Plan    I doubt this is what that is the acute renal failure is most likely secondary to dehydration as the patient states he has not been drinking or eating well since he's been feeling so inadequate. At this point fluid resuscitation will be utilized if his renal functions do not improve an ultrasound the kidney will be done.        Hyponatremia- (present on admission)   Assessment & Plan    Hyponatremia at this point is at 127 fluid resuscitation will be given most likely secondary to hypovolemia.        Essential hypertension- (present on admission)   Assessment & Plan    Continue with blood pressure management optimization keep the systolic blood pressure under 140 diastolic of 90.        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        Normochromic normocytic anemia- (present on admission)   Assessment & Plan    Monitor H&H currently not in transfusions own. Patient was scheduled as an outpatient to get a bone marrow biopsy.        Mixed hyperlipidemia- mild no meds- (present on admission)   Assessment & Plan    Statin        Hypothyroidism due to acquired atrophy of thyroid- (present on admission)   Assessment & Plan    -supportive measures with synthroid supplementation at 100 mcg per day, no change  -latest TSH is 1.240 and this is with in establish normal guidlines             VTE prophylaxis: Sequentials.

## 2018-02-24 NOTE — CARE PLAN
Problem: Communication  Goal: The ability to communicate needs accurately and effectively will improve  Patient calls appropriately for any and all needs.    Problem: Knowledge Deficit  Goal: Knowledge of disease process/condition, treatment plan, diagnostic tests, and medications will improve  POC discussed with patient. All questions answered. Patient verbalized understanding.

## 2018-02-24 NOTE — FLOWSHEET NOTE
02/23/18 1834   Events/Summary/Plan   Events/Summary/Plan SVN   Interdisciplinary Plan of Care-Goals (Indications)   Obstructive Ventilatory Defect or Pulmonary Disease without Obvious Obstruction Strong Subjective / Objective Improvement   Interdisciplinary Plan of Care-Outcomes    Bronchodilator Outcome Patient at Stable Baseline   Education   Education Yes - Pt. / Family has been Instructed in use of Respiratory Equipment   RT Assessment of Delivered Medications   Evaluation of Medication Delivery Daily Yes-- Pt /Family has been Instructed in use of Respiratory Medications and Adverse Reactions   SVN Group   #SVN Performed Yes   Given By: Mouthpiece   Date SVN Last Changed 02/23/18   Date SVN Next Change Due (Q 7 Days) 03/02/18   Respiratory WDL   Respiratory (WDL) X   Chest Exam   Respiration 16   Pulse (!) 103   Breath Sounds   Pre/Post Intervention Pre Intervention Assessment   RUL Breath Sounds Clear;Diminished   RML Breath Sounds Clear;Diminished   RLL Breath Sounds Diminished   GREGORIA Breath Sounds Clear;Diminished   LLL Breath Sounds Diminished   Secretions   Cough Strong;Dry   How Sputum Obtained Spontaneous   Oximetry   #Pulse Oximetry (Single Determination) Yes   Oxygen   Home O2 Use Prior To Admission? No   Pulse Oximetry 95 %   O2 (LPM) 0   O2 (FiO2) 21   O2 Daily Delivery Respiratory  Room Air with O2 Available   Room Air Challenge Pass   Increased aeration throughout predominately in the upper lobes vs lower lobes but patient can take a deeper breathe without coughing

## 2018-02-24 NOTE — ASSESSMENT & PLAN NOTE
Per his oncologist it has been in remission since 2010. He just had an appointment with oncologist on mon prior to admission and has a PET scan ordered for next Tuesday. CT head , abd and pelvis did not reveal any new changes in Lymph nodes .    Pancytopenia - concern for transformation to other leukemia -- noted increased T cell lymphocytic proliferation although no increased blast-  ? Leukemia. - fu studies sent to [place]--discussed with Dr. Klein-- will plan tx to [facility] over weekend.  tx plts.  Granix trial. -- ? Dc as not effective. Fu CBC

## 2018-02-24 NOTE — ASSESSMENT & PLAN NOTE
Patient competed  non-Hodgkin's lymphoma treatment. The patient has been having elevated white blood cell counts as of recent however his white blood cell count dropped w findings of pancytopenia .  Cxs negative -- fevers resolved.  IV cefepime for sinusitis coverage   cw prophl diflucan   Discussed with Dr. Mckinney

## 2018-02-24 NOTE — ED NOTES
Tech  from Lab called with critical result of wbc 0.3 at 1903 Critical lab result read back to tech.   Dr. Leal  notified of critical lab result at 1904.  Critical lab result read back by Dr. Leal .

## 2018-02-24 NOTE — ASSESSMENT & PLAN NOTE
With his history of maxillary sinusitis- Suspect source for infxn- afbe , improved symptoms.   cw Iv cefepime  prophyl diflucan  discussed with Dr. Murguia ID

## 2018-02-24 NOTE — DISCHARGE PLANNING
Care Transition Team Assessment    Patient lives at home with his spouse and the discharge plan is that he returns home when medically able. No current SS needs noted.     Information Source  Orientation : Oriented x 4  Information Given By: Patient  Informant's Name: Chang  Who is responsible for making decisions for patient? : Patient    Readmission Evaluation  Is this a readmission?: No    Elopement Risk  Legal Hold: No  Ambulatory or Self Mobile in Wheelchair: Yes  Disoriented: No  Psychiatric Symptoms: None  History of Wandering: No  Elopement this Admit: No  Vocalizing Wanting to Leave: No  Displays Behaviors, Body Language Wanting to Leave: No-Not at Risk for Elopement  Elopement Risk: Not at Risk for Elopement    Interdisciplinary Discharge Planning  Primary Care Physician:  (Dr. Ortez)  Lives with - Patient's Self Care Capacity: Spouse  Support Systems: Family Member(s)  Housing / Facility: [address]  Do You Take your Prescribed Medications Regularly: Yes  Able to Return to Previous ADL's: Other  Mobility Issues: No  Prior Services: None  Patient Expects to be Discharged to:: Home  Assistance Needed: Unknown at this Time  Durable Medical Equipment: Not Applicable    Discharge Preparedness  What is your plan after discharge?: Home with help  What are your discharge supports?: Spouse  Prior Functional Level: Independent with Activities of Daily Living    Functional Assesment  Prior Functional Level: Independent with Activities of Daily Living    Finances  Financial Barriers to Discharge: No  Prescription Coverage: Yes    Vision / Hearing Impairment  Vision Impairment : Yes  Right Eye Vision: Wears Glasses (for reading/driving)  Left Eye Vision: Wears Glasses (for reading/driving)  Hearing Impairment : No    Values / Beliefs / Concerns  Values / Beliefs Concerns : No    Advance Directive  Advance Directive?: None    Domestic Abuse  Have you ever been the victim of abuse or violence?: No  Physical Abuse  or Sexual Abuse: No  Verbal Abuse or Emotional Abuse: No  Possible Abuse Reported to:: Not Applicable    Psychological Assessment  History of Substance Abuse: None (HX, not current)  History of Psychiatric Problems: No  Newly Diagnosed Illness: No    Discharge Risks or Barriers  Discharge risks or barriers?: No    Anticipated Discharge Information  Anticipated discharge disposition: Home

## 2018-02-24 NOTE — PROGRESS NOTES
Notified Dr Gotti on pt's critical labs,  WBC:0.4 and absolute neutrophils: 0.05.  No new orders received.

## 2018-02-24 NOTE — ED NOTES
Patient reports consistent cough over 3 months, placed on antibiotics with no relief. Sepsis protocol initiated in triage.

## 2018-02-24 NOTE — ED PROVIDER NOTES
ED Provider Note    Primary care provider: Hector Burnett M.D.  Means of arrival: Private vehicle  History obtained from: Patient  History limited by: None    CHIEF COMPLAINT  Chief Complaint   Patient presents with   • Cough       HPI  Chang Unger is a 77 y.o. male who presents to the Emergency Department for cough. Patient reports that he has had a cough for 3 months and has been treated with 3 rounds of outpatient antibiotics at this last round started about a week ago (Augmentin) but he has not had any relief of his cough. Patient states that he has a history of lymphoma and is regularly followed up with an oncologist. He has been in remission since 2010, however at his office follow-up last week (when he was started on the Augmentin) labs were also done which noted that his white blood cell count was 1700. Previously his white blood cell count had been trending up and had suddenly trended down at this last visit. He came in today because he started to spike fevers and his symptoms did not seem to improve despite treatment with Augmentin. Aside from cough and fevers patient has no other complaints. He denies nausea, vomiting, chest pain, shortness of breath.    REVIEW OF SYSTEMS  Review of Systems   Constitutional: Positive for chills and fever.   HENT: Negative for sore throat.    Eyes: Negative for blurred vision.   Respiratory: Positive for cough. Negative for shortness of breath.    Cardiovascular: Negative for chest pain.   Gastrointestinal: Negative for nausea and vomiting.   Genitourinary: Negative for dysuria.   Musculoskeletal: Negative for back pain.   Skin: Negative for rash.   Neurological: Negative for dizziness and headaches.   All other systems reviewed and are negative.    PAST MEDICAL HISTORY   has a past medical history of Basal cell cancer; GERD (gastroesophageal reflux disease); Hematuria; HTN; and Lymphoma (CMS-HCC).    SURGICAL HISTORY   has a past surgical history that includes  "tonsillectomy.    SOCIAL HISTORY  Social History   Substance Use Topics   • Smoking status: Never Smoker   • Smokeless tobacco: Never Used   • Alcohol use No      Comment: etoh abuse,      History   Drug Use No       FAMILY HISTORY  Family History   Problem Relation Age of Onset   • Cancer Mother      pancreatic       CURRENT MEDICATIONS  Home Medications    None         ALLERGIES  Allergies   Allergen Reactions   • Nitroglycerin      Pulse drops to 40   • Losartan      Leg swelling       PHYSICAL EXAM  VITAL SIGNS: BP (!) 95/59   Pulse 74   Temp 36.5 °C (97.7 °F)   Resp 20   Ht 1.778 m (5' 10\")   Wt 86.9 kg (191 lb 9.3 oz)   SpO2 97%   BMI 27.49 kg/m²   Vitals reviewed by myself.  Physical Exam   Constitutional: He is oriented to person, place, and time and well-developed, well-nourished, and in no distress. No distress.   HENT:   Head: Normocephalic.   Eyes: EOM are normal.   Neck: Normal range of motion.   Cardiovascular: Normal rate.    Tachycardic   Pulmonary/Chest: Effort normal and breath sounds normal. No respiratory distress. He has no wheezes.   Abdominal: Soft. He exhibits no distension. There is no tenderness. There is no rebound.   Musculoskeletal: Normal range of motion.   Neurological: He is alert and oriented to person, place, and time.   Skin: Skin is warm and dry.     DIAGNOSTIC STUDIES /  LABS  Labs Reviewed   LACTIC ACID - Abnormal; Notable for the following:        Result Value    Lactic Acid 2.96 (*)     All other components within normal limits   CBC WITH DIFFERENTIAL   COMP METABOLIC PANEL   BLOOD CULTURE   BLOOD CULTURE   INFLUENZA RAPID   LACTIC ACID   URINALYSIS   URINE CULTURE(NEW)      All labs reviewed by me.    EKG Interpretation:  Interpreted by myself    12 Lead EKG interpreted by me to show:  EKG at 1852:7 tachycardia, heart rate 114, normal axis, normal intervals, no acute ST-T segment changes  My impression of this EKG: Does not indicate ischemia or arrhythmia at this " time.    RADIOLOGY  DX-CHEST-PORTABLE (1 VIEW)   Final Result      Hypoinflation without other evidence for acute cardiopulmonary disease.        The radiologist's interpretation of all radiological studies have been reviewed by me.      COURSE & MEDICAL DECISION MAKING  Nursing notes, VS, PMSFHx reviewed in chart.    Patient is a 77-year-old male who comes in for cough and fever. Differential diagnosis includes viral syndrome, pneumonia, upper respiratory infection, influenza, neutropenic fever, urinary tract infection, bacteremia. Diagnostic workup includes labs, blood cultures, chest x-ray, an influenza swab. Next    Patient's initial vitals are notable for fever and tachycardia. I am concerned about infectious etiology in patient given his symptoms. For his tachycardia elect to treat him with IV fluids. Labs returned are notable for an elevated lactate of 2.96, and neutropenia with white blood cell count of 0.3 and ANC of 0.05. At this point elect to start patient on broad-spectrum antibiotics as he has neutropenic fever with unknown source. I started him on vancomycin and Zosyn. Patient's labs are also notable for hyponatremia with sodium of 127 and acute kidney injury with a creatinine of 1.78. This is likely secondary to hypovolemia and will continue treatment with IV fluids. The chest x-ray returns and demonstrates no source of infection. Influenza swab is negative. I will admit the patient hospital for further infectious workup and continued broad-spectrum antibiotics. I discussed case with Dr. Knowles who has accepted the admission. Attempt admission patient is in stable condition.      FINAL IMPRESSION  1. Neutropenic fever (CMS-HCC)    2. Cough

## 2018-02-24 NOTE — ASSESSMENT & PLAN NOTE
This is severe sepsis with the following associated acute organ dysfunction(s): acute kidney failure.and neutropenia.  Patient with hx of nonhodgkins lymphoma in remission, now presenting with fevers, tachycardia, lactic acidosis, ROHITH  Source could be acute maxillary sinusitis .  Cdiff and cultures negative. Lactic acid normalized.  Sepsis resolving   IV atbs  IVF

## 2018-02-24 NOTE — PROGRESS NOTES
[facility] Hospitalist Progress Note    Date of Service: 2018    Chief Complaint  77 y.o. male admitted 2018 with neutropenia , fever and weakness with hx of non hodgkins lymphoma    Interval Problem Update  : patient doing ok this morning, no change really. Having dry cough, otherwise no complaints  Placed on isolation, on [email or web address].diflucan  Blood, urine and sputum cultures pending.  Consultants/Specialty  none    Disposition  none        Review of Systems   Constitutional: Negative for fever and malaise/fatigue.   HENT: Negative for hearing loss and tinnitus.    Eyes: Negative for blurred vision, double vision and photophobia.   Respiratory: Positive for cough. Negative for hemoptysis, sputum production and shortness of breath.    Cardiovascular: Negative for chest pain and palpitations.   Gastrointestinal: Positive for nausea. Negative for abdominal pain and vomiting.   Genitourinary: Negative for dysuria, frequency, hematuria and urgency.   Musculoskeletal: Negative for myalgias.   Neurological: Positive for weakness. Negative for dizziness and headaches.   Psychiatric/Behavioral: Negative for depression and suicidal ideas.      Physical Exam  Laboratory/Imaging   Hemodynamics  Temp (24hrs), Av.8 °C (100 °F), Min:36.5 °C (97.7 °F), Max:39.1 °C (102.4 °F)   Temperature: (!) 39.1 °C (102.4 °F) (RN notified)  Pulse  Av.8  Min: 74  Max: 115    Blood Pressure : 140/67, NIBP: (!) 91/59      Respiratory      Respiration: 20, Pulse Oximetry: 93 %, O2 Daily Delivery Respiratory : Room Air with O2 Available     Given By:: Mouthpiece  RUL Breath Sounds: Clear;Diminished, RML Breath Sounds: Clear;Diminished, RLL Breath Sounds: Diminished, GREGORIA Breath Sounds: Clear;Diminished, LLL Breath Sounds: Diminished    Fluids    Intake/Output Summary (Last 24 hours) at 18 0735  Last data filed at 18 0300   Gross per 24 hour   Intake                0 ml   Output              400 ml   Net              -400 ml       Nutrition  Orders Placed This Encounter   Procedures   • Diet Order     Standing Status:   Standing     Number of Occurrences:   1     Order Specific Question:   Diet:     Answer:   Regular [1]     Physical Exam   Constitutional: He is oriented to person, place, and time. He appears well-developed and well-nourished.   HENT:   Head: Normocephalic and atraumatic.   Mouth/Throat: Oropharynx is clear and moist.   Eyes: EOM are normal. Pupils are equal, round, and reactive to light. No scleral icterus.   Neck: Normal range of motion. Neck supple. No JVD present.   Cardiovascular: Normal rate, regular rhythm and normal heart sounds.    Pulmonary/Chest: Effort normal and breath sounds normal. No respiratory distress. He has no wheezes.   Coarse breathing sounds   Abdominal: Soft. Bowel sounds are normal. He exhibits no distension. There is no tenderness. There is no rebound.   Musculoskeletal: Normal range of motion. He exhibits no edema or deformity.   Lymphadenopathy:     He has no cervical adenopathy.   Neurological: He is alert and oriented to person, place, and time.   Skin: Skin is warm. No erythema.   Psychiatric: He has a normal mood and affect.       Recent Labs      02/23/18   1800  02/24/18   0224   WBC  0.3*  0.4*   RBC  4.38*  4.00*   HEMOGLOBIN  12.5*  11.4*   HEMATOCRIT  34.7*  33.2*   MCV  79.2*  83.0   MCH  28.5  28.5   MCHC  36.0*  34.3   RDW  39.8  42.5   PLATELETCT  75*  70*   MPV  11.0  11.8     Recent Labs      02/23/18   1800  02/24/18   0224   SODIUM  127*  135   POTASSIUM  3.6  3.6   CHLORIDE  97  108   CO2  19*  19*   GLUCOSE  114*  93   BUN  24*  21   CREATININE  1.78*  1.74*   CALCIUM  8.2*  7.5*            Impression       Retroperitoneal, iliac and mesenteric lymphadenopathy is not significantly changed compared to prior.    Haziness about the root of the mesentery is again noted.    Nonobstructing bilateral renal calculi. Atrophic right kidney. Left parapelvic  cysts.    Enlarged prostate.    Trace bilateral pleural effusions. Rounded opacity at the right lung base likely represents round atelectasis.                Assessment/Plan     * Neutropenic fever (CMS-[facility])- (present on admission)   Assessment & Plan    Patient since 2010 has been stable from his non-Hodgkin's lymphoma treatment. The patient has been having elevated white blood cell counts as of recent however his white blood cell count dropped. The patient then developed generalized fatigue and fevers that he was not even aware of. His most recent white blood cell count 0.3. At this point the patient will need aggressive treatment with broad-spectrum antibiotics including vancomycin and Zosyn which will offer pseudomonas coverage as well as gram-positive coverage in addition I will also add Diflucan for possible yeast coverage. Patient does not seem to be exhibiting any viral symptoms. Patient will need at this point pain management as well as fluid resuscitation. Blood culture urine culture and sputum culture will be carefully followed. Will at this point repeat CAT scans of his abdomen and pelvis chest and head to make sure that there is no lymphoma recurrence. At this point contrast cannot be utilized as he has acute renal failure as well.        Maxillary sinusitis- (present on admission)   Assessment & Plan    With his history of maxillary sinusitis this certainly could be the source for his infection. At this point aggressive management will be utilized to get rid of the sinusitis.        Acute renal failure (ARF) (CMS-[facility])- (present on admission)   Assessment & Plan    Likely prerenal due to dehydration as patient has not been drinking well for the past few days.   Will treat with IVF at this time and recheck if continues to rise will check urine lytes and renal US        Hyponatremia- (present on admission)   Assessment & Plan    Hyponatremia on admission 127, now resolved  monitor        Essential  hypertension- (present on admission)   Assessment & Plan    Continue with blood pressure management optimization keep the systolic blood pressure under 140 diastolic of 90.        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 He just had an appointment with oncologist on mon prior to admission and has a PET scan ordered for next Tuesday. Dr. Ramirez  - CT head , abd and pelvis did not reveal any new changes in Lymph nodes         Sepsis (CMS-HCC)   Assessment & Plan    This is severe sepsis with the following associated acute organ dysfunction(s): acute kidney failure.and neutropenia.  Patient with hx of nonhodgkins lymphoma in remission, now presenting with fevers, tachycardia, lactic acidosis, ROHITH  Source could be acute maxillary sinusitis vs PNA, pro calcitonin is elevated as well  Will start on IV abx  IVF  trend lactates  Monitor closely        Normochromic normocytic anemia- (present on admission)   Assessment & Plan    Monitor H&H    Patient was scheduled as an outpatient to get a bone marrow biopsy.        Mixed hyperlipidemia- mild no meds- (present on admission)   Assessment & Plan    Statin        Hypothyroidism due to acquired atrophy of thyroid- (present on admission)   Assessment & Plan    Recheck Tsh  Continue current home med dose, will not adjust in acute setting          Quality-Core Measures   Reviewed items::  Labs reviewed, Medications reviewed and Radiology images reviewed  Joshi catheter::  No Joshi  Antibiotics:  Treating active infection/contamination beyond 24 hours perioperative coverage

## 2018-02-24 NOTE — ED NOTES
1800 At time of arrival , Iv placed , labs and bld cx x 2  drawn and taken to lab.    1810 pt to imaging  1820 pt back from imaging , pt to room 10

## 2018-02-25 PROBLEM — E87.6 HYPOKALEMIA: Status: ACTIVE | Noted: 2018-01-01

## 2018-02-25 NOTE — PROGRESS NOTES
EKG Rhythm Strip    DE Interval: 0.12  QRS Interval: 0.10  QT Interval: 0.34  Rhythm Interpretation: SR,ST    Ectopy: R-PVC, O-PVC.

## 2018-02-25 NOTE — PROGRESS NOTES
Received report from night RN. Pt resting in bed no signs of distress. VSS. AOx4, pt denies needs at this time. Educated on fall risks and to use call light before getting out of bed. Fall precautions in place, call light and personal belongings in reach will monitor.

## 2018-02-25 NOTE — PROGRESS NOTES
Patient up to commode had a loose BM sample sent to lab, precaution changed to special precautions. Call light in reach will continue to monitor.

## 2018-02-25 NOTE — CARE PLAN
Problem: Safety  Goal: Will remain free from falls  Outcome: PROGRESSING AS EXPECTED    Intervention: Implement fall precautions  Room/floor clear. Non skid socks on. Proper signs up. Bed  low and locked.  Call light and belongings within reach.  Hourly rounding in place to make sure needs are met.      Problem: Infection  Goal: Will remain free from infection  Outcome: PROGRESSING AS EXPECTED    Intervention: Implement standard precautions and perform hand washing before and after patient contact  Hand washing every encounter. IV ports scrubbed with alcohol when hanging medicine. Patient watch for s/s of infection. Patient taught to report s/s of infection, verbalizes understanding.

## 2018-02-25 NOTE — PROGRESS NOTES
[facility] Hospitalist Progress Note    Date of Service: 2018    Chief Complaint  77 y.o. male admitted 2018 with neutropenia , fever and weakness with hx of non hodgkins lymphoma    Interval Problem Update  : patient doing ok this morning, no change really. Having dry cough, otherwise no complaints  Placed on isolation, on Vancomycin.zosyn.diflucan  Blood, urine and sputum cultures pending.    : patient doing ok this morning, still having sinus pressure and coughing dry. Otherwise slight improvement from yesterday, still having diarrhea   white count slightly lower today 0.2  Lactic acid normalized  K low will replace  On abx    Consultants/Specialty  none    Disposition  none        Review of Systems   Constitutional: Negative for fever and malaise/fatigue.   HENT: Negative for hearing loss and tinnitus.    Eyes: Negative for blurred vision, double vision and photophobia.   Respiratory: Positive for cough. Negative for hemoptysis, sputum production and shortness of breath.    Cardiovascular: Negative for chest pain and palpitations.   Gastrointestinal: Positive for nausea. Negative for abdominal pain and vomiting.   Genitourinary: Negative for dysuria, frequency, hematuria and urgency.   Musculoskeletal: Negative for myalgias.   Neurological: Positive for weakness. Negative for dizziness and headaches.   Psychiatric/Behavioral: Negative for depression and suicidal ideas.      Physical Exam  Laboratory/Imaging   Hemodynamics  Temp (24hrs), Av.1 °C (98.8 °F), Min:36.6 °C (97.8 °F), Max:37.9 °C (100.2 °F)   Temperature: 37.2 °C (98.9 °F)  Pulse  Av  Min: 74  Max: 115    Blood Pressure : 112/80      Respiratory      Respiration: 18, Pulse Oximetry: 96 %        RUL Breath Sounds: Clear, RML Breath Sounds: Diminished, RLL Breath Sounds: Diminished, GREGORIA Breath Sounds: Clear, LLL Breath Sounds: Diminished    Fluids    Intake/Output Summary (Last 24 hours) at 18 1318  Last data filed at  02/24/18 1500   Gross per 24 hour   Intake                0 ml   Output              300 ml   Net             -300 ml       Nutrition  Orders Placed This Encounter   Procedures   • DIET ORDER     Standing Status:   Standing     Number of Occurrences:   1     Order Specific Question:   Diet:     Answer:   2 Gram Sodium [7]     Physical Exam   Constitutional: He is oriented to person, place, and time. He appears well-developed and well-nourished.   HENT:   Head: Normocephalic and atraumatic.   Mouth/Throat: Oropharynx is clear and moist.   Eyes: EOM are normal. Pupils are equal, round, and reactive to light. No scleral icterus.   Neck: Normal range of motion. Neck supple. No JVD present.   Cardiovascular: Normal rate, regular rhythm and normal heart sounds.    Pulmonary/Chest: Effort normal and breath sounds normal. No respiratory distress. He has no wheezes.   Coarse breathing sounds   Abdominal: Soft. Bowel sounds are normal. He exhibits no distension. There is no tenderness. There is no rebound.   Musculoskeletal: Normal range of motion. He exhibits no edema or deformity.   Lymphadenopathy:     He has no cervical adenopathy.   Neurological: He is alert and oriented to person, place, and time.   Skin: Skin is warm. No erythema.   Psychiatric: He has a normal mood and affect.       Recent Labs      02/23/18   1800  02/24/18   0224  02/25/18   0436   WBC  0.3*  0.4*  0.2*   RBC  4.38*  4.00*  4.10*   HEMOGLOBIN  12.5*  11.4*  11.4*   HEMATOCRIT  34.7*  33.2*  33.7*   MCV  79.2*  83.0  82.2   MCH  28.5  28.5  27.8   MCHC  36.0*  34.3  33.8   RDW  39.8  42.5  43.3   PLATELETCT  75*  70*  53*   MPV  11.0  11.8  11.1     Recent Labs      02/23/18   1800  02/24/18   0224   SODIUM  127*  135   POTASSIUM  3.6  3.6   CHLORIDE  97  108   CO2  19*  19*   GLUCOSE  114*  93   BUN  24*  21   CREATININE  1.78*  1.74*   CALCIUM  8.2*  7.5*            Impression       Retroperitoneal, iliac and mesenteric lymphadenopathy is not  significantly changed compared to prior.    Haziness about the root of the mesentery is again noted.    Nonobstructing bilateral renal calculi. Atrophic right kidney. Left parapelvic cysts.    Enlarged prostate.    Trace bilateral pleural effusions. Rounded opacity at the right lung base likely represents round atelectasis.                Assessment/Plan     * Neutropenic fever (CMS-HCC)- (present on admission)   Assessment & Plan    Patient since 2010 has been stable from his non-Hodgkin's lymphoma treatment. The patient has been having elevated white blood cell counts as of recent however his white blood cell count dropped. The patient then developed generalized fatigue and fevers that he was not even aware of.  Will start on cefepime for fevers, and add diflucan as well  CT abd/pelvis no change or new lymph nodes noted  Continue to monitor        Maxillary sinusitis- (present on admission)   Assessment & Plan    With his history of maxillary sinusitis this certainly could be the source for his infection. At this point aggressive management will be utilized to get rid of the sinusitis.        Acute renal failure (ARF) (CMS-HCC)- (present on admission)   Assessment & Plan    Likely prerenal due to dehydration as patient has not been drinking well for the past few days.   Will treat with IVF at this time and recheck if continues to rise will check urine lytes and renal US        Hyponatremia- (present on admission)   Assessment & Plan    Hyponatremia on admission 127, now resolved  monitor        Essential hypertension- (present on admission)   Assessment & Plan    Continue with blood pressure management optimization keep the systolic blood pressure under 140 diastolic of 90.        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 He  just had an appointment with oncologist on mon prior to admission and has a PET scan ordered for next Tuesday. Dr. Ramirez  - CT head , abd and pelvis did not reveal any new changes in Lymph nodes         Hypokalemia   Assessment & Plan    Replace and monitor        Sepsis (CMS-[facility])   Assessment & Plan    This is severe sepsis with the following associated acute organ dysfunction(s): acute kidney failure.and neutropenia.  Patient with hx of nonhodgkins lymphoma in remission, now presenting with fevers, tachycardia, lactic acidosis, ROHITH  Source could be acute maxillary sinusitis vs PNA, pro calcitonin is elevated as well  On abx, blood and sputum cultures neg so far  Patient having diarrhea, will check C.Diff  IVF  trend lactates, normalzing  Monitor closely        Normochromic normocytic anemia- (present on admission)   Assessment & Plan    Monitor H&H    Patient was scheduled as an outpatient to get a bone marrow biopsy.        Mixed hyperlipidemia- mild no meds- (present on admission)   Assessment & Plan    Statin        Hypothyroidism due to acquired atrophy of thyroid- (present on admission)   Assessment & Plan    Recheck Tsh  Continue current home med dose, will not adjust in acute setting          Quality-Core Measures   Reviewed items::  Labs reviewed, Medications reviewed and Radiology images reviewed  Joshi catheter::  No Joshi  Antibiotics:  Treating active infection/contamination beyond 24 hours perioperative coverage      On IV abx, monitoring renal function, ivf for lactic acidosis, c.diff ordered given diarrhea and immunocompromised state  Will try to get intouch with his oncologist Dr. Klein today

## 2018-02-26 PROBLEM — D69.6 THROMBOCYTOPENIA (HCC): Status: ACTIVE | Noted: 2018-01-01

## 2018-02-26 NOTE — ASSESSMENT & PLAN NOTE
Improved post plt tx-- again declined-- no active bleed.  tx plts w concern for spontaneous bleeding.

## 2018-02-26 NOTE — PROGRESS NOTES
0969 Dr. torres made aware of platelet count of 44 and ANC of 0.2. Plan to continue augmentin and current IV ABX. ID was consult per Dr. Torres.

## 2018-02-26 NOTE — PROGRESS NOTES
Tele Summary:    Rhythm: SR/ST  Rate: 60s-100s  ND: 0.18  QRS: 0.08  QT: 0.38    Ectopy: occasional PVC, rare PAC    Tele strip placed in pt chart.

## 2018-02-26 NOTE — CARE PLAN
Problem: Safety  Goal: Will remain free from injury  Outcome: PROGRESSING AS EXPECTED  Pt encouraged to call before getting out of bed to prevent injury. Pt verbalized understanding.    Problem: Knowledge Deficit  Goal: Knowledge of disease process/condition, treatment plan, diagnostic tests, and medications will improve  Outcome: PROGRESSING AS EXPECTED  POC reviewed with pt. Pt verbalized understanding.

## 2018-02-26 NOTE — CONSULTS
INFECTIOUS DISEASES INPATIENT CONSULT NOTE     Date of Service: 2/26/2018    Consult Requested By: Bethany Torres M.D.    Reason for Consultation: Neutropenic fever    History of Present Illness:   Chang Unger is a 77 y.o. Man with lymphoma followed by Dr. Klein and chronic sinusitis admitted 2/23/2018 for neutropenic fevers. Pt states he completed treatment for lymphoma in 2010 and has been in remission ever since. Since November, he has been having chronic sinus congestion, nasal drainage and post nasal drip. He has been treated with two courses of PO augmentin. He follows up with his oncologist twice a year for check ups and was recently seen in the office the Monday prior to admission and was noted to have abnormal counts on routine labs. He has been having subjective fevers and chills. No new skin rash, sore throat, abdominal pain or dysuria but he endorses diarrhea while on oral abx. He has also noted an 8 pound weight loss since he was on abx which he attributes to poor appetite secondary to the abx. He developed increasing weakness and given intermittent fevers and increasing malaise, he presented to the ED for further evaluation. On presentation, he was febrile to 100.7 with a white count of 0.3 and ANC 0.05. CT head revealed minimal mucosal thickening within the maxillary sinuses c/w sinusitis and CT a/p retroperitoneal, iliac and mesenteric lymphadenopathy not significantly changed compared to prior imaging. He was started on broad spectrum abx. Blood, and urine cultures in addition to C diff are negative. ID consulted for further abx recommendations.     Review Of Systems:  All other ROS were reviewed and are negative except as noted above in the HPI.    PMH:   Past Medical History:   Diagnosis Date   • Back pain    • Basal cell cancer    • Bronchitis    • Disorder of thyroid    • GERD (gastroesophageal reflux disease)    • Hematuria    • HTN    • Lymphoma (CMS-HCC)        PSH:  Past Surgical  History:   Procedure Laterality Date   • TONSILLECTOMY         FAMILY HX:  Family History   Problem Relation Age of Onset   • Cancer Mother      pancreatic       SOCIAL HX:  Social History     Social History   • Marital status:      Spouse name: N/A   • Number of children: N/A   • Years of education: N/A     Occupational History   • Not on file.     Social History Main Topics   • Smoking status: Never Smoker   • Smokeless tobacco: Never Used   • Alcohol use No      Comment: Hx etoh abuse, does not drink    • Drug use: No   • Sexual activity: Not on file      Comment: ,2 kids, retired marine     Other Topics Concern   • Not on file     Social History Narrative   • No narrative on file     History   Smoking Status   • Never Smoker   Smokeless Tobacco   • Never Used     History   Alcohol Use No     Comment: Hx etoh abuse, does not drink        Allergies/Intolerances:  Allergies   Allergen Reactions   • Nitroglycerin      Pulse drops to 40   • Losartan      Leg swelling       History reviewed with the patient    Other Current Medications:    Current Facility-Administered Medications:   •  amoxicillin-clavulanate (AUGMENTIN) 875-125 MG per tablet 1 Tab, 1 Tab, Oral, Q12HRS, Bethany Torres M.D., 1 Tab at 02/26/18 0922  •  cefepime (MAXIPIME) 2 g in  mL IVPB, 2 g, Intravenous, Q12HRS, Bethany Torres M.D., Stopped at 02/26/18 0951  •  tbo-filgrastim (GRANIX) injection 480 mcg, 480 mcg, Subcutaneous, DAILY, Bethany Torres M.D., 480 mcg at 02/26/18 0925  •  acetaminophen (TYLENOL) tablet 650 mg, 650 mg, Oral, Q6HRS PRN, Debbie Gotti M.D., 650 mg at 02/26/18 0541  •  benzonatate (TESSALON) capsule 100 mg, 100 mg, Oral, Q4HRS PRN, Debbie Gotti M.D., 100 mg at 02/25/18 1955  •  NS infusion 2,607 mL, 30 mL/kg, Intravenous, Once PRN, Bethany Torres M.D.  •  NS (BOLUS) infusion 500 mL, 500 mL, Intravenous, Once PRN, Bethany Torres M.D.  •  levothyroxine (SYNTHROID) tablet 100 mcg,  "100 mcg, Oral, AM ES, Fidencio Martínez M.D., 100 mcg at 18 0541  •  PARoxetine (PAXIL) tablet 20 mg, 20 mg, Oral, Once per day on Sun Wed, Fidencio Martínez M.D., 20 mg at 18 0842  •  senna-docusate (PERICOLACE or SENOKOT S) 8.6-50 MG per tablet 2 Tab, 2 Tab, Oral, BID **AND** polyethylene glycol/lytes (MIRALAX) PACKET 1 Packet, 1 Packet, Oral, QDAY PRN **AND** magnesium hydroxide (MILK OF MAGNESIA) suspension 30 mL, 30 mL, Oral, QDAY PRN **AND** bisacodyl (DULCOLAX) suppository 10 mg, 10 mg, Rectal, QDAY PRN, Fidencio Martínez M.D.  •  NS infusion, , Intravenous, Continuous, Fidencio Martínez M.D., Last Rate: 83 mL/hr at 18 0920  •  labetalol (NORMODYNE,TRANDATE) injection 10 mg, 10 mg, Intravenous, Q4HRS PRN, Fidencio Martínez M.D.  •  ondansetron (ZOFRAN) syringe/vial injection 4 mg, 4 mg, Intravenous, Q4HRS PRN, Fidencio Martínez M.D.  •  ondansetron (ZOFRAN ODT) dispertab 4 mg, 4 mg, Oral, Q4HRS PRN, Fidencio Martínez M.D.  •  zolpidem (AMBIEN) tablet 5 mg, 5 mg, Oral, HS PRN - MR X 1, Fidencio Martínez M.D.  •  fluconazole (DIFLUCAN) 100 mg in bottle/bag empty 50 mL IVPB, 100 mg, Intravenous, Q24HRS, Fidencio Martínez M.D., Stopped at 18 1322  •  benzocaine-menthol (CEPACOL) lozenge 1 Lozenge, 1 Lozenge, Mouth/Throat, Q2HRS PRN, Fidencio Martínez M.D., 1 Lozenge at 18  [unfilled]    Most Recent Vital Signs:  /78   Pulse 87   Temp 36.7 °C (98 °F)   Resp 18   Ht 1.778 m (5' 10\")   Wt 86.9 kg (191 lb 9.3 oz)   SpO2 99%   BMI 27.49 kg/m²   Temp  Av.5 °C (99.5 °F)  Min: 36.5 °C (97.7 °F)  Max: 39.5 °C (103.1 °F)    Physical Exam:  General: elderly, chronically ill-appearing, no acute distress  HEENT: sclera anicteric, PERRL, EOMI, MMM, no oral lesions, no maxillary sinus tenderness  Neck: supple, no lymphadenopathy  Chest: few rales, no r/r/w, normal work of breathing.  Cardiac: RRR, normal S1 S2, no m/r/g   Abdomen: + bowel sounds, soft, non-tender, non-distended, no " HSM  Extremities: WWP, no edema, 2+ pulses  Skin: no rashes or worrisome lesions  Neuro: Alert and oriented times 3, non-focal exam, speech fluent, moves all extremities    Pertinent Lab Results:  Recent Labs      02/24/18   0224  02/25/18   0436  02/26/18   0445   WBC  0.4*  0.2*  0.2*      Recent Labs      02/24/18   0224  02/25/18   0436  02/26/18   0445   HEMOGLOBIN  11.4*  11.4*  10.1*   HEMATOCRIT  33.2*  33.7*  28.9*   MCV  83.0  82.2  80.3*   MCH  28.5  27.8  28.1   PLATELETCT  70*  53*  44*         Recent Labs      02/24/18   0224  02/25/18   0805  02/26/18   0846   SODIUM  135  135  134*   POTASSIUM  3.6  3.3*  3.7   CHLORIDE  108  109  106   CO2  19*  20  23   CREATININE  1.74*  1.54*  1.49*        Recent Labs      02/23/18   1800  02/25/18   0805   ALBUMIN  3.6  2.7*        Pertinent Micro:  Results     Procedure Component Value Units Date/Time    URINE CULTURE(NEW) [[id number]] Collected:  02/23/18 2130    Order Status:  Completed Specimen:  Urine Updated:  02/26/18 0835     Significant Indicator NEG     Source UR     Site --     Urine Culture No growth at 48 hours.    Narrative:       Indication for culture:->Emergency Room Patient    C Diff by PCR rflx Toxin [[id number]] Collected:  02/25/18 1256    Order Status:  Completed Specimen:  Stool from Stool Updated:  02/25/18 1928     C Diff by PCR Negative     Comment: C. difficile NOT detected by PCR.  Treatment not indicated per guidelines.  Repeat testing not indicated within 7 days.          027-NAP1-BI Presumptive Negative     Comment: Presumptive 027/NAP1/BI target DNA sequences are NOT DETECTED.       Narrative:       ProtectiveTEIRAIDA ALMONTE  Does this patient have risk factors for C-diff?->Yes    Urinalysis [[id number]]     Order Status:  Canceled Specimen:  Urine     Culture Respiratory W/ GRM STN [[id number]]     Order Status:  No result Specimen:  Respirate from Sputum     BLOOD CULTURE [[id number]] Collected:  02/23/18 1800    Order  "Status:  Completed Specimen:  Blood from Peripheral Updated:  02/24/18 0734     Significant Indicator NEG     Source BLD     Site Peripheral     Blood Culture --     No Growth    Note: Blood cultures are incubated for 5 days and  are monitored continuously.Positive blood cultures  are called to the RN and reported as soon as  they are identified.    Blood culture testing and Gram stain, if indicated, are  performed at [facility], [address] [place] [address].  Positive blood cultures are  sent to [facility], [address] [place] [address], for organism identification and  susceptibility testing.      Narrative:       Per Hospital Policy: Only change Specimen Src: to \"Line\" if  specified by physician order.    BLOOD CULTURE [[id number]] Collected:  02/23/18 1800    Order Status:  Completed Specimen:  Blood from Peripheral Updated:  02/24/18 0734     Significant Indicator NEG     Source BLD     Site Peripheral     Blood Culture --     No Growth    Note: Blood cultures are incubated for 5 days and  are monitored continuously.Positive blood cultures  are called to the RN and reported as soon as  they are identified.    Blood culture testing and Gram stain, if indicated, are  performed at [facility], [address] [place] [address].  Positive blood cultures are  sent to [facility], [address] [place] [address], for organism identification and  susceptibility testing.      Narrative:       Per Hospital Policy: Only change Specimen Src: to \"Line\" if  specified by physician order.    Blood Culture [[id number]] Collected:  02/24/18 0000    Order Status:  Canceled Specimen:  Other from Peripheral     Blood Culture [[id number]] Collected:  02/24/18 0000    Order Status:  Canceled Specimen:  Other from Peripheral     INFLUENZA A/B BY PCR [[id number]] Collected:  02/23/18 1755    Order Status:  Completed Updated:  02/23/18 2204 "     Influenza virus A RNA Negative     Influenza virus B, PCR Negative    Narrative:       per Dr. Mello    URINALYSIS [[id number]]  (Abnormal) Collected:  02/23/18 2130    Order Status:  Completed Specimen:  Urine Updated:  02/23/18 2158     Color Yellow     Character Clear     Specific Gravity <=1.005     Ph 5.5     Glucose Negative mg/dL      Ketones Trace (A) mg/dL      Protein Negative mg/dL      Bilirubin Negative     Nitrite Negative     Leukocyte Esterase Negative     Occult Blood Trace (A)     Micro Urine Req Microscopic    Narrative:       Indication for culture:->Emergency Room Patient    CULTURE RESPIRATORY W/ GRM STN [[id number]]     Order Status:  Completed Specimen:  Respirate from Sputum     INFLUENZA RAPID [[id number]] Collected:  02/23/18 1755    Order Status:  Completed Specimen:  Respirate from Respiratory Updated:  02/23/18 1904     Significant Indicator NEG     Source RESP     Site RESPIRATORY     Rapid Influenza A-B --     Negative for Influenza A and Influenza B antigens.  Infection due to influenza A or B cannot be ruled out  since the antigen present in the specimen may be below the  detection limit of the test. Culture confirmation of  negative samples is recommended.      BLOOD CULTURE [[id number]] Collected:  02/23/18 0000    Order Status:  Canceled Specimen:  Other from Peripheral     BLOOD CULTURE [[id number]] Collected:  02/23/18 0000    Order Status:  Canceled Specimen:  Other from Peripheral     URINE CULTURE(NEW) [[id number]] Collected:  02/23/18 0000    Order Status:  Canceled Specimen:  Other from Urine, Clean Catch         Blood Culture   Date Value Ref Range Status   02/23/2018   Preliminary    No Growth    Note: Blood cultures are incubated for 5 days and  are monitored continuously.Positive blood cultures  are called to the RN and reported as soon as  they are identified.    Blood culture testing and Gram stain, if indicated, are  performed at [facility], [address] [place] [address].[place], [place].  Positive blood cultures are  sent to [facility], [address] [place] [address], for organism identification and  susceptibility testing.     02/23/2018   Preliminary    No Growth    Note: Blood cultures are incubated for 5 days and  are monitored continuously.Positive blood cultures  are called to the RN and reported as soon as  they are identified.    Blood culture testing and Gram stain, if indicated, are  performed at [facility], [address] [place] [address].  Positive blood cultures are  sent to [facility], [address] [place] [address], for organism identification and  susceptibility testing.          Studies:  Ct-abdomen-pelvis W/o    Result Date: 2/24/2018 2/24/2018 6:09 AM HISTORY/REASON FOR EXAM:  Fever. TECHNIQUE/EXAM DESCRIPTION: CT scan of the abdomen and pelvis without contrast. Noncontrast helical scanning was obtained from the diaphragmatic domes through the pubic symphysis. Low dose optimization technique was utilized for this CT exam including automated exposure control and adjustment of the mA and/or kV according to patient size. COMPARISON: 4/12/2017. FINDINGS: There are trace bilateral pleural effusions. There is a rounded opacity at the right lung base likely representing round atelectasis. There is trace pericardial fluid. No free air is seen in the abdomen or pelvis. Evaluation of parenchymal and vascular structures is limited by the lack of intravenous contrast. Liver is hypodense suggestive of hepatic steatosis. Subtle 1.1 cm hypodense lesion in the right lobe of the liver corresponds to the previously noted hemangioma. There are calcified granulomata within the liver. Gallbladder appears unremarkable. The spleen is not enlarged. No adrenal mass is identified on the left. There is a right adrenal nodule measuring 2 cm which is compatible with an adrenal adenoma. There  is renal cortical atrophy on the right. There are bilateral nonobstructing renal calculi. There are parapelvic cysts on the left. There is no evidence of hydronephrosis. Pancreas appears unremarkable.  Aorta is not aneurysmal. Atherosclerotic plaque is seen. Mildly enlarged dense right inguinal lymph node is again seen. There are dense retroperitoneal and right iliac lymph nodes. The dens retroperitoneal nodes measure up to 1.9 cm in short axis dimension, not significantly changed compared to prior. Haziness about the mesentery is not significantly changed compared to prior. Dense lymph node in the root of the mesentery measures 1.6 cm in short axis dimension, unchanged. There is no evidence of bowel obstruction. There is no evidence of acute appendicitis. Bladder is mildly distended. Prostate is enlarged. There is a fat-containing right inguinal hernia. Degenerative changes are seen in the spine. Wedge deformities of L1 and L2 appear unchanged.     Retroperitoneal, iliac and mesenteric lymphadenopathy is not significantly changed compared to prior. Haziness about the root of the mesentery is again noted. Nonobstructing bilateral renal calculi. Atrophic right kidney. Left parapelvic cysts. Enlarged prostate. Trace bilateral pleural effusions. Rounded opacity at the right lung base likely represents round atelectasis.    Ct-head W/o    Result Date: 2/24/2018 2/24/2018 6:09 AM HISTORY/REASON FOR EXAM:  Fever. TECHNIQUE/EXAM DESCRIPTION AND NUMBER OF VIEWS: CT of the head without contrast. Contiguous axial sections were obtained from the skull base through the vertex. Up to date radiation dose reduction adjustments have been utilized to meet ALARA standards for radiation dose reduction. COMPARISON:  None available FINDINGS: The is no evidence of intraparenchymal, intraventricular and extra-axial hemorrhage.  The ventricles and cortical sulci are prominent compatible with age related change.  There is no mass effect or  midline shift. There is minimal mucosal thickening within the maxillary sinuses. Visualized mastoid air cells are clear. The calvarium is intact. There is mild left frontal soft tissue swelling.     No acute intracranial abnormality is identified. Cortical atrophy. Mild left frontal soft tissue swelling.    Dx-chest-portable (1 View)    Result Date: 2018 6:01 PM HISTORY/REASON FOR EXAM:  Cough. TECHNIQUE/EXAM DESCRIPTION AND NUMBER OF VIEWS: Single portable view of the chest. COMPARISON: 3/22/2017 FINDINGS: Cardiomediastinal contour is within normal limits. Lungs show mild hypoinflation. No focal pulmonary consolidation. No pleural fluid collection or pneumothorax. No major bony abnormality is seen.     Hypoinflation without other evidence for acute cardiopulmonary disease.    Echocardiogram Comp W/o Cont    Result Date: 2018  Transthoracic Echo Report Echocardiography Laboratory CONCLUSIONS No prior study is available for comparison. Normal transthoracic echocardiogram. SHELLIE CASIANO Exam Date:         2018                    12:59 Exam Location:     Inpatient Priority:          Routine Ordering Physician:        RILEY JAQUEZ Referring Physician:       [id number] Zacarias Sonographer:               Ashish Zapata RDCS Age:    77     Gender:    M MRN:    [id number] :    1940 BSA:    2.05   Ht (in):    70     Wt (lb):    191 Exam Type:     Complete Indications:     Weakness ICD Codes:       R531 CPT Codes:       92556 BP:   117    /   66     HR:   99 Technical Quality:       Fair MEASUREMENTS  (Male / Female) Normal Values 2D ECHO LV Diastolic Diameter PLAX        4.4 cm                4.2 - 5.9 / 3.9 - 5.3 cm LV Systolic Diameter PLAX         2.7 cm                2.1 - 4.0 cm IVS Diastolic Thickness           1.2 cm                LVPW Diastolic Thickness          1.2 cm                LVOT Diameter                     2.1 cm                Estimated LV Ejection Fraction    65 %                   LV Ejection Fraction MOD BP       65.4 %                >= 55  % LV Ejection Fraction MOD 4C       69.4 %                LV Ejection Fraction MOD 2C       63.3 %                IVC Diameter                      1.4 cm                M-MODE Aortic Root Diameter MM           3.2 cm                DOPPLER AV Peak Velocity                  1.6 m/s               AV Peak Gradient                  9.7 mmHg              AV Mean Gradient                  5.6 mmHg              LVOT Peak Velocity                0.97 m/s              AV Area Cont Eq vti               2.2 cm²               Mitral E Point Velocity           1.1 m/s               Mitral E to A Ratio               1.1                   Mitral A Duration                 142 ms                MV Pressure Half Time             45.2 ms               MV Area PHT                       4.9 cm²               MV Deceleration Time              156 ms                Pulmonary Vein A Velocity         0.5 m/s               Pulmonary Vein A Duration         104 ms                Pulm Vein-MV A Duration           -38 ms                TV Peak E Velocity                0.59 m/s              TR Peak Velocity                  318 cm/s              PV Peak Velocity                  1.2 m/s               PV Peak Gradient                  6 mmHg                RVOT Peak Velocity                0.83 m/s              * Indicates values subject to auto-interpretation LV EF:  65    % FINDINGS Left Ventricle Normal left ventricular size, systolic function, and diastolic function. Mild concentric left ventricular hypertrophy. Normal regional wall motion. Left ventricular ejection fraction is visually estimated to be 65%. Right Ventricle The right ventricle was normal in size and function. Right Atrium The right atrium is normal in size.  Normal inferior vena cava size and inspiratory collapse. Left Atrium The left atrium is normal in size.  Left atrial volume index is 26 mL/sq m.  Mitral Valve Structurally normal mitral valve without significant stenosis. Trace mitral regurgitation. Aortic Valve Tricuspid aortic valve. Aortic sclerosis without stenosis. No aortic insufficiency. Tricuspid Valve Structurally normal tricuspid valve without significant stenosis. Mild tricuspid regurgitation. Right atrial pressure is estimated to be 3 mmHg. Estimated right ventricular systolic pressure is 40 mmHg. Pulmonic Valve Structurally normal pulmonic valve without significant stenosis or regurgitation. Pericardium Normal pericardium without effusion. Aorta The aortic root is normal. Jovany Heath (Electronically Signed) Final Date:     25 February 2018                 17:42      IMPRESSION:   1. Neutropenic fever    2. Sinusitis  3. Acute kidney injury  4. H/o lymphoma      PLAN:   Chang Unger is a 77 y.o. man with a history of lymphoma not on chemo and in remission and chronic sinusitis recently on multiple courses of oral abx admitted for fevers and increasing weakness and malaise found to be neutropenic. CT revealed findings c/w maxillary sinusitis. Suspect his fevers are related to his sinus infection as he has no evidence of pneumonia, GI infection or positive blood cultures. Recommend CT maxillofacial to rule out any abscess given persistent fevers. He has been started on granix to help his counts. He may require a bone marrow biopsy at some point. Will DC augmentin as he is currently on cefepime. Change to PO fluconazole as he is taking PO. Further recommendations per clinical course and imaging results.     Plan of care discussed with LOR Torres M.D.. Will continue to follow    Gricel Murguia M.D.

## 2018-02-26 NOTE — PROGRESS NOTES
[facility] Hospitalist Progress Note    Date of Service: 2018    Chief Complaint  77 y.o. male admitted 2018 with neutropenia , fever and weakness with hx of non hodgkins lymphoma    Interval Problem Update  : patient doing ok this morning, no change really. Having dry cough, otherwise no complaints  Placed on isolation, on [email or web address].[email or web address]  Blood, urine and sputum cultures pending.    : patient doing ok this morning, still having sinus pressure and coughing dry. Otherwise slight improvement from yesterday, still having diarrhea   white count slightly lower today 0.2  Lactic acid normalized  K low will replace  On abx    : patient doing better this morning, no sinus tenderness or HA anymore. + coughing, + diarrhea. Otherwise no complaints.    Consultants/Specialty  none    Disposition  none        Review of Systems   Constitutional: Negative for fever and malaise/fatigue.   HENT: Negative for hearing loss and tinnitus.    Eyes: Negative for blurred vision, double vision and photophobia.   Respiratory: Positive for cough. Negative for hemoptysis, sputum production and shortness of breath.    Cardiovascular: Negative for chest pain and palpitations.   Gastrointestinal: Negative for abdominal pain, nausea and vomiting.   Genitourinary: Negative for dysuria, frequency, hematuria and urgency.   Musculoskeletal: Negative for myalgias.   Neurological: Positive for weakness. Negative for dizziness and headaches.   Psychiatric/Behavioral: Negative for depression and suicidal ideas.      Physical Exam  Laboratory/Imaging   Hemodynamics  Temp (24hrs), Av.8 °C (100 °F), Min:36.7 °C (98.1 °F), Max:39.5 °C (103.1 °F)   Temperature: 36.9 °C (98.5 °F)  Pulse  Av.8  Min: 73  Max: 115    Blood Pressure : (!) 99/76      Respiratory      Respiration: 18, Pulse Oximetry: 94 %        RUL Breath Sounds: Clear, RML Breath Sounds: Clear;Diminished, RLL Breath Sounds: Clear;Diminished, GREGORIA Breath Sounds:  Clear, LLL Breath Sounds: Clear;Diminished    Fluids    Intake/Output Summary (Last 24 hours) at 02/26/18 0837  Last data filed at 02/26/18 0500   Gross per 24 hour   Intake              920 ml   Output                0 ml   Net              920 ml       Nutrition  Orders Placed This Encounter   Procedures   • DIET ORDER     Standing Status:   Standing     Number of Occurrences:   1     Order Specific Question:   Diet:     Answer:   2 Gram Sodium [7]     Physical Exam   Constitutional: He is oriented to person, place, and time. He appears well-developed and well-nourished.   HENT:   Head: Normocephalic and atraumatic.   Mouth/Throat: Oropharynx is clear and moist.   Eyes: EOM are normal. Pupils are equal, round, and reactive to light. No scleral icterus.   Neck: Normal range of motion. Neck supple. No JVD present.   Cardiovascular: Normal rate, regular rhythm and normal heart sounds.    Pulmonary/Chest: Effort normal and breath sounds normal. No respiratory distress. He has no wheezes.   Coarse breathing sounds   Abdominal: Soft. Bowel sounds are normal. He exhibits no distension. There is no tenderness. There is no rebound.   Musculoskeletal: Normal range of motion. He exhibits no edema or deformity.   Lymphadenopathy:     He has no cervical adenopathy.   Neurological: He is alert and oriented to person, place, and time.   Skin: Skin is warm. No erythema.   Psychiatric: He has a normal mood and affect.       Recent Labs      02/24/18   0224  02/25/18   0436  02/26/18   0445   WBC  0.4*  0.2*  0.2*   RBC  4.00*  4.10*  3.60*   HEMOGLOBIN  11.4*  11.4*  10.1*   HEMATOCRIT  33.2*  33.7*  28.9*   MCV  83.0  82.2  80.3*   MCH  28.5  27.8  28.1   MCHC  34.3  33.8  34.9   RDW  42.5  43.3  42.9   PLATELETCT  70*  53*  44*   MPV  11.8  11.1  12.1     Recent Labs      02/23/18   1800  02/24/18   0224  02/25/18   0805   SODIUM  127*  135  135   POTASSIUM  3.6  3.6  3.3*   CHLORIDE  97  108  109   CO2  19*  19*  20   GLUCOSE   114*  93  116*   BUN  24*  21  13   CREATININE  1.78*  1.74*  1.54*   CALCIUM  8.2*  7.5*  7.3*            Impression       Retroperitoneal, iliac and mesenteric lymphadenopathy is not significantly changed compared to prior.    Haziness about the root of the mesentery is again noted.    Nonobstructing bilateral renal calculi. Atrophic right kidney. Left parapelvic cysts.    Enlarged prostate.    Trace bilateral pleural effusions. Rounded opacity at the right lung base likely represents round atelectasis.                Assessment/Plan     * Neutropenic fever (CMS-[facility])- (present on admission)   Assessment & Plan    Patient since 2010 has been stable from his non-Hodgkin's lymphoma treatment. The patient has been having elevated white blood cell counts as of recent however his white blood cell count dropped. The patient then developed generalized fatigue and fevers that he was not even aware of.  Will start on cefepime for fevers, and add diflucan as well  CT abd/pelvis no change or new lymph nodes noted  Continue to monitor  Spoke to Dr. Martin yesterday oncology regarding patients condition and admission, he suggest Neupogen till ANC >1550 for 3 days  Consulted ID Dr. Murguia for abx evaluation, appreciate recs        Maxillary sinusitis- (present on admission)   Assessment & Plan    With his history of maxillary sinusitis this certainly could be the source for his infection.   Continue abx        Acute renal failure (ARF) (CMS-[facility])- (present on admission)   Assessment & Plan    Likely prerenal due to dehydration as patient has not been drinking well for the past few days.   Will treat with IVF , creatinine improving slowly        Hyponatremia- (present on admission)   Assessment & Plan    Hyponatremia on admission 127, now improving  monitor        Essential hypertension- (present on admission)   Assessment & Plan    Continue with blood pressure management optimization keep the systolic blood pressure under 140  diastolic of 90.  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 He just had an appointment with oncologist on mon prior to admission and has a PET scan ordered for next Tuesday. Dr. Ramirez  - CT head , abd and pelvis did not reveal any new changes in Lymph nodes         Thrombocytopenia (CMS-HCC)   Assessment & Plan    44 plt this morning, continue to monitor  Per patient he is scheduled for a bone marrow biospy on wed, will get in touch with Dr. Martin to see if he needs one while in hospital  No complaints of active bleeding  Continue to monitor        Hypokalemia   Assessment & Plan    Replace and monitor        Sepsis (CMS-HCC)   Assessment & Plan    This is severe sepsis with the following associated acute organ dysfunction(s): acute kidney failure.and neutropenia.  Patient with hx of nonhodgkins lymphoma in remission, now presenting with fevers, tachycardia, lactic acidosis, ROHITH  Source could be acute maxillary sinusitis vs PNA, pro calcitonin is elevated as well  On abx, blood and sputum cultures neg so far  Patient having diarrhea, will check C.Diff  IVF  Lactic acid improving  Monitor closely        Normochromic normocytic anemia- (present on admission)   Assessment & Plan    Monitor H&H    Patient was scheduled as an outpatient to get a bone marrow biopsy.        Mixed hyperlipidemia- mild no meds- (present on admission)   Assessment & Plan    Statin        Hypothyroidism due to acquired atrophy of thyroid- (present on admission)   Assessment & Plan    Recheck Tsh  Continue current home med dose, will not adjust in acute setting          Quality-Core Measures   Reviewed items::  Labs reviewed, Medications reviewed and Radiology images reviewed  Joshi catheter::  No  Joshi  Antibiotics:  Treating active infection/contamination beyond 24 hours perioperative coverage      Patient with hx of lymphoma now with neutropenic fever and continues to have decreased WBC count, he remains in guarded condition, Dr. Martin aware that patient in hospital started neupogen for ANC stimulation, Dr. Murguia consulted for ID input. Continue to monitor.   Continue abx  Ivf

## 2018-02-27 NOTE — PROGRESS NOTES
Report received from Day RN. Pt awake and alert. Pt just returned from CT. Pt states no needs at this time.

## 2018-02-27 NOTE — PROGRESS NOTES
Received Bedside report. Assumed care at 0715. This pt is AOx4, ambulatory w/ SBA, no N/V this morning, voiding, declines pain. Patient and RN discussed plan of care including neupogen, IV abx: questions answered. Labs noted, assessment complete, patient tolerating regular diet. Call light in place, fall precautions in place, patient educated on importance of calling for assistance. No additional needs at this time. VSS

## 2018-02-27 NOTE — PROGRESS NOTES
Tele Summary    Rhythm: Sinus rhythm, sinus tachycardia  Ectopy: Frequent PVCs  HR: 60s-100s  CA: 0.14  QRS: 0.08  QT: 0.36    All further monitoring will be done by pt's primary RN.

## 2018-02-27 NOTE — PROGRESS NOTES
Infectious Disease Progress Note    Author: Gricel Murguia M.D. Date & Time of service: 2018  9:29 AM    Chief Complaint:  FU neutropenic fever    Interval History:   Tmax 100.4, WBC 0.2, ANC 0.2, feels better today, had R groin pain overnight which spontaneously resolved  Labs Reviewed, Medications Reviewed, Radiology Reviewed and Wound Reviewed.    Review of Systems:  Review of Systems   Constitutional: Positive for fever.   HENT: Positive for congestion. Negative for nosebleeds.    Respiratory: Positive for cough. Negative for shortness of breath.    Gastrointestinal: Negative for abdominal pain, diarrhea, nausea and vomiting.   Genitourinary: Negative for dysuria and urgency.       Hemodynamics:  Temp (24hrs), Av.7 °C (99.8 °F), Min:36.7 °C (98 °F), Max:39.4 °C (102.9 °F)  Temperature: 37.4 °C (99.4 °F)  Pulse  Av.4  Min: 73  Max: 115   Blood Pressure : 141/78       Physical Exam:  Physical Exam   Constitutional: He is oriented to person, place, and time. He appears well-developed.   HENT:   Head: Normocephalic and atraumatic.   Eyes: EOM are normal. Pupils are equal, round, and reactive to light.   Neck: Neck supple.   Cardiovascular: Normal rate, regular rhythm and normal heart sounds.    Pulmonary/Chest: Effort normal. He has rales.   Abdominal: Soft. There is no tenderness.   Musculoskeletal: Normal range of motion. He exhibits no edema.   Neurological: He is alert and oriented to person, place, and time.       Meds:    Current Facility-Administered Medications:   •  fluconazole  •  cefepime (MAXIPIME) 2G IVPB in 100 mL  •  tbo-filgrastim  •  acetaminophen  •  benzonatate  •  NS  •  NS  •  levothyroxine  •  PARoxetine  •  senna-docusate **AND** polyethylene glycol/lytes **AND** magnesium hydroxide **AND** bisacodyl  •  NS  •  labetalol  •  ondansetron  •  ondansetron  •  zolpidem  •  benzocaine-menthol    Labs:  Recent Labs      18   0436  18   0445  18   0526   WBC   0.2*  0.2*  0.2*   RBC  4.10*  3.60*  3.41*   HEMOGLOBIN  11.4*  10.1*  9.6*   HEMATOCRIT  33.7*  28.9*  27.8*   MCV  82.2  80.3*  81.5   MCH  27.8  28.1  28.2   RDW  43.3  42.9  42.7   PLATELETCT  53*  44*  17*   MPV  11.1  12.1   --    NEUTSPOLYS   --   10.40*  11.10*   LYMPHOCYTES   --   63.20*  72.20*   MONOCYTES   --   21.10*  16.70*   EOSINOPHILS   --   0.00  0.00   BASOPHILS   --   0.00  0.00     Recent Labs      02/25/18   0805  02/26/18   0846   SODIUM  135  134*   POTASSIUM  3.3*  3.7   CHLORIDE  109  106   CO2  20  23   GLUCOSE  116*  129*   BUN  13  11     Recent Labs      02/25/18   0805  02/26/18   0846   ALBUMIN  2.7*   --    TBILIRUBIN  2.0*   --    ALKPHOSPHAT  36   --    TOTPROTEIN  4.1*   --    ALTSGPT  28   --    ASTSGOT  42   --    CREATININE  1.54*  1.49*       Imaging:  Ct-abdomen-pelvis W/o    Result Date: 2/24/2018 2/24/2018 6:09 AM HISTORY/REASON FOR EXAM:  Fever. TECHNIQUE/EXAM DESCRIPTION: CT scan of the abdomen and pelvis without contrast. Noncontrast helical scanning was obtained from the diaphragmatic domes through the pubic symphysis. Low dose optimization technique was utilized for this CT exam including automated exposure control and adjustment of the mA and/or kV according to patient size. COMPARISON: 4/12/2017. FINDINGS: There are trace bilateral pleural effusions. There is a rounded opacity at the right lung base likely representing round atelectasis. There is trace pericardial fluid. No free air is seen in the abdomen or pelvis. Evaluation of parenchymal and vascular structures is limited by the lack of intravenous contrast. Liver is hypodense suggestive of hepatic steatosis. Subtle 1.1 cm hypodense lesion in the right lobe of the liver corresponds to the previously noted hemangioma. There are calcified granulomata within the liver. Gallbladder appears unremarkable. The spleen is not enlarged. No adrenal mass is identified on the left. There is a right adrenal nodule measuring  2 cm which is compatible with an adrenal adenoma. There is renal cortical atrophy on the right. There are bilateral nonobstructing renal calculi. There are parapelvic cysts on the left. There is no evidence of hydronephrosis. Pancreas appears unremarkable.  Aorta is not aneurysmal. Atherosclerotic plaque is seen. Mildly enlarged dense right inguinal lymph node is again seen. There are dense retroperitoneal and right iliac lymph nodes. The dens retroperitoneal nodes measure up to 1.9 cm in short axis dimension, not significantly changed compared to prior. Haziness about the mesentery is not significantly changed compared to prior. Dense lymph node in the root of the mesentery measures 1.6 cm in short axis dimension, unchanged. There is no evidence of bowel obstruction. There is no evidence of acute appendicitis. Bladder is mildly distended. Prostate is enlarged. There is a fat-containing right inguinal hernia. Degenerative changes are seen in the spine. Wedge deformities of L1 and L2 appear unchanged.     Retroperitoneal, iliac and mesenteric lymphadenopathy is not significantly changed compared to prior. Haziness about the root of the mesentery is again noted. Nonobstructing bilateral renal calculi. Atrophic right kidney. Left parapelvic cysts. Enlarged prostate. Trace bilateral pleural effusions. Rounded opacity at the right lung base likely represents round atelectasis.    Ct-head W/o    Result Date: 2/24/2018 2/24/2018 6:09 AM HISTORY/REASON FOR EXAM:  Fever. TECHNIQUE/EXAM DESCRIPTION AND NUMBER OF VIEWS: CT of the head without contrast. Contiguous axial sections were obtained from the skull base through the vertex. Up to date radiation dose reduction adjustments have been utilized to meet ALARA standards for radiation dose reduction. COMPARISON:  None available FINDINGS: The is no evidence of intraparenchymal, intraventricular and extra-axial hemorrhage.  The ventricles and cortical sulci are prominent  compatible with age related change.  There is no mass effect or midline shift. There is minimal mucosal thickening within the maxillary sinuses. Visualized mastoid air cells are clear. The calvarium is intact. There is mild left frontal soft tissue swelling.     No acute intracranial abnormality is identified. Cortical atrophy. Mild left frontal soft tissue swelling.    Ct-maxillofacial W/o Plus Recons    Result Date: 2/26/2018 2/26/2018 7:24 PM HISTORY/REASON FOR EXAM:  Facial pain. Suspected sinusitis. TECHNIQUE/EXAM DESCRIPTION AND NUMBER OF VIEWS:  CT scan maxillofacial without contrast, with reconstructions. Thin-section helical imaging was obtained of the face from the orbital roofs through the mandible. Coronal multiplanar volume reformat images were generated from the axial data. Low dose optimization technique was utilized for this CT exam including automated exposure control and adjustment of the mA and/or kV according to patient size. FINDINGS: The frontal sinus is clear. There is minimal mucosal thickening in the ethmoid sinus bilaterally. There is mild bilateral mucosal thickening and/or polyp formation in the maxillary sinus. Both maxillary sinus infundibulum and ostia are patent. The sphenoid sinus is clear. No significant bony nasal septal deviation is present. No nasal turbinate edema is present.     1.  Mild chronic bilateral maxillary and ethmoid sinusitis. 2.  No evidence of acute sinusitis. 3.  Areas of rounded mucosal thickening or polyp formation in each maxillary sinus antrum. 4.  Otherwise clear paranasal sinuses.    Dx-chest-portable (1 View)    Result Date: 2/23/2018 2/23/2018 6:01 PM HISTORY/REASON FOR EXAM:  Cough. TECHNIQUE/EXAM DESCRIPTION AND NUMBER OF VIEWS: Single portable view of the chest. COMPARISON: 3/22/2017 FINDINGS: Cardiomediastinal contour is within normal limits. Lungs show mild hypoinflation. No focal pulmonary consolidation. No pleural fluid collection or pneumothorax.  No major bony abnormality is seen.     Hypoinflation without other evidence for acute cardiopulmonary disease.    Echocardiogram Comp W/o Cont    Result Date: 2018  Transthoracic Echo Report Echocardiography Laboratory CONCLUSIONS No prior study is available for comparison. Normal transthoracic echocardiogram. SHELLIE CASIANO Exam Date:         2018                    12:59 Exam Location:     Inpatient Priority:          Routine Ordering Physician:        RILEY JAQUEZ Referring Physician:       [id number] Zacarias Sonographer:               Ashish Zapata RDCS Age:    77     Gender:    M MRN:    [id number] :    1940 BSA:    2.05   Ht (in):    70     Wt (lb):    191 Exam Type:     Complete Indications:     Weakness ICD Codes:       R531 CPT Codes:       95073 BP:   117    /   66     HR:   99 Technical Quality:       Fair MEASUREMENTS  (Male / Female) Normal Values 2D ECHO LV Diastolic Diameter PLAX        4.4 cm                4.2 - 5.9 / 3.9 - 5.3 cm LV Systolic Diameter PLAX         2.7 cm                2.1 - 4.0 cm IVS Diastolic Thickness           1.2 cm                LVPW Diastolic Thickness          1.2 cm                LVOT Diameter                     2.1 cm                Estimated LV Ejection Fraction    65 %                  LV Ejection Fraction MOD BP       65.4 %                >= 55  % LV Ejection Fraction MOD 4C       69.4 %                LV Ejection Fraction MOD 2C       63.3 %                IVC Diameter                      1.4 cm                M-MODE Aortic Root Diameter MM           3.2 cm                DOPPLER AV Peak Velocity                  1.6 m/s               AV Peak Gradient                  9.7 mmHg              AV Mean Gradient                  5.6 mmHg              LVOT Peak Velocity                0.97 m/s              AV Area Cont Eq vti               2.2 cm²               Mitral E Point Velocity           1.1 m/s               Mitral E to A Ratio               1.1                    Mitral A Duration                 142 ms                MV Pressure Half Time             45.2 ms               MV Area PHT                       4.9 cm²               MV Deceleration Time              156 ms                Pulmonary Vein A Velocity         0.5 m/s               Pulmonary Vein A Duration         104 ms                Pulm Vein-MV A Duration           -38 ms                TV Peak E Velocity                0.59 m/s              TR Peak Velocity                  318 cm/s              PV Peak Velocity                  1.2 m/s               PV Peak Gradient                  6 mmHg                RVOT Peak Velocity                0.83 m/s              * Indicates values subject to auto-interpretation LV EF:  65    % FINDINGS Left Ventricle Normal left ventricular size, systolic function, and diastolic function. Mild concentric left ventricular hypertrophy. Normal regional wall motion. Left ventricular ejection fraction is visually estimated to be 65%. Right Ventricle The right ventricle was normal in size and function. Right Atrium The right atrium is normal in size.  Normal inferior vena cava size and inspiratory collapse. Left Atrium The left atrium is normal in size.  Left atrial volume index is 26 mL/sq m. Mitral Valve Structurally normal mitral valve without significant stenosis. Trace mitral regurgitation. Aortic Valve Tricuspid aortic valve. Aortic sclerosis without stenosis. No aortic insufficiency. Tricuspid Valve Structurally normal tricuspid valve without significant stenosis. Mild tricuspid regurgitation. Right atrial pressure is estimated to be 3 mmHg. Estimated right ventricular systolic pressure is 40 mmHg. Pulmonic Valve Structurally normal pulmonic valve without significant stenosis or regurgitation. Pericardium Normal pericardium without effusion. Aorta The aortic root is normal. Jovany Heath (Electronically Signed) Final Date:     25 February 2018                  17:42      Micro:  Results     Procedure Component Value Units Date/Time    Culture Respiratory W/ GRM STN [[id number]] Collected:  02/26/18 2004    Order Status:  Completed Specimen:  Respirate from Sputum Updated:  02/26/18 2016    Narrative:       [facility]ctive[facility] LESLEY MEJIAS  Sputum cultures, induced if needed. If not done within the  last 24 hours    URINE CULTURE(NEW) [[id number]] Collected:  02/23/18 2130    Order Status:  Completed Specimen:  Urine Updated:  02/26/18 0835     Significant Indicator NEG     Source UR     Site --     Urine Culture No growth at 48 hours.    Narrative:       Indication for culture:->Emergency Room Patient    C Diff by PCR rflx Toxin [[id number]] Collected:  02/25/18 1256    Order Status:  Completed Specimen:  Stool from Stool Updated:  02/25/18 1928     C Diff by PCR Negative     Comment: C. difficile NOT detected by PCR.  Treatment not indicated per guidelines.  Repeat testing not indicated within 7 days.          027-NAP1-BI Presumptive Negative     Comment: Presumptive 027/NAP1/BI target DNA sequences are NOT DETECTED.       Narrative:       Julio ALMONTE  Does this patient have risk factors for C-diff?->Yes    Urinalysis [[id number]]     Order Status:  Canceled Specimen:  Urine     BLOOD CULTURE [[id number]] Collected:  02/23/18 1800    Order Status:  Completed Specimen:  Blood from Peripheral Updated:  02/24/18 0734     Significant Indicator NEG     Source BLD     Site Peripheral     Blood Culture --     No Growth    Note: Blood cultures are incubated for 5 days and  are monitored continuously.Positive blood cultures  are called to the RN and reported as soon as  they are identified.    Blood culture testing and Gram stain, if indicated, are  performed at [facility], [address] [place] [address].  Positive blood cultures are  sent to [facility], [address] [place] [address], for organism  "identification and  susceptibility testing.      Narrative:       Per Hospital Policy: Only change Specimen Src: to \"Line\" if  specified by physician order.    BLOOD CULTURE [[id number]] Collected:  02/23/18 1800    Order Status:  Completed Specimen:  Blood from Peripheral Updated:  02/24/18 0734     Significant Indicator NEG     Source BLD     Site Peripheral     Blood Culture --     No Growth    Note: Blood cultures are incubated for 5 days and  are monitored continuously.Positive blood cultures  are called to the RN and reported as soon as  they are identified.    Blood culture testing and Gram stain, if indicated, are  performed at [address].  Positive blood cultures are  sent to [facility], [address] [place] [address], for organism identification and  susceptibility testing.      Narrative:       Per Hospital Policy: Only change Specimen Src: to \"Line\" if  specified by physician order.    Blood Culture [[id number]] Collected:  02/24/18 0000    Order Status:  Canceled Specimen:  Other from Peripheral     Blood Culture [[id number]] Collected:  02/24/18 0000    Order Status:  Canceled Specimen:  Other from Peripheral     INFLUENZA A/B BY PCR [[id number]] Collected:  02/23/18 1755    Order Status:  Completed Updated:  02/23/18 2204     Influenza virus A RNA Negative     Influenza virus B, PCR Negative    Narrative:       per Dr. Mello    URINALYSIS [[id number]]  (Abnormal) Collected:  02/23/18 2130    Order Status:  Completed Specimen:  Urine Updated:  02/23/18 2158     Color Yellow     Character Clear     Specific Gravity <=1.005     Ph 5.5     Glucose Negative mg/dL      Ketones Trace (A) mg/dL      Protein Negative mg/dL      Bilirubin Negative     Nitrite Negative     Leukocyte Esterase Negative     Occult Blood Trace (A)     Micro Urine Req Microscopic    Narrative:       Indication for culture:->Emergency Room Patient    " INFLUENZA RAPID [[id number]] Collected:  02/23/18 1755    Order Status:  Completed Specimen:  Respirate from Respiratory Updated:  02/23/18 1904     Significant Indicator NEG     Source RESP     Site RESPIRATORY     Rapid Influenza A-B --     Negative for Influenza A and Influenza B antigens.  Infection due to influenza A or B cannot be ruled out  since the antigen present in the specimen may be below the  detection limit of the test. Culture confirmation of  negative samples is recommended.      BLOOD CULTURE [[id number]] Collected:  02/23/18 0000    Order Status:  Canceled Specimen:  Other from Peripheral     BLOOD CULTURE [[id number]] Collected:  02/23/18 0000    Order Status:  Canceled Specimen:  Other from Peripheral     URINE CULTURE(NEW) [[id number]] Collected:  02/23/18 0000    Order Status:  Canceled Specimen:  Other from Urine, Clean Catch     CULTURE RESPIRATORY W/ GRM STN [[id number]] Collected:  02/23/18 0000    Order Status:  Canceled Specimen:  Sputum from Sputum           Assessment:  Active Hospital Problems    Diagnosis   • *Neutropenic fever (CMS-HCC) [D70.9, R50.81]   • Maxillary sinusitis [J32.0]   • Hyponatremia [E87.1]   • Acute renal failure (ARF) (CMS-HCC) [N17.9]   • 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 Sinusitis as cause   CT maxillary - chronic sinusitis but no acute  Bcx 2/23 - NGTD  Ucx - neg  C diff negative  Continue cefepime and PO fluc  Recommend bone marrow biopsy given persistent fevers - if cannot be performed at , recommend transfer to  Regional    Lymphoma  Completed chemo in 2010  Now neutropenic with fevers  Recommend BM biopsy as above    ROHITH  Renally dose abx as needed  Avoid nephrotoxins  Monitor    Discussed with internal medicine RN.

## 2018-02-27 NOTE — PROGRESS NOTES
Called Histology to schedule bone marrow biopsy. Scheduled for 0900 tomorrow 2/28/18. Pt to be NPO @ [facility].

## 2018-02-27 NOTE — PROGRESS NOTES
No change from morning assessment except remains weak with a cough.  Denies pain. Temperature up this afternoon, declined Tylenol.

## 2018-02-27 NOTE — PROGRESS NOTES
Gale from Lab called with critical result of Platelets of 17 and WBC of 0.2 at 0620. Critical lab result read back to Gale.   Dr. Gotti notified of critical lab result at 0625 .  Critical lab result read back by Dr. Gotti.

## 2018-02-27 NOTE — CARE PLAN
Problem: Discharge Barriers/Planning  Goal: Patient's continuum of care needs will be met  Outcome: PROGRESSING AS EXPECTED  Patient prefers to have bone marrow biopsy after his current problem improves.      Problem: Pain Management  Goal: Pain level will decrease to patient's comfort goal  Outcome: PROGRESSING AS EXPECTED  Denies pain.

## 2018-02-27 NOTE — CARE PLAN
Problem: Safety  Goal: Will remain free from injury  Outcome: PROGRESSING AS EXPECTED    Intervention: Provide assistance with mobility   02/27/18 0129   OTHER   Assistance / Tolerance No assistance required;Tolerates Well         Problem: Venous Thromboembolism (VTW)/Deep Vein Thrombosis (DVT) Prevention:  Goal: Patient will participate in Venous Thrombosis (VTE)/Deep Vein Thrombosis (DVT)Prevention Measures  Outcome: PROGRESSING AS EXPECTED      Problem: Pain Management  Goal: Pain level will decrease to patient's comfort goal  Outcome: PROGRESSING AS EXPECTED   02/26/18 0800 02/27/18 0129   OTHER   Nurse Pain Scale 0 - 10  0 --    Non Verbal Scale  --  Calm;Sleeping;Unlabored Breathing

## 2018-02-27 NOTE — PROGRESS NOTES
[facility] Hospitalist Progress Note    Date of Service: 2018    Chief Complaint  77 y.o. male admitted 2018 with neutropenia , fever and weakness with hx of non hodgkins lymphoma    Interval Problem Update    Remains neutropenic.  Cxs negative .  Afebrile on Iv atbs for sinusitis . Thrombocytopenia - worsened- no symptoms of active bleeding.     Consultants/Specialty  none    Disposition  TBD- anticipate dc home when stable .        Review of Systems   Constitutional: Negative for chills, fever and malaise/fatigue.   HENT: Positive for congestion (w mild sinus congestion ). Negative for hearing loss and tinnitus.    Eyes: Negative for discharge and redness.   Respiratory: Positive for cough. Negative for hemoptysis, sputum production and shortness of breath.    Cardiovascular: Negative for chest pain, palpitations and leg swelling.   Gastrointestinal: Negative for abdominal pain, blood in stool, nausea and vomiting.   Genitourinary: Negative for flank pain and hematuria.   Musculoskeletal: Negative for back pain, myalgias and neck pain.   Neurological: Positive for weakness. Negative for dizziness, speech change, focal weakness and headaches.   Psychiatric/Behavioral: Negative for depression, substance abuse and suicidal ideas.      Physical Exam  Laboratory/Imaging   Hemodynamics  Temp (24hrs), Av.7 °C (99.9 °F), Min:36.9 °C (98.4 °F), Max:39.4 °C (102.9 °F)   Temperature: 36.9 °C (98.4 °F)  Pulse  Av.1  Min: 73  Max: 115    Blood Pressure : 141/78      Respiratory      Respiration: 18, Pulse Oximetry: 96 %        RUL Breath Sounds: Clear, RML Breath Sounds: Diminished, RLL Breath Sounds: Diminished, GREGORIA Breath Sounds: Clear, LLL Breath Sounds: Diminished    Fluids    Intake/Output Summary (Last 24 hours) at 18 1243  Last data filed at 18 1100   Gross per 24 hour   Intake             2696 ml   Output              850 ml   Net             1846 ml       Nutrition  Orders Placed This  Encounter   Procedures   • DIET ORDER     Standing Status:   Standing     Number of Occurrences:   1     Order Specific Question:   Diet:     Answer:   Regular [1]     Order Specific Question:   Consistency/Fluid modifications:     Answer:   Thin Liquids [3]     Physical Exam   Constitutional: He is oriented to person, place, and time. He appears well-developed and well-nourished. No distress.   HENT:   Head: Normocephalic and atraumatic.   Right Ear: External ear normal.   Left Ear: External ear normal.   Nose: Nose normal.   Eyes: Conjunctivae and EOM are normal. No scleral icterus.   Neck: Neck supple. No JVD present.   Cardiovascular: Normal rate, regular rhythm, normal heart sounds and intact distal pulses.    No murmur heard.  Pulmonary/Chest: Effort normal and breath sounds normal. No stridor. No respiratory distress. He has no wheezes. He has no rales.   Abdominal: Soft. Bowel sounds are normal. He exhibits no distension. There is no tenderness. There is no rebound.   Musculoskeletal: Normal range of motion. He exhibits no edema or deformity.   Lymphadenopathy:     He has no cervical adenopathy.   Neurological: He is alert and oriented to person, place, and time. No cranial nerve deficit.   No gross focal weakness   Skin: Skin is warm. He is not diaphoretic. No erythema.   Sporadic upper ext old appearing bruises.    Psychiatric: He has a normal mood and affect. His behavior is normal.       Recent Labs      02/25/18   0436  02/26/18   0445  02/27/18   0526   WBC  0.2*  0.2*  0.2*   RBC  4.10*  3.60*  3.41*   HEMOGLOBIN  11.4*  10.1*  9.6*   HEMATOCRIT  33.7*  28.9*  27.8*   MCV  82.2  80.3*  81.5   MCH  27.8  28.1  28.2   MCHC  33.8  34.9  34.5   RDW  43.3  42.9  42.7   PLATELETCT  53*  44*  17*   MPV  11.1  12.1   --      Recent Labs      02/25/18   0805  02/26/18   0846   SODIUM  135  134*   POTASSIUM  3.3*  3.7   CHLORIDE  109  106   CO2  20  23   GLUCOSE  116*  129*   BUN  13  11   CREATININE  1.54*   1.49*   CALCIUM  7.3*  7.5*            Impression       Retroperitoneal, iliac and mesenteric lymphadenopathy is not significantly changed compared to prior.    Haziness about the root of the mesentery is again noted.    Nonobstructing bilateral renal calculi. Atrophic right kidney. Left parapelvic cysts.    Enlarged prostate.    Trace bilateral pleural effusions. Rounded opacity at the right lung base likely represents round atelectasis.                Assessment/Plan     * Neutropenic fever (CMS-HCC)- (present on admission)   Assessment & Plan    Patient competed  non-Hodgkin's lymphoma treatment. The patient has been having elevated white blood cell counts as of recent however his white blood cell count dropped w findings of pancytopenia .  IV cefepime for fevers - sinusitis coverage   cw prophl diflucan   Case was discussed with  Dr. [facility] - was recommended  Neupogen till ANC >1550 for 3 days  Fu Cbc w diff.   Id input- case discussed with Dr. Blade FISHER        Thrombocytopenia (CMS-HCC)   Assessment & Plan    Platelets declined  Transfuse platelets w planned BMB  Fu CBC/ plts  Dicussed with Heme/onc .         Sepsis (CMS-HCC)   Assessment & Plan    This is severe sepsis with the following associated acute organ dysfunction(s): acute kidney failure.and neutropenia.  Patient with hx of nonhodgkins lymphoma in remission, now presenting with fevers, tachycardia, lactic acidosis, ROHITH  Source could be acute maxillary sinusitis .  Cdiff and cultures negative.  Fevers improved cw IV atbs  IVF  Lactic acid improving  Monitor closely        Maxillary sinusitis- (present on admission)   Assessment & Plan    With his history of maxillary sinusitis- Suspect source for infxn  Continue abx- cefepime.         Acute renal failure (ARF) (CMS-HCC)- (present on admission)   Assessment & Plan    Likely due to dehydration- Cr improving .  IVF- monitor renal fxn, lytes, uop        Hyponatremia- (present on  admission)   Assessment & Plan    Correcting.   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 He just had an appointment with oncologist on mon prior to admission and has a PET scan ordered for next Tuesday. CT head , abd and pelvis did not reveal any new changes in Lymph nodes .    Pancytopenia worsening - concern for transformation to other leukemia or myelodysplasia- Plan BMB - discussed with Dr. Rae to perform.         Hypokalemia   Assessment & Plan    Replace and monitor        Normochromic normocytic anemia- (present on admission)   Assessment & Plan    w pancytopenia  - worsening thrombocytopenia   Discussed with hematology - Dr. Smith -will  tx irradiated platelets  Continue neupogen  Obtain BMB.        Mixed hyperlipidemia- mild no meds- (present on admission)   Assessment & Plan    Statin        Hypothyroidism due to acquired atrophy of thyroid- (present on admission)   Assessment & Plan    Tsh depressed   Synthroid Decreased.  Fu TSH in 6-8 weeks            Quality-Core Measures   Reviewed items::  Labs reviewed, Medications reviewed and Radiology images reviewed  Joshi catheter::  No Joshi  Antibiotics:  Treating active infection/contamination beyond 24 hours perioperative coverage      Discussed with RN, multi disciplinary team plan of care .

## 2018-02-28 NOTE — PROGRESS NOTES
Dana from Lab called with critical result of WBC 0.2 and Platelets of 44 at 0640. Critical lab result read back to Dana.   Dr. Orta notified of critical lab result at 0622.  Critical lab result read back by Dr. Orta.

## 2018-02-28 NOTE — PROGRESS NOTES
Tele Summary:    Rhythm: SR/ST  Rate: 60s-100s  AZ: 0.16  QRS: 0.08  QT: 0.36    Ectopy: freq PVC, couplets, trigem    Tele strip placed in pt chart.

## 2018-02-28 NOTE — PROGRESS NOTES
Transferred to [facility] for bone marrow biopsy by wheelchair. Report given to MELINA Guardado.

## 2018-02-28 NOTE — PROGRESS NOTES
Report received from Yaz Pt transferred to ICU room 322, Pt alert and awake, understands procedure, Dr Rae at bedside, VSS, Pt placed on 02 at 15 L oxy mask, medications administered per MD orders, see MAR. Pt tolerated procedure well, resting calmly, denies pain, titrating 02 down, closely monitoring.

## 2018-02-28 NOTE — PROGRESS NOTES
"Pt complaining of \"not being able to breath as well as I did when I came in\". IS given and encouraged. Lung sounds clear. RT notified.  "

## 2018-02-28 NOTE — PROGRESS NOTES
Infectious Disease Progress Note    Author: Gricel Murguia M.D. Date & Time of service: 2018  8:33 AM    Chief Complaint:  FU neutropenic fever    Interval History:   Tmax 100.4, WBC 0.2, ANC 0.2, feels better today, had R groin pain overnight which spontaneously resolved   Tmax 100.5, having coughing fit and SOB, plan for BM biopsy  Labs Reviewed, Medications Reviewed, Radiology Reviewed and Wound Reviewed.    Review of Systems:  Review of Systems   Constitutional: Positive for fever.   HENT: Positive for congestion. Negative for nosebleeds.    Respiratory: Positive for cough and shortness of breath.    Gastrointestinal: Negative for abdominal pain, diarrhea, nausea and vomiting.   Genitourinary: Negative for dysuria and urgency.       Hemodynamics:  Temp (24hrs), Av.2 °C (99 °F), Min:36.5 °C (97.7 °F), Max:38.1 °C (100.5 °F)  Temperature: 37.2 °C (99 °F)  Pulse  Av.6  Min: 70  Max: 115   Blood Pressure : 132/71       Physical Exam:  Physical Exam   Constitutional: He is oriented to person, place, and time. He appears well-developed.   HENT:   Head: Normocephalic and atraumatic.   Eyes: EOM are normal. Pupils are equal, round, and reactive to light.   Neck: Neck supple.   Cardiovascular: Normal rate, regular rhythm and normal heart sounds.    Pulmonary/Chest: Effort normal. He has rales.   Abdominal: Soft. There is no tenderness.   Musculoskeletal: Normal range of motion. He exhibits no edema.   Neurological: He is alert and oriented to person, place, and time.       Meds:    Current Facility-Administered Medications:   •  fluconazole  •  cefepime (MAXIPIME) 2G IVPB in 100 mL  •  tbo-filgrastim  •  acetaminophen  •  benzonatate  •  NS  •  NS  •  levothyroxine  •  PARoxetine  •  senna-docusate **AND** polyethylene glycol/lytes **AND** magnesium hydroxide **AND** bisacodyl  •  NS  •  labetalol  •  ondansetron  •  ondansetron  •  zolpidem  •  benzocaine-menthol    Labs:  Recent Labs       02/26/18   0445  02/27/18   0526  02/28/18   0529   WBC  0.2*  0.2*  0.2*   RBC  3.60*  3.41*  3.34*   HEMOGLOBIN  10.1*  9.6*  9.4*   HEMATOCRIT  28.9*  27.8*  26.6*   MCV  80.3*  81.5  79.6*   MCH  28.1  28.2  28.1   RDW  42.9  42.7  42.1   PLATELETCT  44*  17*  40*   MPV  12.1   --   12.1   NEUTSPOLYS  10.40*  11.10*  CANCEL   LYMPHOCYTES  63.20*  72.20*  CANCEL   MONOCYTES  21.10*  16.70*  CANCEL   EOSINOPHILS  0.00  0.00  CANCEL   BASOPHILS  0.00  0.00  CANCEL     Recent Labs      02/26/18   0846  02/28/18   0529   SODIUM  134*  135   POTASSIUM  3.7  3.2*   CHLORIDE  106  105   CO2  23  21   GLUCOSE  129*  108*   BUN  11  13     Recent Labs      02/26/18   0846  02/28/18   0529   CREATININE  1.49*  1.43*       Imaging:  Ct-abdomen-pelvis W/o    Result Date: 2/24/2018 2/24/2018 6:09 AM HISTORY/REASON FOR EXAM:  Fever. TECHNIQUE/EXAM DESCRIPTION: CT scan of the abdomen and pelvis without contrast. Noncontrast helical scanning was obtained from the diaphragmatic domes through the pubic symphysis. Low dose optimization technique was utilized for this CT exam including automated exposure control and adjustment of the mA and/or kV according to patient size. COMPARISON: 4/12/2017. FINDINGS: There are trace bilateral pleural effusions. There is a rounded opacity at the right lung base likely representing round atelectasis. There is trace pericardial fluid. No free air is seen in the abdomen or pelvis. Evaluation of parenchymal and vascular structures is limited by the lack of intravenous contrast. Liver is hypodense suggestive of hepatic steatosis. Subtle 1.1 cm hypodense lesion in the right lobe of the liver corresponds to the previously noted hemangioma. There are calcified granulomata within the liver. Gallbladder appears unremarkable. The spleen is not enlarged. No adrenal mass is identified on the left. There is a right adrenal nodule measuring 2 cm which is compatible with an adrenal adenoma. There is renal  cortical atrophy on the right. There are bilateral nonobstructing renal calculi. There are parapelvic cysts on the left. There is no evidence of hydronephrosis. Pancreas appears unremarkable.  Aorta is not aneurysmal. Atherosclerotic plaque is seen. Mildly enlarged dense right inguinal lymph node is again seen. There are dense retroperitoneal and right iliac lymph nodes. The dens retroperitoneal nodes measure up to 1.9 cm in short axis dimension, not significantly changed compared to prior. Haziness about the mesentery is not significantly changed compared to prior. Dense lymph node in the root of the mesentery measures 1.6 cm in short axis dimension, unchanged. There is no evidence of bowel obstruction. There is no evidence of acute appendicitis. Bladder is mildly distended. Prostate is enlarged. There is a fat-containing right inguinal hernia. Degenerative changes are seen in the spine. Wedge deformities of L1 and L2 appear unchanged.     Retroperitoneal, iliac and mesenteric lymphadenopathy is not significantly changed compared to prior. Haziness about the root of the mesentery is again noted. Nonobstructing bilateral renal calculi. Atrophic right kidney. Left parapelvic cysts. Enlarged prostate. Trace bilateral pleural effusions. Rounded opacity at the right lung base likely represents round atelectasis.    Ct-head W/o    Result Date: 2/24/2018 2/24/2018 6:09 AM HISTORY/REASON FOR EXAM:  Fever. TECHNIQUE/EXAM DESCRIPTION AND NUMBER OF VIEWS: CT of the head without contrast. Contiguous axial sections were obtained from the skull base through the vertex. Up to date radiation dose reduction adjustments have been utilized to meet ALARA standards for radiation dose reduction. COMPARISON:  None available FINDINGS: The is no evidence of intraparenchymal, intraventricular and extra-axial hemorrhage.  The ventricles and cortical sulci are prominent compatible with age related change.  There is no mass effect or midline  shift. There is minimal mucosal thickening within the maxillary sinuses. Visualized mastoid air cells are clear. The calvarium is intact. There is mild left frontal soft tissue swelling.     No acute intracranial abnormality is identified. Cortical atrophy. Mild left frontal soft tissue swelling.    Ct-maxillofacial W/o Plus Recons    Result Date: 2/26/2018 2/26/2018 7:24 PM HISTORY/REASON FOR EXAM:  Facial pain. Suspected sinusitis. TECHNIQUE/EXAM DESCRIPTION AND NUMBER OF VIEWS:  CT scan maxillofacial without contrast, with reconstructions. Thin-section helical imaging was obtained of the face from the orbital roofs through the mandible. Coronal multiplanar volume reformat images were generated from the axial data. Low dose optimization technique was utilized for this CT exam including automated exposure control and adjustment of the mA and/or kV according to patient size. FINDINGS: The frontal sinus is clear. There is minimal mucosal thickening in the ethmoid sinus bilaterally. There is mild bilateral mucosal thickening and/or polyp formation in the maxillary sinus. Both maxillary sinus infundibulum and ostia are patent. The sphenoid sinus is clear. No significant bony nasal septal deviation is present. No nasal turbinate edema is present.     1.  Mild chronic bilateral maxillary and ethmoid sinusitis. 2.  No evidence of acute sinusitis. 3.  Areas of rounded mucosal thickening or polyp formation in each maxillary sinus antrum. 4.  Otherwise clear paranasal sinuses.    Dx-chest-portable (1 View)    Result Date: 2/23/2018 2/23/2018 6:01 PM HISTORY/REASON FOR EXAM:  Cough. TECHNIQUE/EXAM DESCRIPTION AND NUMBER OF VIEWS: Single portable view of the chest. COMPARISON: 3/22/2017 FINDINGS: Cardiomediastinal contour is within normal limits. Lungs show mild hypoinflation. No focal pulmonary consolidation. No pleural fluid collection or pneumothorax. No major bony abnormality is seen.     Hypoinflation without other  evidence for acute cardiopulmonary disease.    Echocardiogram Comp W/o Cont    Result Date: 2018  Transthoracic Echo Report Echocardiography Laboratory CONCLUSIONS No prior study is available for comparison. Normal transthoracic echocardiogram. SHELLIE CASIANO Exam Date:         2018                    12:59 Exam Location:     Inpatient Priority:          Routine Ordering Physician:        RILEY JAQUEZ Referring Physician:       [id number] Sonographer:               Ashish Zapata RDCS Age:    77     Gender:    M MRN:    [id number] :    1940 BSA:    2.05   Ht (in):    70     Wt (lb):    191 Exam Type:     Complete Indications:     Weakness ICD Codes:       R531 CPT Codes:       37991 BP:   117    /   66     HR:   99 Technical Quality:       Fair MEASUREMENTS  (Male / Female) Normal Values 2D ECHO LV Diastolic Diameter PLAX        4.4 cm                4.2 - 5.9 / 3.9 - 5.3 cm LV Systolic Diameter PLAX         2.7 cm                2.1 - 4.0 cm IVS Diastolic Thickness           1.2 cm                LVPW Diastolic Thickness          1.2 cm                LVOT Diameter                     2.1 cm                Estimated LV Ejection Fraction    65 %                  LV Ejection Fraction MOD BP       65.4 %                >= 55  % LV Ejection Fraction MOD 4C       69.4 %                LV Ejection Fraction MOD 2C       63.3 %                IVC Diameter                      1.4 cm                M-MODE Aortic Root Diameter MM           3.2 cm                DOPPLER AV Peak Velocity                  1.6 m/s               AV Peak Gradient                  9.7 mmHg              AV Mean Gradient                  5.6 mmHg              LVOT Peak Velocity                0.97 m/s              AV Area Cont Eq vti               2.2 cm²               Mitral E Point Velocity           1.1 m/s               Mitral E to A Ratio               1.1                   Mitral A Duration                 142 ms                 MV Pressure Half Time             45.2 ms               MV Area PHT                       4.9 cm²               MV Deceleration Time              156 ms                Pulmonary Vein A Velocity         0.5 m/s               Pulmonary Vein A Duration         104 ms                Pulm Vein-MV A Duration           -38 ms                TV Peak E Velocity                0.59 m/s              TR Peak Velocity                  318 cm/s              PV Peak Velocity                  1.2 m/s               PV Peak Gradient                  6 mmHg                RVOT Peak Velocity                0.83 m/s              * Indicates values subject to auto-interpretation LV EF:  65    % FINDINGS Left Ventricle Normal left ventricular size, systolic function, and diastolic function. Mild concentric left ventricular hypertrophy. Normal regional wall motion. Left ventricular ejection fraction is visually estimated to be 65%. Right Ventricle The right ventricle was normal in size and function. Right Atrium The right atrium is normal in size.  Normal inferior vena cava size and inspiratory collapse. Left Atrium The left atrium is normal in size.  Left atrial volume index is 26 mL/sq m. Mitral Valve Structurally normal mitral valve without significant stenosis. Trace mitral regurgitation. Aortic Valve Tricuspid aortic valve. Aortic sclerosis without stenosis. No aortic insufficiency. Tricuspid Valve Structurally normal tricuspid valve without significant stenosis. Mild tricuspid regurgitation. Right atrial pressure is estimated to be 3 mmHg. Estimated right ventricular systolic pressure is 40 mmHg. Pulmonic Valve Structurally normal pulmonic valve without significant stenosis or regurgitation. Pericardium Normal pericardium without effusion. Aorta The aortic root is normal. Jovany Heath (Electronically Signed) Final Date:     25 February 2018                 17:42      Micro:  Results     Procedure Component Value Units  "Date/Time    Culture Respiratory W/ GRM STN [[id number]] Collected:  02/26/18 2004    Order Status:  Canceled Specimen:  Sputum from Sputum Updated:  02/26/18 2016    URINE CULTURE(NEW) [[id number]] Collected:  02/23/18 2130    Order Status:  Completed Specimen:  Urine Updated:  02/26/18 0835     Significant Indicator NEG     Source UR     Site --     Urine Culture No growth at 48 hours.    Narrative:       Indication for culture:->Emergency Room Patient    C Diff by PCR rflx Toxin [[id number]] Collected:  02/25/18 1256    Order Status:  Completed Specimen:  Stool from Stool Updated:  02/25/18 1928     C Diff by PCR Negative     Comment: C. difficile NOT detected by PCR.  Treatment not indicated per guidelines.  Repeat testing not indicated within 7 days.          027-NAP1-BI Presumptive Negative     Comment: Presumptive 027/NAP1/BI target DNA sequences are NOT DETECTED.       Narrative:       ProtectiveTEIRAIDA ALMONTE  Does this patient have risk factors for C-diff?->Yes    Urinalysis [[id number]]     Order Status:  Canceled Specimen:  Urine     BLOOD CULTURE [[id number]] Collected:  02/23/18 1800    Order Status:  Completed Specimen:  Blood from Peripheral Updated:  02/24/18 0734     Significant Indicator NEG     Source BLD     Site Peripheral     Blood Culture --     No Growth    Note: Blood cultures are incubated for 5 days and  are monitored continuously.Positive blood cultures  are called to the RN and reported as soon as  they are identified.    Blood culture testing and Gram stain, if indicated, are  performed at [facility], [address] [place] [address].  Positive blood cultures are  sent to [facility], [address] [place] [address], for organism identification and  susceptibility testing.      Narrative:       Per Hospital Policy: Only change Specimen Src: to \"Line\" if  specified by physician order.    BLOOD CULTURE [[id number]] Collected:  02/23/18 " "1800    Order Status:  Completed Specimen:  Blood from Peripheral Updated:  02/24/18 0734     Significant Indicator NEG     Source BLD     Site Peripheral     Blood Culture --     No Growth    Note: Blood cultures are incubated for 5 days and  are monitored continuously.Positive blood cultures  are called to the RN and reported as soon as  they are identified.    Blood culture testing and Gram stain, if indicated, are  performed at [facility], [address] [place] [address].  Positive blood cultures are  sent to [facility], [address] [place] [address], for organism identification and  susceptibility testing.      Narrative:       Per Hospital Policy: Only change Specimen Src: to \"Line\" if  specified by physician order.    Blood Culture [[id number]] Collected:  02/24/18 0000    Order Status:  Canceled Specimen:  Other from Peripheral     Blood Culture [[id number]] Collected:  02/24/18 0000    Order Status:  Canceled Specimen:  Other from Peripheral     INFLUENZA A/B BY PCR [[id number]] Collected:  02/23/18 1755    Order Status:  Completed Updated:  02/23/18 2204     Influenza virus A RNA Negative     Influenza virus B, PCR Negative    Narrative:       per Dr. Mello    URINALYSIS [[id number]]  (Abnormal) Collected:  02/23/18 2130    Order Status:  Completed Specimen:  Urine Updated:  02/23/18 2158     Color Yellow     Character Clear     Specific Gravity <=1.005     Ph 5.5     Glucose Negative mg/dL      Ketones Trace (A) mg/dL      Protein Negative mg/dL      Bilirubin Negative     Nitrite Negative     Leukocyte Esterase Negative     Occult Blood Trace (A)     Micro Urine Req Microscopic    Narrative:       Indication for culture:->Emergency Room Patient    INFLUENZA RAPID [[id number]] Collected:  02/23/18 1755    Order Status:  Completed Specimen:  Respirate from Respiratory Updated:  02/23/18 1904     Significant Indicator NEG     Source RESP     Site RESPIRATORY "     Rapid Influenza A-B --     Negative for Influenza A and Influenza B antigens.  Infection due to influenza A or B cannot be ruled out  since the antigen present in the specimen may be below the  detection limit of the test. Culture confirmation of  negative samples is recommended.      BLOOD CULTURE [[id number]] Collected:  02/23/18 0000    Order Status:  Canceled Specimen:  Other from Peripheral     BLOOD CULTURE [[id number]] Collected:  02/23/18 0000    Order Status:  Canceled Specimen:  Other from Peripheral     URINE CULTURE(NEW) [[id number]] Collected:  02/23/18 0000    Order Status:  Canceled Specimen:  Other from Urine, Clean Catch     CULTURE RESPIRATORY W/ GRM STN [[id number]] Collected:  02/23/18 0000    Order Status:  Canceled Specimen:  Sputum from Sputum           Assessment:  Active Hospital Problems    Diagnosis   • *Neutropenic fever (CMS-HCC) [D70.9, R50.81]   • Maxillary sinusitis [J32.0]   • Hyponatremia [E87.1]   • Acute renal failure (ARF) (CMS-[facility]) [N17.9]   • 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 Sinusitis as cause   CT maxillary - chronic sinusitis but no acute  Bcx 2/23 - NGTD  Ucx - neg  C diff negative  Continue cefepime and PO fluc  Plan for BM biopsy  Check CXR today given worsening cough    H/o Lymphoma  Completed chemo in 2010  Now neutropenic with fevers  Recommend BM biopsy as above    ROHITH, improved  Renally dose abx as needed  Avoid nephrotoxins  Monitor    Discussed with internal medicine/ Dr Oliveira

## 2018-02-28 NOTE — PROGRESS NOTES
Pt awake and alert, A/O X4, wife at bedside, denies pain, dressing to LL Back CD&I, VSS, Full Report to Yaz SUMMERS.

## 2018-02-28 NOTE — PROGRESS NOTES
[facility]ist Progress Note    Date of Service: 2018    Chief Complaint  77 y.o. male admitted 2018 with neutropenia , fever and weakness with hx of non hodgkins lymphoma    Interval Problem Update    Post BMB this morning. Remains neutropenic - no new fevers. Reports of increased cough , congestion. CXR no infiltrates. Platelets improved post plt tx.    Consultants/Specialty  none    Disposition  TBD- anticipate dc home when stable .        Review of Systems   Constitutional: Negative for chills, fever and malaise/fatigue.   HENT: Positive for congestion. Negative for hearing loss and tinnitus.    Eyes: Negative for discharge and redness.   Respiratory: Positive for cough. Negative for hemoptysis, sputum production and shortness of breath.    Cardiovascular: Negative for chest pain and leg swelling.   Gastrointestinal: Negative for abdominal pain, blood in stool, nausea and vomiting.   Genitourinary: Negative for flank pain and hematuria.   Musculoskeletal: Negative for back pain, myalgias and neck pain.   Neurological: Positive for weakness. Negative for speech change and focal weakness.   Psychiatric/Behavioral: Negative for hallucinations, substance abuse and suicidal ideas.      Physical Exam  Laboratory/Imaging   Hemodynamics  Temp (24hrs), Av.4 °C (99.3 °F), Min:36.5 °C (97.7 °F), Max:38.1 °C (100.5 °F)   Temperature: 37.8 °C (100.1 °F)  Pulse  Av.3  Min: 70  Max: 115 Heart Rate (Monitored): 75  Blood Pressure : 132/71, NIBP: 106/73      Respiratory      Respiration: (!) 22, Pulse Oximetry: 94 %        RUL Breath Sounds: Clear, RML Breath Sounds: Clear, RLL Breath Sounds: Clear, GREGORIA Breath Sounds: Clear, LLL Breath Sounds: Clear    Fluids    Intake/Output Summary (Last 24 hours) at 18 1547  Last data filed at 18 0400   Gross per 24 hour   Intake             2655 ml   Output                0 ml   Net             2655 ml       Nutrition  Orders Placed This Encounter    Procedures   • DIET ORDER     Standing Status:   Standing     Number of Occurrences:   1     Order Specific Question:   Diet:     Answer:   Regular [1]     Physical Exam   Constitutional: He is oriented to person, place, and time. He appears well-developed and well-nourished. No distress.   HENT:   Head: Normocephalic and atraumatic.   Right Ear: External ear normal.   Left Ear: External ear normal.   Nose: Nose normal.   Eyes: Conjunctivae and EOM are normal. No scleral icterus.   Neck: Normal range of motion. Neck supple.   Cardiovascular: Normal rate and regular rhythm.    No murmur heard.  Pulmonary/Chest: No stridor. No respiratory distress. He has no wheezes. He has no rales.   Abdominal: Soft. Bowel sounds are normal. He exhibits no distension. There is no tenderness. There is no rebound.   Musculoskeletal: Normal range of motion. He exhibits no edema or deformity.   Neurological: He is alert and oriented to person, place, and time. No cranial nerve deficit.   No gross focal weakness   Skin: Skin is warm. He is not diaphoretic. No erythema.   Sporadic upper ext old appearing bruises.    Psychiatric: He has a normal mood and affect. His behavior is normal.       Recent Labs      02/26/18   0445  02/27/18   0526  02/28/18   0529   WBC  0.2*  0.2*  0.2*   RBC  3.60*  3.41*  3.34*   HEMOGLOBIN  10.1*  9.6*  9.4*   HEMATOCRIT  28.9*  27.8*  26.6*   MCV  80.3*  81.5  79.6*   MCH  28.1  28.2  28.1   MCHC  34.9  34.5  35.3   RDW  42.9  42.7  42.1   PLATELETCT  44*  17*  40*   MPV  12.1   --   12.1     Recent Labs      02/26/18   0846  02/28/18   0529   SODIUM  134*  135   POTASSIUM  3.7  3.2*   CHLORIDE  106  105   CO2  23  21   GLUCOSE  129*  108*   BUN  11  13   CREATININE  1.49*  1.43*   CALCIUM  7.5*  7.3*            Impression       Retroperitoneal, iliac and mesenteric lymphadenopathy is not significantly changed compared to prior.    Haziness about the root of the mesentery is again noted.    Nonobstructing  bilateral renal calculi. Atrophic right kidney. Left parapelvic cysts.    Enlarged prostate.    Trace bilateral pleural effusions. Rounded opacity at the right lung base likely represents round atelectasis.                Assessment/Plan     * Neutropenic fever (CMS-[facility])- (present on admission)   Assessment & Plan    Patient competed  non-Hodgkin's lymphoma treatment. The patient has been having elevated white blood cell counts as of recent however his white blood cell count dropped w findings of pancytopenia .  IV cefepime for fevers - sinusitis coverage - ID to determine duration .  cw prophl diflucan   Case was discussed with  Dr. Martin [facility] - was recommended  Neupogen till ANC >1550 for 3 days  Fu Cbc w diff.   Cough , congestion - increased - Cxr no infiltrate - supportive treatment for now.         Thrombocytopenia (CMS-HCC)   Assessment & Plan    Improved post plt tx- no active bleed.  Fu CBC/ plts  Fu bmb path.        Sepsis (CMS-HCC)   Assessment & Plan    This is severe sepsis with the following associated acute organ dysfunction(s): acute kidney failure.and neutropenia.  Patient with hx of nonhodgkins lymphoma in remission, now presenting with fevers, tachycardia, lactic acidosis, ROHITH  Source could be acute maxillary sinusitis .  Cdiff and cultures negative.  Fevers improved cw IV atbs  IVF  Lactic acid improving  Monitor closely        Maxillary sinusitis- (present on admission)   Assessment & Plan    With his history of maxillary sinusitis- Suspect source for infxn  Continue abx- cefepime- discussed with Dr. 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 He just had an appointment with oncologist on mon prior to admission and has a PET scan ordered for next Tuesday. CT head , abd and pelvis did not  reveal any new changes in Lymph nodes .    Pancytopenia - concern for transformation to other leukemia or myelodysplasia- fu BMB path  Hem/onc input depending on findings.   Monitor cbc w diff.         Acute renal failure (ARF) (CMS-HCC)- (present on admission)   Assessment & Plan    Likely due to dehydration- improved.  IVF  Fu renal fxn, lytes, uop  Avoid nephrotoxins         Hyponatremia- (present on admission)   Assessment & Plan    Correcting.  Monitor        Essential hypertension- (present on admission)   Assessment & Plan    Controlled   Monitor   Prn labetalol         Hypokalemia   Assessment & Plan    Recurrent  Add oral kcl.           Normochromic normocytic anemia- (present on admission)   Assessment & Plan    w Pancytopenia  - worsening thrombocytopenia  - improved post plt tx 2-27-18   Continue neupogen  Fu  BMB  Discussed with hematology Dr. Smith.         Mixed hyperlipidemia- mild no meds- (present on admission)   Assessment & Plan    Statin        Hypothyroidism due to acquired atrophy of thyroid- (present on admission)   Assessment & Plan    Tsh depressed   Synthroid Decreased.  Fu TSH in 6-8 weeks            Quality-Core Measures   Reviewed items::  Labs reviewed, Medications reviewed and Radiology images reviewed  Joshi catheter::  No Joshi  Antibiotics:  Treating active infection/contamination beyond 24 hours perioperative coverage      Discussed with RN, multi disciplinary team and wife plan of care.

## 2018-02-28 NOTE — PROGRESS NOTES
Platelet transfusion started with 2nd RN. This RN waiting in room for 15 min to assess for adverse rxn.

## 2018-02-28 NOTE — PROGRESS NOTES
Pt sitting up in bed, eating lunch. Wife at bs. Respirations even and unlabored. No other needs at this time.

## 2018-02-28 NOTE — CARE PLAN
Problem: Safety  Goal: Will remain free from injury  Outcome: PROGRESSING AS EXPECTED  Mobility assessed. Pt out of bed with SBA. Educated on the importance of calling for assistance, verbalizes understanding. Upper bed rails up x2, bed in low/locked position, hourly rounding in place, treaded socks on, call light/belongings within reach.     Problem: Respiratory:  Goal: Respiratory status will improve  Outcome: PROGRESSING AS EXPECTED  Encouraging cough/turn deep breathe. Prns per MAR. Abx per MAR.

## 2018-03-01 NOTE — PROGRESS NOTES
Pt's tele summary: sinus rhythm w/frequent PVC's.      HR 91      NH interval 0.16  QRS complex 0.08  QT interval 0.36      This RN only responsible for interpreting tele strip at this time.      Primary nurse in charge of primary monitoring and keeping in communication w/CCT.

## 2018-03-01 NOTE — CARE PLAN
Problem: Safety  Goal: Will remain free from injury  Outcome: PROGRESSING AS EXPECTED  Mobility assessed. Pt out of bed without assistance. Educated on the importance of calling for assistance, verbalizes understanding. Upper bed rails up x2, bed in low/locked position, treaded socks on, call light/belongings within reach, hourly rounding.     Problem: Respiratory:  Goal: Respiratory status will improve  Outcome: PROGRESSING AS EXPECTED  Encouraging cough/turn/deep breathe/IS. Monitoring O2 sats.

## 2018-03-01 NOTE — PROGRESS NOTES
Telemetry Report: pt has been SR/ST.  HR: .70s-110  Measurements: (.14/.08/.34)  Ectopy: f PVC    Measurements and updates per Pete NAVA    For tele interpretation only, patient care is sole responsibility of primary nurse.

## 2018-03-01 NOTE — PROGRESS NOTES
Tele strip at 0725 shows SR w/ HR of 90  Measurements: .16/.08/.36    Tele Shift Summary:  Rhythm: SR  Rate: 80's-90's  Ectopy: Per CCT Yaz, pt had frequent PVC/bigeminy, occ couplets.    Telemetry monitoring strips faxed to primary RN. Tele strip summary only. Further assessment and monitoring to be done by primary RN.

## 2018-03-01 NOTE — PROCEDURES
DATE OF PROCEDURE: 2/28/2018    PROCEDURE: Bone marrow biopsy with bone marrow aspiration.     REQUESTING PHYSICIAN: Dr. Oliveira    INDICATIONS: Pancytopenia, hx of lymphoma    INFORMED CONSENT: Consent signed and on the chart.     DESCRIPTION: A time out was called. The patient was placed in the prone position. The left buttock was prepped and draped in a sterile fashion.  1 mL of 1% lidocaine was injected into the skin followed by 3 mL into the periosteum of the posterior ilium bone. Large-bore needle was used to obtain 5 mL of aspirate followed by 5 mL into a heparinized syringe for flow cytometry. This followed by a  bone marrow biopsy using the Jamshidi needle.     SEDATION USED: 3mg IV versed and 50mcg fentanyl IV    ESTIMATED BLOOD LOSS: none

## 2018-03-01 NOTE — PROGRESS NOTES
Infectious Disease Progress Note    Author: Gricel Murguia M.D. Date & Time of service: 3/1/2018  8:02 AM    Chief Complaint:  FU neutropenic fever    Interval History:   Tmax 100.4, WBC 0.2, ANC 0.2, feels better today, had R groin pain overnight which spontaneously resolved   Tmax 100.5, having coughing fit and SOB, plan for BM biopsy  3/1Tmax 100.2, WBC 0.3, had a BM this am, cough better, repeat CXR with no infiltrate   Labs Reviewed, Medications Reviewed, Radiology Reviewed and Wound Reviewed.    Review of Systems:  Review of Systems   Constitutional: Negative for fever.   HENT: Positive for congestion. Negative for nosebleeds.    Respiratory: Positive for cough and shortness of breath.         Improved   Gastrointestinal: Negative for abdominal pain, diarrhea, nausea and vomiting.   Genitourinary: Negative for dysuria and urgency.       Hemodynamics:  Temp (24hrs), Av.6 °C (99.6 °F), Min:37.1 °C (98.7 °F), Max:37.9 °C (100.2 °F)  Temperature: 37.5 °C (99.5 °F)  Pulse  Av.3  Min: 70  Max: 115Heart Rate (Monitored): 75  Blood Pressure : 140/66, NIBP: 106/73       Physical Exam:  Physical Exam   Constitutional: He is oriented to person, place, and time. He appears well-developed.   HENT:   Head: Normocephalic and atraumatic.   Eyes: EOM are normal. Pupils are equal, round, and reactive to light.   Neck: Neck supple.   Cardiovascular: Normal rate, regular rhythm and normal heart sounds.    Pulmonary/Chest: Effort normal. He has rales.   Abdominal: Soft. There is no tenderness.   Musculoskeletal: Normal range of motion. He exhibits no edema.   Neurological: He is alert and oriented to person, place, and time.       Meds:    Current Facility-Administered Medications:   •  potassium chloride SA  •  fluconazole  •  cefepime (MAXIPIME) 2G IVPB in 100 mL  •  tbo-filgrastim  •  acetaminophen  •  benzonatate  •  NS  •  NS  •  levothyroxine  •  PARoxetine  •  senna-docusate **AND** polyethylene  glycol/lytes **AND** magnesium hydroxide **AND** bisacodyl  •  NS  •  labetalol  •  ondansetron  •  ondansetron  •  zolpidem  •  benzocaine-menthol    Labs:  Recent Labs      02/27/18   0526  02/28/18   0529  03/01/18   0432   WBC  0.2*  0.2*  0.3*   RBC  3.41*  3.34*  3.23*   HEMOGLOBIN  9.6*  9.4*  9.1*   HEMATOCRIT  27.8*  26.6*  25.8*   MCV  81.5  79.6*  79.9*   MCH  28.2  28.1  28.2   RDW  42.7  42.1  42.5   PLATELETCT  17*  40*  29*   MPV   --   12.1  11.5   NEUTSPOLYS  11.10*  CANCEL   --    LYMPHOCYTES  72.20*  CANCEL   --    MONOCYTES  16.70*  CANCEL   --    EOSINOPHILS  0.00  CANCEL   --    BASOPHILS  0.00  CANCEL   --      Recent Labs      02/26/18   0846  02/28/18   0529  03/01/18   0432   SODIUM  134*  135  134*   POTASSIUM  3.7  3.2*  3.3*   CHLORIDE  106  105  104   CO2  23  21  22   GLUCOSE  129*  108*  95   BUN  11  13  15     Recent Labs      02/26/18   0846  02/28/18   0529  03/01/18   0432   CREATININE  1.49*  1.43*  1.39       Imaging:  Ct-abdomen-pelvis W/o    Result Date: 2/24/2018 2/24/2018 6:09 AM HISTORY/REASON FOR EXAM:  Fever. TECHNIQUE/EXAM DESCRIPTION: CT scan of the abdomen and pelvis without contrast. Noncontrast helical scanning was obtained from the diaphragmatic domes through the pubic symphysis. Low dose optimization technique was utilized for this CT exam including automated exposure control and adjustment of the mA and/or kV according to patient size. COMPARISON: 4/12/2017. FINDINGS: There are trace bilateral pleural effusions. There is a rounded opacity at the right lung base likely representing round atelectasis. There is trace pericardial fluid. No free air is seen in the abdomen or pelvis. Evaluation of parenchymal and vascular structures is limited by the lack of intravenous contrast. Liver is hypodense suggestive of hepatic steatosis. Subtle 1.1 cm hypodense lesion in the right lobe of the liver corresponds to the previously noted hemangioma. There are calcified  granulomata within the liver. Gallbladder appears unremarkable. The spleen is not enlarged. No adrenal mass is identified on the left. There is a right adrenal nodule measuring 2 cm which is compatible with an adrenal adenoma. There is renal cortical atrophy on the right. There are bilateral nonobstructing renal calculi. There are parapelvic cysts on the left. There is no evidence of hydronephrosis. Pancreas appears unremarkable.  Aorta is not aneurysmal. Atherosclerotic plaque is seen. Mildly enlarged dense right inguinal lymph node is again seen. There are dense retroperitoneal and right iliac lymph nodes. The dens retroperitoneal nodes measure up to 1.9 cm in short axis dimension, not significantly changed compared to prior. Haziness about the mesentery is not significantly changed compared to prior. Dense lymph node in the root of the mesentery measures 1.6 cm in short axis dimension, unchanged. There is no evidence of bowel obstruction. There is no evidence of acute appendicitis. Bladder is mildly distended. Prostate is enlarged. There is a fat-containing right inguinal hernia. Degenerative changes are seen in the spine. Wedge deformities of L1 and L2 appear unchanged.     Retroperitoneal, iliac and mesenteric lymphadenopathy is not significantly changed compared to prior. Haziness about the root of the mesentery is again noted. Nonobstructing bilateral renal calculi. Atrophic right kidney. Left parapelvic cysts. Enlarged prostate. Trace bilateral pleural effusions. Rounded opacity at the right lung base likely represents round atelectasis.    Ct-head W/o    Result Date: 2/24/2018 2/24/2018 6:09 AM HISTORY/REASON FOR EXAM:  Fever. TECHNIQUE/EXAM DESCRIPTION AND NUMBER OF VIEWS: CT of the head without contrast. Contiguous axial sections were obtained from the skull base through the vertex. Up to date radiation dose reduction adjustments have been utilized to meet ALARA standards for radiation dose reduction.  COMPARISON:  None available FINDINGS: The is no evidence of intraparenchymal, intraventricular and extra-axial hemorrhage.  The ventricles and cortical sulci are prominent compatible with age related change.  There is no mass effect or midline shift. There is minimal mucosal thickening within the maxillary sinuses. Visualized mastoid air cells are clear. The calvarium is intact. There is mild left frontal soft tissue swelling.     No acute intracranial abnormality is identified. Cortical atrophy. Mild left frontal soft tissue swelling.    Ct-maxillofacial W/o Plus Recons    Result Date: 2/26/2018 2/26/2018 7:24 PM HISTORY/REASON FOR EXAM:  Facial pain. Suspected sinusitis. TECHNIQUE/EXAM DESCRIPTION AND NUMBER OF VIEWS:  CT scan maxillofacial without contrast, with reconstructions. Thin-section helical imaging was obtained of the face from the orbital roofs through the mandible. Coronal multiplanar volume reformat images were generated from the axial data. Low dose optimization technique was utilized for this CT exam including automated exposure control and adjustment of the mA and/or kV according to patient size. FINDINGS: The frontal sinus is clear. There is minimal mucosal thickening in the ethmoid sinus bilaterally. There is mild bilateral mucosal thickening and/or polyp formation in the maxillary sinus. Both maxillary sinus infundibulum and ostia are patent. The sphenoid sinus is clear. No significant bony nasal septal deviation is present. No nasal turbinate edema is present.     1.  Mild chronic bilateral maxillary and ethmoid sinusitis. 2.  No evidence of acute sinusitis. 3.  Areas of rounded mucosal thickening or polyp formation in each maxillary sinus antrum. 4.  Otherwise clear paranasal sinuses.    Dx-chest-portable (1 View)    Result Date: 2/23/2018 2/23/2018 6:01 PM HISTORY/REASON FOR EXAM:  Cough. TECHNIQUE/EXAM DESCRIPTION AND NUMBER OF VIEWS: Single portable view of the chest. COMPARISON:  3/22/2017 FINDINGS: Cardiomediastinal contour is within normal limits. Lungs show mild hypoinflation. No focal pulmonary consolidation. No pleural fluid collection or pneumothorax. No major bony abnormality is seen.     Hypoinflation without other evidence for acute cardiopulmonary disease.    Echocardiogram Comp W/o Cont    Result Date: 2018  Transthoracic Echo Report Echocardiography Laboratory CONCLUSIONS No prior study is available for comparison. Normal transthoracic echocardiogram. SHELLIE CASIANO Exam Date:         2018                    12:59 Exam Location:     Inpatient Priority:          Routine Ordering Physician:        RILEY JAQUEZ Referring Physician:       [id number] Zacarias Sonographer:               Ashish Zapata RDCS Age:    77     Gender:    M MRN:    [id number] :    1940 BSA:    2.05   Ht (in):    70     Wt (lb):    191 Exam Type:     Complete Indications:     Weakness ICD Codes:       R531 CPT Codes:       97503 BP:   117    /   66     HR:   99 Technical Quality:       Fair MEASUREMENTS  (Male / Female) Normal Values 2D ECHO LV Diastolic Diameter PLAX        4.4 cm                4.2 - 5.9 / 3.9 - 5.3 cm LV Systolic Diameter PLAX         2.7 cm                2.1 - 4.0 cm IVS Diastolic Thickness           1.2 cm                LVPW Diastolic Thickness          1.2 cm                LVOT Diameter                     2.1 cm                Estimated LV Ejection Fraction    65 %                  LV Ejection Fraction MOD BP       65.4 %                >= 55  % LV Ejection Fraction MOD 4C       69.4 %                LV Ejection Fraction MOD 2C       63.3 %                IVC Diameter                      1.4 cm                M-MODE Aortic Root Diameter MM           3.2 cm                DOPPLER AV Peak Velocity                  1.6 m/s               AV Peak Gradient                  9.7 mmHg              AV Mean Gradient                  5.6 mmHg              LVOT Peak Velocity                 0.97 m/s              AV Area Cont Eq vti               2.2 cm²               Mitral E Point Velocity           1.1 m/s               Mitral E to A Ratio               1.1                   Mitral A Duration                 142 ms                MV Pressure Half Time             45.2 ms               MV Area PHT                       4.9 cm²               MV Deceleration Time              156 ms                Pulmonary Vein A Velocity         0.5 m/s               Pulmonary Vein A Duration         104 ms                Pulm Vein-MV A Duration           -38 ms                TV Peak E Velocity                0.59 m/s              TR Peak Velocity                  318 cm/s              PV Peak Velocity                  1.2 m/s               PV Peak Gradient                  6 mmHg                RVOT Peak Velocity                0.83 m/s              * Indicates values subject to auto-interpretation LV EF:  65    % FINDINGS Left Ventricle Normal left ventricular size, systolic function, and diastolic function. Mild concentric left ventricular hypertrophy. Normal regional wall motion. Left ventricular ejection fraction is visually estimated to be 65%. Right Ventricle The right ventricle was normal in size and function. Right Atrium The right atrium is normal in size.  Normal inferior vena cava size and inspiratory collapse. Left Atrium The left atrium is normal in size.  Left atrial volume index is 26 mL/sq m. Mitral Valve Structurally normal mitral valve without significant stenosis. Trace mitral regurgitation. Aortic Valve Tricuspid aortic valve. Aortic sclerosis without stenosis. No aortic insufficiency. Tricuspid Valve Structurally normal tricuspid valve without significant stenosis. Mild tricuspid regurgitation. Right atrial pressure is estimated to be 3 mmHg. Estimated right ventricular systolic pressure is 40 mmHg. Pulmonic Valve Structurally normal pulmonic valve without significant stenosis or  "regurgitation. Pericardium Normal pericardium without effusion. Aorta The aortic root is normal. Jovany Heath (Electronically Signed) Final Date:     25 February 2018                 17:42      Micro:  Results     Procedure Component Value Units Date/Time    BLOOD CULTURE [[id number]] Collected:  02/28/18 1015    Order Status:  Completed Specimen:  Bone Marrow Updated:  03/01/18 0656     Significant Indicator NEG     Source BMR     Site Bone Marrow     Blood Culture No Growth    Note: Blood cultures are incubated for 5 days and  are monitored continuously.Positive blood cultures  are called to the RN and reported as soon as  they are identified.      BLOOD CULTURE [[id number]] Collected:  02/23/18 1800    Order Status:  Completed Specimen:  Blood from Peripheral Updated:  02/28/18 2217     Significant Indicator NEG     Source BLD     Site Peripheral     Blood Culture No growth after 5 days of incubation.  Blood culture testing and Gram stain, if indicated, are  performed at [facility], [address] [place] [address].  Positive blood cultures are  sent to [facility], [address] [place] [address], for organism identification and  susceptibility testing.      Narrative:       Per Hospital Policy: Only change Specimen Src: to \"Line\" if  specified by physician order.    BLOOD CULTURE [[id number]] Collected:  02/23/18 1800    Order Status:  Completed Specimen:  Blood from Peripheral Updated:  02/28/18 2217     Significant Indicator NEG     Source BLD     Site Peripheral     Blood Culture No growth after 5 days of incubation.  Blood culture testing and Gram stain, if indicated, are  performed at [facility], [address] [place] [address] [facility].[place], [place].  Positive blood cultures are  sent to [facility], [address] [place] [address], for organism identification and  susceptibility testing.      Narrative:       Per Hospital " "Policy: Only change Specimen Src: to \"Line\" if  specified by physician order.    Culture Respiratory W/ GRM STN [[id number]] Collected:  02/26/18 2004    Order Status:  Canceled Specimen:  Sputum from Sputum Updated:  02/26/18 2016    URINE CULTURE(NEW) [[id number]] Collected:  02/23/18 2130    Order Status:  Completed Specimen:  Urine Updated:  02/26/18 0835     Significant Indicator NEG     Source UR     Site --     Urine Culture No growth at 48 hours.    Narrative:       Indication for culture:->Emergency Room Patient    C Diff by PCR rflx Toxin [[id number]] Collected:  02/25/18 1256    Order Status:  Completed Specimen:  Stool from Stool Updated:  02/25/18 1928     C Diff by PCR Negative     Comment: C. difficile NOT detected by PCR.  Treatment not indicated per guidelines.  Repeat testing not indicated within 7 days.          027-NAP1-BI Presumptive Negative     Comment: Presumptive 027/NAP1/BI target DNA sequences are NOT DETECTED.       Narrative:       ProtectiveTEIRAIDA ALMONTE  Does this patient have risk factors for C-diff?->Yes    Urinalysis [[id number]]     Order Status:  Canceled Specimen:  Urine     Blood Culture [[id number]] Collected:  02/24/18 0000    Order Status:  Canceled Specimen:  Other from Peripheral     Blood Culture [[id number]] Collected:  02/24/18 0000    Order Status:  Canceled Specimen:  Other from Peripheral     INFLUENZA A/B BY PCR [[id number]] Collected:  02/23/18 1755    Order Status:  Completed Updated:  02/23/18 2204     Influenza virus A RNA Negative     Influenza virus B, PCR Negative    Narrative:       per Dr. Mello    URINALYSIS [[id number]]  (Abnormal) Collected:  02/23/18 2130    Order Status:  Completed Specimen:  Urine Updated:  02/23/18 2158     Color Yellow     Character Clear     Specific Gravity <=1.005     Ph 5.5     Glucose Negative mg/dL      Ketones Trace (A) mg/dL      Protein Negative mg/dL      Bilirubin Negative     Nitrite Negative     Leukocyte Esterase " Negative     Occult Blood Trace (A)     Micro Urine Req Microscopic    Narrative:       Indication for culture:->Emergency Room Patient    INFLUENZA RAPID [[id number]] Collected:  02/23/18 1755    Order Status:  Completed Specimen:  Respirate from Respiratory Updated:  02/23/18 1904     Significant Indicator NEG     Source RESP     Site RESPIRATORY     Rapid Influenza A-B --     Negative for Influenza A and Influenza B antigens.  Infection due to influenza A or B cannot be ruled out  since the antigen present in the specimen may be below the  detection limit of the test. Culture confirmation of  negative samples is recommended.      BLOOD CULTURE [[id number]] Collected:  02/23/18 0000    Order Status:  Canceled Specimen:  Other from Peripheral     BLOOD CULTURE [[id number]] Collected:  02/23/18 0000    Order Status:  Canceled Specimen:  Other from Peripheral     URINE CULTURE(NEW) [[id number]] Collected:  02/23/18 0000    Order Status:  Canceled Specimen:  Other from Urine, Clean Catch     CULTURE RESPIRATORY W/ GRM STN [[id number]] Collected:  02/23/18 0000    Order Status:  Canceled Specimen:  Sputum from Sputum           Assessment:  Active Hospital Problems    Diagnosis   • *Neutropenic fever (CMS-HCC) [D70.9, R50.81]   • Maxillary sinusitis [J32.0]   • Hyponatremia [E87.1]   • Acute renal failure (ARF) (CMS-HCC) [N17.9]   • 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 Sinusitis as cause   CT maxillary - chronic sinusitis but no acute  Bcx 2/23 - NGTD  Ucx - neg  C diff  negative  Continue cefepime and PO fluc  S/p BM biopsy on 2/28. Path pending    H/o Lymphoma  Completed chemo in 2010  Neutropenic with fevers  Recommend BM biopsy as above    ROHITH, improved  Renally dose abx as needed  Avoid nephrotoxins  Monitor    Discussed with internal medicine/ Dr Oliveira

## 2018-03-01 NOTE — CARE PLAN
Problem: Safety  Goal: Will remain free from injury  Outcome: PROGRESSING AS EXPECTED  Pt able to transfer self from bed to commode. Pt educated to call for assistance if pt is dizzy.    Problem: Venous Thromboembolism (VTW)/Deep Vein Thrombosis (DVT) Prevention:  Goal: Patient will participate in Venous Thrombosis (VTE)/Deep Vein Thrombosis (DVT)Prevention Measures  Outcome: PROGRESSING AS EXPECTED      Problem: Pain Management  Goal: Pain level will decrease to patient's comfort goal  Outcome: PROGRESSING AS EXPECTED   02/28/18 1200 02/28/18 2000   OTHER   Nurse Pain Scale 0 - 10  --  0   Non Verbal Scale  Calm --    CPOT Total Score 0 --    Pt educated to call for pain medication if needed

## 2018-03-01 NOTE — PROGRESS NOTES
Received report from night RN.  Assumed pt care.  Pt is alert and oriented.  No complaints or needs at this time.  Updated w/ POC.  Will continue with care.

## 2018-03-01 NOTE — PROGRESS NOTES
[facility]ist Progress Note    Date of Service: 3/1/2018    Chief Complaint  77 y.o. male admitted 2018 with neutropenia , fever and weakness with hx of non hodgkins lymphoma    Interval Problem Update    No signif increase wbc despite Granix . Plts decline.  afeb on IV atbs. BMB path pending. Reports cough/ congestion improved.  Persistent hypokalemia.     Consultants/Specialty  none    Disposition  TBD- anticipate dc home when stable .        Review of Systems   Constitutional: Negative for chills and fever.   HENT: Positive for congestion. Negative for hearing loss and tinnitus.    Respiratory: Positive for cough. Negative for hemoptysis, sputum production and shortness of breath.         Improved.    Cardiovascular: Negative for chest pain and leg swelling.   Gastrointestinal: Negative for abdominal pain, blood in stool and vomiting.   Genitourinary: Negative for flank pain and hematuria.   Musculoskeletal: Positive for myalgias (chronic left shoulder. ). Negative for back pain and neck pain.   Neurological: Positive for weakness. Negative for speech change and focal weakness.   Psychiatric/Behavioral: Negative for hallucinations, substance abuse and suicidal ideas.      Physical Exam  Laboratory/Imaging   Hemodynamics  Temp (24hrs), Av.4 °C (99.4 °F), Min:37.1 °C (98.7 °F), Max:37.9 °C (100.2 °F)   Temperature: 37.2 °C (99 °F)  Pulse  Av.3  Min: 70  Max: 115    Blood Pressure : 139/69      Respiratory      Respiration: 18, Pulse Oximetry: 93 %        RUL Breath Sounds: Clear, RML Breath Sounds: Diminished, RLL Breath Sounds: Diminished, GREGORIA Breath Sounds: Clear, LLL Breath Sounds: Diminished    Fluids    Intake/Output Summary (Last 24 hours) at 18 1307  Last data filed at 18 0830   Gross per 24 hour   Intake             1696 ml   Output                0 ml   Net             1696 ml       Nutrition  Orders Placed This Encounter   Procedures   • DIET ORDER     Standing Status:    Standing     Number of Occurrences:   1     Order Specific Question:   Diet:     Answer:   Regular [1]     Physical Exam   Constitutional: He is oriented to person, place, and time. He appears well-developed and well-nourished. No distress.   Calm, cooperative .   HENT:   Head: Normocephalic and atraumatic.   Right Ear: External ear normal.   Left Ear: External ear normal.   Nose: Nose normal.   Eyes: Conjunctivae and EOM are normal. No scleral icterus.   Neck: Normal range of motion. Neck supple.   Cardiovascular: Normal rate and regular rhythm.    No murmur heard.  Pulmonary/Chest: No stridor. No respiratory distress. He has no wheezes. He has no rales.   Abdominal: Soft. Bowel sounds are normal. He exhibits no distension. There is no tenderness. There is no rebound.   Obese.    Musculoskeletal: Normal range of motion. He exhibits no edema or deformity.   Neurological: He is alert and oriented to person, place, and time. No cranial nerve deficit.   No gross focal weakness   Skin: Skin is warm. He is not diaphoretic. No erythema.   Sporadic upper ext old appearing bruises.        Recent Labs      02/27/18   0526 02/28/18   0529  03/01/18   0432   WBC  0.2*  0.2*  0.3*   RBC  3.41*  3.34*  3.23*   HEMOGLOBIN  9.6*  9.4*  9.1*   HEMATOCRIT  27.8*  26.6*  25.8*   MCV  81.5  79.6*  79.9*   MCH  28.2  28.1  28.2   MCHC  34.5  35.3  35.3   RDW  42.7  42.1  42.5   PLATELETCT  17*  40*  29*   MPV   --   12.1  11.5     Recent Labs      02/28/18   0529  03/01/18   0432   SODIUM  135  134*   POTASSIUM  3.2*  3.3*   CHLORIDE  105  104   CO2  21  22   GLUCOSE  108*  95   BUN  13  15   CREATININE  1.43*  1.39   CALCIUM  7.3*  7.2*            Impression       Retroperitoneal, iliac and mesenteric lymphadenopathy is not significantly changed compared to prior.    Haziness about the root of the mesentery is again noted.    Nonobstructing bilateral renal calculi. Atrophic right kidney. Left parapelvic cysts.    Enlarged  prostate.    Trace bilateral pleural effusions. Rounded opacity at the right lung base likely represents round atelectasis.                Assessment/Plan     * Neutropenic fever (CMS-HCC)- (present on admission)   Assessment & Plan    Patient competed  non-Hodgkin's lymphoma treatment. The patient has been having elevated white blood cell counts as of recent however his white blood cell count dropped w findings of pancytopenia .  Cxs negative   IV cefepime for fevers - sinusitis coverage   cw prophl diflucan   Case was discussed with  Dr. Martin On[facility] - was recommended  Neupogen till ANC >1550 for 3 days  Fu Cbc w diff.   Trial of Granix although no sigif response.        Thrombocytopenia (CMS-[facility])   Assessment & Plan    Improved post plt tx- no active bleed.  Fu CBC/ plts  Fu bmb path.  tx if signif declines or signs of bleed.         Sepsis (CMS-[facility])   Assessment & Plan    This is severe sepsis with the following associated acute organ dysfunction(s): acute kidney failure.and neutropenia.  Patient with hx of nonhodgkins lymphoma in remission, now presenting with fevers, tachycardia, lactic acidosis, ROHITH  Source could be acute maxillary sinusitis .  Cdiff and cultures negative. Lactic acid normalized.  Sepsis resolving   IV atbs  IVF until consistent intake.           Maxillary sinusitis- (present on admission)   Assessment & Plan    With his history of maxillary sinusitis- Suspect source for infxn- afbe , improved symptoms.   cw Iv cefepime  discussed with Dr. 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 He just had an appointment with oncologist on mon prior to admission and has a PET scan ordered for next Tuesday. CT head , abd and pelvis did not reveal any new changes in Lymph nodes .    Pancytopenia -  concern for transformation to other leukemia or myelodysplasia  Monitor CBC  tx platelets if signif declines or signs of bleed.  Hem/onc input   Fu BMB Path.          Acute renal failure (ARF) (CMS-HCC)- (present on admission)   Assessment & Plan    Dehydration / hypovol- improving.   cw IVF until more consistent intake.   Fu renal fxn, lytes, uop  Avoid nephrotoxins         Hyponatremia- (present on admission)   Assessment & Plan    Mild, stable.         Essential hypertension- (present on admission)   Assessment & Plan    Controlled   Monitor   Prn labetalol         Hypokalemia   Assessment & Plan    Persistent   Increase oral kcl.            Normochromic normocytic anemia- (present on admission)   Assessment & Plan    w Pancytopenia  - worsening thrombocytopenia  - improved post plt tx 2-27-18   Continue neupogen  Fu  BMB          Mixed hyperlipidemia- mild no meds- (present on admission)   Assessment & Plan    Statin        Hypothyroidism due to acquired atrophy of thyroid- (present on admission)   Assessment & Plan    Tsh depressed   Synthroid Decreased.  Fu TSH in 6-8 weeks            Quality-Core Measures   Reviewed items::  Labs reviewed, Medications reviewed and Radiology images reviewed  Joshi catheter::  No Joshi  Antibiotics:  Treating active infection/contamination beyond 24 hours perioperative coverage      Discussed with RN, multi disciplinary team and wife plan of care.

## 2018-03-01 NOTE — PROGRESS NOTES
Marina from Lab called with critical result of WBC of 0.3 and Platelets of 29 at 0630. Critical lab result read back to Marina.   Dr. Orta notified of critical lab result at WBC of 0.3 and Platelets of 29.  Critical lab result read back by Dr. Orta.

## 2018-03-01 NOTE — CARE PLAN
Problem: Safety  Goal: Will remain free from injury  Outcome: PROGRESSING AS EXPECTED  Bed in low and locked position.  Call light with in reach.     Problem: Infection  Goal: Will remain free from infection  Outcome: PROGRESSING AS EXPECTED  Protective precautions in place.

## 2018-03-02 NOTE — PROGRESS NOTES
Received report from night RN.  Assumed pt care.  Pt is resting comfortably.  Protective precautions in place. Will continue with care.

## 2018-03-02 NOTE — PROGRESS NOTES
Tele strip at 1929 shows sinus rhythm w/ HR of 100.   Measurements: 0.16 / 0.08 / 0.32    Tele Shift Summary:  Rhythm : SR/ST  Rate :   Ectopy : Per DUNIA Coker, pt has had occasional PVCs.    Tele strip faxed to primary RN to place in pt's chart. All monitoring, assessments, meds, and updates to MD are the responsibility of primary RN.

## 2018-03-02 NOTE — DISCHARGE PLANNING
Medical Social Work     SW intern gave IMM letter.     This note has been reviewed by Brandee REAVES

## 2018-03-02 NOTE — PROGRESS NOTES
Pt's tele summary: sinus rhythm w/rare PVC's.      HR 82      CT interval 0.14  QRS complex 0.08  QT interval 0.30      This RN only responsible for interpreting tele strip at this time.      Primary RN in charge of primary monitoring and keeping in communication w/monitor tech.

## 2018-03-02 NOTE — PROGRESS NOTES
Notified Dr. Orta that pt's nose has been bleeding for 5 mins.   Dr. Orta said she will come and take a look at the pt.

## 2018-03-02 NOTE — PROGRESS NOTES
Infectious Disease Progress Note    Author: Gricel Murguia M.D. Date & Time of service: 3/2/2018  8:39 AM    Chief Complaint:  FU neutropenic fever    Interval History:   Tmax 100.4, WBC 0.2, ANC 0.2, feels better today, had R groin pain overnight which spontaneously resolved   Tmax 100.5, having coughing fit and SOB, plan for BM biopsy  3/1Tmax 100.2, WBC 0.3, had a BM this am, cough better, repeat CXR with no infiltrate   3/2 Tmax 100.5, shortness of breath with exertion, had bloody nose this am, plts 22, denies abd pain or diarrhea, path neg for lyphoma  Labs Reviewed, Medications Reviewed, Radiology Reviewed and Wound Reviewed.    Review of Systems:  Review of Systems   Constitutional: Positive for fever.   HENT: Positive for congestion and nosebleeds.    Respiratory: Positive for shortness of breath. Negative for cough.         With exertion   Gastrointestinal: Negative for abdominal pain, diarrhea, nausea and vomiting.   Genitourinary: Negative for dysuria and urgency.       Hemodynamics:  Temp (24hrs), Av.2 °C (99 °F), Min:36.6 °C (97.8 °F), Max:38.1 °C (100.5 °F)  Temperature: 37.2 °C (99 °F)  Pulse  Av.1  Min: 70  Max: 115   Blood Pressure : 144/57       Physical Exam:  Physical Exam   Constitutional: He is oriented to person, place, and time. He appears well-developed.   Ill appearing   HENT:   Head: Normocephalic and atraumatic.   Eyes: EOM are normal. Pupils are equal, round, and reactive to light.   Neck: Neck supple.   Cardiovascular: Normal rate, regular rhythm and normal heart sounds.    Pulmonary/Chest: Effort normal. He has rales.   Abdominal: Soft. There is no tenderness.   Musculoskeletal: Normal range of motion. He exhibits no edema.   Neurological: He is alert and oriented to person, place, and time.       Meds:    Current Facility-Administered Medications:   •  potassium chloride SA  •  fluconazole  •  cefepime (MAXIPIME) 2G IVPB in 100 mL  •  tbo-filgrastim  •   acetaminophen  •  benzonatate  •  NS  •  NS  •  levothyroxine  •  PARoxetine  •  senna-docusate **AND** polyethylene glycol/lytes **AND** magnesium hydroxide **AND** bisacodyl  •  NS  •  labetalol  •  ondansetron  •  ondansetron  •  zolpidem  •  benzocaine-menthol    Labs:  Recent Labs      02/28/18   0529  03/01/18   0432  03/02/18   0511   WBC  0.2*  0.3*  0.2*   RBC  3.34*  3.23*  3.07*   HEMOGLOBIN  9.4*  9.1*  8.5*   HEMATOCRIT  26.6*  25.8*  24.8*   MCV  79.6*  79.9*  80.8*   MCH  28.1  28.2  27.7   RDW  42.1  42.5  43.8   PLATELETCT  40*  29*  22*   MPV  12.1  11.5  12.9   NEUTSPOLYS  CANCEL  CANCEL  CANCEL   LYMPHOCYTES  CANCEL  CANCEL  CANCEL   MONOCYTES  CANCEL  CANCEL  CANCEL   EOSINOPHILS  CANCEL  CANCEL  CANCEL   BASOPHILS  CANCEL  CANCEL  CANCEL     Recent Labs      02/28/18   0529  03/01/18   0432  03/02/18   0511   SODIUM  135  134*  134*   POTASSIUM  3.2*  3.3*  3.8   CHLORIDE  105  104  107   CO2  21  22  22   GLUCOSE  108*  95  101*   BUN  13  15  13     Recent Labs      02/28/18   0529  03/01/18   0432  03/02/18   0511   CREATININE  1.43*  1.39  1.36       Imaging:  Ct-abdomen-pelvis W/o    Result Date: 2/24/2018 2/24/2018 6:09 AM HISTORY/REASON FOR EXAM:  Fever. TECHNIQUE/EXAM DESCRIPTION: CT scan of the abdomen and pelvis without contrast. Noncontrast helical scanning was obtained from the diaphragmatic domes through the pubic symphysis. Low dose optimization technique was utilized for this CT exam including automated exposure control and adjustment of the mA and/or kV according to patient size. COMPARISON: 4/12/2017. FINDINGS: There are trace bilateral pleural effusions. There is a rounded opacity at the right lung base likely representing round atelectasis. There is trace pericardial fluid. No free air is seen in the abdomen or pelvis. Evaluation of parenchymal and vascular structures is limited by the lack of intravenous contrast. Liver is hypodense suggestive of hepatic steatosis. Subtle  1.1 cm hypodense lesion in the right lobe of the liver corresponds to the previously noted hemangioma. There are calcified granulomata within the liver. Gallbladder appears unremarkable. The spleen is not enlarged. No adrenal mass is identified on the left. There is a right adrenal nodule measuring 2 cm which is compatible with an adrenal adenoma. There is renal cortical atrophy on the right. There are bilateral nonobstructing renal calculi. There are parapelvic cysts on the left. There is no evidence of hydronephrosis. Pancreas appears unremarkable.  Aorta is not aneurysmal. Atherosclerotic plaque is seen. Mildly enlarged dense right inguinal lymph node is again seen. There are dense retroperitoneal and right iliac lymph nodes. The dens retroperitoneal nodes measure up to 1.9 cm in short axis dimension, not significantly changed compared to prior. Haziness about the mesentery is not significantly changed compared to prior. Dense lymph node in the root of the mesentery measures 1.6 cm in short axis dimension, unchanged. There is no evidence of bowel obstruction. There is no evidence of acute appendicitis. Bladder is mildly distended. Prostate is enlarged. There is a fat-containing right inguinal hernia. Degenerative changes are seen in the spine. Wedge deformities of L1 and L2 appear unchanged.     Retroperitoneal, iliac and mesenteric lymphadenopathy is not significantly changed compared to prior. Haziness about the root of the mesentery is again noted. Nonobstructing bilateral renal calculi. Atrophic right kidney. Left parapelvic cysts. Enlarged prostate. Trace bilateral pleural effusions. Rounded opacity at the right lung base likely represents round atelectasis.    Ct-head W/o    Result Date: 2/24/2018 2/24/2018 6:09 AM HISTORY/REASON FOR EXAM:  Fever. TECHNIQUE/EXAM DESCRIPTION AND NUMBER OF VIEWS: CT of the head without contrast. Contiguous axial sections were obtained from the skull base through the vertex.  Up to date radiation dose reduction adjustments have been utilized to meet ALARA standards for radiation dose reduction. COMPARISON:  None available FINDINGS: The is no evidence of intraparenchymal, intraventricular and extra-axial hemorrhage.  The ventricles and cortical sulci are prominent compatible with age related change.  There is no mass effect or midline shift. There is minimal mucosal thickening within the maxillary sinuses. Visualized mastoid air cells are clear. The calvarium is intact. There is mild left frontal soft tissue swelling.     No acute intracranial abnormality is identified. Cortical atrophy. Mild left frontal soft tissue swelling.    Ct-maxillofacial W/o Plus Recons    Result Date: 2/26/2018 2/26/2018 7:24 PM HISTORY/REASON FOR EXAM:  Facial pain. Suspected sinusitis. TECHNIQUE/EXAM DESCRIPTION AND NUMBER OF VIEWS:  CT scan maxillofacial without contrast, with reconstructions. Thin-section helical imaging was obtained of the face from the orbital roofs through the mandible. Coronal multiplanar volume reformat images were generated from the axial data. Low dose optimization technique was utilized for this CT exam including automated exposure control and adjustment of the mA and/or kV according to patient size. FINDINGS: The frontal sinus is clear. There is minimal mucosal thickening in the ethmoid sinus bilaterally. There is mild bilateral mucosal thickening and/or polyp formation in the maxillary sinus. Both maxillary sinus infundibulum and ostia are patent. The sphenoid sinus is clear. No significant bony nasal septal deviation is present. No nasal turbinate edema is present.     1.  Mild chronic bilateral maxillary and ethmoid sinusitis. 2.  No evidence of acute sinusitis. 3.  Areas of rounded mucosal thickening or polyp formation in each maxillary sinus antrum. 4.  Otherwise clear paranasal sinuses.    Dx-chest-portable (1 View)    Result Date: 2/23/2018 2/23/2018 6:01 PM HISTORY/REASON  FOR EXAM:  Cough. TECHNIQUE/EXAM DESCRIPTION AND NUMBER OF VIEWS: Single portable view of the chest. COMPARISON: 3/22/2017 FINDINGS: Cardiomediastinal contour is within normal limits. Lungs show mild hypoinflation. No focal pulmonary consolidation. No pleural fluid collection or pneumothorax. No major bony abnormality is seen.     Hypoinflation without other evidence for acute cardiopulmonary disease.    Echocardiogram Comp W/o Cont    Result Date: 2018  Transthoracic Echo Report Echocardiography Laboratory CONCLUSIONS No prior study is available for comparison. Normal transthoracic echocardiogram. SHELLIE CASIANO Exam Date:         2018                    12:59 Exam Location:     Inpatient Priority:          Routine Ordering Physician:        RILEY JAQUEZ Referring Physician:       [id number] Zacarias Sonographer:               Ashish Zapata RDCS Age:    77     Gender:    M MRN:    [id number] :    1940 BSA:    2.05   Ht (in):    70     Wt (lb):    191 Exam Type:     Complete Indications:     Weakness ICD Codes:       R531 CPT Codes:       83468 BP:   117    /   66     HR:   99 Technical Quality:       Fair MEASUREMENTS  (Male / Female) Normal Values 2D ECHO LV Diastolic Diameter PLAX        4.4 cm                4.2 - 5.9 / 3.9 - 5.3 cm LV Systolic Diameter PLAX         2.7 cm                2.1 - 4.0 cm IVS Diastolic Thickness           1.2 cm                LVPW Diastolic Thickness          1.2 cm                LVOT Diameter                     2.1 cm                Estimated LV Ejection Fraction    65 %                  LV Ejection Fraction MOD BP       65.4 %                >= 55  % LV Ejection Fraction MOD 4C       69.4 %                LV Ejection Fraction MOD 2C       63.3 %                IVC Diameter                      1.4 cm                M-MODE Aortic Root Diameter MM           3.2 cm                DOPPLER AV Peak Velocity                  1.6 m/s               AV Peak Gradient                   9.7 mmHg              AV Mean Gradient                  5.6 mmHg              LVOT Peak Velocity                0.97 m/s              AV Area Cont Eq vti               2.2 cm²               Mitral E Point Velocity           1.1 m/s               Mitral E to A Ratio               1.1                   Mitral A Duration                 142 ms                MV Pressure Half Time             45.2 ms               MV Area PHT                       4.9 cm²               MV Deceleration Time              156 ms                Pulmonary Vein A Velocity         0.5 m/s               Pulmonary Vein A Duration         104 ms                Pulm Vein-MV A Duration           -38 ms                TV Peak E Velocity                0.59 m/s              TR Peak Velocity                  318 cm/s              PV Peak Velocity                  1.2 m/s               PV Peak Gradient                  6 mmHg                RVOT Peak Velocity                0.83 m/s              * Indicates values subject to auto-interpretation LV EF:  65    % FINDINGS Left Ventricle Normal left ventricular size, systolic function, and diastolic function. Mild concentric left ventricular hypertrophy. Normal regional wall motion. Left ventricular ejection fraction is visually estimated to be 65%. Right Ventricle The right ventricle was normal in size and function. Right Atrium The right atrium is normal in size.  Normal inferior vena cava size and inspiratory collapse. Left Atrium The left atrium is normal in size.  Left atrial volume index is 26 mL/sq m. Mitral Valve Structurally normal mitral valve without significant stenosis. Trace mitral regurgitation. Aortic Valve Tricuspid aortic valve. Aortic sclerosis without stenosis. No aortic insufficiency. Tricuspid Valve Structurally normal tricuspid valve without significant stenosis. Mild tricuspid regurgitation. Right atrial pressure is estimated to be 3 mmHg. Estimated right ventricular systolic  "pressure is 40 mmHg. Pulmonic Valve Structurally normal pulmonic valve without significant stenosis or regurgitation. Pericardium Normal pericardium without effusion. Aorta The aortic root is normal. Jovany Heath (Electronically Signed) Final Date:     25 February 2018                 17:42      Micro:  Results     Procedure Component Value Units Date/Time    BLOOD CULTURE [[id number]] Collected:  02/28/18 1015    Order Status:  Completed Specimen:  Bone Marrow Updated:  03/01/18 0656     Significant Indicator NEG     Source BMR     Site Bone Marrow     Blood Culture No Growth    Note: Blood cultures are incubated for 5 days and  are monitored continuously.Positive blood cultures  are called to the RN and reported as soon as  they are identified.      BLOOD CULTURE [[id number]] Collected:  02/23/18 1800    Order Status:  Completed Specimen:  Blood from Peripheral Updated:  02/28/18 2217     Significant Indicator NEG     Source BLD     Site Peripheral     Blood Culture No growth after 5 days of incubation.  Blood culture testing and Gram stain, if indicated, are  performed at [facility], [address] [place] [address].  Positive blood cultures are  sent to [facility], [address] [place] [address], for organism identification and  susceptibility testing.      Narrative:       Per Hospital Policy: Only change Specimen Src: to \"Line\" if  specified by physician order.    BLOOD CULTURE [[id number]] Collected:  02/23/18 1800    Order Status:  Completed Specimen:  Blood from Peripheral Updated:  02/28/18 2217     Significant Indicator NEG     Source BLD     Site Peripheral     Blood Culture No growth after 5 days of incubation.  Blood culture testing and Gram stain, if indicated, are  performed at [facility], [address] [place] [address] [facility].[place], [place].  Positive blood cultures are  sent to [facility], [address] [place] [address]" "[place], for organism identification and  susceptibility testing.      Narrative:       Per Hospital Policy: Only change Specimen Src: to \"Line\" if  specified by physician order.    Culture Respiratory W/ GRM STN [[id number]] Collected:  02/26/18 2004    Order Status:  Canceled Specimen:  Sputum from Sputum Updated:  02/26/18 2016    URINE CULTURE(NEW) [[id number]] Collected:  02/23/18 2130    Order Status:  Completed Specimen:  Urine Updated:  02/26/18 0835     Significant Indicator NEG     Source UR     Site --     Urine Culture No growth at 48 hours.    Narrative:       Indication for culture:->Emergency Room Patient    C Diff by PCR rflx Toxin [[id number]] Collected:  02/25/18 1256    Order Status:  Completed Specimen:  Stool from Stool Updated:  02/25/18 1928     C Diff by PCR Negative     Comment: C. difficile NOT detected by PCR.  Treatment not indicated per guidelines.  Repeat testing not indicated within 7 days.          027-NAP1-BI Presumptive Negative     Comment: Presumptive 027/NAP1/BI target DNA sequences are NOT DETECTED.       Narrative:       Julio ALMONTE  Does this patient have risk factors for C-diff?->Yes    Urinalysis [[id number]]     Order Status:  Canceled Specimen:  Urine     Blood Culture [[id number]] Collected:  02/24/18 0000    Order Status:  Canceled Specimen:  Other from Peripheral     Blood Culture [[id number]] Collected:  02/24/18 0000    Order Status:  Canceled Specimen:  Other from Peripheral     INFLUENZA A/B BY PCR [[id number]] Collected:  02/23/18 1755    Order Status:  Completed Updated:  02/23/18 2204     Influenza virus A RNA Negative     Influenza virus B, PCR Negative    Narrative:       per Dr. Mello    URINALYSIS [[id number]]  (Abnormal) Collected:  02/23/18 2130    Order Status:  Completed Specimen:  Urine Updated:  02/23/18 2158     Color Yellow     Character Clear     Specific Gravity <=1.005     Ph 5.5     Glucose Negative mg/dL      Ketones Trace (A) " mg/dL      Protein Negative mg/dL      Bilirubin Negative     Nitrite Negative     Leukocyte Esterase Negative     Occult Blood Trace (A)     Micro Urine Req Microscopic    Narrative:       Indication for culture:->Emergency Room Patient    INFLUENZA RAPID [[id number]] Collected:  02/23/18 2784    Order Status:  Completed Specimen:  Respirate from Respiratory Updated:  02/23/18 0703     Significant Indicator NEG     Source RESP     Site RESPIRATORY     Rapid Influenza A-B --     Negative for Influenza A and Influenza B antigens.  Infection due to influenza A or B cannot be ruled out  since the antigen present in the specimen may be below the  detection limit of the test. Culture confirmation of  negative samples is recommended.            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 Sinusitis as cause   CT maxillary - chronic sinusitis but no acute  Bcx 2/23 - NGTD  Ucx - neg  C diff negative  Continue cefepime and PO fluc    Pancytopenia  Remains neutropenic  Transfuse as needed  S/p BM biopsy on 2/28. Path - neg for malignancy, no blasts, +granulomatous inflammation  On granix    H/o Lymphoma  Completed chemo in 2010  Neutropenic with fevers    ROHITH, improved  Renally dose abx as needed  Avoid nephrotoxins  Monitor    Discussed with internal medicine/ Dr Oliveira

## 2018-03-03 NOTE — PROGRESS NOTES
[facility] Hospitalist Progress Note    Date of Service: 3/3/2018    Chief Complaint  77 y.o. male admitted 2018 with neutropenia , fever and weakness with hx of non hodgkins lymphoma    Interval Problem Update    No signif changes in Wbc count.  Platelets declining.         Consultants/Specialty  Discussed with Dr. Smith and [facility] hematology .    Disposition  TBD- anticipate dc home when stable .        Review of Systems   Constitutional: Negative for chills and fever.   HENT: Positive for congestion.    Respiratory: Negative for sputum production and shortness of breath.    Cardiovascular: Negative for chest pain and leg swelling.   Gastrointestinal: Negative for abdominal pain, blood in stool, diarrhea and vomiting.   Genitourinary: Negative for flank pain and hematuria.   Musculoskeletal: Positive for myalgias (chronic left shoulder. ). Negative for back pain and neck pain.   Neurological: Positive for weakness. Negative for speech change and focal weakness.      Physical Exam  Laboratory/Imaging   Hemodynamics  Temp (24hrs), Av.3 °C (99.2 °F), Min:36.9 °C (98.5 °F), Max:37.6 °C (99.6 °F)   Temperature: 36.9 °C (98.5 °F)  Pulse  Av  Min: 70  Max: 115    Blood Pressure : 119/64      Respiratory      Respiration: 18, Pulse Oximetry: 95 %        RUL Breath Sounds: Clear, RML Breath Sounds: Diminished, RLL Breath Sounds: Diminished, GREGORIA Breath Sounds: Clear, LLL Breath Sounds: Diminished    Fluids    Intake/Output Summary (Last 24 hours) at 18 1435  Last data filed at 18 0900   Gross per 24 hour   Intake             1246 ml   Output              400 ml   Net              846 ml       Nutrition  Orders Placed This Encounter   Procedures   • DIET ORDER     Standing Status:   Standing     Number of Occurrences:   1     Order Specific Question:   Diet:     Answer:   Regular [1]     Physical Exam   Constitutional: He is oriented to person, place, and time. He appears well-developed and  well-nourished. No distress.   Calm, cooperative .   HENT:   Head: Normocephalic and atraumatic.   Right Ear: External ear normal.   Left Ear: External ear normal.   Nose: Nose normal.   Eyes: EOM are normal. No scleral icterus.   Neck: No JVD present.   Cardiovascular: Normal rate and regular rhythm.    No murmur heard.  Pulmonary/Chest: No stridor. No respiratory distress. He has no wheezes. He has no rales.   Abdominal: Soft. Bowel sounds are normal. He exhibits no distension. There is no tenderness. There is no rebound.   Obese.    Musculoskeletal: Normal range of motion. He exhibits no edema or deformity.   Neurological: He is alert and oriented to person, place, and time. No cranial nerve deficit.   No gross focal weakness   Skin: Skin is warm. He is not diaphoretic. No erythema.   Sporadic upper ext old appearing bruises.        Recent Labs      03/01/18   0432  03/02/18   0511  03/03/18   0523   WBC  0.3*  0.2*  0.3*   RBC  3.23*  3.07*  3.06*   HEMOGLOBIN  9.1*  8.5*  8.5*   HEMATOCRIT  25.8*  24.8*  24.5*   MCV  79.9*  80.8*  80.1*   MCH  28.2  27.7  27.8   MCHC  35.3  34.3  34.7   RDW  42.5  43.8  43.5   PLATELETCT  29*  22*  17*   MPV  11.5  12.9  13.8*     Recent Labs      03/01/18   0432  03/02/18   0511  03/03/18   0523   SODIUM  134*  134*  133*   POTASSIUM  3.3*  3.8  4.0   CHLORIDE  104  107  106   CO2  22  22  22   GLUCOSE  95  101*  106*   BUN  15  13  12   CREATININE  1.39  1.36  1.38   CALCIUM  7.2*  7.1*  7.4*                        Assessment/Plan     * Thrombocytopenia (CMS-HCC)   Assessment & Plan    Improved post plt tx-- again declined-- no active bleed.  tx plts w concern for spontaneous bleeding.         Sepsis (CMS-HCC)   Assessment & Plan    This is severe sepsis with the following associated acute organ dysfunction(s): acute kidney failure.and neutropenia.  Patient with hx of nonhodgkins lymphoma in remission, now presenting with fevers, tachycardia, lactic acidosis, ROHITH  Source  could be acute maxillary sinusitis .  Cdiff and cultures negative. Lactic acid normalized.  Sepsis resolving   IV atbs  IVF          Neutropenic fever (CMS-HCC)- (present on admission)   Assessment & Plan    Patient competed  non-Hodgkin's lymphoma treatment. The patient has been having elevated white blood cell counts as of recent however his white blood cell count dropped w findings of pancytopenia .  Cxs negative -- fevers resolved.  IV cefepime for sinusitis coverage   cw prophl diflucan   Discussed with Dr. Mckinney           Maxillary sinusitis- (present on admission)   Assessment & Plan    With his history of maxillary sinusitis- Suspect source for infxn- afbe , improved symptoms.   cw Iv cefepime  prophyl diflucan  discussed with Dr. 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 He just had an appointment with oncologist on mon prior to admission and has a PET scan ordered for next Tuesday. CT head , abd and pelvis did not reveal any new changes in Lymph nodes .    Pancytopenia - concern for transformation to other leukemia -- noted increased T cell lymphocytic proliferation although no increased blast-  ? Leukemia. - fu studies sent to [place]--discussed with Dr. Klein-- will plan tx to [facility] over weekend.  tx plts.  Granix trial. -- ? Dc as not effective. Fu CBC            Acute renal failure (ARF) (CMS-HCC)- (present on admission)   Assessment & Plan    Dehyd  cw IVF until consistent oral intake  Fu renal fxn, lytes, uop  Avoid nephrotoxins         Hyponatremia- (present on admission)   Assessment & Plan    Mild, stable.         Essential hypertension- (present on admission)   Assessment & Plan    Controlled   Monitor   Prn labetalol         Hypokalemia   Assessment & Plan    Corrected    oral  kcl.            Normochromic normocytic anemia- (present on admission)   Assessment & Plan    w Pancytopenia  - worsening thrombocytopenia    plt tx          Mixed hyperlipidemia- mild no meds- (present on admission)   Assessment & Plan    Statin        Hypothyroidism due to acquired atrophy of thyroid- (present on admission)   Assessment & Plan    Tsh depressed   Synthroid Decreased.  Fu TSH in 6-8 weeks            Quality-Core Measures   Reviewed items::  Labs reviewed, Medications reviewed and Radiology images reviewed  Joshi catheter::  No Joshi  Antibiotics:  Treating active infection/contamination beyond 24 hours perioperative coverage      Discussed with RN, multi disciplinary team plan of care

## 2018-03-03 NOTE — PROGRESS NOTES
Infectious Disease Progress Note    Author: Marisabel Gomez M.D. Date & Time of service: 3/3/2018  9:32 AM    Chief Complaint:  FU neutropenic fever    Interval History:   Tmax 100.4, WBC 0.2, ANC 0.2, feels better today, had R groin pain overnight which spontaneously resolved   Tmax 100.5, having coughing fit and SOB, plan for BM biopsy  3/1Tmax 100.2, WBC 0.3, had a BM this am, cough better, repeat CXR with no infiltrate   3/2 Tmax 100.5, shortness of breath with exertion, had bloody nose this am, plts 22, denies abd pain or diarrhea, path neg for lyphoma  3/3/2018-Tmax 99.6. WBC 0.3 platelets 17 creatinine 1.38  Labs Reviewed, Medications Reviewed, Radiology Reviewed and Wound Reviewed.    Review of Systems:  Review of Systems   Constitutional: Negative for fever.   HENT: Negative for congestion and nosebleeds.    Respiratory: Positive for shortness of breath. Negative for cough.         With exertion   Gastrointestinal: Negative for abdominal pain, diarrhea, nausea and vomiting.   Genitourinary: Negative for dysuria and urgency.   Musculoskeletal: Negative for myalgias.   Neurological: Negative for sensory change and speech change.       Hemodynamics:  Temp (24hrs), Av.3 °C (99.1 °F), Min:36.9 °C (98.5 °F), Max:37.6 °C (99.6 °F)  Temperature: 36.9 °C (98.5 °F)  Pulse  Av  Min: 70  Max: 115   Blood Pressure : 144/76       Physical Exam:  Physical Exam   Constitutional: He is oriented to person, place, and time. He appears well-developed.   Ill appearing   HENT:   Head: Normocephalic and atraumatic.   Eyes: EOM are normal. Pupils are equal, round, and reactive to light.   Neck: Neck supple.   Cardiovascular: Normal rate, regular rhythm and normal heart sounds.    Pulmonary/Chest: Effort normal. He has rales.   Abdominal: Soft. There is no tenderness.   Musculoskeletal: Normal range of motion. He exhibits no edema.   Neurological: He is alert and oriented to person, place, and time.   Vitals  reviewed.      Meds:    Current Facility-Administered Medications:   •  potassium chloride SA  •  fluconazole  •  cefepime (MAXIPIME) 2G IVPB in 100 mL  •  tbo-filgrastim  •  acetaminophen  •  benzonatate  •  NS  •  NS  •  levothyroxine  •  PARoxetine  •  senna-docusate **AND** polyethylene glycol/lytes **AND** magnesium hydroxide **AND** bisacodyl  •  NS  •  labetalol  •  ondansetron  •  ondansetron  •  zolpidem  •  benzocaine-menthol    Labs:  Recent Labs      03/01/18   0432  03/02/18   0511  03/03/18   0523   WBC  0.3*  0.2*  0.3*   RBC  3.23*  3.07*  3.06*   HEMOGLOBIN  9.1*  8.5*  8.5*   HEMATOCRIT  25.8*  24.8*  24.5*   MCV  79.9*  80.8*  80.1*   MCH  28.2  27.7  27.8   RDW  42.5  43.8  43.5   PLATELETCT  29*  22*  17*   MPV  11.5  12.9  13.8*   NEUTSPOLYS  CANCEL  CANCEL  CANCEL   LYMPHOCYTES  CANCEL  CANCEL  CANCEL   MONOCYTES  CANCEL  CANCEL  CANCEL   EOSINOPHILS  CANCEL  CANCEL  CANCEL   BASOPHILS  CANCEL  CANCEL  CANCEL     Recent Labs      03/01/18   0432  03/02/18   0511  03/03/18   0523   SODIUM  134*  134*  133*   POTASSIUM  3.3*  3.8  4.0   CHLORIDE  104  107  106   CO2  22  22  22   GLUCOSE  95  101*  106*   BUN  15  13  12     Recent Labs      03/01/18   0432  03/02/18   0511  03/03/18   0523   CREATININE  1.39  1.36  1.38       Imaging:  Ct-abdomen-pelvis W/o    Result Date: 2/24/2018 2/24/2018 6:09 AM HISTORY/REASON FOR EXAM:  Fever. TECHNIQUE/EXAM DESCRIPTION: CT scan of the abdomen and pelvis without contrast. Noncontrast helical scanning was obtained from the diaphragmatic domes through the pubic symphysis. Low dose optimization technique was utilized for this CT exam including automated exposure control and adjustment of the mA and/or kV according to patient size. COMPARISON: 4/12/2017. FINDINGS: There are trace bilateral pleural effusions. There is a rounded opacity at the right lung base likely representing round atelectasis. There is trace pericardial fluid. No free air is seen in the  abdomen or pelvis. Evaluation of parenchymal and vascular structures is limited by the lack of intravenous contrast. Liver is hypodense suggestive of hepatic steatosis. Subtle 1.1 cm hypodense lesion in the right lobe of the liver corresponds to the previously noted hemangioma. There are calcified granulomata within the liver. Gallbladder appears unremarkable. The spleen is not enlarged. No adrenal mass is identified on the left. There is a right adrenal nodule measuring 2 cm which is compatible with an adrenal adenoma. There is renal cortical atrophy on the right. There are bilateral nonobstructing renal calculi. There are parapelvic cysts on the left. There is no evidence of hydronephrosis. Pancreas appears unremarkable.  Aorta is not aneurysmal. Atherosclerotic plaque is seen. Mildly enlarged dense right inguinal lymph node is again seen. There are dense retroperitoneal and right iliac lymph nodes. The dens retroperitoneal nodes measure up to 1.9 cm in short axis dimension, not significantly changed compared to prior. Haziness about the mesentery is not significantly changed compared to prior. Dense lymph node in the root of the mesentery measures 1.6 cm in short axis dimension, unchanged. There is no evidence of bowel obstruction. There is no evidence of acute appendicitis. Bladder is mildly distended. Prostate is enlarged. There is a fat-containing right inguinal hernia. Degenerative changes are seen in the spine. Wedge deformities of L1 and L2 appear unchanged.     Retroperitoneal, iliac and mesenteric lymphadenopathy is not significantly changed compared to prior. Haziness about the root of the mesentery is again noted. Nonobstructing bilateral renal calculi. Atrophic right kidney. Left parapelvic cysts. Enlarged prostate. Trace bilateral pleural effusions. Rounded opacity at the right lung base likely represents round atelectasis.    Ct-head W/o    Result Date: 2/24/2018 2/24/2018 6:09 AM HISTORY/REASON FOR  EXAM:  Fever. TECHNIQUE/EXAM DESCRIPTION AND NUMBER OF VIEWS: CT of the head without contrast. Contiguous axial sections were obtained from the skull base through the vertex. Up to date radiation dose reduction adjustments have been utilized to meet ALARA standards for radiation dose reduction. COMPARISON:  None available FINDINGS: The is no evidence of intraparenchymal, intraventricular and extra-axial hemorrhage.  The ventricles and cortical sulci are prominent compatible with age related change.  There is no mass effect or midline shift. There is minimal mucosal thickening within the maxillary sinuses. Visualized mastoid air cells are clear. The calvarium is intact. There is mild left frontal soft tissue swelling.     No acute intracranial abnormality is identified. Cortical atrophy. Mild left frontal soft tissue swelling.    Ct-maxillofacial W/o Plus Recons    Result Date: 2/26/2018 2/26/2018 7:24 PM HISTORY/REASON FOR EXAM:  Facial pain. Suspected sinusitis. TECHNIQUE/EXAM DESCRIPTION AND NUMBER OF VIEWS:  CT scan maxillofacial without contrast, with reconstructions. Thin-section helical imaging was obtained of the face from the orbital roofs through the mandible. Coronal multiplanar volume reformat images were generated from the axial data. Low dose optimization technique was utilized for this CT exam including automated exposure control and adjustment of the mA and/or kV according to patient size. FINDINGS: The frontal sinus is clear. There is minimal mucosal thickening in the ethmoid sinus bilaterally. There is mild bilateral mucosal thickening and/or polyp formation in the maxillary sinus. Both maxillary sinus infundibulum and ostia are patent. The sphenoid sinus is clear. No significant bony nasal septal deviation is present. No nasal turbinate edema is present.     1.  Mild chronic bilateral maxillary and ethmoid sinusitis. 2.  No evidence of acute sinusitis. 3.  Areas of rounded mucosal thickening or  polyp formation in each maxillary sinus antrum. 4.  Otherwise clear paranasal sinuses.    Dx-chest-portable (1 View)    Result Date: 2018 6:01 PM HISTORY/REASON FOR EXAM:  Cough. TECHNIQUE/EXAM DESCRIPTION AND NUMBER OF VIEWS: Single portable view of the chest. COMPARISON: 3/22/2017 FINDINGS: Cardiomediastinal contour is within normal limits. Lungs show mild hypoinflation. No focal pulmonary consolidation. No pleural fluid collection or pneumothorax. No major bony abnormality is seen.     Hypoinflation without other evidence for acute cardiopulmonary disease.    Echocardiogram Comp W/o Cont    Result Date: 2018  Transthoracic Echo Report Echocardiography Laboratory CONCLUSIONS No prior study is available for comparison. Normal transthoracic echocardiogram. SHELLIE CASIANO Exam Date:         2018                    12:59 Exam Location:     Inpatient Priority:          Routine Ordering Physician:        RILEY JAQUEZ Referring Physician:       [id number] Zacarias Sonographer:               Ashish Zapata RDCS Age:    77     Gender:    M MRN:    [id number] :    1940 BSA:    2.05   Ht (in):    70     Wt (lb):    191 Exam Type:     Complete Indications:     Weakness ICD Codes:       R531 CPT Codes:       56023 BP:   117    /   66     HR:   99 Technical Quality:       Fair MEASUREMENTS  (Male / Female) Normal Values 2D ECHO LV Diastolic Diameter PLAX        4.4 cm                4.2 - 5.9 / 3.9 - 5.3 cm LV Systolic Diameter PLAX         2.7 cm                2.1 - 4.0 cm IVS Diastolic Thickness           1.2 cm                LVPW Diastolic Thickness          1.2 cm                LVOT Diameter                     2.1 cm                Estimated LV Ejection Fraction    65 %                  LV Ejection Fraction MOD BP       65.4 %                >= 55  % LV Ejection Fraction MOD 4C       69.4 %                LV Ejection Fraction MOD 2C       63.3 %                IVC Diameter                       1.4 cm                M-MODE Aortic Root Diameter MM           3.2 cm                DOPPLER AV Peak Velocity                  1.6 m/s               AV Peak Gradient                  9.7 mmHg              AV Mean Gradient                  5.6 mmHg              LVOT Peak Velocity                0.97 m/s              AV Area Cont Eq vti               2.2 cm²               Mitral E Point Velocity           1.1 m/s               Mitral E to A Ratio               1.1                   Mitral A Duration                 142 ms                MV Pressure Half Time             45.2 ms               MV Area PHT                       4.9 cm²               MV Deceleration Time              156 ms                Pulmonary Vein A Velocity         0.5 m/s               Pulmonary Vein A Duration         104 ms                Pulm Vein-MV A Duration           -38 ms                TV Peak E Velocity                0.59 m/s              TR Peak Velocity                  318 cm/s              PV Peak Velocity                  1.2 m/s               PV Peak Gradient                  6 mmHg                RVOT Peak Velocity                0.83 m/s              * Indicates values subject to auto-interpretation LV EF:  65    % FINDINGS Left Ventricle Normal left ventricular size, systolic function, and diastolic function. Mild concentric left ventricular hypertrophy. Normal regional wall motion. Left ventricular ejection fraction is visually estimated to be 65%. Right Ventricle The right ventricle was normal in size and function. Right Atrium The right atrium is normal in size.  Normal inferior vena cava size and inspiratory collapse. Left Atrium The left atrium is normal in size.  Left atrial volume index is 26 mL/sq m. Mitral Valve Structurally normal mitral valve without significant stenosis. Trace mitral regurgitation. Aortic Valve Tricuspid aortic valve. Aortic sclerosis without stenosis. No aortic insufficiency. Tricuspid Valve  "Structurally normal tricuspid valve without significant stenosis. Mild tricuspid regurgitation. Right atrial pressure is estimated to be 3 mmHg. Estimated right ventricular systolic pressure is 40 mmHg. Pulmonic Valve Structurally normal pulmonic valve without significant stenosis or regurgitation. Pericardium Normal pericardium without effusion. Aorta The aortic root is normal. Jovany Heath (Electronically Signed) Final Date:     25 February 2018                 17:42      Micro:  Results     Procedure Component Value Units Date/Time    BLOOD CULTURE [[id number]] Collected:  02/28/18 1015    Order Status:  Completed Specimen:  Bone Marrow Updated:  03/01/18 0656     Significant Indicator NEG     Source BMR     Site Bone Marrow     Blood Culture No Growth    Note: Blood cultures are incubated for 5 days and  are monitored continuously.Positive blood cultures  are called to the RN and reported as soon as  they are identified.      BLOOD CULTURE [[id number]] Collected:  02/23/18 1800    Order Status:  Completed Specimen:  Blood from Peripheral Updated:  02/28/18 2217     Significant Indicator NEG     Source BLD     Site Peripheral     Blood Culture No growth after 5 days of incubation.  Blood culture testing and Gram stain, if indicated, are  performed at [facility], [address] [place] [address].  Positive blood cultures are  sent to [facility], [address] [place] [address], for organism identification and  susceptibility testing.      Narrative:       Per Hospital Policy: Only change Specimen Src: to \"Line\" if  specified by physician order.    BLOOD CULTURE [[id number]] Collected:  02/23/18 1800    Order Status:  Completed Specimen:  Blood from Peripheral Updated:  02/28/18 2217     Significant Indicator NEG     Source BLD     Site Peripheral     Blood Culture No growth after 5 days of incubation.  Blood culture testing and Gram stain, if indicated, " "are  performed at [facility], [address] [place] [address].  Positive blood cultures are  sent to [facility], [address] [place] [address], for organism identification and  susceptibility testing.      Narrative:       Per Hospital Policy: Only change Specimen Src: to \"Line\" if  specified by physician order.    Culture Respiratory W/ GRM STN [[id number]] Collected:  02/26/18 2004    Order Status:  Canceled Specimen:  Sputum from Sputum Updated:  02/26/18 2016    URINE CULTURE(NEW) [[id number]] Collected:  02/23/18 2130    Order Status:  Completed Specimen:  Urine Updated:  02/26/18 0835     Significant Indicator NEG     Source UR     Site --     Urine Culture No growth at 48 hours.    Narrative:       Indication for culture:->Emergency Room Patient    C Diff by PCR rflx Toxin [[id number]] Collected:  02/25/18 1256    Order Status:  Completed Specimen:  Stool from Stool Updated:  02/25/18 1928     C Diff by PCR Negative     Comment: C. difficile NOT detected by PCR.  Treatment not indicated per guidelines.  Repeat testing not indicated within 7 days.          027-NAP1-BI Presumptive Negative     Comment: Presumptive 027/NAP1/BI target DNA sequences are NOT DETECTED.       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 Sinusitis as cause   CT maxillary - chronic sinusitis but no acute  Bcx 2/23 - NGTD  Ucx - neg  C diff negative  Continue cefepime and PO fluc    Pancytopenia  Remains neutropenic  Transfuse as needed  S/p BM biopsy on 2/28. Path - neg for malignancy, no blasts, +granulomatous inflammation  On granix  Would recommend oncology follow-up    H/o Lymphoma  Completed chemo in 2010  Neutropenic with fevers    ROHITH, improved  Renally dose abx as needed  Avoid nephrotoxins  Monitor    Discussed with internal medicine/ Dr Oliveira

## 2018-03-03 NOTE — CARE PLAN
Problem: Safety  Goal: Will remain free from injury  Outcome: PROGRESSING AS EXPECTED  Pt will remain injury free.  Hourly rounding  Call light in reach

## 2018-03-03 NOTE — PROGRESS NOTES
EKG Rhythm Strip    NV Interval: 0.18  QRS Interval: 0.08  QT Interval: 0.40  Rhythm Interpretation: SR,ST, PSVT (5 seconds).    Ectopy: O-PVC, R-PVC.

## 2018-03-03 NOTE — DIETARY
"Nutrition services: Day 8 of admit.  Chang Unger is a 77 y.o. male with admitting DX of Neutropenic fever   Patient seen for length of stay greater than 6 days.  Pertinent History:  Non-Hodgkin's lymphoma (in remission since 2010)    Assessment:  Height: 177.8 cm (5' 10\")  Weight: 88.8 kg (195 lb 12.3 oz)  Body mass index is 28.09 kg/m².   Diet Rx:  Regular    Evaluation:   1. PO intake:  Variable. Averaging 25-50%  2. Pertinent Labs:  Na 133, Glucose 106  3. Pertinent Medications/fluids: NS at 83 ml/hr, Diflucan, Synthroid, Kdur  4. Skin:  No skin breakdown noted    Patient has menus and nursing is helping patient call down to order from room service menu.    Recommendations/Plan:  1. Encourage PO intake.  2. RD to monitor PO intake.  3. Nutrition Services to call patient (he is isolation), to help with menu selections.  4. Nursing:  Please continue to assist patient with communicating meal selections to kitchen as needed.      "

## 2018-03-03 NOTE — CARE PLAN
Problem: Safety  Goal: Will remain free from injury  Outcome: PROGRESSING AS EXPECTED      Problem: Knowledge Deficit  Goal: Knowledge of disease process/condition, treatment plan, diagnostic tests, and medications will improve  Outcome: PROGRESSING AS EXPECTED  POC reviewed with pt. Pt verbalized understanding.

## 2018-03-03 NOTE — PROGRESS NOTES
Report received from Day RN, assumed care of pt at this time. POC and medications reviewed with pt. Pt verbalized understanding. Pt denies pain, SOB, or dizziness at this time. Safety measures in place. Will continues to monitor.

## 2018-03-03 NOTE — PROGRESS NOTES
Pt voided dark yellow urine and loose brown bm.  Poor appetite.  Iv infusing.  Denies pain at present.

## 2018-03-04 PROBLEM — D61.818 PANCYTOPENIA (HCC): Status: ACTIVE | Noted: 2018-01-01

## 2018-03-04 NOTE — CARE PLAN
Problem: Safety  Goal: Will remain free from injury    Intervention: Provide assistance with mobility  Encouraged patient to call for assistance with call light   Call light and belongings within reach        Problem: Knowledge Deficit  Goal: Knowledge of disease process/condition, treatment plan, diagnostic tests, and medications will improve    Intervention: Assess knowledge level of disease process/condition, treatment plan, diagnostic tests, and medications  Implemented medication teaching, patient states verbal understanding        Problem: Pain Management  Goal: Pain level will decrease to patient's comfort goal    Intervention: Follow pain managment plan developed in collaboration with patient and Interdisciplinary Team  Work with pt on comfort goal; pain management per MD orders.

## 2018-03-04 NOTE — PROGRESS NOTES
Telemetry Summary    Rhythm: Sinus Rhythm  Ectopy: Rare PVC  Rate: 70's-80's  Pr: 0.14  QRS: 0.08  Qt: 0.32

## 2018-03-04 NOTE — PROGRESS NOTES
Patient in bed with HOB elevated.  No complaints of pain or discomfort.  Patient respirations regular and unlabored.  Bed rails up X 2, call light and belongings within reach, patient encouraged to call for assistance.   Will continue to monitor.

## 2018-03-04 NOTE — PROGRESS NOTES
Critical labs received from [facility] from lab.  WBC's of 0.3 and platelets of 24.  Dr. Duarte notified.

## 2018-03-04 NOTE — PROGRESS NOTES
0700-Report from Shantell SUMMERS. POC Reviewed. Pt resting in bed. Denies any pain or discomfort. Call light remains in reach. Will continue to monitor.     0830-AM meds given. Tolerated well. No current needs. Will call RN/CNA if any needs arise.

## 2018-03-04 NOTE — CARE PLAN
Problem: Communication  Goal: The ability to communicate needs accurately and effectively will improve  Outcome: PROGRESSING AS EXPECTED  Communication will remain effective with patient. Updated on POC. All questions and concerns addressed as they arise.     Problem: Safety  Goal: Will remain free from injury  Outcome: PROGRESSING AS EXPECTED  Pt will remain free from injury. Pt has been educated on the call light and demonstrates its use appropriately. Fall precautions in place.

## 2018-03-05 NOTE — PROGRESS NOTES
Infectious Disease Progress Note    Author: Santa Lynne M.D. Date & Time of service: 3/5/2018  3:59 PM    Chief Complaint:  FU neutropenic fever      Interval History:  Tmax 100.4, WBC 0.2, ANC 0.2, feels better today, had R groin pain overnight which spontaneously resolved   Tmax 100.5, having coughing fit and SOB, plan for BM biopsy  3/1Tmax 100.2, WBC 0.3, had a BM this am, cough better, repeat CXR with no infiltrate   3/2 Tmax 100.5, shortness of breath with exertion, had bloody nose this am, plts 22, denies abd pain or diarrhea, path neg for lyphoma  3/3/2018-Tmax 99.6. WBC 0.3 platelets 17 creatinine 1.38  3/4/2018-Tmax 99.2 WBCs 0.3 creatinine 1.38 complains of some shortness of breath  3/5 AF WBC 0.3 transferred to [facility] no new complaints  Labs Reviewed, Medications Reviewed, Radiology Reviewed and Wound Reviewed.    Review of Systems:  Review of Systems   Constitutional: Negative for fever.   Respiratory: Negative for cough.    Cardiovascular: Negative for chest pain.   Gastrointestinal: Negative for nausea and vomiting.   Genitourinary: Negative for dysuria.   Neurological: Positive for weakness.   All other systems reviewed and are negative.      Hemodynamics:  Temp (24hrs), Av.7 °C (98.1 °F), Min:36.4 °C (97.6 °F), Max:36.9 °C (98.5 °F)  Temperature: 36.6 °C (97.9 °F)  Pulse  Av.3  Min: 79  Max: 88   Blood Pressure : 119/67       Physical Exam:  Physical Exam   Constitutional: He is oriented to person, place, and time. He appears well-developed. No distress.   HENT:   Head: Normocephalic and atraumatic.   Eyes: EOM are normal. Pupils are equal, round, and reactive to light.   Neck: Neck supple.   Cardiovascular: Normal rate.    Pulmonary/Chest: Effort normal. No respiratory distress. He has no wheezes. He has no rales.   Abdominal: Soft. He exhibits no distension. There is no tenderness.   Musculoskeletal: He exhibits no edema.   Neurological: He is alert and oriented to person, place,  and time.   Skin: No rash noted.   Nursing note and vitals reviewed.      Meds:    Current Facility-Administered Medications:   •  cefepime  •  NS  •  acetaminophen  •  benzocaine-menthol  •  benzonatate  •  fluconazole  •  labetalol  •  levothyroxine  •  ondansetron  •  potassium chloride SA  •  [START ON 3/7/2018] PARoxetine  •  senna-docusate  •  polyethylene glycol/lytes  •  magnesium hydroxide  •  bisacodyl  •  tbo-filgrastim  •  zolpidem    Labs:  Recent Labs      03/03/18 0523 03/04/18   0429  03/05/18 0316   WBC  0.3*  0.3*  0.3*   RBC  3.06*  2.94*  3.11*   HEMOGLOBIN  8.5*  8.3*  8.4*   HEMATOCRIT  24.5*  23.6*  25.0*   MCV  80.1*  80.3*  80.4*   MCH  27.8  28.2  27.0   RDW  43.5  44.1  44.6   PLATELETCT  17*  24*  18*   MPV  13.8*  11.4   --    NEUTSPOLYS  CANCEL   --   20.40*   LYMPHOCYTES  CANCEL   --   76.70*   MONOCYTES  CANCEL   --   1.00   EOSINOPHILS  CANCEL   --   0.00   BASOPHILS  CANCEL   --   0.00     Recent Labs      03/03/18   0523 03/05/18 0316  03/05/18   0840   SODIUM  133*  134*  133*   POTASSIUM  4.0  4.3  4.4   CHLORIDE  106  103  102   CO2  22  22  22   GLUCOSE  106*  104*  103*   BUN  12  15  15     Recent Labs      03/03/18   0523 03/05/18 0316 03/05/18   0840   ALBUMIN   --   3.0*  3.6   TBILIRUBIN   --   2.1*  2.5*   ALKPHOSPHAT   --   90  100*   TOTPROTEIN   --   4.3*  5.0*   ALTSGPT   --   28  32   ASTSGOT   --   22  24   CREATININE  1.38  1.29  1.29       Imaging:  Ct-abdomen-pelvis W/o    Result Date: 2/24/2018 2/24/2018 6:09 AM HISTORY/REASON FOR EXAM:  Fever. TECHNIQUE/EXAM DESCRIPTION: CT scan of the abdomen and pelvis without contrast. Noncontrast helical scanning was obtained from the diaphragmatic domes through the pubic symphysis. Low dose optimization technique was utilized for this CT exam including automated exposure control and adjustment of the mA and/or kV according to patient size. COMPARISON: 4/12/2017. FINDINGS: There are trace bilateral pleural  effusions. There is a rounded opacity at the right lung base likely representing round atelectasis. There is trace pericardial fluid. No free air is seen in the abdomen or pelvis. Evaluation of parenchymal and vascular structures is limited by the lack of intravenous contrast. Liver is hypodense suggestive of hepatic steatosis. Subtle 1.1 cm hypodense lesion in the right lobe of the liver corresponds to the previously noted hemangioma. There are calcified granulomata within the liver. Gallbladder appears unremarkable. The spleen is not enlarged. No adrenal mass is identified on the left. There is a right adrenal nodule measuring 2 cm which is compatible with an adrenal adenoma. There is renal cortical atrophy on the right. There are bilateral nonobstructing renal calculi. There are parapelvic cysts on the left. There is no evidence of hydronephrosis. Pancreas appears unremarkable.  Aorta is not aneurysmal. Atherosclerotic plaque is seen. Mildly enlarged dense right inguinal lymph node is again seen. There are dense retroperitoneal and right iliac lymph nodes. The dens retroperitoneal nodes measure up to 1.9 cm in short axis dimension, not significantly changed compared to prior. Haziness about the mesentery is not significantly changed compared to prior. Dense lymph node in the root of the mesentery measures 1.6 cm in short axis dimension, unchanged. There is no evidence of bowel obstruction. There is no evidence of acute appendicitis. Bladder is mildly distended. Prostate is enlarged. There is a fat-containing right inguinal hernia. Degenerative changes are seen in the spine. Wedge deformities of L1 and L2 appear unchanged.     Retroperitoneal, iliac and mesenteric lymphadenopathy is not significantly changed compared to prior. Haziness about the root of the mesentery is again noted. Nonobstructing bilateral renal calculi. Atrophic right kidney. Left parapelvic cysts. Enlarged prostate. Trace bilateral pleural  effusions. Rounded opacity at the right lung base likely represents round atelectasis.    Ct-head W/o    Result Date: 2/24/2018 2/24/2018 6:09 AM HISTORY/REASON FOR EXAM:  Fever. TECHNIQUE/EXAM DESCRIPTION AND NUMBER OF VIEWS: CT of the head without contrast. Contiguous axial sections were obtained from the skull base through the vertex. Up to date radiation dose reduction adjustments have been utilized to meet ALARA standards for radiation dose reduction. COMPARISON:  None available FINDINGS: The is no evidence of intraparenchymal, intraventricular and extra-axial hemorrhage.  The ventricles and cortical sulci are prominent compatible with age related change.  There is no mass effect or midline shift. There is minimal mucosal thickening within the maxillary sinuses. Visualized mastoid air cells are clear. The calvarium is intact. There is mild left frontal soft tissue swelling.     No acute intracranial abnormality is identified. Cortical atrophy. Mild left frontal soft tissue swelling.    Ct-maxillofacial W/o Plus Recons    Result Date: 2/26/2018 2/26/2018 7:24 PM HISTORY/REASON FOR EXAM:  Facial pain. Suspected sinusitis. TECHNIQUE/EXAM DESCRIPTION AND NUMBER OF VIEWS:  CT scan maxillofacial without contrast, with reconstructions. Thin-section helical imaging was obtained of the face from the orbital roofs through the mandible. Coronal multiplanar volume reformat images were generated from the axial data. Low dose optimization technique was utilized for this CT exam including automated exposure control and adjustment of the mA and/or kV according to patient size. FINDINGS: The frontal sinus is clear. There is minimal mucosal thickening in the ethmoid sinus bilaterally. There is mild bilateral mucosal thickening and/or polyp formation in the maxillary sinus. Both maxillary sinus infundibulum and ostia are patent. The sphenoid sinus is clear. No significant bony nasal septal deviation is present. No nasal  turbinate edema is present.     1.  Mild chronic bilateral maxillary and ethmoid sinusitis. 2.  No evidence of acute sinusitis. 3.  Areas of rounded mucosal thickening or polyp formation in each maxillary sinus antrum. 4.  Otherwise clear paranasal sinuses.    Dx-chest-2 Views    Result Date: 2018 8:43 AM HISTORY/REASON FOR EXAM: Cough.  Lymphoma TECHNIQUE/EXAM DESCRIPTION AND NUMBER OF VIEWS: Two views of the chest. COMPARISON:   FINDINGS: Cardiomediastinal contours are stable with some aortic ectasia and upper normal heart size. No pulmonary consolidation is seen. Stable mostly right pleural space thickening, possible small effusions Chronic upper lumbar moderate severity compression fracture resulting in kyphosis     Stable chest with some chronic pleural space thickening more likely from pleural parenchymal scarring than pleural fluid, large lung volumes and mild cardiac silhouette enlargement    Dx-chest-portable (1 View)    Result Date: 2018 6:01 PM HISTORY/REASON FOR EXAM:  Cough. TECHNIQUE/EXAM DESCRIPTION AND NUMBER OF VIEWS: Single portable view of the chest. COMPARISON: 3/22/2017 FINDINGS: Cardiomediastinal contour is within normal limits. Lungs show mild hypoinflation. No focal pulmonary consolidation. No pleural fluid collection or pneumothorax. No major bony abnormality is seen.     Hypoinflation without other evidence for acute cardiopulmonary disease.    Echocardiogram Comp W/o Cont    Result Date: 2018  Transthoracic Echo Report Echocardiography Laboratory CONCLUSIONS No prior study is available for comparison. Normal transthoracic echocardiogram. KEVONSHELLIE DELVALLE Exam Date:         2018                    12:59 Exam Location:     Inpatient Priority:          Routine Ordering Physician:        RILEY JAQUEZ Referring Physician:       [id number] Zacarias Sonographer:               Ashish Zapata RDCS Age:    77     Gender:    M MRN:    [id number] :     1940 BSA:    2.05   Ht (in):    70     Wt (lb):    191 Exam Type:     Complete Indications:     Weakness ICD Codes:       R531 CPT Codes:       94160 BP:   117    /   66     HR:   99 Technical Quality:       Fair MEASUREMENTS  (Male / Female) Normal Values 2D ECHO LV Diastolic Diameter PLAX        4.4 cm                4.2 - 5.9 / 3.9 - 5.3 cm LV Systolic Diameter PLAX         2.7 cm                2.1 - 4.0 cm IVS Diastolic Thickness           1.2 cm                LVPW Diastolic Thickness          1.2 cm                LVOT Diameter                     2.1 cm                Estimated LV Ejection Fraction    65 %                  LV Ejection Fraction MOD BP       65.4 %                >= 55  % LV Ejection Fraction MOD 4C       69.4 %                LV Ejection Fraction MOD 2C       63.3 %                IVC Diameter                      1.4 cm                M-MODE Aortic Root Diameter MM           3.2 cm                DOPPLER AV Peak Velocity                  1.6 m/s               AV Peak Gradient                  9.7 mmHg              AV Mean Gradient                  5.6 mmHg              LVOT Peak Velocity                0.97 m/s              AV Area Cont Eq vti               2.2 cm²               Mitral E Point Velocity           1.1 m/s               Mitral E to A Ratio               1.1                   Mitral A Duration                 142 ms                MV Pressure Half Time             45.2 ms               MV Area PHT                       4.9 cm²               MV Deceleration Time              156 ms                Pulmonary Vein A Velocity         0.5 m/s               Pulmonary Vein A Duration         104 ms                Pulm Vein-MV A Duration           -38 ms                TV Peak E Velocity                0.59 m/s              TR Peak Velocity                  318 cm/s              PV Peak Velocity                  1.2 m/s               PV Peak Gradient                  6 mmHg                 RVOT Peak Velocity                0.83 m/s              * Indicates values subject to auto-interpretation LV EF:  65    % FINDINGS Left Ventricle Normal left ventricular size, systolic function, and diastolic function. Mild concentric left ventricular hypertrophy. Normal regional wall motion. Left ventricular ejection fraction is visually estimated to be 65%. Right Ventricle The right ventricle was normal in size and function. Right Atrium The right atrium is normal in size.  Normal inferior vena cava size and inspiratory collapse. Left Atrium The left atrium is normal in size.  Left atrial volume index is 26 mL/sq m. Mitral Valve Structurally normal mitral valve without significant stenosis. Trace mitral regurgitation. Aortic Valve Tricuspid aortic valve. Aortic sclerosis without stenosis. No aortic insufficiency. Tricuspid Valve Structurally normal tricuspid valve without significant stenosis. Mild tricuspid regurgitation. Right atrial pressure is estimated to be 3 mmHg. Estimated right ventricular systolic pressure is 40 mmHg. Pulmonic Valve Structurally normal pulmonic valve without significant stenosis or regurgitation. Pericardium Normal pericardium without effusion. Aorta The aortic root is normal. Jovany Heath (Electronically Signed) Final Date:     25 February 2018                 17:42      Micro:  Results     ** No results found for the last 168 hours. 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 Path - neg for malignancy, no blasts, +granulomatous inflammation-sent to [facility]  For transfer to oncology floor     H/o Lymphoma  Last chemo in 2010     ROHITH, improved  Renally dose abx as needed  Avoid nephrotoxins  Monitor    Discussed with internal medicine.

## 2018-03-05 NOTE — DISCHARGE PLANNING
Received transport request from SHAE Daugherty. PCS has been faxed to [facility].  Per Bed Control we are awaiting release of bed.

## 2018-03-05 NOTE — PROGRESS NOTES
Pt transferred to [facility] via [facility], room T401. Report to GSU at [facility]. Call back number provided if any questions arise. All belongings with patient.

## 2018-03-05 NOTE — PROGRESS NOTES
Assessment completed.  AA&Ox4. VSS on RA. Denies SOB. Denies any pain.  Cardiac diet. Denies N/V.  + void. Bedside commode in use for urgency. LBM PTA.  Pt is up with SBA.   Admit profile complete. Vax refused.  No active orders at beginning of shift. Admitting MD paged. All orders received from Dr. Oliveira via telephone with readback.   IVF started. Night meds administered.  All needs met at this time. Call light within reach. Pt calls appropriately.

## 2018-03-05 NOTE — ASSESSMENT & PLAN NOTE
IV diuresis started 3/26  Cr increased but good urinary output  Avoid nephrotoxins   Renally dose all medications

## 2018-03-05 NOTE — ASSESSMENT & PLAN NOTE
Secondary to severe bone marrow infiltrate.   T cell LGL - leukemia  Campath start admin 3/16 discussed with Dr. Horta, patient will be here for the duration of campath administration.

## 2018-03-05 NOTE — PROGRESS NOTES
2 RN skin check complete.  Scattered bruising noted on BUE.   Skin tear to R elbow. No drainage noted.  Previous biopsy site with dressing on left lower back.  Scabbing noted to L knee and L shin.  No other notable areas of skin breakdown.

## 2018-03-05 NOTE — PROGRESS NOTES
Received report from MELINA Smith [phone] at [facility].   Pt on transport with SUSAN

## 2018-03-05 NOTE — PROGRESS NOTES
Lubna from Lab called with critical result of WBC at 0.3 and PLT at 18 . Critical lab result read back to Arie.

## 2018-03-05 NOTE — PROGRESS NOTES
Infectious Disease Progress Note    Author: Marisabel Gomez M.D. Date & Time of service: 3/4/2018  4:26 PM    Chief Complaint:  FU neutropenic fever    Interval History:   Tmax 100.4, WBC 0.2, ANC 0.2, feels better today, had R groin pain overnight which spontaneously resolved   Tmax 100.5, having coughing fit and SOB, plan for BM biopsy  3/1Tmax 100.2, WBC 0.3, had a BM this am, cough better, repeat CXR with no infiltrate   3/2 Tmax 100.5, shortness of breath with exertion, had bloody nose this am, plts 22, denies abd pain or diarrhea, path neg for lyphoma  3/3/2018-Tmax 99.6. WBC 0.3 platelets 17 creatinine 1.38  3/4/2018-Tmax 99.2 WBCs 0.3 creatinine 1.38 complains of some shortness of breath  Labs Reviewed, Medications Reviewed, Radiology Reviewed and Wound Reviewed.    Review of Systems:  Review of Systems   Constitutional: Negative for fever.   HENT: Negative for congestion and nosebleeds.    Respiratory: Positive for shortness of breath. Negative for cough.         With exertion   Gastrointestinal: Negative for abdominal pain, diarrhea, nausea and vomiting.   Genitourinary: Negative for dysuria and urgency.   Musculoskeletal: Negative for myalgias.   Neurological: Negative for sensory change and speech change.       Hemodynamics:  Temp (24hrs), Av.9 °C (98.5 °F), Min:36.4 °C (97.6 °F), Max:37.3 °C (99.2 °F)  Temperature: 37.1 °C (98.8 °F)  Pulse  Av.7  Min: 70  Max: 115   Blood Pressure : 127/64       Physical Exam:  Physical Exam   Constitutional: He is oriented to person, place, and time. He appears well-developed.   Ill appearing   HENT:   Head: Normocephalic and atraumatic.   Eyes: EOM are normal. Pupils are equal, round, and reactive to light.   Neck: Neck supple.   Cardiovascular: Normal rate, regular rhythm and normal heart sounds.    Pulmonary/Chest: Effort normal. He has rales.   Abdominal: Soft. There is no tenderness.   Musculoskeletal: Normal range of motion. He exhibits no  edema.   Neurological: He is alert and oriented to person, place, and time.   Vitals reviewed.      Meds:    Current Facility-Administered Medications:   •  potassium chloride SA  •  fluconazole  •  cefepime (MAXIPIME) 2G IVPB in 100 mL  •  tbo-filgrastim  •  acetaminophen  •  benzonatate  •  NS  •  NS  •  levothyroxine  •  PARoxetine  •  senna-docusate **AND** polyethylene glycol/lytes **AND** magnesium hydroxide **AND** bisacodyl  •  NS  •  labetalol  •  ondansetron  •  ondansetron  •  zolpidem  •  benzocaine-menthol    Labs:  Recent Labs      03/02/18   0511  03/03/18   0523  03/04/18   0429   WBC  0.2*  0.3*  0.3*   RBC  3.07*  3.06*  2.94*   HEMOGLOBIN  8.5*  8.5*  8.3*   HEMATOCRIT  24.8*  24.5*  23.6*   MCV  80.8*  80.1*  80.3*   MCH  27.7  27.8  28.2   RDW  43.8  43.5  44.1   PLATELETCT  22*  17*  24*   MPV  12.9  13.8*  11.4   NEUTSPOLYS  CANCEL  CANCEL   --    LYMPHOCYTES  CANCEL  CANCEL   --    MONOCYTES  CANCEL  CANCEL   --    EOSINOPHILS  CANCEL  CANCEL   --    BASOPHILS  CANCEL  CANCEL   --      Recent Labs      03/02/18   0511  03/03/18   0523   SODIUM  134*  133*   POTASSIUM  3.8  4.0   CHLORIDE  107  106   CO2  22  22   GLUCOSE  101*  106*   BUN  13  12     Recent Labs      03/02/18   0511  03/03/18   0523   CREATININE  1.36  1.38       Imaging:  Ct-abdomen-pelvis W/o    Result Date: 2/24/2018 2/24/2018 6:09 AM HISTORY/REASON FOR EXAM:  Fever. TECHNIQUE/EXAM DESCRIPTION: CT scan of the abdomen and pelvis without contrast. Noncontrast helical scanning was obtained from the diaphragmatic domes through the pubic symphysis. Low dose optimization technique was utilized for this CT exam including automated exposure control and adjustment of the mA and/or kV according to patient size. COMPARISON: 4/12/2017. FINDINGS: There are trace bilateral pleural effusions. There is a rounded opacity at the right lung base likely representing round atelectasis. There is trace pericardial fluid. No free air is seen in  the abdomen or pelvis. Evaluation of parenchymal and vascular structures is limited by the lack of intravenous contrast. Liver is hypodense suggestive of hepatic steatosis. Subtle 1.1 cm hypodense lesion in the right lobe of the liver corresponds to the previously noted hemangioma. There are calcified granulomata within the liver. Gallbladder appears unremarkable. The spleen is not enlarged. No adrenal mass is identified on the left. There is a right adrenal nodule measuring 2 cm which is compatible with an adrenal adenoma. There is renal cortical atrophy on the right. There are bilateral nonobstructing renal calculi. There are parapelvic cysts on the left. There is no evidence of hydronephrosis. Pancreas appears unremarkable.  Aorta is not aneurysmal. Atherosclerotic plaque is seen. Mildly enlarged dense right inguinal lymph node is again seen. There are dense retroperitoneal and right iliac lymph nodes. The dens retroperitoneal nodes measure up to 1.9 cm in short axis dimension, not significantly changed compared to prior. Haziness about the mesentery is not significantly changed compared to prior. Dense lymph node in the root of the mesentery measures 1.6 cm in short axis dimension, unchanged. There is no evidence of bowel obstruction. There is no evidence of acute appendicitis. Bladder is mildly distended. Prostate is enlarged. There is a fat-containing right inguinal hernia. Degenerative changes are seen in the spine. Wedge deformities of L1 and L2 appear unchanged.     Retroperitoneal, iliac and mesenteric lymphadenopathy is not significantly changed compared to prior. Haziness about the root of the mesentery is again noted. Nonobstructing bilateral renal calculi. Atrophic right kidney. Left parapelvic cysts. Enlarged prostate. Trace bilateral pleural effusions. Rounded opacity at the right lung base likely represents round atelectasis.    Ct-head W/o    Result Date: 2/24/2018 2/24/2018 6:09 AM HISTORY/REASON  FOR EXAM:  Fever. TECHNIQUE/EXAM DESCRIPTION AND NUMBER OF VIEWS: CT of the head without contrast. Contiguous axial sections were obtained from the skull base through the vertex. Up to date radiation dose reduction adjustments have been utilized to meet ALARA standards for radiation dose reduction. COMPARISON:  None available FINDINGS: The is no evidence of intraparenchymal, intraventricular and extra-axial hemorrhage.  The ventricles and cortical sulci are prominent compatible with age related change.  There is no mass effect or midline shift. There is minimal mucosal thickening within the maxillary sinuses. Visualized mastoid air cells are clear. The calvarium is intact. There is mild left frontal soft tissue swelling.     No acute intracranial abnormality is identified. Cortical atrophy. Mild left frontal soft tissue swelling.    Ct-maxillofacial W/o Plus Recons    Result Date: 2/26/2018 2/26/2018 7:24 PM HISTORY/REASON FOR EXAM:  Facial pain. Suspected sinusitis. TECHNIQUE/EXAM DESCRIPTION AND NUMBER OF VIEWS:  CT scan maxillofacial without contrast, with reconstructions. Thin-section helical imaging was obtained of the face from the orbital roofs through the mandible. Coronal multiplanar volume reformat images were generated from the axial data. Low dose optimization technique was utilized for this CT exam including automated exposure control and adjustment of the mA and/or kV according to patient size. FINDINGS: The frontal sinus is clear. There is minimal mucosal thickening in the ethmoid sinus bilaterally. There is mild bilateral mucosal thickening and/or polyp formation in the maxillary sinus. Both maxillary sinus infundibulum and ostia are patent. The sphenoid sinus is clear. No significant bony nasal septal deviation is present. No nasal turbinate edema is present.     1.  Mild chronic bilateral maxillary and ethmoid sinusitis. 2.  No evidence of acute sinusitis. 3.  Areas of rounded mucosal thickening  or polyp formation in each maxillary sinus antrum. 4.  Otherwise clear paranasal sinuses.    Dx-chest-portable (1 View)    Result Date: 2018 6:01 PM HISTORY/REASON FOR EXAM:  Cough. TECHNIQUE/EXAM DESCRIPTION AND NUMBER OF VIEWS: Single portable view of the chest. COMPARISON: 3/22/2017 FINDINGS: Cardiomediastinal contour is within normal limits. Lungs show mild hypoinflation. No focal pulmonary consolidation. No pleural fluid collection or pneumothorax. No major bony abnormality is seen.     Hypoinflation without other evidence for acute cardiopulmonary disease.    Echocardiogram Comp W/o Cont    Result Date: 2018  Transthoracic Echo Report Echocardiography Laboratory CONCLUSIONS No prior study is available for comparison. Normal transthoracic echocardiogram. SHELLIE CASIANO Exam Date:         2018                    12:59 Exam Location:     Inpatient Priority:          Routine Ordering Physician:        RILEY JAQUEZ Referring Physician:       [id number] Zacarias Sonographer:               Ashish Zapata RDCS Age:    77     Gender:    M MRN:    [id number] :    1940 BSA:    2.05   Ht (in):    70     Wt (lb):    191 Exam Type:     Complete Indications:     Weakness ICD Codes:       R531 CPT Codes:       11324 BP:   117    /   66     HR:   99 Technical Quality:       Fair MEASUREMENTS  (Male / Female) Normal Values 2D ECHO LV Diastolic Diameter PLAX        4.4 cm                4.2 - 5.9 / 3.9 - 5.3 cm LV Systolic Diameter PLAX         2.7 cm                2.1 - 4.0 cm IVS Diastolic Thickness           1.2 cm                LVPW Diastolic Thickness          1.2 cm                LVOT Diameter                     2.1 cm                Estimated LV Ejection Fraction    65 %                  LV Ejection Fraction MOD BP       65.4 %                >= 55  % LV Ejection Fraction MOD 4C       69.4 %                LV Ejection Fraction MOD 2C       63.3 %                IVC Diameter                       1.4 cm                M-MODE Aortic Root Diameter MM           3.2 cm                DOPPLER AV Peak Velocity                  1.6 m/s               AV Peak Gradient                  9.7 mmHg              AV Mean Gradient                  5.6 mmHg              LVOT Peak Velocity                0.97 m/s              AV Area Cont Eq vti               2.2 cm²               Mitral E Point Velocity           1.1 m/s               Mitral E to A Ratio               1.1                   Mitral A Duration                 142 ms                MV Pressure Half Time             45.2 ms               MV Area PHT                       4.9 cm²               MV Deceleration Time              156 ms                Pulmonary Vein A Velocity         0.5 m/s               Pulmonary Vein A Duration         104 ms                Pulm Vein-MV A Duration           -38 ms                TV Peak E Velocity                0.59 m/s              TR Peak Velocity                  318 cm/s              PV Peak Velocity                  1.2 m/s               PV Peak Gradient                  6 mmHg                RVOT Peak Velocity                0.83 m/s              * Indicates values subject to auto-interpretation LV EF:  65    % FINDINGS Left Ventricle Normal left ventricular size, systolic function, and diastolic function. Mild concentric left ventricular hypertrophy. Normal regional wall motion. Left ventricular ejection fraction is visually estimated to be 65%. Right Ventricle The right ventricle was normal in size and function. Right Atrium The right atrium is normal in size.  Normal inferior vena cava size and inspiratory collapse. Left Atrium The left atrium is normal in size.  Left atrial volume index is 26 mL/sq m. Mitral Valve Structurally normal mitral valve without significant stenosis. Trace mitral regurgitation. Aortic Valve Tricuspid aortic valve. Aortic sclerosis without stenosis. No aortic insufficiency. Tricuspid Valve  "Structurally normal tricuspid valve without significant stenosis. Mild tricuspid regurgitation. Right atrial pressure is estimated to be 3 mmHg. Estimated right ventricular systolic pressure is 40 mmHg. Pulmonic Valve Structurally normal pulmonic valve without significant stenosis or regurgitation. Pericardium Normal pericardium without effusion. Aorta The aortic root is normal. Jovany Heath (Electronically Signed) Final Date:     25 February 2018                 17:42      Micro:  Results     Procedure Component Value Units Date/Time    BLOOD CULTURE [[id number]] Collected:  02/28/18 1015    Order Status:  Completed Specimen:  Bone Marrow Updated:  03/01/18 0656     Significant Indicator NEG     Source BMR     Site Bone Marrow     Blood Culture No Growth    Note: Blood cultures are incubated for 5 days and  are monitored continuously.Positive blood cultures  are called to the RN and reported as soon as  they are identified.      BLOOD CULTURE [[id number]] Collected:  02/23/18 1800    Order Status:  Completed Specimen:  Blood from Peripheral Updated:  02/28/18 2217     Significant Indicator NEG     Source BLD     Site Peripheral     Blood Culture No growth after 5 days of incubation.  Blood culture testing and Gram stain, if indicated, are  performed at [facility], [address] [place] [address].  Positive blood cultures are  sent to [facility], [address] [place] [address], for organism identification and  susceptibility testing.      Narrative:       Per Hospital Policy: Only change Specimen Src: to \"Line\" if  specified by physician order.    BLOOD CULTURE [[id number]] Collected:  02/23/18 1800    Order Status:  Completed Specimen:  Blood from Peripheral Updated:  02/28/18 2217     Significant Indicator NEG     Source BLD     Site Peripheral     Blood Culture No growth after 5 days of incubation.  Blood culture testing and Gram stain, if indicated, " "are  performed at [facility], [address] [place] [address].  Positive blood cultures are  sent to [facility], [address] [place] [address], for organism identification and  susceptibility testing.      Narrative:       Per Hospital Policy: Only change Specimen Src: to \"Line\" if  specified by physician order.    Culture Respiratory W/ GRM STN [[id number]] Collected:  02/26/18 2004    Order Status:  Canceled Specimen:  Sputum from Sputum Updated:  02/26/18 2016    URINE CULTURE(NEW) [[id number]] Collected:  02/23/18 2130    Order Status:  Completed Specimen:  Urine Updated:  02/26/18 0835     Significant Indicator NEG     Source UR     Site --     Urine Culture No growth at 48 hours.    Narrative:       Indication for culture:->Emergency Room Patient    C Diff by PCR rflx Toxin [[id number]] Collected:  02/25/18 1256    Order Status:  Completed Specimen:  Stool from Stool Updated:  02/25/18 1928     C Diff by PCR Negative     Comment: C. difficile NOT detected by PCR.  Treatment not indicated per guidelines.  Repeat testing not indicated within 7 days.          027-NAP1-BI Presumptive Negative     Comment: Presumptive 027/NAP1/BI target DNA sequences are NOT DETECTED.       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 Path - neg for malignancy, no blasts, +granulomatous inflammation  For transfer to oncology floor    H/o Lymphoma  Completed chemo in 2010  Neutropenic with fevers    ROHITH, improved  Renally dose abx as needed  Avoid nephrotoxins  Monitor    Discussed with internal medicine/ Dr Oliveira

## 2018-03-05 NOTE — ASSESSMENT & PLAN NOTE
ANC 0  On Cefepime and fluconazole.   Persistent neutropenia febrile, cough and diarrhea  Cdiff positive: completed po vanc x 14days now on prophylaxis  Cultures neg  ID following  Presently on: Atovaqone, acyclovir, po vanc, cefepime, diflucan

## 2018-03-05 NOTE — PROGRESS NOTES
Med rec completed by interview with patient at bedside  Pt was admitted on 2/23/18 at [facility] where he received abx while in house (fluconazole, augmentin, cefepime, zosyn, and vancomycin)  Allergies reviewed

## 2018-03-05 NOTE — CARE PLAN
Problem: Communication  Goal: The ability to communicate needs accurately and effectively will improve  Outcome: PROGRESSING AS EXPECTED  Pt appropriately verbalizes concerns and needs    Problem: Urinary Elimination:  Goal: Ability to reestablish a normal urinary elimination pattern will improve  Outcome: PROGRESSING AS EXPECTED  Pt uses bedside urinal to void

## 2018-03-05 NOTE — H&P
Hospital Medicine History and Physical    Date of Service  3/4/2018    Chief Complaint  Transfer from [facility] with pancytopenia, neutropenic fever, need for hematology evaluation    History of Presenting Illness  77 y.o. male history of non-Hodgkin's lymphoma treated 2010 Dr. Klein who presented 3/4/2018 as transfer from [facility] with pancytopenia, recent neutropenic fever .  Patient has been afebrile with negative cultures on IV cefepime, prophylactic Diflucan .  Recent bone marrow biopsy revealed hypercellular bone marrow with lymphocytic cells , positive T-cell marker stain, no blasts .  Cause for his pancytopenia remains still clear possible leukemia, lymphoma transformation . Molecular studies and pathology from [facility] pending .   Reports no fevers has occasional cough with congestion, improved .  Generalized occasional myalgias .  No bloody stools . Dr. Klein his hematologist is aware of patient admission and will plan for hematology consultation tomorrow.     Primary Care Physician  Hector Burnett M.D.    Consultants  Dr Mckinney ID     Code Status  Code: Full code    Review of Systems  Review of Systems   Constitutional: Positive for malaise/fatigue. Negative for chills, diaphoresis and fever.   HENT: Positive for congestion.    Eyes: Negative for discharge and redness.   Respiratory: Positive for cough. Negative for shortness of breath, wheezing and stridor.    Cardiovascular: Negative for chest pain, palpitations and leg swelling.   Gastrointestinal: Negative for abdominal pain, diarrhea and vomiting.   Genitourinary: Negative for flank pain and hematuria.   Musculoskeletal: Positive for myalgias. Negative for back pain, joint pain and neck pain.   Neurological: Positive for weakness. Negative for tremors, sensory change, speech change and focal weakness.   Psychiatric/Behavioral: Negative for hallucinations and substance abuse.          Past Medical History  Past Medical History:    Diagnosis Date   • Back pain    • Basal cell cancer    • Bronchitis    • Disorder of thyroid    • GERD (gastroesophageal reflux disease)    • Hematuria    • HTN    • Lymphoma (CMS-HCC)      Surgical History  Past Surgical History:   Procedure Laterality Date   • TONSILLECTOMY         Medications  Current Facility-Administered Medications on File Prior to Encounter   Medication Dose Route Frequency Provider Last Rate Last Dose   • acetaminophen (TYLENOL) tablet 650 mg  650 mg Oral Q6HRS PRN Thong Oliveira M.D.       • benzocaine-menthol (CEPACOL) lozenge 1 Lozenge  1 Lozenge Mouth/Throat Q2HRS PRN Thong Oliveira M.D.       • benzonatate (TESSALON) capsule 100 mg  100 mg Oral Q4HRS PRN Thong Oliveira M.D.       • cefepime (MAXIPIME) 2 g in  mL IVPB  2 g Intravenous Q12HRS Thong Oliveira M.D.       • [START ON 3/5/2018] fluconazole (DIFLUCAN) tablet 200 mg  200 mg Oral DAILY Thong Oliveira M.D.       • labetalol (NORMODYNE,TRANDATE) injection 10 mg  10 mg Intravenous Q4HRS PRN Thong Oliveira M.D.       • [START ON 3/5/2018] levothyroxine (SYNTHROID) tablet 100 mcg  100 mcg Oral AM ES Thong Oliveira M.D.       • NS infusion   Intravenous Continuous Thong Oliveira M.D.       • ondansetron (ZOFRAN) syringe/vial injection 4 mg  4 mg Intravenous Q4HRS PRN Thong Oliveira M.D.       • potassium chloride SA (Kdur) tablet 40 mEq  40 mEq Oral BID Thong Oliveira M.D.       • [START ON 3/7/2018] PARoxetine (PAXIL) tablet 20 mg  20 mg Oral Once per day on Sun Wed Thong Oliveira M.D.       • senna-docusate (PERICOLACE or SENOKOT S) 8.6-50 MG per tablet 2 Tab  2 Tab Oral BID Thong Oliveira M.D.        And   • polyethylene glycol/lytes (MIRALAX) PACKET 1 Packet  1 Packet Oral QDAY PRN Thong Oliveira M.D.        And   • magnesium hydroxide (MILK OF MAGNESIA) suspension 30 mL  30 mL Oral QDAY PRN Thong Oliveira M.D.        And   • bisacodyl (DULCOLAX) suppository 10 mg  10 mg Rectal QDAY PRN Thong Oliveira M.D.       • [START ON 3/5/2018]  tbo-filgrastim (GRANIX) injection 480 mcg  480 mcg Subcutaneous DAILY Thong Oliveira M.D.       • zolpidem (AMBIEN) tablet 5 mg  5 mg Oral HS PRN - MR X 1 Thong Oliveira M.D.         Current Outpatient Prescriptions on File Prior to Encounter   Medication Sig Dispense Refill   • cyclobenzaprine (FLEXERIL) 10 MG Tab Take 1 Tab by mouth 3 times a day as needed. 30 Tab 0   • HYDROcodone-acetaminophen (NORCO) 7.5-325 MG per tablet Take 1 Tab by mouth 2 times a day as needed for up to 30 days. 30 Tab 0   • paroxetine (PAXIL) 20 MG Tab Take 20 mg by mouth See Admin Instructions. Pt takes on Sunday and Wed     • Coenzyme Q10 (CO Q 10 PO) Take 1 Cap by mouth 2 Times a Day.     • TURMERIC CURCUMIN PO Take 1 Cap by mouth 2 Times a Day.     • ketorolac (TORADOL) 10 MG Tab Take 1 Tab by mouth every 6 hours as needed for up to 22 doses. 22 Tab 2   • ketorolac (TORADOL) 10 MG Tab Take 1 Tab by mouth every 6 hours as needed for up to 22 doses. 22 Tab 2   • levothyroxine (SYNTHROID) 100 MCG Tab Take 1 Tab by mouth Every morning on an empty stomach. 90 Tab 4   • lisinopril (PRINIVIL) 20 MG Tab TAKE 1 TABLET DAILY 90 Tab 4       Family History  Family History   Problem Relation Age of Onset   • Cancer Mother      pancreatic       Social History  Social History   Substance Use Topics   • Smoking status: Never Smoker   • Smokeless tobacco: Never Used   • Alcohol use No      Comment: Hx etoh abuse, does not drink        Allergies  Allergies   Allergen Reactions   • Nitroglycerin      Pulse drops to 40   • Losartan      Leg swelling        Physical Exam  Laboratory   Hemodynamics  No data recorded.      No Data Recorded           Respiratory                    Physical Exam   Constitutional: He is oriented to person, place, and time. No distress.   HENT:   Head: Normocephalic and atraumatic.   Nose: Nose normal.   Eyes: Conjunctivae and EOM are normal. No scleral icterus.   Neck: Normal range of motion. No JVD present.   Cardiovascular:  Normal rate and regular rhythm.    No murmur heard.  Pulmonary/Chest: Effort normal. No stridor. No respiratory distress. He has no wheezes. He has no rales.   Abdominal: Soft. Bowel sounds are normal. He exhibits no distension. There is no tenderness.   Obese   Musculoskeletal: He exhibits no edema or tenderness.   Neurological: He is alert and oriented to person, place, and time. No cranial nerve deficit.   Skin: Skin is warm and dry. He is not diaphoretic. No pallor.   Sporadic old appearing bruises, ecchymosis of exts.   Psychiatric: He has a normal mood and affect. His behavior is normal.   Vitals reviewed.        Assessment/Plan  * Pancytopenia (CMS-[facility])   Assessment & Plan    His counts have not improved with Granix or sustained with platelet transfusions.   Discussed with Dr. Klein concern for leukemic or  lymphoma infiltration of bone marrow.  Follow-up molecular studies of BMB. Samples were sent to [facility].   Heme onc consultation tomorrow.  Monitor cbc w diff.         Thrombocytopenia (CMS-HCC)- (present on admission)   Assessment & Plan    Close platelet transfusions ×2 at [facility], no symptoms of active bleed  Monitor platelets, transfuse if significant decline        Neutropenic fever (CMS-HCC)- (present on admission)   Assessment & Plan    Attributed to sinusitis, cultures from [facility] negative  Provide as needed Tylenol  Prophylactic Diflucan   IV cefepime for sinusitis coverage        Maxillary sinusitis- (present on admission)   Assessment & Plan    IV cefepime to be continued per ID        Acute renal failure (ARF) (CMS-HCC)- (present on admission)   Assessment & Plan    Recently diagnosed, attributed to dehydration  Renal function improved, stabilizing  Follow-up renal function electrolytes urine output  Continue with IV fluids until consistent intake         Hyponatremia- (present on admission)   Assessment & Plan    Monitor        Essential hypertension- (present on admission)    Assessment & Plan    Controlled  Resume home medications          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 Klein. To have hematology / oncology evaluation            I anticipate this patient will require at least two midnights for appropriate medical management, necessitating inpatient admission.    Prophylaxis: SCDs    Recent Labs      03/02/18   0511  03/03/18   0523  03/04/18   0429   WBC  0.2*  0.3*  0.3*   RBC  3.07*  3.06*  2.94*   HEMOGLOBIN  8.5*  8.5*  8.3*   HEMATOCRIT  24.8*  24.5*  23.6*   MCV  80.8*  80.1*  80.3*   MCH  27.7  27.8  28.2   MCHC  34.3  34.7  35.2   RDW  43.8  43.5  44.1   PLATELETCT  22*  17*  24*   MPV  12.9  13.8*  11.4     Recent Labs      03/02/18   0511  03/03/18   0523   SODIUM  134*  133*   POTASSIUM  3.8  4.0   CHLORIDE  107  106   CO2  22  22   GLUCOSE  101*  106*   BUN  13  12   CREATININE  1.36  1.38   CALCIUM  7.1*  7.4*     Recent Labs      03/02/18   0511  03/03/18   0523   GLUCOSE  101*  106*                 Lab Results   Component Value Date    TROPONINI <0.02 02/23/2018       Imaging  No orders to display

## 2018-03-05 NOTE — PROGRESS NOTES
Pt on unit from [facility]    2 RN skin check complete  Scab to R elbow  Redness that blanches to L elbow  Scattered abrasions to BLE  Bone marrow biopsy site to L flank  Red freckle-like spots generalized on back  Bilateral feet dry and flaky  No other areas of redness or concern noted    Admit profile complete  Refuses vaccines  Med Rec Complete  GRETEL verified    78 yo M  Full Code  GRETEL: nitroglycerin and losartan  Regular diet  Admit to [facility] 2/23- transfer to [facility] 3/4    Assessment done     A+Ox4    RA    Active BS x4  Declines meal at this time  Prefers water NO ICE    L wrist 18g PIV saline locked with coban wrapping     Pt up to commode for voiding/BM  Bedside commode in place  [facility] 3/4  +voiding    Pt ambulated from gurney to bed, steady gait, no assistance or assistive devices needed    Bed low and locked, call light and belongings in reach, hourly rounding in place    All needs met at this time

## 2018-03-05 NOTE — CARE PLAN
Problem: Communication  Goal: The ability to communicate needs accurately and effectively will improve  Outcome: PROGRESSING AS EXPECTED  Education provided on importance of using call light to alert staff of patient needs. Pt demonstrates understanding by using call light appropriately.    Problem: Safety  Goal: Will remain free from falls  Outcome: PROGRESSING AS EXPECTED  Treaded slipper socks worn. Bed locked and in lowest position. Reinforced use of call light to alert staff of needs. Patient remains free from falls.

## 2018-03-05 NOTE — DISCHARGE SUMMARY
Hospital Medicine Discharge Note     Admit Date:  2/23/2018       Discharge Date:   3/4/2018    Attending Physician:  Thong Oliveira M.D.      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 Klein,      Maxillary sinusitis POA: Yes    Sepsis (CMS-[facility]) POA: Unknown    Essential hypertension POA: Yes    Hyponatremia POA: Yes    Acute renal failure (ARF) (CMS-HCC) POA: Yes    Hypothyroidism due to acquired atrophy of thyroid POA: Yes      Overview: TSH 88 on 1/14/2014.    Mixed hyperlipidemia- mild no meds POA: Yes      Overview: Chol level 392 on 1/14/2014,    Normochromic normocytic anemia POA: Yes    Hypokalemia POA: Unknown    Hospital Summary (Brief Narrative):         Patient is a 77 year old male hx non-Hodgkin's lymphoma post treatment in 2010 per Dr. Klein, hypertension, hypothyroid, dyslipidemia was admitted with symptoms of cough, fever.  Patient had findings of severe pancytopenia and was treated for sepsis. Started on IV vancomycin, Zosyn with Diflucan. Eventually de-escalate to cefepime for sinusitis. Cultures negative.  Patient required platelet transfusions ×2. Given Granix , despite this remains severely neutropenic without any significant increase Wbc . Underwent bone marrow biopsy revealing hypercellular bone marrow lympho histiocytic cells stained for T-cell marker. No blasts.  Molecular studies sent to [facility], pending.  Fever resolved.  Discussed with Dr. Klein recommended transfer [facility] for hematology resource / consult.        Consultants:        Dr Blade FISHER    Imaging/ Testing:      DX-CHEST-2 VIEWS   Final Result      Stable chest with some chronic pleural space thickening  more likely from pleural parenchymal scarring than pleural fluid, large lung volumes and mild cardiac silhouette enlargement      CT-MAXILLOFACIAL W/O PLUS RECONS   Final Result         1.  Mild chronic bilateral maxillary and ethmoid sinusitis.      2.  No evidence of acute sinusitis.      3.  Areas of rounded mucosal thickening or polyp formation in each maxillary sinus antrum.      4.  Otherwise clear paranasal sinuses.      ECHOCARDIOGRAM COMP W/O CONT   Final Result      CT-ABDOMEN-PELVIS W/O   Final Result      Retroperitoneal, iliac and mesenteric lymphadenopathy is not significantly changed compared to prior.      Haziness about the root of the mesentery is again noted.      Nonobstructing bilateral renal calculi. Atrophic right kidney. Left parapelvic cysts.      Enlarged prostate.      Trace bilateral pleural effusions. Rounded opacity at the right lung base likely represents round atelectasis.      CT-HEAD W/O   Final Result      No acute intracranial abnormality is identified.      Cortical atrophy.      Mild left frontal soft tissue swelling.      DX-CHEST-PORTABLE (1 VIEW)   Final Result      Hypoinflation without other evidence for acute cardiopulmonary disease.            Procedures:          [facility] 2-28-18     Discharge Medications:          Please see medication reconciliation on transfer  Diet:       DIET ORDERS (Through next 24h)    Start     Ordered    03/04/18 1827  DIET ORDER  ALL MEALS     Question:  Diet:  Answer:  Regular    03/04/18 1826            Activity:   As tolerated.      Code status:   Full code    Primary Care Provider:    Hector Burnett M.D.          Time spent on discharge day patient visit: 35 minutes    #################################################

## 2018-03-05 NOTE — PROGRESS NOTES
Direct admit from [facility], Dr. Oliveira for Neutropenic fever.  Oncology consult pending.  Discharge and readmit orders signed and held, need to be released upon pt arrival.  Pt coming by ground.

## 2018-03-05 NOTE — PROGRESS NOTES
Assumed care at 0700. Received report from night shift RN. Bedside report completed. AOx4.    Denies pain.  Denies nausea.  Tolerating diet. +voiding. +loose BM today.  IVF infusing.   Ambulating with standby assist. Pt call light and belongings within reach, fall precautions in place. Protective Isolation in place.

## 2018-03-05 NOTE — PROGRESS NOTES
Pt to transfer to [facility] for oncology floor. HOOD Quiroz, notified. Awaiting bed assignment. Pt updated. Will continue to update once bed assignment received.

## 2018-03-05 NOTE — PROGRESS NOTES
Agree with assessment and plan from Dr. Benito's H&P.  77 yr old male with h/o lymphoma presented with pancytopenia and neutropenic fever. Continue empiric IV antibiotics. Consult Oncology in the AM.

## 2018-03-05 NOTE — ASSESSMENT & PLAN NOTE
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  Severe neutropenia

## 2018-03-06 NOTE — CONSULTS
Consult Note: Oncology    Date of consultation: 3/5/2018 5:34 PM    Referring provider: Todd Martin    Reason for consultation: Pancytopenia with concern for bone marrow infiltrative process    History of presenting illness:       Chang Unger   is a 77 y.o. 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 RCVP Summer 2009: R pleural effusion: problems swallowing Sept 2009: Started RCVP Nov 2009: 3 cycles of RCVP? increased inguinal and pelvic adenopathy Dec 9,09: Saw Yadira Petersen? start [facility] 4/5/10: Last course of [facility]. Received BEXXAR 2/21/12: Pet: No FDG activity? retroperitoneal and mesenteric adenopathy without change 12/4/13: PET: increase in R effusion 9/30/14: PET: no FDG activity? stable nodes 4/12/17: CAT: unchanged retroperitoneal adenopathy and mesenteric nodes? trace R effusion? L kidney stone 4/14/17: WBC 3.6/plt 135/Creat 1.47, bili 1.5 4/18/17: Feels well? no fevers, no adenopathy? see in fall with lab 8/19/17: CMP benign: creat 1.5, bili 1.5: CBC : plt 142 8/23/17: doing well? no fevers, night sweats or weight loss? benign exam? return in six months with nonIV contrast Chest, abdomen, pelvic CAT? recent skin cancers, squamous and basal 2/9/18: Lab WBC 1.7, ANC 0.7, Hgb 14.5, plt 153? CMP benign, LDH normal, bili 1.6 .     He has developed worsening pancytopenia and was admitted with neutropenic fever.   Bone marrow biopsy-2/28/18- Hypercellular bone marrow (cellularity estimated at 80-85%)  showing extensive replacement of the marrow space by a  lymphohistiocytic proliferation; the lymphoid infiltrate stains          with a T-cell marker and is negative for B lymphocyte markers;  the extensive histiocytic infiltrate is somewhat suggestive  of granulomatous type inflammation. immunohistochemicaltains performed on the marrow show that there are no cells staining for B cell markers CD20 or PAX-5 and therefore the findings are not  that of a B-cell lymphoma. The increased lymphoid population in the marrow stains with the T-cell marker CD3.     T Cell Receptor study positive.    Pathology sent to [place]. Is currently afebrile. Continues to have pancytopenia, fatigue and some weight loss.    CT chest, abdomen, pelvis-2/24/18-Retroperitoneal, iliac and mesenteric lymphadenopathy is not significantly changed compared to prior studies from 2017       Past Medical History:    Past Medical History:   Diagnosis Date   • Back pain    • Basal cell cancer    • Bronchitis    • Disorder of thyroid    • GERD (gastroesophageal reflux disease)    • Hematuria    • HTN    • Lymphoma (CMS-HCC)        Past surgical history:    Past Surgical History:   Procedure Laterality Date   • TONSILLECTOMY         Allergies:  Nitroglycerin and Losartan    Medications:    Current Facility-Administered Medications   Medication Dose Route Frequency Provider Last Rate Last Dose   • cefepime (MAXIPIME) 2 g in  mL IVPB  2 g Intravenous Q12HRS Thong Oliveira M.D.   Stopped at 03/05/18 1021   • NS infusion   Intravenous Continuous Thong Oliveira M.D. 83 mL/hr at 03/05/18 1221     • acetaminophen (TYLENOL) tablet 650 mg  650 mg Oral Q6HRS PRN Thong Oliveira M.D.       • benzocaine-menthol (CEPACOL) lozenge 1 Lozenge  1 Lozenge Mouth/Throat Q2HRS PRN Thong Oliveira M.D.       • benzonatate (TESSALON) capsule 100 mg  100 mg Oral Q4HRS PRN Thong Oliveira M.D.       • fluconazole (DIFLUCAN) tablet 200 mg  200 mg Oral DAILY Thong Oliveira M.D.   200 mg at 03/05/18 0951   • labetalol (NORMODYNE,TRANDATE) injection 10 mg  10 mg Intravenous Q4HRS PRN Thong Oliveira M.D.       • levothyroxine (SYNTHROID) tablet 100 mcg  100 mcg Oral AM ES Thong Oliveira M.D.   100 mcg at 03/05/18 0557   • ondansetron  (ZOFRAN) syringe/vial injection 4 mg  4 mg Intravenous Q4HRS PRN Thong Oliveira M.D.       • potassium chloride SA (Kdur) tablet 40 mEq  40 mEq Oral BID Thong Oliveira M.D.   40 mEq at 03/05/18 0951   • [START ON 3/7/2018] PARoxetine (PAXIL) tablet 20 mg  20 mg Oral QSUN-WED Thong Oliveira M.D.       • senna-docusate (PERICOLACE or SENOKOT S) 8.6-50 MG per tablet 2 Tab  2 Tab Oral BID Thong Oliveira M.D.       • polyethylene glycol/lytes (MIRALAX) PACKET 1 Packet  1 Packet Oral QDAY PRN Thong Oliveira M.D.       • magnesium hydroxide (MILK OF MAGNESIA) suspension 30 mL  30 mL Oral QDAY PRN Thong Oliveira M.D.       • bisacodyl (DULCOLAX) suppository 10 mg  10 mg Rectal QDAY PRN Thong Oliveira M.D.       • tbo-filgrastim (GRANIX) injection 480 mcg  5 mcg/kg Subcutaneous DAILY Thong Oliveira M.D.   480 mcg at 03/05/18 0952   • zolpidem (AMBIEN) tablet 5 mg  5 mg Oral HS PRN Thong Oliveira M.D.           Social History:     Social History     Social History   • Marital status:      Spouse name: N/A   • Number of children: N/A   • Years of education: N/A     Occupational History   • Not on file.     Social History Main Topics   • Smoking status: Never Smoker   • Smokeless tobacco: Never Used   • Alcohol use No      Comment: Hx etoh abuse, does not drink    • Drug use: No   • Sexual activity: Not on file      Comment: ,2 kids, retired marine     Other Topics Concern   • Not on file     Social History Narrative   • No narrative on file       Family History:     Family History   Problem Relation Age of Onset   • Cancer Mother      pancreatic       Review of Systems:  All other review of systems are negative except what was mentioned above in the HPI.    Constitutional: Positive for fever, chills, weight loss ,malaise/fatigue.    HEENT: No new auditory or visual complaints. No sore throat and neck pain.     Respiratory:No new cough, sputum production, shortness of breath and wheezing.    Cardiovascular: No new chest  "pain, palpitations, orthopnea and leg swelling.    Gastrointestinal: No heartburn, nausea, vomiting ,abdominal pain, hematochezia or melena     Genitourinary: Negative for dysuria, hematuria    Musculoskeletal: No new arthralgias or myalgias   Skin: Negative for rash and itching.    Neurological: Negative for focal weakness or headaches.    Endo/Heme/Allergies: No abnormal bleed/bruise.    Psychiatric/Behavioral: No new depression/anxiety.    Physical Exam:  Vitals:   /70   Pulse 94   Temp 36.8 °C (98.2 °F)   Resp 18   Ht 1.778 m (5' 10\")   Wt 85.5 kg (188 lb 7.9 oz)   SpO2 93%   BMI 27.05 kg/m²     General: Not in acute distress, alert and oriented x 3  HEENT: No pallor, icterus. Oropharynx clear.   Neck: Supple, no palpable masses.  Lymph nodes: No palpable cervical, supraclavicular, axillary or inguinal lymphadenopathy.    CVS: regular rate and rhythm, no rubs or gallops  RESP: Clear to auscultate bilaterally, no wheezing or crackles.   ABD: Soft, non tender, non distended, positive bowel sounds, no palpable organomegaly  EXT: No edema or cyanosis  CNS: Alert and oriented x3, No focal deficits.  Skin- No rash      Labs:   Recent Labs      03/03/18   0523  03/04/18   0429  03/05/18   0316   RBC  3.06*  2.94*  3.11*   HEMOGLOBIN  8.5*  8.3*  8.4*   HEMATOCRIT  24.5*  23.6*  25.0*   PLATELETCT  17*  24*  18*     Lab Results   Component Value Date/Time    SODIUM 133 (L) 03/05/2018 08:40 AM    POTASSIUM 4.4 03/05/2018 08:40 AM    CHLORIDE 102 03/05/2018 08:40 AM    CO2 22 03/05/2018 08:40 AM    GLUCOSE 103 (H) 03/05/2018 08:40 AM    BUN 15 03/05/2018 08:40 AM    CREATININE 1.29 03/05/2018 08:40 AM    CREATININE 1.0 04/20/2009 12:30 PM    BUNCREATRAT 16 10/24/2017 09:44 AM        Assessment and Plan:     Severe pancytopenia secondary to bone marrow infiltration-interestingly, the bone marrow biopsy showed T-cell clonality concerning for T-cell lymphoma or leukemia.. There was significant lymphohistiocytic " infiltration which is rather unusual pattern. Other etiologies like granulomatous infection is a possibility, however, in view of the T-cell receptor clonality a malignant process is favored. Informed him that he does not appear to be a relapse from prior follicular lymphoma. Interestingly, he did receive Bexxar at [facility] many years ago which brought his follicular lymphoma in remission.    He will also benefit from an outpatient PET scan. If his blood counts are overall stable, consider discharging him home on by mouth antibiotics , obtain outpatient PET scan and await the final pathology results from [facility].   I did check his LDH, which is only mildly elevated at 282. HIV, hepatitis screen, uric acid levels were not elevated.  Hold off on further platelet transfusion unless his platelets are less than 10 or if he starts bleeding. He does not have any response to 2 doses of Granix, which is not surprising.     . I will discuss with Dr. Klein about scheduling an outpatient PET scan.      Informed the patient and his wife that it is very important to know the exact diagnosis to know the prognosis and appropriate treatment.    He agreed and verbalized his agreement and understanding with the current plan.  I answered all questions and concerns he has at this time.              Thank you for allowing me to participate in his care.    Please note that this dictation was created using voice recognition software. I have made every reasonable attempt to correct obvious errors, but I expect that there are errors of grammar and possibly content that I did not discover before finalizing the note.      SIGNATURES:  Michael Girard    CC:  Hector Burnett M.D.  No ref. provider found

## 2018-03-06 NOTE — PROGRESS NOTES
One unit of platelets finished. Patient with vital signs in normal range, and no signs of reaction. PIV flushed.

## 2018-03-06 NOTE — CARE PLAN
Problem: Communication  Goal: The ability to communicate needs accurately and effectively will improve  Outcome: PROGRESSING AS EXPECTED  Education provided on importance of using call light to alert staff of patient needs. Pt demonstrates understanding by using call light appropriately.    Problem: Infection  Goal: Will remain free from infection  Outcome: PROGRESSING AS EXPECTED  Protective isolation precautions in place.

## 2018-03-06 NOTE — DIETARY
"Nutrition Services     Pt noted with nutrition admit screen trigger: poor po PTA     Pt is on day 2 of admit.  He is a 76 yo male admitted from [facility] with pancytopenia, neutropenic fever and hematology evaluation.     He has been receiving a cardiac diet with great po intake of % thus far. No recent wt changes noted.     Ht: 70\"  Wt: 85.5 kg via stand up scale 3/4   Body mass index is 27.05 kg/m². - overweight     Due to great po intake and no recent wt changes noted, RD will con't to monitor per dept policy   "

## 2018-03-06 NOTE — PROGRESS NOTES
[facility] Hospitalist Progress Note    Date of Service: 3/6/2018    Chief Complaint  77 y.o. male admitted 3/4/2018 with non-Hodgkin's versus Hodgkin's lymphoma presented from [facility] with pancytopenia and neutropenic fever associated with sinusitis.     Interval Problem Update  No events. We discussed plan of care. He is feeling well today.     Consultants/Specialty  Hematology oncology  Infectious disease        Disposition  hospital        Review of Systems   Constitutional: Positive for malaise/fatigue. Negative for chills and fever.   HENT: Negative for hearing loss and sore throat.    Eyes: Negative for blurred vision, double vision and pain.   Respiratory: Negative for cough and shortness of breath.    Cardiovascular: Negative for chest pain and palpitations.   Gastrointestinal: Negative for abdominal pain, nausea and vomiting.   Genitourinary: Negative for dysuria, frequency and urgency.   Musculoskeletal: Negative for back pain, myalgias and neck pain.   Skin: Negative for itching and rash.   Neurological: Positive for weakness. Negative for dizziness, tingling and headaches.   Psychiatric/Behavioral: Negative for depression and hallucinations. The patient does not have insomnia.    All other systems reviewed and are negative.     Physical Exam  Laboratory/Imaging   Hemodynamics  Temp (24hrs), Av.7 °C (98.1 °F), Min:36.4 °C (97.6 °F), Max:37.1 °C (98.8 °F)   Temperature: 36.4 °C (97.6 °F)  Pulse  Av.6  Min: 79  Max: 94    Blood Pressure : 118/60      Respiratory      Respiration: 16, Pulse Oximetry: 96 %        RUL Breath Sounds: Clear, RML Breath Sounds: Diminished, RLL Breath Sounds: Diminished, GREGORIA Breath Sounds: Clear, LLL Breath Sounds: Diminished    Fluids    Intake/Output Summary (Last 24 hours) at 18 0734  Last data filed at 18 0400   Gross per 24 hour   Intake             2829 ml   Output             1550 ml   Net             1279 ml       Nutrition  Orders Placed This  Encounter   Procedures   • DIET ORDER     Standing Status:   Standing     Number of Occurrences:   1     Order Specific Question:   Diet:     Answer:   Cardiac [6]     Physical Exam   Constitutional: He is oriented to person, place, and time. He appears well-developed and well-nourished. No distress.   HENT:   Head: Normocephalic and atraumatic.   Right Ear: External ear normal.   Left Ear: External ear normal.   Nose: Nose normal.   Eyes: Conjunctivae and EOM are normal. Pupils are equal, round, and reactive to light. Right eye exhibits no discharge. Left eye exhibits no discharge. No scleral icterus.   Neck: Normal range of motion. Neck supple. No JVD present. No tracheal deviation present.   Cardiovascular: Normal rate, regular rhythm, normal heart sounds and intact distal pulses.    No murmur heard.  Pulmonary/Chest: Effort normal and breath sounds normal. No respiratory distress. He has no wheezes. He has no rales.   Abdominal: Soft. Bowel sounds are normal. He exhibits no distension. There is no tenderness. There is no guarding.   Musculoskeletal: Normal range of motion. He exhibits no edema or tenderness.   Neurological: He is alert and oriented to person, place, and time.   Skin: Skin is warm and dry. He is not diaphoretic. No erythema.   Psychiatric: He has a normal mood and affect. His behavior is normal.   Nursing note and vitals reviewed.      Recent Labs      03/04/18   0429  03/05/18 0316  03/06/18   0212   WBC  0.3*  0.3*  0.3*   RBC  2.94*  3.11*  2.82*   HEMOGLOBIN  8.3*  8.4*  7.8*   HEMATOCRIT  23.6*  25.0*  22.8*   MCV  80.3*  80.4*  80.9*   MCH  28.2  27.0  27.7   MCHC  35.2  33.6*  34.2   RDW  44.1  44.6  45.2   PLATELETCT  24*  18*  14*   MPV  11.4   --    --      Recent Labs      03/05/18   0316  03/05/18   0840   SODIUM  134*  133*   POTASSIUM  4.3  4.4   CHLORIDE  103  102   CO2  22  22   GLUCOSE  104*  103*   BUN  15  15   CREATININE  1.29  1.29   CALCIUM  7.5*  7.9*                       Assessment/Plan     * Pancytopenia (CMS-[facility])   Assessment & Plan    His counts have not improved with Granix or sustained with platelet transfusions.   Discussed with Dr. Roman concern for leukemic or  lymphoma infiltration of bone marrow.  Follow-up molecular studies of BMB. Samples were sent to [place].   I consulted Dr. Giarrd: Follow-up recommendations  Monitor cbc w diff.         Thrombocytopenia (CMS-HCC)- (present on admission)   Assessment & Plan    S/p platelet transfusions ×2 at [facility]  Transfuse today as 14 count          Neutropenic fever (CMS-HCC)- (present on admission)   Assessment & Plan    Attributed to sinusitis, cultures from [facility] negative  Prophylactic Diflucan   IV cefepime for sinusitis coverage per ID recs        Maxillary sinusitis- (present on admission)   Assessment & Plan    IV cefepime per ID        Acute renal failure (ARF) (CMS-HCC)- (present on admission)   Assessment & Plan    Recently diagnosed, attributed to dehydration  Renal function improved, stabilizing  Follow-up renal function electrolytes urine output  Continue with IV fluids until consistent intake         Hyponatremia- (present on admission)   Assessment & Plan    Monitor        Essential hypertension- (present on admission)   Assessment & Plan    Controlled  Resume home medications          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 Roman. To have hematology / oncology evaluation          Quality-Core Measures   Reviewed items::  Radiology images reviewed, Labs reviewed and Medications reviewed  DVT prophylaxis pharmacological::  Contraindicated - High bleeding risk  Antibiotics:  Treating active infection/contamination beyond 24 hours perioperative coverage

## 2018-03-06 NOTE — DISCHARGE PLANNING
Situation: Pt was transferred from [facility] current diagnosis of lymphoma and is currently admitted to  Pancytopenia and fever  Background: Pt resides in [facility] with Wife, Pt advised prior to hospitalization he was independent with all ADLs and provided transportation. Pt is tri care beneficent and does not report any substance abuse or mental health concern  Assessment: Pt has currently no social work needs at this time    Recommendation: LSW will follow to assistance any D/C need    Care Transition Team Assessment    Information Source  Orientation : Oriented x 4  Information Given By: Patient    Readmission Evaluation  Is this a readmission?: No    Elopement Risk  Legal Hold: No  Ambulatory or Self Mobile in Wheelchair: Yes  Disoriented: No  Psychiatric Symptoms: None  History of Wandering: No  Elopement this Admit: No  Vocalizing Wanting to Leave: No  Displays Behaviors, Body Language Wanting to Leave: No-Not at Risk for Elopement  Elopement Risk: Not at Risk for Elopement    Interdisciplinary Discharge Planning  Does Admitting Nurse Feel This Could be a Complex Discharge?: No  Primary Care Physician: Dr. Burnett  Lives with - Patient's Self Care Capacity: Spouse  Patient or legal guardian wants to designate a caregiver (see row info): No  Support Systems: Spouse / Significant Other  Housing / Facility: [address]  Do You Take your Prescribed Medications Regularly: Yes  Able to Return to Previous ADL's: Yes  Mobility Issues: Yes (generalized weakness for past 10 days per pt)  Prior Services: None  Patient Expects to be Discharged to:: Home  Assistance Needed: Unknown at this Time  Durable Medical Equipment: Not Applicable    Discharge Preparedness  What is your plan after discharge?: Uncertain - pending medical team collaboration  What are your discharge supports?: Spouse  Prior Functional Level: Drives Self, Independent with Activities of Daily Living    Functional Assesment  Prior Functional Level: Drives  Self, Independent with Activities of Daily Living    Finances  Financial Barriers to Discharge: No  Prescription Coverage: Yes    Vision / Hearing Impairment  Vision Impairment : Yes  Right Eye Vision: Wears Glasses  Left Eye Vision: Wears Glasses  Hearing Impairment : No    Values / Beliefs / Concerns  Values / Beliefs Concerns : No  Special Hospitalization Concerns: N/A         Domestic Abuse  Have you ever been the victim of abuse or violence?: No  Physical Abuse or Sexual Abuse: No  Verbal Abuse or Emotional Abuse: No

## 2018-03-06 NOTE — CARE PLAN
Problem: Infection  Goal: Will remain free from infection  IV antibiotics ordered and administered.     Problem: Mobility  Goal: Risk for activity intolerance will decrease  Patient ambulatory with steady gait.

## 2018-03-06 NOTE — PROGRESS NOTES
Assessment completed.  AA&Ox4. VSS on RA. Denies SOB. Denies any pain.  Tolerating diet. Denies N/V.  + void. + BM - loose stool.  Pt is up with SBA.   POC discussed. All questions answered. Call light within reach. Pt calls appropriately.

## 2018-03-06 NOTE — PROGRESS NOTES
Assumed care of patient. Patient denying pain or nausea. WBC holding steady. Per MD, platelets to be administered. Consent signed. Patient ambulatory to commode with steady gait. No other needs at this time. Call light within reach.

## 2018-03-06 NOTE — PROGRESS NOTES
Infectious Disease Progress Note    Author: Santa Lynne M.D. Date & Time of service: 3/6/2018  3:28 PM    Chief Complaint:  FU neutropenic fever      Interval History:  Tmax 100.4, WBC 0.2, ANC 0.2, feels better today, had R groin pain overnight which spontaneously resolved   Tmax 100.5, having coughing fit and SOB, plan for BM biopsy  3/1Tmax 100.2, WBC 0.3, had a BM this am, cough better, repeat CXR with no infiltrate   3/2 Tmax 100.5, shortness of breath with exertion, had bloody nose this am, plts 22, denies abd pain or diarrhea, path neg for lyphoma  3/3/2018-Tmax 99.6. WBC 0.3 platelets 17 creatinine 1.38  3/4/2018-Tmax 99.2 WBCs 0.3 creatinine 1.38 complains of some shortness of breath  3/5 AF WBC 0.3 transferred to [facility] no new complaints  3/6 AF WBC 0.3 no positive cxs No new complaints  Labs Reviewed, Medications Reviewed, Radiology Reviewed and Wound Reviewed.    Review of Systems:  Review of Systems   Constitutional: Negative for fever.   Respiratory: Negative for cough.    Cardiovascular: Negative for chest pain.   Gastrointestinal: Negative for nausea and vomiting.   Genitourinary: Negative for dysuria.   Neurological: Positive for weakness.   All other systems reviewed and are negative.      Hemodynamics:  Temp (24hrs), Av.9 °C (98.4 °F), Min:36.4 °C (97.6 °F), Max:37.2 °C (99 °F)  Temperature: 36.4 °C (97.6 °F)  Pulse  Av.9  Min: 79  Max: 98   Blood Pressure : 110/73       Physical Exam:  Physical Exam   Constitutional: He is oriented to person, place, and time. He appears well-developed. No distress.   HENT:   Head: Normocephalic and atraumatic.   Eyes: EOM are normal. Pupils are equal, round, and reactive to light.   Neck: Neck supple.   Cardiovascular: Normal rate.    Pulmonary/Chest: Effort normal. No respiratory distress. He has no wheezes. He has no rales.   Abdominal: Soft. He exhibits no distension. There is no tenderness.   Musculoskeletal: He exhibits no edema.    Neurological: He is alert and oriented to person, place, and time.   Skin: No rash noted.   Nursing note and vitals reviewed.      Meds:    Current Facility-Administered Medications:   •  NS  •  cefepime  •  NS  •  acetaminophen  •  benzocaine-menthol  •  benzonatate  •  fluconazole  •  labetalol  •  levothyroxine  •  ondansetron  •  potassium chloride SA  •  [START ON 3/7/2018] PARoxetine  •  senna-docusate  •  polyethylene glycol/lytes  •  magnesium hydroxide  •  bisacodyl  •  tbo-filgrastim  •  zolpidem    Labs:  Recent Labs      03/04/18   0429  03/05/18 0316  03/06/18   0212   WBC  0.3*  0.3*  0.3*   RBC  2.94*  3.11*  2.82*   HEMOGLOBIN  8.3*  8.4*  7.8*   HEMATOCRIT  23.6*  25.0*  22.8*   MCV  80.3*  80.4*  80.9*   MCH  28.2  27.0  27.7   RDW  44.1  44.6  45.2   PLATELETCT  24*  18*  14*   MPV  11.4   --    --    NEUTSPOLYS   --   20.40*  cancel   LYMPHOCYTES   --   76.70*  cancel   MONOCYTES   --   1.00  cancel   EOSINOPHILS   --   0.00  cancel   BASOPHILS   --   0.00  cancel     Recent Labs      03/05/18 0316 03/05/18   0840  03/06/18   0907   SODIUM  134*  133*  133*   POTASSIUM  4.3  4.4  4.6   CHLORIDE  103  102  105   CO2  22  22  23   GLUCOSE  104*  103*  108*   BUN  15  15  17     Recent Labs      03/05/18 0316 03/05/18   0840  03/06/18   0907   ALBUMIN  3.0*  3.6  2.9*   TBILIRUBIN  2.1*  2.5*  1.9*   ALKPHOSPHAT  90  100*  82   TOTPROTEIN  4.3*  5.0*  4.2*   ALTSGPT  28  32  25   ASTSGOT  22  24  20   CREATININE  1.29  1.29  1.25       Imaging:  Ct-abdomen-pelvis W/o    Result Date: 2/24/2018 2/24/2018 6:09 AM HISTORY/REASON FOR EXAM:  Fever. TECHNIQUE/EXAM DESCRIPTION: CT scan of the abdomen and pelvis without contrast. Noncontrast helical scanning was obtained from the diaphragmatic domes through the pubic symphysis. Low dose optimization technique was utilized for this CT exam including automated exposure control and adjustment of the mA and/or kV according to patient size. COMPARISON:  4/12/2017. FINDINGS: There are trace bilateral pleural effusions. There is a rounded opacity at the right lung base likely representing round atelectasis. There is trace pericardial fluid. No free air is seen in the abdomen or pelvis. Evaluation of parenchymal and vascular structures is limited by the lack of intravenous contrast. Liver is hypodense suggestive of hepatic steatosis. Subtle 1.1 cm hypodense lesion in the right lobe of the liver corresponds to the previously noted hemangioma. There are calcified granulomata within the liver. Gallbladder appears unremarkable. The spleen is not enlarged. No adrenal mass is identified on the left. There is a right adrenal nodule measuring 2 cm which is compatible with an adrenal adenoma. There is renal cortical atrophy on the right. There are bilateral nonobstructing renal calculi. There are parapelvic cysts on the left. There is no evidence of hydronephrosis. Pancreas appears unremarkable.  Aorta is not aneurysmal. Atherosclerotic plaque is seen. Mildly enlarged dense right inguinal lymph node is again seen. There are dense retroperitoneal and right iliac lymph nodes. The dens retroperitoneal nodes measure up to 1.9 cm in short axis dimension, not significantly changed compared to prior. Haziness about the mesentery is not significantly changed compared to prior. Dense lymph node in the root of the mesentery measures 1.6 cm in short axis dimension, unchanged. There is no evidence of bowel obstruction. There is no evidence of acute appendicitis. Bladder is mildly distended. Prostate is enlarged. There is a fat-containing right inguinal hernia. Degenerative changes are seen in the spine. Wedge deformities of L1 and L2 appear unchanged.     Retroperitoneal, iliac and mesenteric lymphadenopathy is not significantly changed compared to prior. Haziness about the root of the mesentery is again noted. Nonobstructing bilateral renal calculi. Atrophic right kidney. Left parapelvic  cysts. Enlarged prostate. Trace bilateral pleural effusions. Rounded opacity at the right lung base likely represents round atelectasis.    Ct-head W/o    Result Date: 2/24/2018 2/24/2018 6:09 AM HISTORY/REASON FOR EXAM:  Fever. TECHNIQUE/EXAM DESCRIPTION AND NUMBER OF VIEWS: CT of the head without contrast. Contiguous axial sections were obtained from the skull base through the vertex. Up to date radiation dose reduction adjustments have been utilized to meet ALARA standards for radiation dose reduction. COMPARISON:  None available FINDINGS: The is no evidence of intraparenchymal, intraventricular and extra-axial hemorrhage.  The ventricles and cortical sulci are prominent compatible with age related change.  There is no mass effect or midline shift. There is minimal mucosal thickening within the maxillary sinuses. Visualized mastoid air cells are clear. The calvarium is intact. There is mild left frontal soft tissue swelling.     No acute intracranial abnormality is identified. Cortical atrophy. Mild left frontal soft tissue swelling.    Ct-maxillofacial W/o Plus Recons    Result Date: 2/26/2018 2/26/2018 7:24 PM HISTORY/REASON FOR EXAM:  Facial pain. Suspected sinusitis. TECHNIQUE/EXAM DESCRIPTION AND NUMBER OF VIEWS:  CT scan maxillofacial without contrast, with reconstructions. Thin-section helical imaging was obtained of the face from the orbital roofs through the mandible. Coronal multiplanar volume reformat images were generated from the axial data. Low dose optimization technique was utilized for this CT exam including automated exposure control and adjustment of the mA and/or kV according to patient size. FINDINGS: The frontal sinus is clear. There is minimal mucosal thickening in the ethmoid sinus bilaterally. There is mild bilateral mucosal thickening and/or polyp formation in the maxillary sinus. Both maxillary sinus infundibulum and ostia are patent. The sphenoid sinus is clear. No significant bony  nasal septal deviation is present. No nasal turbinate edema is present.     1.  Mild chronic bilateral maxillary and ethmoid sinusitis. 2.  No evidence of acute sinusitis. 3.  Areas of rounded mucosal thickening or polyp formation in each maxillary sinus antrum. 4.  Otherwise clear paranasal sinuses.    Dx-chest-2 Views    Result Date: 2/28/2018 2/28/2018 8:43 AM HISTORY/REASON FOR EXAM: Cough.  Lymphoma TECHNIQUE/EXAM DESCRIPTION AND NUMBER OF VIEWS: Two views of the chest. COMPARISON:  February 23 FINDINGS: Cardiomediastinal contours are stable with some aortic ectasia and upper normal heart size. No pulmonary consolidation is seen. Stable mostly right pleural space thickening, possible small effusions Chronic upper lumbar moderate severity compression fracture resulting in kyphosis     Stable chest with some chronic pleural space thickening more likely from pleural parenchymal scarring than pleural fluid, large lung volumes and mild cardiac silhouette enlargement    Dx-chest-portable (1 View)    Result Date: 2/23/2018 2/23/2018 6:01 PM HISTORY/REASON FOR EXAM:  Cough. TECHNIQUE/EXAM DESCRIPTION AND NUMBER OF VIEWS: Single portable view of the chest. COMPARISON: 3/22/2017 FINDINGS: Cardiomediastinal contour is within normal limits. Lungs show mild hypoinflation. No focal pulmonary consolidation. No pleural fluid collection or pneumothorax. No major bony abnormality is seen.     Hypoinflation without other evidence for acute cardiopulmonary disease.    Echocardiogram Comp W/o Cont    Result Date: 2/25/2018  Transthoracic Echo Report Echocardiography Laboratory CONCLUSIONS No prior study is available for comparison. Normal transthoracic echocardiogram. SHELLIE CASIANO Exam Date:         02/25/2018                    12:59 Exam Location:     Inpatient Priority:          Routine Ordering Physician:        RILEY JAQUEZ Referring Physician:       [id number] Sonographer:               Ashish Zapata RDCS Age:    77      Gender:    M MRN:    [id number] :    1940 BSA:    2.05   Ht (in):    70     Wt (lb):    191 Exam Type:     Complete Indications:     Weakness ICD Codes:       R531 CPT Codes:       17040 BP:   117    /   66     HR:   99 Technical Quality:       Fair MEASUREMENTS  (Male / Female) Normal Values 2D ECHO LV Diastolic Diameter PLAX        4.4 cm                4.2 - 5.9 / 3.9 - 5.3 cm LV Systolic Diameter PLAX         2.7 cm                2.1 - 4.0 cm IVS Diastolic Thickness           1.2 cm                LVPW Diastolic Thickness          1.2 cm                LVOT Diameter                     2.1 cm                Estimated LV Ejection Fraction    65 %                  LV Ejection Fraction MOD BP       65.4 %                >= 55  % LV Ejection Fraction MOD 4C       69.4 %                LV Ejection Fraction MOD 2C       63.3 %                IVC Diameter                      1.4 cm                M-MODE Aortic Root Diameter MM           3.2 cm                DOPPLER AV Peak Velocity                  1.6 m/s               AV Peak Gradient                  9.7 mmHg              AV Mean Gradient                  5.6 mmHg              LVOT Peak Velocity                0.97 m/s              AV Area Cont Eq vti               2.2 cm²               Mitral E Point Velocity           1.1 m/s               Mitral E to A Ratio               1.1                   Mitral A Duration                 142 ms                MV Pressure Half Time             45.2 ms               MV Area PHT                       4.9 cm²               MV Deceleration Time              156 ms                Pulmonary Vein A Velocity         0.5 m/s               Pulmonary Vein A Duration         104 ms                Pulm Vein-MV A Duration           -38 ms                TV Peak E Velocity                0.59 m/s              TR Peak Velocity                  318 cm/s              PV Peak Velocity                  1.2 m/s               PV Peak  Gradient                  6 mmHg                RVOT Peak Velocity                0.83 m/s              * Indicates values subject to auto-interpretation LV EF:  65    % FINDINGS Left Ventricle Normal left ventricular size, systolic function, and diastolic function. Mild concentric left ventricular hypertrophy. Normal regional wall motion. Left ventricular ejection fraction is visually estimated to be 65%. Right Ventricle The right ventricle was normal in size and function. Right Atrium The right atrium is normal in size.  Normal inferior vena cava size and inspiratory collapse. Left Atrium The left atrium is normal in size.  Left atrial volume index is 26 mL/sq m. Mitral Valve Structurally normal mitral valve without significant stenosis. Trace mitral regurgitation. Aortic Valve Tricuspid aortic valve. Aortic sclerosis without stenosis. No aortic insufficiency. Tricuspid Valve Structurally normal tricuspid valve without significant stenosis. Mild tricuspid regurgitation. Right atrial pressure is estimated to be 3 mmHg. Estimated right ventricular systolic pressure is 40 mmHg. Pulmonic Valve Structurally normal pulmonic valve without significant stenosis or regurgitation. Pericardium Normal pericardium without effusion. Aorta The aortic root is normal. Jovany Heath (Electronically Signed) Final Date:     25 February 2018                 17:42      Micro:  Results     ** No results found for the last 168 hours. 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 Path - neg for malignancy, no blasts, +granulomatous inflammation vs T cells-sent to [place]  For transfer to oncology floor     H/o Lymphoma  Last chemo in 2010     ROHITH, improved  Renally dose abx as needed  Avoid nephrotoxins  Monitor    Discussed with internal medicine.

## 2018-03-06 NOTE — PROGRESS NOTES
[facility] Hospitalist Progress Note    Date of Service: 3/5/2018    Chief Complaint  77 y.o. male admitted 3/4/2018 with non-Hodgkin's versus Hodgkin's lymphoma presented from [facility] with pancytopenia and neutropenic fever. He is here for hematology consultation.    Interval Problem Update  Patient without any new complaints today  No issues overnight  Platelet count still low    Consultants/Specialty  Hematology oncology  Infectious disease    Disposition  To be determined        Review of Systems   Constitutional: Positive for malaise/fatigue. Negative for chills and fever.   HENT: Negative for hearing loss and sore throat.    Eyes: Negative for blurred vision and double vision.   Respiratory: Negative for cough and shortness of breath.    Cardiovascular: Negative for chest pain and palpitations.   Gastrointestinal: Negative for nausea and vomiting.   Genitourinary: Negative for dysuria and frequency.   Musculoskeletal: Negative for back pain and myalgias.   Skin: Negative for itching and rash.   Neurological: Positive for weakness. Negative for dizziness and headaches.   Psychiatric/Behavioral: Negative for depression and hallucinations.      Physical Exam  Laboratory/Imaging   Hemodynamics  Temp (24hrs), Av.7 °C (98.1 °F), Min:36.4 °C (97.6 °F), Max:36.9 °C (98.5 °F)   Temperature: 36.6 °C (97.9 °F)  Pulse  Av.3  Min: 79  Max: 88    Blood Pressure : 119/67      Respiratory      Respiration: 18, Pulse Oximetry: 95 %        RUL Breath Sounds: Clear, RML Breath Sounds: Clear, RLL Breath Sounds: Diminished, GREGORIA Breath Sounds: Clear, LLL Breath Sounds: Diminished    Fluids    Intake/Output Summary (Last 24 hours) at 18 1647  Last data filed at 18 1200   Gross per 24 hour   Intake             1233 ml   Output              800 ml   Net              433 ml       Nutrition  Orders Placed This Encounter   Procedures   • DIET ORDER     Standing Status:   Standing     Number of Occurrences:   1      Order Specific Question:   Diet:     Answer:   Cardiac [6]     Physical Exam   Constitutional: He is oriented to person, place, and time. He appears well-developed and well-nourished.   HENT:   Head: Normocephalic and atraumatic.   Eyes: Conjunctivae and EOM are normal. Pupils are equal, round, and reactive to light.   Neck: Normal range of motion. Neck supple.   Cardiovascular: Normal rate and regular rhythm.    Pulmonary/Chest: Effort normal and breath sounds normal.   Abdominal: Soft. Bowel sounds are normal.   Musculoskeletal: Normal range of motion. He exhibits no edema.   Neurological: He is alert and oriented to person, place, and time.   Skin: Skin is warm and dry.   Nursing note and vitals reviewed.      Recent Labs      03/03/18 0523 03/04/18   0429  03/05/18   0316   WBC  0.3*  0.3*  0.3*   RBC  3.06*  2.94*  3.11*   HEMOGLOBIN  8.5*  8.3*  8.4*   HEMATOCRIT  24.5*  23.6*  25.0*   MCV  80.1*  80.3*  80.4*   MCH  27.8  28.2  27.0   MCHC  34.7  35.2  33.6*   RDW  43.5  44.1  44.6   PLATELETCT  17*  24*  18*   MPV  13.8*  11.4   --      Recent Labs      03/03/18   0523 03/05/18   0316  03/05/18   0840   SODIUM  133*  134*  133*   POTASSIUM  4.0  4.3  4.4   CHLORIDE  106  103  102   CO2  22  22  22   GLUCOSE  106*  104*  103*   BUN  12  15  15   CREATININE  1.38  1.29  1.29   CALCIUM  7.4*  7.5*  7.9*                      Assessment/Plan     * Pancytopenia (CMS-HCC)   Assessment & Plan    His counts have not improved with Granix or sustained with platelet transfusions.   Discussed with Dr. Klein concern for leukemic or  lymphoma infiltration of bone marrow.  Follow-up molecular studies of BMB. Samples were sent to [place].   Heme onc consultation tomorrow.  Monitor cbc w diff.         Thrombocytopenia (CMS-HCC)- (present on admission)   Assessment & Plan    Close platelet transfusions ×2 at [facility], no symptoms of active bleed  Monitor platelets, transfuse if significant decline         Neutropenic fever (CMS-HCC)- (present on admission)   Assessment & Plan    Attributed to sinusitis, cultures from [facility] negative  Provide as needed Tylenol  Prophylactic Diflucan   IV cefepime for sinusitis coverage        Maxillary sinusitis- (present on admission)   Assessment & Plan    IV cefepime to be continued per ID        Acute renal failure (ARF) (CMS-HCC)- (present on admission)   Assessment & Plan    Recently diagnosed, attributed to dehydration  Renal function improved, stabilizing  Follow-up renal function electrolytes urine output  Continue with IV fluids until consistent intake         Hyponatremia- (present on admission)   Assessment & Plan    Monitor        Essential hypertension- (present on admission)   Assessment & Plan    Controlled  Resume home medications          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 Klein. To have hematology / oncology evaluation          Quality-Core Measures   Reviewed items::  Radiology images reviewed, Labs reviewed and Medications reviewed  DVT prophylaxis pharmacological::  Contraindicated - High bleeding risk  Antibiotics:  Treating active infection/contamination beyond 24 hours perioperative coverage

## 2018-03-06 NOTE — PROGRESS NOTES
Oncology/Hematology Progress Note               Author: Michael Girard    Remote h/o follicular lymphoma -s/p R-CVP-> R-Luciat  -> Queenie @ [facility] with long term remission    -Recent development of unexplained p ancytopenia.Hypercellular bone marrow (cellularity estimated at 80-85%)  showing extensive replacement of the marrow space by a  lymphohistiocytic proliferation; the lymphoid infiltrate stains          with a T-cell marker and is negative for B lymphocyte markers;  the extensive histiocytic infiltrate is somewhat suggestive of granulomatous type inflammation. immunohistochemicaltains performed on the marrow show that there are no cells staining for B cell markers CD20 or PAX-5 and therefore the findings are not  that of a B-cell lymphoma. The increased lymphoid population in the marrow stains with the T-cell marker CD3.      T Cell Receptor rearrangement positive.  Awaiting path results from [place].     Date & Time created: 3/6/2018  3:11 PM     Interval History:  Fatigue+ , feels the same.Afebrile    Review of Systems:  ROS Constitutional: Positive for fever, chills, weight loss ,malaise/fatigue.    HEENT: No new auditory or visual complaints. No sore throat and neck pain.     Respiratory:No new cough, sputum production, shortness of breath and wheezing.    Cardiovascular: No new chest pain, palpitations, orthopnea and leg swelling.    Gastrointestinal: No heartburn, nausea, vomiting ,abdominal pain, hematochezia or melena     Genitourinary: Negative for dysuria, hematuria    Musculoskeletal: No new arthralgias or myalgias   Skin: Negative for rash and itching.    Neurological: Negative for focal weakness or headaches.    Endo/Heme/Allergies: No abnormal bleed/bruise.    Psychiatric/Behavioral: No new depression/anxiety.    Physical Exam:  Physical Exam General: Not in acute distress, alert and oriented x 3  HEENT: No pallor, icterus. Oropharynx clear.   Neck: Supple, no palpable masses.  Lymph nodes:  No palpable cervical, supraclavicular, axillary or inguinal lymphadenopathy.    CVS: regular rate and rhythm, no rubs or gallops  RESP: Clear to auscultate bilaterally, no wheezing or crackles.   ABD: Soft, non tender, non distended, positive bowel sounds, no palpable organomegaly  EXT: No edema or cyanosis  CNS: Alert and oriented x3, No focal deficits.  Skin- No rash    Labs:        Invalid input(s): [id number]      Recent Labs      18   09   SODIUM  134*  133*  133*   POTASSIUM  4.3  4.4  4.6   CHLORIDE  103  102  105   CO2  22  22  23   BUN  15  15  17   CREATININE  1.29  1.29  1.25   CALCIUM  7.5*  7.9*  7.4*     Recent Labs      18   09   ALTSGPT  28  32  25   ASTSGOT  22  24  20   ALKPHOSPHAT  90  100*  82   TBILIRUBIN  2.1*  2.5*  1.9*   GLUCOSE  104*  103*  108*     Recent Labs      18   RBC  2.94*  3.11*  2.82*   HEMOGLOBIN  8.3*  8.4*  7.8*   HEMATOCRIT  23.6*  25.0*  22.8*   PLATELETCT  24*  18*  14*     Recent Labs      18   09   WBC  0.3*  0.3*   --   0.3*   --    NEUTSPOLYS   --   20.40*   --   cancel   --    LYMPHOCYTES   --   76.70*   --   cancel   --    MONOCYTES   --   1.00   --   cancel   --    EOSINOPHILS   --   0.00   --   cancel   --    BASOPHILS   --   0.00   --   cancel   --    ASTSGOT   --   22  24   --   20   ALTSGPT   --   28  32   --   25   ALKPHOSPHAT   --   90  100*   --   82   TBILIRUBIN   --   2.1*  2.5*   --   1.9*     Recent Labs      18   0907   SODIUM  134*  133*  133*   POTASSIUM  4.3  4.4  4.6   CHLORIDE  103  102  105   CO2  22  22  23   GLUCOSE  104*  103*  108*   BUN  15  15  17   CREATININE  1.29  1.29  1.25   CALCIUM  7.5*  7.9*  7.4*     Hemodynamics:  Temp (24hrs), Av.9 °C (98.4 °F), Min:36.4 °C (97.6 °F), Max:37.2 °C  (99 °F)  Temperature: 36.4 °C (97.6 °F)  Pulse  Av.9  Min: 79  Max: 98   Blood Pressure : 110/73     Respiratory:    Respiration: 17, Pulse Oximetry: 95 %        RUL Breath Sounds: Clear, RML Breath Sounds: Diminished, RLL Breath Sounds: Diminished, GREGORIA Breath Sounds: Clear, LLL Breath Sounds: Diminished  Fluids:    Intake/Output Summary (Last 24 hours) at 18 1511  Last data filed at 18 1200   Gross per 24 hour   Intake           2480.5 ml   Output             1300 ml   Net           1180.5 ml        GI/Nutrition:  Orders Placed This Encounter   Procedures   • DIET ORDER     Standing Status:   Standing     Number of Occurrences:   1     Order Specific Question:   Diet:     Answer:   Cardiac [6]     Medical Decision Making, by Problem:  Active Hospital Problems    Diagnosis   • *Pancytopenia (CMS-HCC) [D61.818]   • Thrombocytopenia (CMS-HCC) [D69.6]   • Neutropenic fever (CMS-HCC) [D70.9, R50.81]   • Maxillary sinusitis [J32.0]   • Acute renal failure (ARF) (CMS-HCC) [N17.9]   • Hyponatremia [E87.1]   • Essential hypertension [I10]   • 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 There was significant lymphohistiocytic infiltration which is rather unusual pattern. In view of the T-cell receptor clonality a malignant process is favored. Informed him that he does not appear to be a relapse from prior follicular lymphoma. Hopefully will hear back from [place] soon. May need outpt PET depending on final diagnosis    D/w Dr Klein who favors patient staying in the hospital until path results are back .LDH is only mildly elevated at 282. HIV, hepatitis screen, uric acid levels were not elevated.  Hold off on further platelet  transfusion unless his platelets are less than 10 or if he starts bleeding. He does not have any response to 2 doses of Granix, which is not surprising.   Neutropenic fever- on empiric Abx, Afebrile  Will follow    Quality-Core Measures

## 2018-03-06 NOTE — PROGRESS NOTES
[place] from Lab called with critical result of WBC at 0.3 and PLT at 14. Critical lab result read back to [place].

## 2018-03-07 NOTE — PROGRESS NOTES
[facility] Hospitalist Progress Note    Date of Service: 3/7/2018    Chief Complaint  77 y.o. male admitted 3/4/2018 with non-Hodgkin's versus Hodgkin's lymphoma presented from [facility] with pancytopenia and neutropenic fever associated with sinusitis.     Interval Problem Update  Did well over night. Discussed waiting in hospital until path is back        Consultants/Specialty  Hematology oncology  Infectious disease        Disposition  hospital        Review of Systems   Constitutional: Positive for malaise/fatigue. Negative for fever.   HENT: Negative for hearing loss and sore throat.    Eyes: Negative for blurred vision and double vision.   Respiratory: Negative for cough and shortness of breath.    Cardiovascular: Negative for chest pain and palpitations.   Gastrointestinal: Negative for abdominal pain and nausea.   Genitourinary: Negative for dysuria and frequency.   Musculoskeletal: Negative for back pain and myalgias.   Skin: Negative for itching and rash.   Neurological: Negative for dizziness, tingling and weakness.   Psychiatric/Behavioral: Negative for hallucinations. The patient does not have insomnia.    All other systems reviewed and are negative.     Physical Exam  Laboratory/Imaging   Hemodynamics  Temp (24hrs), Av.9 °C (98.5 °F), Min:36.4 °C (97.6 °F), Max:37.3 °C (99.2 °F)   Temperature: 36.9 °C (98.4 °F)  Pulse  Av.9  Min: 79  Max: 110    Blood Pressure : 129/79      Respiratory      Respiration: 17, Pulse Oximetry: 93 %        RUL Breath Sounds: Clear, RML Breath Sounds: Diminished, RLL Breath Sounds: Diminished, GREGORIA Breath Sounds: Clear, LLL Breath Sounds: Diminished    Fluids    Intake/Output Summary (Last 24 hours) at 18 1026  Last data filed at 18 0800   Gross per 24 hour   Intake           3008.5 ml   Output             1600 ml   Net           1408.5 ml       Nutrition  Orders Placed This Encounter   Procedures   • DIET ORDER     Standing Status:   Standing     Number  of Occurrences:   1     Order Specific Question:   Diet:     Answer:   Cardiac [6]     Physical Exam   Constitutional: He is oriented to person, place, and time. He appears well-developed and well-nourished. No distress.   HENT:   Right Ear: External ear normal.   Left Ear: External ear normal.   Nose: Nose normal.   Eyes: Right eye exhibits no discharge. Left eye exhibits no discharge. No scleral icterus.   Neck: No JVD present. No tracheal deviation present.   Cardiovascular: Normal rate, regular rhythm, normal heart sounds and intact distal pulses.    No murmur heard.  Pulmonary/Chest: Effort normal and breath sounds normal. No respiratory distress. He has no wheezes.   Abdominal: He exhibits no distension. There is no tenderness.   Musculoskeletal: Normal range of motion. He exhibits no edema or tenderness.   Neurological: He is alert and oriented to person, place, and time.   Skin: Skin is warm and dry. He is not diaphoretic. No erythema.   Psychiatric: He has a normal mood and affect. His behavior is normal.   Nursing note and vitals reviewed.      Recent Labs      03/05/18   0316  03/06/18   0212  03/07/18   0224   WBC  0.3*  0.3*  0.4*   RBC  3.11*  2.82*  2.88*   HEMOGLOBIN  8.4*  7.8*  8.0*   HEMATOCRIT  25.0*  22.8*  23.4*   MCV  80.4*  80.9*  81.3*   MCH  27.0  27.7  27.8   MCHC  33.6*  34.2  34.2   RDW  44.6  45.2  45.7   PLATELETCT  18*  14*  18*   MPV   --    --   12.6     Recent Labs      03/05/18   0316  03/05/18   0840  03/06/18   0907   SODIUM  134*  133*  133*   POTASSIUM  4.3  4.4  4.6   CHLORIDE  103  102  105   CO2  22  22  23   GLUCOSE  104*  103*  108*   BUN  15  15  17   CREATININE  1.29  1.29  1.25   CALCIUM  7.5*  7.9*  7.4*                      Assessment/Plan     * Pancytopenia (CMS-HCC)   Assessment & Plan    His counts have not improved with Granix or sustained with platelet transfusions.   Discussed with Dr. Klein concern for leukemic or  lymphoma infiltration of bone marrow.   Follow-up molecular studies of BMB. Samples were sent to [facility].   I consulted Dr. Girard: Follow-up recommendations  Monitor cbc w diff.         Thrombocytopenia (CMS-HCC)- (present on admission)   Assessment & Plan    S/p platelet transfusions ×2 at [facility]  Transfuse today as 14 count          Neutropenic fever (CMS-HCC)- (present on admission)   Assessment & Plan    Attributed to sinusitis, cultures from [facility] negative  Prophylactic Diflucan   IV cefepime for sinusitis coverage per ID recs        Maxillary sinusitis- (present on admission)   Assessment & Plan    IV cefepime per ID        Acute renal failure (ARF) (CMS-HCC)- (present on admission)   Assessment & Plan    Recently diagnosed, attributed to dehydration  Renal function improved, stabilizing  Follow-up renal function electrolytes urine output  Continue with IV fluids until consistent intake         Hyponatremia- (present on admission)   Assessment & Plan    Monitor        Essential hypertension- (present on admission)   Assessment & Plan    Controlled  Resume home medications          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 Klein. To have hematology / oncology evaluation          Quality-Core Measures   Reviewed items::  Radiology images reviewed, Labs reviewed, Medications reviewed and EKG reviewed  Joshi catheter::  No Joshi  DVT prophylaxis pharmacological::  Contraindicated - High bleeding risk  Antibiotics:  Treating active infection/contamination beyond 24 hours perioperative coverage  Assessed for rehabilitation services:  Patient was assess for and/or received rehabilitation services during this hospitalization

## 2018-03-07 NOTE — PROGRESS NOTES
Infectious Disease Progress Note    Author: Santa Lynne M.D. Date & Time of service: 3/7/2018  3:23 PM    Chief Complaint:  FU neutropenic fever      Interval History:  Tmax 100.4, WBC 0.2, ANC 0.2, feels better today, had R groin pain overnight which spontaneously resolved   Tmax 100.5, having coughing fit and SOB, plan for BM biopsy  3/1Tmax 100.2, WBC 0.3, had a BM this am, cough better, repeat CXR with no infiltrate   3/2 Tmax 100.5, shortness of breath with exertion, had bloody nose this am, plts 22, denies abd pain or diarrhea, path neg for lyphoma  3/3/2018-Tmax 99.6. WBC 0.3 platelets 17 creatinine 1.38  3/4/2018-Tmax 99.2 WBCs 0.3 creatinine 1.38 complains of some shortness of breath  3/5 AF WBC 0.3 transferred to [facility] no new complaints  3/6 AF WBC 0.3 no positive cxs No new complaints  3/7 AF WBC 0.4 no complaints except fatigue and CORRALES  Labs Reviewed, Medications Reviewed, Radiology Reviewed and Wound Reviewed.    Review of Systems:  Review of Systems   Constitutional: Negative for fever.   Respiratory: Negative for cough.    Cardiovascular: Negative for chest pain.   Gastrointestinal: Negative for nausea and vomiting.   Genitourinary: Negative for dysuria.   Neurological: Positive for weakness.   All other systems reviewed and are negative.      Hemodynamics:  Temp (24hrs), Av °C (98.6 °F), Min:36.8 °C (98.2 °F), Max:37.3 °C (99.2 °F)  Temperature: 36.9 °C (98.4 °F)  Pulse  Av.9  Min: 79  Max: 110   Blood Pressure : 129/79       Physical Exam:  Physical Exam   Constitutional: He is oriented to person, place, and time. He appears well-developed. No distress.   HENT:   Head: Normocephalic and atraumatic.   Eyes: EOM are normal. Pupils are equal, round, and reactive to light.   Neck: Neck supple.   Cardiovascular: Normal rate.    Pulmonary/Chest: Effort normal. No respiratory distress. He has no wheezes. He has no rales.   Abdominal: Soft. He exhibits no distension. There is no  tenderness.   Musculoskeletal: He exhibits no edema.   Neurological: He is alert and oriented to person, place, and time.   Skin: No rash noted.   ecchymosis   Nursing note and vitals reviewed.      Meds:    Current Facility-Administered Medications:   •  cefepime  •  NS  •  acetaminophen  •  benzocaine-menthol  •  benzonatate  •  fluconazole  •  labetalol  •  levothyroxine  •  ondansetron  •  potassium chloride SA  •  PARoxetine  •  senna-docusate  •  polyethylene glycol/lytes  •  magnesium hydroxide  •  bisacodyl  •  tbo-filgrastim  •  zolpidem    Labs:  Recent Labs      03/05/18   0316  03/06/18   0212  03/07/18   0224   WBC  0.3*  0.3*  0.4*   RBC  3.11*  2.82*  2.88*   HEMOGLOBIN  8.4*  7.8*  8.0*   HEMATOCRIT  25.0*  22.8*  23.4*   MCV  80.4*  80.9*  81.3*   MCH  27.0  27.7  27.8   RDW  44.6  45.2  45.7   PLATELETCT  18*  14*  18*   MPV   --    --   12.6   NEUTSPOLYS  20.40*  cancel  CANCEL   LYMPHOCYTES  76.70*  cancel  CANCEL   MONOCYTES  1.00  cancel  CANCEL   EOSINOPHILS  0.00  cancel  CANCEL   BASOPHILS  0.00  cancel  CANCEL     Recent Labs      03/05/18   0840  03/06/18   0907  03/07/18   0922   SODIUM  133*  133*  135   POTASSIUM  4.4  4.6  4.4   CHLORIDE  102  105  106   CO2  22  23  23   GLUCOSE  103*  108*  101*   BUN  15  17  18     Recent Labs      03/05/18   0840  03/06/18   0907  03/07/18   0922   ALBUMIN  3.6  2.9*  3.0*   TBILIRUBIN  2.5*  1.9*  2.0*   ALKPHOSPHAT  100*  82  88   TOTPROTEIN  5.0*  4.2*  4.3*   ALTSGPT  32  25  26   ASTSGOT  24  20  20   CREATININE  1.29  1.25  1.35       Imaging:  Ct-abdomen-pelvis W/o    Result Date: 2/24/2018 2/24/2018 6:09 AM HISTORY/REASON FOR EXAM:  Fever. TECHNIQUE/EXAM DESCRIPTION: CT scan of the abdomen and pelvis without contrast. Noncontrast helical scanning was obtained from the diaphragmatic domes through the pubic symphysis. Low dose optimization technique was utilized for this CT exam including automated exposure control and adjustment of the mA  and/or kV according to patient size. COMPARISON: 4/12/2017. FINDINGS: There are trace bilateral pleural effusions. There is a rounded opacity at the right lung base likely representing round atelectasis. There is trace pericardial fluid. No free air is seen in the abdomen or pelvis. Evaluation of parenchymal and vascular structures is limited by the lack of intravenous contrast. Liver is hypodense suggestive of hepatic steatosis. Subtle 1.1 cm hypodense lesion in the right lobe of the liver corresponds to the previously noted hemangioma. There are calcified granulomata within the liver. Gallbladder appears unremarkable. The spleen is not enlarged. No adrenal mass is identified on the left. There is a right adrenal nodule measuring 2 cm which is compatible with an adrenal adenoma. There is renal cortical atrophy on the right. There are bilateral nonobstructing renal calculi. There are parapelvic cysts on the left. There is no evidence of hydronephrosis. Pancreas appears unremarkable.  Aorta is not aneurysmal. Atherosclerotic plaque is seen. Mildly enlarged dense right inguinal lymph node is again seen. There are dense retroperitoneal and right iliac lymph nodes. The dens retroperitoneal nodes measure up to 1.9 cm in short axis dimension, not significantly changed compared to prior. Haziness about the mesentery is not significantly changed compared to prior. Dense lymph node in the root of the mesentery measures 1.6 cm in short axis dimension, unchanged. There is no evidence of bowel obstruction. There is no evidence of acute appendicitis. Bladder is mildly distended. Prostate is enlarged. There is a fat-containing right inguinal hernia. Degenerative changes are seen in the spine. Wedge deformities of L1 and L2 appear unchanged.     Retroperitoneal, iliac and mesenteric lymphadenopathy is not significantly changed compared to prior. Haziness about the root of the mesentery is again noted. Nonobstructing bilateral  renal calculi. Atrophic right kidney. Left parapelvic cysts. Enlarged prostate. Trace bilateral pleural effusions. Rounded opacity at the right lung base likely represents round atelectasis.    Ct-head W/o    Result Date: 2/24/2018 2/24/2018 6:09 AM HISTORY/REASON FOR EXAM:  Fever. TECHNIQUE/EXAM DESCRIPTION AND NUMBER OF VIEWS: CT of the head without contrast. Contiguous axial sections were obtained from the skull base through the vertex. Up to date radiation dose reduction adjustments have been utilized to meet ALARA standards for radiation dose reduction. COMPARISON:  None available FINDINGS: The is no evidence of intraparenchymal, intraventricular and extra-axial hemorrhage.  The ventricles and cortical sulci are prominent compatible with age related change.  There is no mass effect or midline shift. There is minimal mucosal thickening within the maxillary sinuses. Visualized mastoid air cells are clear. The calvarium is intact. There is mild left frontal soft tissue swelling.     No acute intracranial abnormality is identified. Cortical atrophy. Mild left frontal soft tissue swelling.    Ct-maxillofacial W/o Plus Recons    Result Date: 2/26/2018 2/26/2018 7:24 PM HISTORY/REASON FOR EXAM:  Facial pain. Suspected sinusitis. TECHNIQUE/EXAM DESCRIPTION AND NUMBER OF VIEWS:  CT scan maxillofacial without contrast, with reconstructions. Thin-section helical imaging was obtained of the face from the orbital roofs through the mandible. Coronal multiplanar volume reformat images were generated from the axial data. Low dose optimization technique was utilized for this CT exam including automated exposure control and adjustment of the mA and/or kV according to patient size. FINDINGS: The frontal sinus is clear. There is minimal mucosal thickening in the ethmoid sinus bilaterally. There is mild bilateral mucosal thickening and/or polyp formation in the maxillary sinus. Both maxillary sinus infundibulum and ostia are  patent. The sphenoid sinus is clear. No significant bony nasal septal deviation is present. No nasal turbinate edema is present.     1.  Mild chronic bilateral maxillary and ethmoid sinusitis. 2.  No evidence of acute sinusitis. 3.  Areas of rounded mucosal thickening or polyp formation in each maxillary sinus antrum. 4.  Otherwise clear paranasal sinuses.    Dx-chest-2 Views    Result Date: 2/28/2018 2/28/2018 8:43 AM HISTORY/REASON FOR EXAM: Cough.  Lymphoma TECHNIQUE/EXAM DESCRIPTION AND NUMBER OF VIEWS: Two views of the chest. COMPARISON:  February 23 FINDINGS: Cardiomediastinal contours are stable with some aortic ectasia and upper normal heart size. No pulmonary consolidation is seen. Stable mostly right pleural space thickening, possible small effusions Chronic upper lumbar moderate severity compression fracture resulting in kyphosis     Stable chest with some chronic pleural space thickening more likely from pleural parenchymal scarring than pleural fluid, large lung volumes and mild cardiac silhouette enlargement    Dx-chest-portable (1 View)    Result Date: 2/23/2018 2/23/2018 6:01 PM HISTORY/REASON FOR EXAM:  Cough. TECHNIQUE/EXAM DESCRIPTION AND NUMBER OF VIEWS: Single portable view of the chest. COMPARISON: 3/22/2017 FINDINGS: Cardiomediastinal contour is within normal limits. Lungs show mild hypoinflation. No focal pulmonary consolidation. No pleural fluid collection or pneumothorax. No major bony abnormality is seen.     Hypoinflation without other evidence for acute cardiopulmonary disease.    Echocardiogram Comp W/o Cont    Result Date: 2/25/2018  Transthoracic Echo Report Echocardiography Laboratory CONCLUSIONS No prior study is available for comparison. Normal transthoracic echocardiogram. SHELLIE CASIANO Exam Date:         02/25/2018                    12:59 Exam Location:     Inpatient Priority:          Routine Ordering Physician:        RILEY JAQUEZ Referring Physician:       [id number] Zacarias  Sonographer:               Ashish Zapata RDCS Age:    77     Gender:    M MRN:    [id number] :    1940 BSA:    2.05   Ht (in):    70     Wt (lb):    191 Exam Type:     Complete Indications:     Weakness ICD Codes:       R531 CPT Codes:       31488 BP:   117    /   66     HR:   99 Technical Quality:       Fair MEASUREMENTS  (Male / Female) Normal Values 2D ECHO LV Diastolic Diameter PLAX        4.4 cm                4.2 - 5.9 / 3.9 - 5.3 cm LV Systolic Diameter PLAX         2.7 cm                2.1 - 4.0 cm IVS Diastolic Thickness           1.2 cm                LVPW Diastolic Thickness          1.2 cm                LVOT Diameter                     2.1 cm                Estimated LV Ejection Fraction    65 %                  LV Ejection Fraction MOD BP       65.4 %                >= 55  % LV Ejection Fraction MOD 4C       69.4 %                LV Ejection Fraction MOD 2C       63.3 %                IVC Diameter                      1.4 cm                M-MODE Aortic Root Diameter MM           3.2 cm                DOPPLER AV Peak Velocity                  1.6 m/s               AV Peak Gradient                  9.7 mmHg              AV Mean Gradient                  5.6 mmHg              LVOT Peak Velocity                0.97 m/s              AV Area Cont Eq vti               2.2 cm²               Mitral E Point Velocity           1.1 m/s               Mitral E to A Ratio               1.1                   Mitral A Duration                 142 ms                MV Pressure Half Time             45.2 ms               MV Area PHT                       4.9 cm²               MV Deceleration Time              156 ms                Pulmonary Vein A Velocity         0.5 m/s               Pulmonary Vein A Duration         104 ms                Pulm Vein-MV A Duration           -38 ms                TV Peak E Velocity                0.59 m/s              TR Peak Velocity                  318 cm/s              PV Peak  Velocity                  1.2 m/s               PV Peak Gradient                  6 mmHg                RVOT Peak Velocity                0.83 m/s              * Indicates values subject to auto-interpretation LV EF:  65    % FINDINGS Left Ventricle Normal left ventricular size, systolic function, and diastolic function. Mild concentric left ventricular hypertrophy. Normal regional wall motion. Left ventricular ejection fraction is visually estimated to be 65%. Right Ventricle The right ventricle was normal in size and function. Right Atrium The right atrium is normal in size.  Normal inferior vena cava size and inspiratory collapse. Left Atrium The left atrium is normal in size.  Left atrial volume index is 26 mL/sq m. Mitral Valve Structurally normal mitral valve without significant stenosis. Trace mitral regurgitation. Aortic Valve Tricuspid aortic valve. Aortic sclerosis without stenosis. No aortic insufficiency. Tricuspid Valve Structurally normal tricuspid valve without significant stenosis. Mild tricuspid regurgitation. Right atrial pressure is estimated to be 3 mmHg. Estimated right ventricular systolic pressure is 40 mmHg. Pulmonic Valve Structurally normal pulmonic valve without significant stenosis or regurgitation. Pericardium Normal pericardium without effusion. Aorta The aortic root is normal. Jovany Heath (Electronically Signed) Final Date:     25 February 2018                 17:42      Micro:  Results     ** No results found for the last 168 hours. 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 Path - neg for malignancy, no blasts, +granulomatous inflammation vs T cells-sent to [facility]  For transfer to oncology floor     H/o Lymphoma  Last chemo in 2010     ROHITH, improved  Renally dose abx as needed  Avoid nephrotoxins  Monitor    Discussed with internal medicine Dr Melendez

## 2018-03-07 NOTE — PROGRESS NOTES
Joseph from Lab called with critical result of WBC at 0.4 and PLT at 14. Critical lab result read back to Joseph.   Dr. Martel notified of critical lab result at 0405.  Critical lab result read back by Dr. Martel. No new orders received at this time.

## 2018-03-07 NOTE — PROGRESS NOTES
Assessment completed.  AA&Ox4. VSS on RA. Denies SOB. Denies any pain.   Tolerating diet. Denies N/V.  + void. + BM.   Pt resting comfortably in bed. No other concerns voiced by pt at this time.   Protective isolation precautions maintained.  Call light within reach. Pt calls appropriately.

## 2018-03-07 NOTE — CARE PLAN
Problem: Communication  Goal: The ability to communicate needs accurately and effectively will improve  Outcome: PROGRESSING AS EXPECTED  POC discussed at beginning of shift.     Problem: Safety  Goal: Will remain free from falls  Outcome: PROGRESSING AS EXPECTED  Treaded slipper socks worn. Bed locked and in lowest position.

## 2018-03-07 NOTE — DISCHARGE PLANNING
Anticipated Discharge Disposition: Unknown - Hospice     Action: LSW provided Pt with Hospice choice form     Barriers to Discharge: None     Plan:LSW to follow up regarding hospice choice

## 2018-03-07 NOTE — CARE PLAN
Problem: Safety  Goal: Will remain free from falls    Intervention: Implement fall precautions  Call light in reach, hourly rounding in practice.       Problem: Mobility  Goal: Risk for activity intolerance will decrease    Intervention: Encourage patient to increase activity level in collaboration with Interdisciplinary Team  Pt will get OOB 3 times today.

## 2018-03-08 NOTE — PROGRESS NOTES
Patient A&O X4. Denying pain at this time. Protective precautions in place. Patient educated regarding need to call for assistance. Bed locked in low position.

## 2018-03-08 NOTE — PROGRESS NOTES
CLARI from Lab called with critical result of platelets 15 and white blood cells 0.4 at 0012. Critical lab result read back to CLARI.   This critical lab result is within parameters established by [facility] policy for this patient

## 2018-03-08 NOTE — PROGRESS NOTES
[facility] Hospitalist Progress Note    Date of Service: 3/8/2018    Chief Complaint  77 y.o. male admitted 3/4/2018 with non-Hodgkin's versus Hodgkin's lymphoma presented from [facility] with pancytopenia and neutropenic fever associated with sinusitis.     Interval Problem Update  Feels okay. Tolerating by mouth intake well.    Consultants/Specialty  Hematology oncology  Infectious disease        Disposition  hospital        Review of Systems   Constitutional: Positive for malaise/fatigue. Negative for fever.   HENT: Negative for hearing loss and tinnitus.    Eyes: Negative for blurred vision and double vision.   Respiratory: Negative for cough.    Cardiovascular: Negative for chest pain and palpitations.   Gastrointestinal: Negative for abdominal pain and vomiting.   Genitourinary: Negative for dysuria and urgency.   Musculoskeletal: Negative for myalgias and neck pain.   Skin: Negative for rash.   Neurological: Positive for weakness. Negative for dizziness and headaches.   Psychiatric/Behavioral: Negative for depression, hallucinations and suicidal ideas.      Physical Exam  Laboratory/Imaging   Hemodynamics  Temp (24hrs), Av.2 °C (98.9 °F), Min:36.6 °C (97.8 °F), Max:37.7 °C (99.8 °F)   Temperature: 37.4 °C (99.4 °F)  Pulse  Av  Min: 79  Max: 110    Blood Pressure : 110/56      Respiratory      Respiration: 18, Pulse Oximetry: 96 %        RUL Breath Sounds: Clear, RML Breath Sounds: Clear, RLL Breath Sounds: Diminished, GREGORIA Breath Sounds: Clear, LLL Breath Sounds: Diminished    Fluids    Intake/Output Summary (Last 24 hours) at 18 1519  Last data filed at 18 1154   Gross per 24 hour   Intake             3154 ml   Output             1950 ml   Net             1204 ml       Nutrition  Orders Placed This Encounter   Procedures   • DIET ORDER     Standing Status:   Standing     Number of Occurrences:   1     Order Specific Question:   Diet:     Answer:   Cardiac [6]     Physical Exam    Constitutional: He is oriented to person, place, and time. He appears well-developed and well-nourished. No distress.   HENT:   Head: Normocephalic and atraumatic.   Nose: Nose normal.   Eyes: Conjunctivae and EOM are normal. Pupils are equal, round, and reactive to light. No scleral icterus.   Neck: Normal range of motion. Neck supple. No tracheal deviation present.   Cardiovascular: Normal rate, regular rhythm, normal heart sounds and intact distal pulses.  Exam reveals no friction rub.    Pulmonary/Chest: Effort normal and breath sounds normal. No respiratory distress. He has no wheezes.   Abdominal: Soft. Bowel sounds are normal. He exhibits no distension. There is no tenderness. There is no guarding.   Musculoskeletal: Normal range of motion. He exhibits no tenderness.   Neurological: He is alert and oriented to person, place, and time.   Skin: Skin is warm and dry. No erythema.   Psychiatric: He has a normal mood and affect. His behavior is normal.   Nursing note and vitals reviewed.      Recent Labs      03/06/18   0212  03/07/18   0224  03/07/18   2357   WBC  0.3*  0.4*  0.4*   RBC  2.82*  2.88*  2.74*   HEMOGLOBIN  7.8*  8.0*  7.6*   HEMATOCRIT  22.8*  23.4*  22.1*   MCV  80.9*  81.3*  80.7*   MCH  27.7  27.8  27.7   MCHC  34.2  34.2  34.4   RDW  45.2  45.7  46.0   PLATELETCT  14*  18*  15*   MPV   --   12.6  11.6     Recent Labs      03/06/18   0907  03/07/18   0922  03/08/18   0933   SODIUM  133*  135  134*   POTASSIUM  4.6  4.4  4.6   CHLORIDE  105  106  105   CO2  23  23  22   GLUCOSE  108*  101*  104*   BUN  17  18  17   CREATININE  1.25  1.35  1.33   CALCIUM  7.4*  7.6*  7.9*                      Assessment/Plan     * Pancytopenia (CMS-HCC)   Assessment & Plan    Secondary to severe bone marrow infiltrate. Bone marrow biopsy showed T-cell clonality concerning for T-cell lymphoma or leukemia. Waiting for  molecular studies of BMB. Samples were sent to [place].   onc following.  Continue Granix.            Thrombocytopenia (CMS-HCC)- (present on admission)   Assessment & Plan    S/p platelet transfusions ×2 at [facility]  Transfuse prn less than 10        Neutropenic fever (CMS-HCC)- (present on admission)   Assessment & Plan    Attributed to sinusitis, cultures from [facility] negative  Prophylactic Diflucan   IV cefepime for sinusitis coverage per ID recs        Maxillary sinusitis- (present on admission)   Assessment & Plan    IV cefepime per ID  Complete 14d course  outpatient levo ok if going home.         Acute renal failure (ARF) (CMS-HCC)- (present on admission)   Assessment & Plan    resolved        Hyponatremia- (present on admission)   Assessment & Plan    Stable  Not improved  Iv fluids          Essential hypertension- (present on admission)   Assessment & Plan    Monitor blood pressure.          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         Plan of care discussed with RN.   Quality-Core Measures   Reviewed items::  Radiology images reviewed, Labs reviewed and Medications reviewed  DVT prophylaxis pharmacological::  Contraindicated - High bleeding risk  Antibiotics:  Treating active infection/contamination beyond 24 hours perioperative coverage

## 2018-03-08 NOTE — PROGRESS NOTES
Infectious Disease Progress Note    Author: Santa Lynne M.D. Date & Time of service: 3/8/2018  12:46 PM    Chief Complaint:  FU neutropenic fever      Interval History:  76 y/o WM admitted with febrile neutropenia     Tmax 100.5, having coughing fit and SOB, plan for BM biopsy  3/1Tmax 100.2, WBC 0.3, had a BM this am, cough better, repeat CXR with no infiltrate   3/2 Tmax 100.5, shortness of breath with exertion, had bloody nose this am, plts 22, denies abd pain or diarrhea, path neg for lyphoma  3/3/2018-Tmax 99.6. WBC 0.3 platelets 17 creatinine 1.38  3/4/2018-Tmax 99.2 WBCs 0.3 creatinine 1.38 complains of some shortness of breath  3/5 AF WBC 0.3 transferred to [facility] no new complaints  3/6 AF WBC 0.3 no positive cxs No new complaints  3/7 AF WBC 0.4 no complaints except fatigue and CORRALES  3/8 AF (99.8) no CBC transferred to Oncology floor-no acute events  Labs Reviewed, Medications Reviewed, Radiology Reviewed and Wound Reviewed.    Review of Systems:  Review of Systems   Constitutional: Negative for fever.   Respiratory: Negative for cough.    Cardiovascular: Negative for chest pain.   Gastrointestinal: Negative for nausea and vomiting.   Genitourinary: Negative for dysuria.   Neurological: Positive for weakness.   All other systems reviewed and are negative.      Hemodynamics:  Temp (24hrs), Av.2 °C (98.9 °F), Min:36.6 °C (97.8 °F), Max:37.7 °C (99.8 °F)  Temperature: 37.4 °C (99.4 °F)  Pulse  Av  Min: 79  Max: 110   Blood Pressure : 110/56       Physical Exam:  Physical Exam   Constitutional: He appears well-developed. No distress.   HENT:   Head: Normocephalic and atraumatic.   Eyes: Pupils are equal, round, and reactive to light.   Neck: Neck supple.   Cardiovascular: Normal rate.    Pulmonary/Chest: Effort normal. No respiratory distress. He has no wheezes. He has no rales.   Abdominal: Soft. He exhibits no distension. There is no tenderness.   Musculoskeletal: He exhibits no edema.    Skin: No rash noted.   ecchymosis   Nursing note and vitals reviewed.      Meds:    Current Facility-Administered Medications:   •  cefepime  •  NS  •  acetaminophen  •  benzocaine-menthol  •  benzonatate  •  fluconazole  •  labetalol  •  levothyroxine  •  ondansetron  •  potassium chloride SA  •  PARoxetine  •  senna-docusate  •  polyethylene glycol/lytes  •  magnesium hydroxide  •  bisacodyl  •  tbo-filgrastim  •  zolpidem    Labs:  Recent Labs      03/06/18   0212  03/07/18   0224  03/07/18   2357   WBC  0.3*  0.4*  0.4*   RBC  2.82*  2.88*  2.74*   HEMOGLOBIN  7.8*  8.0*  7.6*   HEMATOCRIT  22.8*  23.4*  22.1*   MCV  80.9*  81.3*  80.7*   MCH  27.7  27.8  27.7   RDW  45.2  45.7  46.0   PLATELETCT  14*  18*  15*   MPV   --   12.6  11.6   NEUTSPOLYS  cancel  CANCEL  CANCEL   LYMPHOCYTES  cancel  CANCEL  CANCEL   MONOCYTES  cancel  CANCEL  CANCEL   EOSINOPHILS  cancel  CANCEL  CANCEL   BASOPHILS  cancel  CANCEL  CANCEL     Recent Labs      03/06/18   0907  03/07/18   0922  03/08/18   0933   SODIUM  133*  135  134*   POTASSIUM  4.6  4.4  4.6   CHLORIDE  105  106  105   CO2  23  23  22   GLUCOSE  108*  101*  104*   BUN  17  18  17     Recent Labs      03/06/18   0907  03/07/18   0922  03/08/18   0933   ALBUMIN  2.9*  3.0*  3.4   TBILIRUBIN  1.9*  2.0*  2.2*   ALKPHOSPHAT  82  88  104*   TOTPROTEIN  4.2*  4.3*  4.8*   ALTSGPT  25  26  29   ASTSGOT  20  20  23   CREATININE  1.25  1.35  1.33       Imaging:  Ct-abdomen-pelvis W/o    Result Date: 2/24/2018 2/24/2018 6:09 AM HISTORY/REASON FOR EXAM:  Fever. TECHNIQUE/EXAM DESCRIPTION: CT scan of the abdomen and pelvis without contrast. Noncontrast helical scanning was obtained from the diaphragmatic domes through the pubic symphysis. Low dose optimization technique was utilized for this CT exam including automated exposure control and adjustment of the mA and/or kV according to patient size. COMPARISON: 4/12/2017. FINDINGS: There are trace bilateral pleural effusions.  There is a rounded opacity at the right lung base likely representing round atelectasis. There is trace pericardial fluid. No free air is seen in the abdomen or pelvis. Evaluation of parenchymal and vascular structures is limited by the lack of intravenous contrast. Liver is hypodense suggestive of hepatic steatosis. Subtle 1.1 cm hypodense lesion in the right lobe of the liver corresponds to the previously noted hemangioma. There are calcified granulomata within the liver. Gallbladder appears unremarkable. The spleen is not enlarged. No adrenal mass is identified on the left. There is a right adrenal nodule measuring 2 cm which is compatible with an adrenal adenoma. There is renal cortical atrophy on the right. There are bilateral nonobstructing renal calculi. There are parapelvic cysts on the left. There is no evidence of hydronephrosis. Pancreas appears unremarkable.  Aorta is not aneurysmal. Atherosclerotic plaque is seen. Mildly enlarged dense right inguinal lymph node is again seen. There are dense retroperitoneal and right iliac lymph nodes. The dens retroperitoneal nodes measure up to 1.9 cm in short axis dimension, not significantly changed compared to prior. Haziness about the mesentery is not significantly changed compared to prior. Dense lymph node in the root of the mesentery measures 1.6 cm in short axis dimension, unchanged. There is no evidence of bowel obstruction. There is no evidence of acute appendicitis. Bladder is mildly distended. Prostate is enlarged. There is a fat-containing right inguinal hernia. Degenerative changes are seen in the spine. Wedge deformities of L1 and L2 appear unchanged.     Retroperitoneal, iliac and mesenteric lymphadenopathy is not significantly changed compared to prior. Haziness about the root of the mesentery is again noted. Nonobstructing bilateral renal calculi. Atrophic right kidney. Left parapelvic cysts. Enlarged prostate. Trace bilateral pleural effusions.  Rounded opacity at the right lung base likely represents round atelectasis.    Ct-head W/o    Result Date: 2/24/2018 2/24/2018 6:09 AM HISTORY/REASON FOR EXAM:  Fever. TECHNIQUE/EXAM DESCRIPTION AND NUMBER OF VIEWS: CT of the head without contrast. Contiguous axial sections were obtained from the skull base through the vertex. Up to date radiation dose reduction adjustments have been utilized to meet ALARA standards for radiation dose reduction. COMPARISON:  None available FINDINGS: The is no evidence of intraparenchymal, intraventricular and extra-axial hemorrhage.  The ventricles and cortical sulci are prominent compatible with age related change.  There is no mass effect or midline shift. There is minimal mucosal thickening within the maxillary sinuses. Visualized mastoid air cells are clear. The calvarium is intact. There is mild left frontal soft tissue swelling.     No acute intracranial abnormality is identified. Cortical atrophy. Mild left frontal soft tissue swelling.    Ct-maxillofacial W/o Plus Recons    Result Date: 2/26/2018 2/26/2018 7:24 PM HISTORY/REASON FOR EXAM:  Facial pain. Suspected sinusitis. TECHNIQUE/EXAM DESCRIPTION AND NUMBER OF VIEWS:  CT scan maxillofacial without contrast, with reconstructions. Thin-section helical imaging was obtained of the face from the orbital roofs through the mandible. Coronal multiplanar volume reformat images were generated from the axial data. Low dose optimization technique was utilized for this CT exam including automated exposure control and adjustment of the mA and/or kV according to patient size. FINDINGS: The frontal sinus is clear. There is minimal mucosal thickening in the ethmoid sinus bilaterally. There is mild bilateral mucosal thickening and/or polyp formation in the maxillary sinus. Both maxillary sinus infundibulum and ostia are patent. The sphenoid sinus is clear. No significant bony nasal septal deviation is present. No nasal turbinate edema is  present.     1.  Mild chronic bilateral maxillary and ethmoid sinusitis. 2.  No evidence of acute sinusitis. 3.  Areas of rounded mucosal thickening or polyp formation in each maxillary sinus antrum. 4.  Otherwise clear paranasal sinuses.    Dx-chest-2 Views    Result Date: 2/28/2018 2/28/2018 8:43 AM HISTORY/REASON FOR EXAM: Cough.  Lymphoma TECHNIQUE/EXAM DESCRIPTION AND NUMBER OF VIEWS: Two views of the chest. COMPARISON:  February 23 FINDINGS: Cardiomediastinal contours are stable with some aortic ectasia and upper normal heart size. No pulmonary consolidation is seen. Stable mostly right pleural space thickening, possible small effusions Chronic upper lumbar moderate severity compression fracture resulting in kyphosis     Stable chest with some chronic pleural space thickening more likely from pleural parenchymal scarring than pleural fluid, large lung volumes and mild cardiac silhouette enlargement    Dx-chest-portable (1 View)    Result Date: 2/23/2018 2/23/2018 6:01 PM HISTORY/REASON FOR EXAM:  Cough. TECHNIQUE/EXAM DESCRIPTION AND NUMBER OF VIEWS: Single portable view of the chest. COMPARISON: 3/22/2017 FINDINGS: Cardiomediastinal contour is within normal limits. Lungs show mild hypoinflation. No focal pulmonary consolidation. No pleural fluid collection or pneumothorax. No major bony abnormality is seen.     Hypoinflation without other evidence for acute cardiopulmonary disease.    Echocardiogram Comp W/o Cont    Result Date: 2/25/2018  Transthoracic Echo Report Echocardiography Laboratory CONCLUSIONS No prior study is available for comparison. Normal transthoracic echocardiogram. STEPHENIE SHELLIE Exam Date:         02/25/2018                    12:59 Exam Location:     Inpatient LV EF:  65    % FINDINGS Left Ventricle Normal left ventricular size, systolic function, and diastolic function. Mild concentric left ventricular hypertrophy. Normal regional wall motion. Left ventricular ejection fraction is  visually estimated to be 65%. Right Ventricle The right ventricle was normal in size and function. Right Atrium The right atrium is normal in size.  Normal inferior vena cava size and inspiratory collapse. Left Atrium The left atrium is normal in size.  Left atrial volume index is 26 mL/sq m. Mitral Valve Structurally normal mitral valve without significant stenosis. Trace mitral regurgitation. Aortic Valve Tricuspid aortic valve. Aortic sclerosis without stenosis. No aortic insufficiency. Tricuspid Valve Structurally normal tricuspid valve without significant stenosis. Mild tricuspid regurgitation. Right atrial pressure is estimated to be 3 mmHg. Estimated right ventricular systolic pressure is 40 mmHg. Pulmonic Valve Structurally normal pulmonic valve without significant stenosis or regurgitation. Pericardium Normal pericardium without effusion. Aorta The aortic root is normal. Jovany Heath (Electronically Signed) Final Date:     25 February 2018                 17:42      Micro:  Results     ** No results found for the last 168 hours. 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 Path - neg for  malignancy, no blasts, +granulomatous inflammation vs T cells-sent to [place]     H/o Lymphoma  Last chemo in 2010     ROHITH, improved  Renally dose abx as needed  Avoid nephrotoxins  Monitor

## 2018-03-08 NOTE — PROGRESS NOTES
Patient  Transfer into R- 319. Patient alert and oriented up with one person assist. Patient asked to please call for assistance. Patient and wife states the BSC needs to stay at bedside. Ask patient to please call for assistance and  To assess his strength. Call out to the kitchen to report patient has been transferred to 300. Call light within reach hourly rounding in practice.

## 2018-03-08 NOTE — CARE PLAN
Problem: Safety  Goal: Will remain free from injury    Intervention: Provide assistance with mobility  Educated patient on the importance of safe mobilization. Safety precautions in place, bed locked in low position, non-skid socks on.      Problem: Urinary Elimination:  Goal: Ability to reestablish a normal urinary elimination pattern will improve  Educated patient who ambulates up to self to use bedside commode for urgency and frequency. Patient able to tolerate. Safety precautions in place.

## 2018-03-08 NOTE — PROGRESS NOTES
Received bedside report from night shift Rn. Assumed care at 0700. Patient A/Ox4 and is resting comfortably in bed. Denies pain, n/v, or discomfort at this time. Up x1 person assist. 20G R PIV. Urgency to BSC needs to stay at bedside. Call light within reach hourly rounding in practice.

## 2018-03-09 PROBLEM — D80.1 HYPOGAMMAGLOBULINEMIA (HCC): Status: ACTIVE | Noted: 2018-01-01

## 2018-03-09 NOTE — PROGRESS NOTES
Oncology/Hematology Progress Note               Author: Michael Girard    Remote h/o follicular lymphoma -s/p R-CVP-> R-Lucita  -> [facility] @ [place] with long term remission    -Recent development of unexplained pancytopenia.Hypercellular bone marrow (cellularity estimated at 80-85%)  showing extensive replacement of the marrow space by a  lymphohistiocytic proliferation;   the lymphoid infiltrate stains          with a T-cell marker and is negative for B lymphocyte markers;  the extensive histiocytic infiltrate is somewhat suggestive of granulomatous type inflammation. immunohistochemicaltains performed on the marrow show that there are no cells staining for B cell markers CD20 or PAX-5 and therefore the findings are not  that of a B-cell lymphoma. The increased lymphoid population in the marrow stains with the T-cell marker CD3.      T Cell Receptor rearrangement positive.  Awaiting path results from [place].     Date & Time created: 3/8/2018  5:53 PM     Interval History:  Fatigue+ , feels the same.Afebrile    Review of Systems:  ROS Constitutional: Positive for fever, chills, weight loss ,malaise/fatigue.    HEENT: No new auditory or visual complaints. No sore throat and neck pain.     Respiratory:No new cough, sputum production, shortness of breath and wheezing.    Cardiovascular: No new chest pain, palpitations, orthopnea and leg swelling.    Gastrointestinal: No heartburn, nausea, vomiting ,abdominal pain, hematochezia or melena     Genitourinary: Negative for dysuria, hematuria    Musculoskeletal: No new arthralgias or myalgias   Skin: Negative for rash and itching.    Neurological: Negative for focal weakness or headaches.    Endo/Heme/Allergies: No abnormal bleed/bruise.    Psychiatric/Behavioral: No new depression/anxiety.    Physical Exam:  Physical Exam General: Not in acute distress, alert and oriented x 3  HEENT: No pallor, icterus. Oropharynx clear.   Neck: Supple, no palpable masses.  Lymph nodes:  No palpable cervical, supraclavicular, axillary or inguinal lymphadenopathy.    CVS: regular rate and rhythm, no rubs or gallops  RESP: Clear to auscultate bilaterally, no wheezing or crackles.   ABD: Soft, non tender, non distended, positive bowel sounds, no palpable organomegaly  EXT: No edema or cyanosis  CNS: Alert and oriented x3, No focal deficits.  Skin- No rash    Labs:        Invalid input(s): [id number]      Recent Labs      03/06/18 0907 03/07/18 0224 03/07/18 0922 03/08/18 0933   SODIUM  133*   --   135  134*   POTASSIUM  4.6   --   4.4  4.6   CHLORIDE  105   --   106  105   CO2  23   --   23  22   BUN  17   --   18  17   CREATININE  1.25   --   1.35  1.33   MAGNESIUM   --   1.8   --    --    PHOSPHORUS   --   2.0*   --    --    CALCIUM  7.4*   --   7.6*  7.9*     Recent Labs      03/06/18 0907 03/07/18 0922 03/08/18 0933   ALTSGPT  25  26  29   ASTSGOT  20  20  23   ALKPHOSPHAT  82  88  104*   TBILIRUBIN  1.9*  2.0*  2.2*   GLUCOSE  108*  101*  104*     Recent Labs      03/06/18 0212 03/07/18 0224 03/07/18 2357   RBC  2.82*  2.88*  2.74*   HEMOGLOBIN  7.8*  8.0*  7.6*   HEMATOCRIT  22.8*  23.4*  22.1*   PLATELETCT  14*  18*  15*     Recent Labs      03/06/18 0212 03/06/18 0907 03/07/18 0224 03/07/18 0922 03/07/18 2357 03/08/18 0933   WBC  0.3*   --   0.4*   --   0.4*   --    NEUTSPOLYS  cancel   --   CANCEL   --   CANCEL   --    LYMPHOCYTES  cancel   --   CANCEL   --   CANCEL   --    MONOCYTES  cancel   --   CANCEL   --   CANCEL   --    EOSINOPHILS  cancel   --   CANCEL   --   CANCEL   --    BASOPHILS  cancel   --   CANCEL   --   CANCEL   --    ASTSGOT   --   20   --   20   --   23   ALTSGPT   --   25   --   26   --   29   ALKPHOSPHAT   --   82   --   88   --   104*   TBILIRUBIN   --   1.9*   --   2.0*   --   2.2*     Recent Labs      03/06/18   0907  03/07/18   0922  03/08/18   0933   SODIUM  133*  135  134*   POTASSIUM  4.6  4.4  4.6   CHLORIDE  105  106   105   CO2  23  23  22   GLUCOSE  108*  101*  104*   BUN  17  18  17   CREATININE  1.25  1.35  1.33   CALCIUM  7.4*  7.6*  7.9*     Hemodynamics:  Temp (24hrs), Av.3 °C (99.2 °F), Min:36.8 °C (98.2 °F), Max:37.7 °C (99.8 °F)  Temperature: 37.4 °C (99.4 °F)  Pulse  Av.3  Min: 79  Max: 110   Blood Pressure : 121/64     Respiratory:    Respiration: 18, Pulse Oximetry: 97 %        RUL Breath Sounds: Clear, RML Breath Sounds: Clear, RLL Breath Sounds: Diminished, GREGORIA Breath Sounds: Clear, LLL Breath Sounds: Diminished  Fluids:    Intake/Output Summary (Last 24 hours) at 18 1511  Last data filed at 18 1200   Gross per 24 hour   Intake           2480.5 ml   Output             1300 ml   Net           1180.5 ml        GI/Nutrition:  Orders Placed This Encounter   Procedures   • DIET ORDER     Standing Status:   Standing     Number of Occurrences:   1     Order Specific Question:   Diet:     Answer:   Cardiac [6]     Medical Decision Making, by Problem:  Active Hospital Problems    Diagnosis   • *Pancytopenia (CMS-HCC) [D61.818]   • Thrombocytopenia (CMS-HCC) [D69.6]   • Neutropenic fever (CMS-HCC) [D70.9, R50.81]   • Maxillary sinusitis [J32.0]   • Acute renal failure (ARF) (CMS-HCC) [N17.9]   • Hyponatremia [E87.1]   • Essential hypertension [I10]   • 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 There was significant lymphohistiocytic infiltration which is rather unusual pattern. In view of the T-cell receptor clonality a malignant process is favored.  Discussed with the Dr. Klein who did discuss the case with stent for pathology. 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  D/w Dr Klein who favors patient staying in  the hospital until path results are back .LDH is only mildly elevated at 282. HIV, hepatitis screen, uric acid levels were not elevated.. I will obtain peripheral blood flow cytometry    Given the significant bone marrow infiltration, he will need some type of chemotherapy. . No clearcut standard of care for these rare lymphomas. Usually methotrexate is the standard of care is for the typical indolent T-LGL., However, given his significant bone marrow involvement, we may have to try him. CHOP regimen. We will obtain a PICC line. Recent cardiac ultrasound showed good ejection fraction.  Dr. Klein will curbside [place] lymphoma tomorrow after finalizing the plan. Ideally, he will need an outpatient PET scan, however, this would be difficult to obtain.    Severe hypogammaglobulinemia.-Unclear whether this is residual from prior lymphoma treatment or new. Will arrange 30 g IVIG as replacement given his immunosuppression. Serum protein electrophoresis did not reveal any monoclonal gammopathy.  Neutropenic fever- on empiric cefepime, Afebrile. Will cover him with acyclovir and Diflucan. Given his pancytopenia   Possible CHOP chemotherapy once the diagnosis is confirmed.    Quality-Core Measures

## 2018-03-09 NOTE — CARE PLAN
Problem: Communication  Goal: The ability to communicate needs accurately and effectively will improve  AxO x4. Able to make needs known. Calls appropriately for assistance. Call light within reach.     Problem: Safety  Goal: Will remain free from injury  Patient up x1. Calls appropriately for assistance. Call light within reach. POC discussed with patient. Patient verbalizing understanding in calling for help HS PRN. Bed in lowest and locked position. Q2 rounding in place. Q4 vitals, Neutropenic precautions in place.

## 2018-03-09 NOTE — PROGRESS NOTES
Amisha from Lab called with critical result of WBC 0.5, PLT 14 at 0054. Critical lab result read back to Amisha.   This critical lab result is within parameters established by Dr. [facility] policy for this patient.

## 2018-03-09 NOTE — PROGRESS NOTES
Possible blood transfusion reaction.  Chills, tachy 110bpm  bp 158/81  Informed dr. Peterson.  Jessica ordered and given.  Informed blood bank.  transfusion Reaction - lab work drawn stat and in process.  Empty blood/platelet bag and lines sent to blood bank.     Staying with patient/q15 vitals in place.

## 2018-03-09 NOTE — PROGRESS NOTES
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   T Cell Receptor rearrangement positive.  , He feels about the same. Wife at the bedside..  has discussed his case with the stand for pathology and also lymphoma specialist who recommended Campath     Date & Time created: 3/9/2018  3:05 PM     Interval History:  Fatigue+ , feels the same.Afebrile    Review of Systems:  ROS Constitutional: Positive for fever, chills, weight loss , malaise/fatigue.    HEENT: No new auditory or visual complaints. No sore throat and neck pain.     Respiratory:No new cough, sputum production, shortness of breath and wheezing.    Cardiovascular: No new chest pain, palpitations, orthopnea and leg swelling.    Gastrointestinal: No heartburn, nausea, vomiting ,abdominal pain, hematochezia or melena     Genitourinary: Negative for dysuria, hematuria    Musculoskeletal: No new arthralgias or myalgias   Skin: Negative for rash and itching.    Neurological: Negative for focal weakness or headaches.    Endo/Heme/Allergies: No abnormal bleed/bruise.    Psychiatric/Behavioral: No new depression/anxiety.    Physical Exam:  Physical Exam General: Not in acute distress, alert and oriented x 3  HEENT: No pallor, icterus. Oropharynx clear.   Neck: Supple, no palpable masses.  Lymph nodes: No palpable cervical, supraclavicular, axillary or inguinal lymphadenopathy.    CVS: regular rate and rhythm, no rubs or gallops  RESP: Clear to auscultate bilaterally, no wheezing or crackles.   ABD: Soft, non tender, non distended, positive bowel sounds, no palpable organomegaly  EXT: No edema or cyanosis  CNS: Alert and  oriented x3, No focal deficits.  Skin- No rash    Labs:        Invalid input(s): [id number]      Recent Labs      18   08   SODIUM   --   135  134*  133*   POTASSIUM   --   4.4  4.6  4.5   CHLORIDE   --   106  105  106   CO2   --   23  22  22   BUN   --   18  17  16   CREATININE   --   1.35  1.33  1.22   MAGNESIUM  1.8   --    --    --    PHOSPHORUS  2.0*   --    --    --    CALCIUM   --   7.6*  7.9*  7.5*     Recent Labs      18   ALTSGPT  26  29     ASTSGOT  20     ALKPHOSPHAT  88  104*  103*   TBILIRUBIN  2.0*  2.2*  1.9*   GLUCOSE  101*  104*  103*     Recent Labs      18   1425   RBC  2.88*  2.74*  2.85*   --    HEMOGLOBIN  8.0*  7.6*  7.8*   --    HEMATOCRIT  23.4*  22.1*  23.2*   --    PLATELETCT  18*  15*  14*  36*     Recent Labs      18   0859   WBC  0.4*   --   0.4*   --   0.5*   --    NEUTSPOLYS  CANCEL   --   CANCEL   --   CANCEL   --    LYMPHOCYTES  CANCEL   --   CANCEL   --   CANCEL   --    MONOCYTES  CANCEL   --   CANCEL   --   CANCEL   --    EOSINOPHILS  CANCEL   --   CANCEL   --   CANCEL   --    BASOPHILS  CANCEL   --   CANCEL   --   CANCEL   --    ASTSGOT   --   20   --   23   --   19   ALTSGPT   --   26   --   29   --   25   ALKPHOSPHAT   --   88   --   104*   --   103*   TBILIRUBIN   --   2.0*   --   2.2*   --   1.9*     Recent Labs      18   08   SODIUM  135  134*  133*   POTASSIUM  4.4  4.6  4.5   CHLORIDE  106  105  106   CO2  23  22  22   GLUCOSE  101*  104*  103*   BUN  18  17  16   CREATININE  1.35  1.33  1.22   CALCIUM  7.6*  7.9*  7.5*     Hemodynamics:  Temp (24hrs), Av.2 °C (99 °F), Min:36.9 °C (98.4 °F), Max:37.4 °C (99.4 °F)  Temperature: 37.2 °C (99 °F)  Pulse  Av.1  Min:  79  Max: 110   Blood Pressure : 158/81     Respiratory:    Respiration: 18, Pulse Oximetry: 99 %        RUL Breath Sounds: Clear, RML Breath Sounds: Clear, RLL Breath Sounds: Diminished, GREGORIA Breath Sounds: Clear, LLL Breath Sounds: Diminished  Fluids:    Intake/Output Summary (Last 24 hours) at 03/06/18 1511  Last data filed at 03/06/18 1200   Gross per 24 hour   Intake           2480.5 ml   Output             1300 ml   Net           1180.5 ml        GI/Nutrition:  Orders Placed This Encounter   Procedures   • DIET ORDER     Standing Status:   Standing     Number of Occurrences:   1     Order Specific Question:   Diet:     Answer:   Cardiac [6]     Medical Decision Making, by Problem:  Active Hospital Problems    Diagnosis   • *Pancytopenia (CMS-HCC) [D61.818]   • Thrombocytopenia (CMS-HCC) [D69.6]   • Neutropenic fever (CMS-HCC) [D70.9, R50.81]   • Maxillary sinusitis [J32.0]   • Acute renal failure (ARF) (CMS-HCC) [N17.9]   • Hyponatremia [E87.1]   • Essential hypertension [I10]   • 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      Discussed with  Dr. Klein who did discuss the case with  [facility] and pathology. 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 Informed him that usually these are benign T-cell lymphomas, however, in his case this is behaving more like a leukemia with severe bone marrow infiltration. Recent CT of the abdomen shows stable mild lymphadenopathy over the past many years ., I will order a peripheral blood flow cytometry as a new baseline    Campath 10 mg IV for 10 days is the recommended treatment by [facility]. This may take a few days to get approved.  Long  discussion with the patient and his wife and explained all this in detail.      LDH is only mildly elevated at 282. HIV, hepatitis screen, uric acid levels were not elevated.. I will obtain peripheral blood flow cytometry  We will obtain a PICC line. Will obtain CMV titers as baseline and weekly.    Severe hypogammaglobulinemia.-Unclear whether this is residual from prior lymphoma treatment or new. Will arrange 30 g IVIG as replacement given his immunosuppression. Serum protein electrophoresis did not reveal any monoclonal gammopathy.  Neutropenic fever- on empiric cefepime, Afebrile. Will cover him with acyclovir and Diflucan. Given his pancytopenia    Complex patient requiring complex decision making. Reviewed the laboratory data and images with the patient. Antineoplastic therapy requiring close monitoring and decision-making.    Quality-Core Measures

## 2018-03-09 NOTE — PROGRESS NOTES
Assumed pt care - bedside report received.  Pt is aaox4-fatigued.  Spouse at bedside.  Continues neutropenic.  I called PICC RN to inform of platelets at 14-picc rn called and they will not place picc unless pt is at platelet 20 or greater - dr. tracey informed - plateletes ordered.  Platelets order entered incorrectly - delayed transfusion.  Reentered and platelets received/Transfusing.  Will check platelet count after transfusion/picc rn informed.  Good po/loose bm's.  piv patent/fluids/abx given.  Pt makes needs known - will continue to round.

## 2018-03-09 NOTE — PROGRESS NOTES
Infectious Disease Progress Note    Author: Santa Lynne M.D. Date & Time of service: 3/9/2018  1:09 PM    Chief Complaint:  FU neutropenic fever      Interval History:  78 y/o WM admitted with febrile neutropenia     Tmax 100.5, having coughing fit and SOB, plan for BM biopsy  3/1Tmax 100.2, WBC 0.3, had a BM this am, cough better, repeat CXR with no infiltrate   3/2 Tmax 100.5, shortness of breath with exertion, had bloody nose this am, plts 22, denies abd pain or diarrhea, path neg for lyphoma  3/3/2018-Tmax 99.6. WBC 0.3 platelets 17 creatinine 1.38  3/4/2018-Tmax 99.2 WBCs 0.3 creatinine 1.38 complains of some shortness of breath  3/5 AF WBC 0.3 transferred to [facility] no new complaints  3/6 AF WBC 0.3 no positive cxs No new complaints  3/7 AF WBC 0.4 no complaints except fatigue and CORRALES  3/8 AF (99.8) no CBC transferred to Oncology floor-no acute events  3/9 AF WBC 0.5 feels weak-no new complaints  Labs Reviewed, Medications Reviewed, Radiology Reviewed and Wound Reviewed.    Review of Systems:  Review of Systems   Constitutional: Negative for fever.   Respiratory: Negative for cough.    Cardiovascular: Negative for chest pain.   Gastrointestinal: Negative for nausea and vomiting.   Genitourinary: Negative for dysuria.   Neurological: Positive for weakness.   All other systems reviewed and are negative.      Hemodynamics:  Temp (24hrs), Av.3 °C (99.1 °F), Min:36.9 °C (98.4 °F), Max:37.4 °C (99.4 °F)  Temperature: 37.2 °C (99 °F)  Pulse  Av.6  Min: 79  Max: 110   Blood Pressure : 105/71       Physical Exam:  Physical Exam   Constitutional: He is oriented to person, place, and time. He appears well-developed. No distress.   HENT:   Head: Normocephalic and atraumatic.   Eyes: EOM are normal. Pupils are equal, round, and reactive to light.   Neck: Neck supple.   Cardiovascular: Normal rate.    Pulmonary/Chest: Effort normal. No respiratory distress. He has no wheezes. He has no rales.   Abdominal:  Soft. He exhibits no distension. There is no tenderness.   Musculoskeletal: He exhibits no edema.   Neurological: He is alert and oriented to person, place, and time.   Skin: No rash noted.   ecchymosis   Nursing note and vitals reviewed.      Meds:    Current Facility-Administered Medications:   •  acyclovir  •  fluconazole  •  cefepime  •  NS  •  acetaminophen  •  benzocaine-menthol  •  benzonatate  •  labetalol  •  levothyroxine  •  ondansetron  •  potassium chloride SA  •  PARoxetine  •  polyethylene glycol/lytes  •  magnesium hydroxide  •  bisacodyl  •  zolpidem    Labs:  Recent Labs      03/07/18   0224  03/07/18   2357  03/08/18   2355   WBC  0.4*  0.4*  0.5*   RBC  2.88*  2.74*  2.85*   HEMOGLOBIN  8.0*  7.6*  7.8*   HEMATOCRIT  23.4*  22.1*  23.2*   MCV  81.3*  80.7*  81.4   MCH  27.8  27.7  27.4   RDW  45.7  46.0  46.5   PLATELETCT  18*  15*  14*   MPV  12.6  11.6   --    NEUTSPOLYS  CANCEL  CANCEL  CANCEL   LYMPHOCYTES  CANCEL  CANCEL  CANCEL   MONOCYTES  CANCEL  CANCEL  CANCEL   EOSINOPHILS  CANCEL  CANCEL  CANCEL   BASOPHILS  CANCEL  CANCEL  CANCEL     Recent Labs      03/07/18   0922  03/08/18   0933  03/09/18   0859   SODIUM  135  134*  133*   POTASSIUM  4.4  4.6  4.5   CHLORIDE  106  105  106   CO2  23  22  22   GLUCOSE  101*  104*  103*   BUN  18  17  16     Recent Labs      03/07/18   0922  03/08/18   0933  03/09/18   0859   ALBUMIN  3.0*  3.4  2.9*   TBILIRUBIN  2.0*  2.2*  1.9*   ALKPHOSPHAT  88  104*  103*   TOTPROTEIN  4.3*  4.8*  4.2*   ALTSGPT  26  29  25   ASTSGOT  20  23  19   CREATININE  1.35  1.33  1.22       Imaging:  Ct-abdomen-pelvis W/o    Result Date: 2/24/2018 2/24/2018 6:09 AM HISTORY/REASON FOR EXAM:  Fever. TECHNIQUE/EXAM DESCRIPTION: CT scan of the abdomen and pelvis without contrast. Noncontrast helical scanning was obtained from the diaphragmatic domes through the pubic symphysis. Low dose optimization technique was utilized for this CT exam including automated exposure  control and adjustment of the mA and/or kV according to patient size. COMPARISON: 4/12/2017. FINDINGS: There are trace bilateral pleural effusions. There is a rounded opacity at the right lung base likely representing round atelectasis. There is trace pericardial fluid. No free air is seen in the abdomen or pelvis. Evaluation of parenchymal and vascular structures is limited by the lack of intravenous contrast. Liver is hypodense suggestive of hepatic steatosis. Subtle 1.1 cm hypodense lesion in the right lobe of the liver corresponds to the previously noted hemangioma. There are calcified granulomata within the liver. Gallbladder appears unremarkable. The spleen is not enlarged. No adrenal mass is identified on the left. There is a right adrenal nodule measuring 2 cm which is compatible with an adrenal adenoma. There is renal cortical atrophy on the right. There are bilateral nonobstructing renal calculi. There are parapelvic cysts on the left. There is no evidence of hydronephrosis. Pancreas appears unremarkable.  Aorta is not aneurysmal. Atherosclerotic plaque is seen. Mildly enlarged dense right inguinal lymph node is again seen. There are dense retroperitoneal and right iliac lymph nodes. The dens retroperitoneal nodes measure up to 1.9 cm in short axis dimension, not significantly changed compared to prior. Haziness about the mesentery is not significantly changed compared to prior. Dense lymph node in the root of the mesentery measures 1.6 cm in short axis dimension, unchanged. There is no evidence of bowel obstruction. There is no evidence of acute appendicitis. Bladder is mildly distended. Prostate is enlarged. There is a fat-containing right inguinal hernia. Degenerative changes are seen in the spine. Wedge deformities of L1 and L2 appear unchanged.     Retroperitoneal, iliac and mesenteric lymphadenopathy is not significantly changed compared to prior. Haziness about the root of the mesentery is again  noted. Nonobstructing bilateral renal calculi. Atrophic right kidney. Left parapelvic cysts. Enlarged prostate. Trace bilateral pleural effusions. Rounded opacity at the right lung base likely represents round atelectasis.    Ct-head W/o    Result Date: 2/24/2018 2/24/2018 6:09 AM HISTORY/REASON FOR EXAM:  Fever. TECHNIQUE/EXAM DESCRIPTION AND NUMBER OF VIEWS: CT of the head without contrast. Contiguous axial sections were obtained from the skull base through the vertex. Up to date radiation dose reduction adjustments have been utilized to meet ALARA standards for radiation dose reduction. COMPARISON:  None available FINDINGS: The is no evidence of intraparenchymal, intraventricular and extra-axial hemorrhage.  The ventricles and cortical sulci are prominent compatible with age related change.  There is no mass effect or midline shift. There is minimal mucosal thickening within the maxillary sinuses. Visualized mastoid air cells are clear. The calvarium is intact. There is mild left frontal soft tissue swelling.     No acute intracranial abnormality is identified. Cortical atrophy. Mild left frontal soft tissue swelling.    Ct-maxillofacial W/o Plus Recons    Result Date: 2/26/2018 2/26/2018 7:24 PM HISTORY/REASON FOR EXAM:  Facial pain. Suspected sinusitis. TECHNIQUE/EXAM DESCRIPTION AND NUMBER OF VIEWS:  CT scan maxillofacial without contrast, with reconstructions. Thin-section helical imaging was obtained of the face from the orbital roofs through the mandible. Coronal multiplanar volume reformat images were generated from the axial data. Low dose optimization technique was utilized for this CT exam including automated exposure control and adjustment of the mA and/or kV according to patient size. FINDINGS: The frontal sinus is clear. There is minimal mucosal thickening in the ethmoid sinus bilaterally. There is mild bilateral mucosal thickening and/or polyp formation in the maxillary sinus. Both maxillary sinus  infundibulum and ostia are patent. The sphenoid sinus is clear. No significant bony nasal septal deviation is present. No nasal turbinate edema is present.     1.  Mild chronic bilateral maxillary and ethmoid sinusitis. 2.  No evidence of acute sinusitis. 3.  Areas of rounded mucosal thickening or polyp formation in each maxillary sinus antrum. 4.  Otherwise clear paranasal sinuses.    Dx-chest-2 Views    Result Date: 2/28/2018 2/28/2018 8:43 AM HISTORY/REASON FOR EXAM: Cough.  Lymphoma TECHNIQUE/EXAM DESCRIPTION AND NUMBER OF VIEWS: Two views of the chest. COMPARISON:  February 23 FINDINGS: Cardiomediastinal contours are stable with some aortic ectasia and upper normal heart size. No pulmonary consolidation is seen. Stable mostly right pleural space thickening, possible small effusions Chronic upper lumbar moderate severity compression fracture resulting in kyphosis     Stable chest with some chronic pleural space thickening more likely from pleural parenchymal scarring than pleural fluid, large lung volumes and mild cardiac silhouette enlargement    Dx-chest-portable (1 View)    Result Date: 2/23/2018 2/23/2018 6:01 PM HISTORY/REASON FOR EXAM:  Cough. TECHNIQUE/EXAM DESCRIPTION AND NUMBER OF VIEWS: Single portable view of the chest. COMPARISON: 3/22/2017 FINDINGS: Cardiomediastinal contour is within normal limits. Lungs show mild hypoinflation. No focal pulmonary consolidation. No pleural fluid collection or pneumothorax. No major bony abnormality is seen.     Hypoinflation without other evidence for acute cardiopulmonary disease.    Echocardiogram Comp W/o Cont    Result Date: 2/25/2018  Transthoracic Echo Report Echocardiography Laboratory CONCLUSIONS No prior study is available for comparison. Normal transthoracic echocardiogram. SHELLIE CASIANO Exam Date:         02/25/2018                    12:59 Exam Location:     Inpatient LV EF:  65    % FINDINGS Left Ventricle Normal left ventricular size, systolic  function, and diastolic function. Mild concentric left ventricular hypertrophy. Normal regional wall motion. Left ventricular ejection fraction is visually estimated to be 65%. Right Ventricle The right ventricle was normal in size and function. Right Atrium The right atrium is normal in size.  Normal inferior vena cava size and inspiratory collapse. Left Atrium The left atrium is normal in size.  Left atrial volume index is 26 mL/sq m. Mitral Valve Structurally normal mitral valve without significant stenosis. Trace mitral regurgitation. Aortic Valve Tricuspid aortic valve. Aortic sclerosis without stenosis. No aortic insufficiency. Tricuspid Valve Structurally normal tricuspid valve without significant stenosis. Mild tricuspid regurgitation. Right atrial pressure is estimated to be 3 mmHg. Estimated right ventricular systolic pressure is 40 mmHg. Pulmonic Valve Structurally normal pulmonic valve without significant stenosis or regurgitation. Pericardium Normal pericardium without effusion. Aorta The aortic root is normal. Jovany Heath (Electronically Signed) Final Date:     25 February 2018                 17:42      Micro:  Results     ** No results found for the last 168 hours. 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 Path - neg for malignancy, no blasts, +granulomatous inflammation vs T cells-sent to [place]     H/o Lymphoma  Last chemo in 2010     ROHITH, improved  Renally dose abx as needed  Avoid nephrotoxins  Monitor

## 2018-03-09 NOTE — PROGRESS NOTES
yoseph from Lab called with critical result of platelets of 36  at 1445. Critical lab result read back to juventino.   This critical lab result is within parameters established by  for this patient

## 2018-03-09 NOTE — CARE PLAN
Problem: Safety  Goal: Will remain free from falls    Intervention: Assess risk factors for falls  Fall precautions in place      Problem: Infection  Goal: Will remain free from infection    Intervention: Implement standard precautions and perform hand washing before and after patient contact  Neutropenic precautions in place        Problem: Knowledge Deficit  Goal: Knowledge of disease process/condition, treatment plan, diagnostic tests, and medications will improve    Intervention: Assess knowledge level of disease process/condition, treatment plan, diagnostic tests, and medications  picc placement after transfusion of platelets if platelets are 20 or greater.

## 2018-03-09 NOTE — PROGRESS NOTES
Received bedside report from day shift RN. Evan x4. Up x1. PIV patent, flushing. Patient denies pain, nausea, vomiting. Will continue to monitor. Neutropenic precautions in place. POC discussed with patient. Calls appropriately for assistance. Call light within reach. Bed in lowest and locked position. Q2 rounding in place.

## 2018-03-10 NOTE — PROGRESS NOTES
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   T Cell Receptor rearrangement positive.      has discussed his case with the stand for pathology and also lymphoma specialist who recommended Campath     Date & Time created: 3/10/2018  3:42 PM     Interval History:  Fatigue+ , feels the same.Afebrile    Review of Systems:  ROS Constitutional: Positive for fever, chills, weight loss , malaise/fatigue.    HEENT: No new auditory or visual complaints. No sore throat and neck pain.     Respiratory:No new cough, sputum production, shortness of breath and wheezing.    Cardiovascular: No new chest pain, palpitations, orthopnea and leg swelling.    Gastrointestinal: No heartburn, nausea, vomiting ,abdominal pain, hematochezia or melena     Genitourinary: Negative for dysuria, hematuria    Musculoskeletal: No new arthralgias or myalgias   Skin: Negative for rash and itching.    Neurological: Negative for focal weakness or headaches.    Endo/Heme/Allergies: No abnormal bleed/bruise.    Psychiatric/Behavioral: No new depression/anxiety.    Physical Exam:  Physical Exam General: Not in acute distress, alert and oriented x 3  HEENT: No pallor, icterus. Oropharynx clear.   Neck: Supple, no palpable masses.  Lymph nodes: No palpable cervical, supraclavicular, axillary or inguinal lymphadenopathy.    CVS: regular rate and rhythm, no rubs or gallops  RESP: Clear to auscultate bilaterally, no wheezing or crackles.   ABD: Soft, non tender, non distended, positive bowel sounds, no palpable organomegaly  EXT: No edema or cyanosis  CNS: Alert and oriented x3, No focal deficits.  Skin- No  rash    Labs:        Invalid input(s): [id number]      Recent Labs      18   0859  03/10/18   06   SODIUM  134*  133*  134*   POTASSIUM  4.6  4.5  4.6   CHLORIDE  105  106  107   CO2  22  22  23   BUN  17  16  24*   CREATININE  1.33  1.22  1.45*   CALCIUM  7.9*  7.5*  7.5*     Recent Labs      18   0859  03/10/18   06   ALTSGPT  29  25  28   ASTSGOT  23  19  31   ALKPHOSPHAT  104*  103*  92   TBILIRUBIN  2.2*  1.9*  2.8*   GLUCOSE  104*  103*  104*     Recent Labs      185  03/10/18   035   RBC  2.74*  2.85*   --   2.28*   HEMOGLOBIN  7.6*  7.8*   --   6.3*   HEMATOCRIT  22.1*  23.2*   --   18.1*   PLATELETCT  15*  14*  36*  24*     Recent Labs      1859  03/10/18   0357  03/10/18   0600   WBC  0.4*   --   0.5*   --   0.5*   --    NEUTSPOLYS  CANCEL   --   CANCEL   --   62.30   --    LYMPHOCYTES  CANCEL   --   CANCEL   --   31.20   --    MONOCYTES  CANCEL   --   CANCEL   --   3.90   --    EOSINOPHILS  CANCEL   --   CANCEL   --   0.00   --    BASOPHILS  CANCEL   --   CANCEL   --   0.00   --    ASTSGOT   --   23   --   19   --   31   ALTSGPT   --   29   --   25   --   28   ALKPHOSPHAT   --   104*   --   103*   --   92   TBILIRUBIN   --   2.2*   --   1.9*   --   2.8*     Recent Labs      1859  03/10/18   06   SODIUM  134*  133*  134*   POTASSIUM  4.6  4.5  4.6   CHLORIDE  105  106  107   CO2  22  22  23   GLUCOSE  104*  103*  104*   BUN  17  16  24*   CREATININE  1.33  1.22  1.45*   CALCIUM  7.9*  7.5*  7.5*     Hemodynamics:  Temp (24hrs), Av.2 °C (98.9 °F), Min:36.9 °C (98.5 °F), Max:37.7 °C (99.9 °F)  Temperature: 37.1 °C (98.8 °F)  Pulse  Av.6  Min: 79  Max: 110   Blood Pressure : 112/73     Respiratory:    Respiration: 18, Pulse Oximetry: 94 %        RUL Breath Sounds: Clear, RML Breath Sounds: Clear, RLL Breath  Sounds: Diminished, GREGORIA Breath Sounds: Clear, LLL Breath Sounds: Diminished  Fluids:    Intake/Output Summary (Last 24 hours) at 03/06/18 1511  Last data filed at 03/06/18 1200   Gross per 24 hour   Intake           2480.5 ml   Output             1300 ml   Net           1180.5 ml     Weight: 85.7 kg (188 lb 15 oz)  GI/Nutrition:  Orders Placed This Encounter   Procedures   • DIET ORDER     Standing Status:   Standing     Number of Occurrences:   1     Order Specific Question:   Diet:     Answer:   Cardiac [6]     Medical Decision Making, by Problem:  Active Hospital Problems    Diagnosis   • *Pancytopenia (CMS-HCC) [D61.818]   • Thrombocytopenia (CMS-HCC) [D69.6]   • Neutropenic fever (CMS-HCC) [D70.9, R50.81]   • Maxillary sinusitis [J32.0]   • Acute renal failure (ARF) (CMS-HCC) [N17.9]   • Hyponatremia [E87.1]   • Essential hypertension [I10]   • 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 Klein  did discuss the case with  [facility] ., Even though these are usually indolent lymphomas however, in his case this is behaving more like a leukemia with severe bone marrow infiltration. Recent CT of the abdomen shows stable mild lymphadenopathy over the past many years .    Campath 10 mg IV for 10 days is the recommended treatment by [facility]. This may take a few days to get approved. I have submitted a nonformulary request and completed application to the company  . He understands that this will take few days      LDH is only mildly elevated at 282. HIV, hepatitis screen, uric acid levels were not elevated..  Status post PICC line. Will obtain CMV titers as baseline and weekly.    Severe hypogammaglobulinemia.-Unclear whether this is residual from prior lymphoma treatment or new.  May need IVIG as  replacement given his immunosuppression. Serum protein electrophoresis did not reveal any monoclonal gammopathy.  Neutropenic fever- on empiric cefepime, Afebrile. Will cover him with acyclovir and Diflucan. Given his pancytopenia    Complex patient requiring complex decision making. Reviewed the laboratory data and images with the patient. Antineoplastic therapy requiring close monitoring and decision-making.    Quality-Core Measures

## 2018-03-10 NOTE — PROGRESS NOTES
[facility] Hospitalist Progress Note    Date of Service: 3/9/2018    Chief Complaint  77 y.o. male admitted 3/4/2018 with non-Hodgkin's versus Hodgkin's lymphoma presented from [facility] with pancytopenia and neutropenic fever associated with sinusitis.     Interval Problem Update  No new complaints to report.     Consultants/Specialty  Hematology oncology  Infectious disease        Disposition  hospital        Review of Systems   Constitutional: Positive for malaise/fatigue. Negative for chills.   HENT: Negative for ear pain and tinnitus.    Eyes: Negative for double vision and photophobia.   Respiratory: Negative for cough and hemoptysis.    Cardiovascular: Negative for palpitations and orthopnea.   Gastrointestinal: Negative for nausea and vomiting.   Genitourinary: Negative for frequency and urgency.   Musculoskeletal: Negative for back pain and neck pain.   Skin: Negative for itching.   Neurological: Positive for weakness. Negative for tingling and headaches.   Psychiatric/Behavioral: Negative for hallucinations, substance abuse and suicidal ideas.      Physical Exam  Laboratory/Imaging   Hemodynamics  Temp (24hrs), Av.2 °C (99 °F), Min:36.9 °C (98.4 °F), Max:37.4 °C (99.3 °F)   Temperature: 36.9 °C (98.5 °F)  Pulse  Av.2  Min: 79  Max: 110    Blood Pressure : 115/90      Respiratory      Respiration: 18, Pulse Oximetry: 96 %        RUL Breath Sounds: Clear, RML Breath Sounds: Clear, RLL Breath Sounds: Diminished, GREGORIA Breath Sounds: Clear, LLL Breath Sounds: Diminished    Fluids    Intake/Output Summary (Last 24 hours) at 18 1702  Last data filed at 18 1420   Gross per 24 hour   Intake              937 ml   Output             2500 ml   Net            -1563 ml       Nutrition  Orders Placed This Encounter   Procedures   • DIET ORDER     Standing Status:   Standing     Number of Occurrences:   1     Order Specific Question:   Diet:     Answer:   Cardiac [6]     Physical Exam   Constitutional:  He is oriented to person, place, and time. He appears well-developed and well-nourished. No distress.   HENT:   Head: Normocephalic and atraumatic.   Nose: Nose normal.   Eyes: Conjunctivae and EOM are normal. Pupils are equal, round, and reactive to light. No scleral icterus.   Neck: Normal range of motion. Neck supple. No tracheal deviation present.   Cardiovascular: Normal rate, regular rhythm, normal heart sounds and intact distal pulses.  Exam reveals no friction rub.    Pulmonary/Chest: Effort normal and breath sounds normal. No respiratory distress. He has no wheezes.   Abdominal: Soft. Bowel sounds are normal. He exhibits no distension. There is no tenderness. There is no guarding.   Musculoskeletal: Normal range of motion. He exhibits no tenderness.   Neurological: He is alert and oriented to person, place, and time. No cranial nerve deficit.   Skin: Skin is warm and dry. He is not diaphoretic. No erythema.   Psychiatric: He has a normal mood and affect. His behavior is normal.   Nursing note and vitals reviewed.      Recent Labs      03/07/18   0224  03/07/18   2357  03/08/18   2355  03/09/18   1425   WBC  0.4*  0.4*  0.5*   --    RBC  2.88*  2.74*  2.85*   --    HEMOGLOBIN  8.0*  7.6*  7.8*   --    HEMATOCRIT  23.4*  22.1*  23.2*   --    MCV  81.3*  80.7*  81.4   --    MCH  27.8  27.7  27.4   --    MCHC  34.2  34.4  33.6*   --    RDW  45.7  46.0  46.5   --    PLATELETCT  18*  15*  14*  36*   MPV  12.6  11.6   --    --      Recent Labs      03/07/18   0922  03/08/18   0933  03/09/18   0859   SODIUM  135  134*  133*   POTASSIUM  4.4  4.6  4.5   CHLORIDE  106  105  106   CO2  23  22  22   GLUCOSE  101*  104*  103*   BUN  18  17  16   CREATININE  1.35  1.33  1.22   CALCIUM  7.6*  7.9*  7.5*                      Assessment/Plan     * Pancytopenia (CMS-HCC)   Assessment & Plan    Secondary to severe bone marrow infiltrate. Bone marrow biopsy showed T-cell clonality concerning for T-cell lymphoma or leukemia.  Waiting for  molecular studies of BMB. Samples were sent to [facility].   onc following.  Continue Granix.           Thrombocytopenia (CMS-[facility])- (present on admission)   Assessment & Plan    S/p platelet transfusions ×2 at [facility]  Transfuse prn less than 10        Neutropenic fever (CMS-HCC)- (present on admission)   Assessment & Plan    On Cefepime and fluconazole.        Maxillary sinusitis- (present on admission)   Assessment & Plan    On abx         Acute renal failure (ARF) (CMS-HCC)- (present on admission)   Assessment & Plan    resolved        Hyponatremia- (present on admission)   Assessment & Plan    Stable  Not improved  Iv fluids          Essential hypertension- (present on admission)   Assessment & Plan    Monitor blood pressure.          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 IVIG 30 g ×1 .        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  Campath 10 mg IV for 10 days . Onc on board.         Plan of care discussed with RN.   Quality-Core Measures   Reviewed items::  Radiology images reviewed, Labs reviewed and Medications reviewed  DVT prophylaxis pharmacological::  Contraindicated - High bleeding risk  Antibiotics:  Treating active infection/contamination beyond 24 hours perioperative coverage

## 2018-03-10 NOTE — PROCEDURES
PICC Insertion Procedure Note    Consents confirmed, vessel patency confirmed with ultrasound, real time ultrasound image printed and placed in patient chart. Risks and benefits of procedure explained to patient/family and education regarding central line associated bloodstream infections provided. Questions answered.     PICC placed in LUE per MD order with ultrasound guidance. 5 Fr, double lumen PICC placed in basilic vein after 1 attempt(s). 3 cc's of 1% lidocaine injected intradermally, 21 gauge microintroducer needle and modified Seldinger technique used. 44 cm total catheter length, 1  cm external measurement inserted with good blood return. Each lumen flushed without resistance with 10 mL 0.9% normal saline. PICC line secured with Biopatch and Tegaderm.    PICC tip location in the SVC confirmed by ECG technology. Pt tolerated procedure well.  Patient condition relayed to unit RN or ordering physician via this post procedure note in the EMR.     [employer] Power PICC ref # [id number], Lot # [id number]

## 2018-03-10 NOTE — PROGRESS NOTES
Received bedside report from day shift RN. Evan x4. Up x1, BSC. PIV patent, flushing. JARROD PICC placed at beginning of shift. Awaiting results for use. Patient denies pain, nausea, vomiting. Will continue to monitor. Neutropenic precautions in place. POC discussed with patient. Calls appropriately for assistance. Call light within reach. Bed in lowest and locked position. Q2 rounding in place.

## 2018-03-10 NOTE — PROGRESS NOTES
Citlaly from Lab called with critical result of WBC 0.5, PLT 24, ANC 0.34, Hgb 6.3 at 0658. Critical lab result read back to Citlaly.   This critical lab result is within parameters established by Dr.[facility] policy for this patient

## 2018-03-10 NOTE — PROGRESS NOTES
Assumed pt care - bedside report received.  Pt is aaox4-fatigued.  Continues neutropenic precautions.  Informed dr. gerardo of rbc - rbc ordered with premedication.  Currently transfusing.   Good po today - two soft bm's.  piv patent/fluids/abx given.  Set up second line for tko for double lumen picc.  Pt makes needs known - will continue to round.

## 2018-03-10 NOTE — CARE PLAN
Problem: Communication  Goal: The ability to communicate needs accurately and effectively will improve  AxO x4. Able to make needs known. Calls appropriately for assistance. Call light within reach.     Problem: Infection  Goal: Will remain free from infection  No signs of infection. Patient running temp of 99.9f. No signs of infection. Patient is asymptomatic. Will continue to monitor.

## 2018-03-10 NOTE — PROGRESS NOTES
[facility] Hospitalist Progress Note    Date of Service: 3/10/2018    Chief Complaint  77 y.o. male admitted 3/4/2018 with non-Hodgkin's versus Hodgkin's lymphoma presented from [facility] with pancytopenia and neutropenic fever associated with sinusitis.     Interval Problem Update  No new complaints to report. Tolerating by mouth intake well.    Consultants/Specialty  Hematology oncology  Infectious disease        Disposition  hospital        Review of Systems   Constitutional: Positive for malaise/fatigue. Negative for weight loss.   HENT: Negative for hearing loss and tinnitus.    Eyes: Negative for double vision, photophobia and pain.   Respiratory: Negative for cough and hemoptysis.    Cardiovascular: Negative for palpitations, orthopnea and claudication.   Gastrointestinal: Negative for nausea and vomiting.   Genitourinary: Negative for frequency and urgency.   Musculoskeletal: Negative for back pain and neck pain.   Skin: Negative for rash.   Neurological: Positive for weakness. Negative for tingling, tremors and headaches.   Psychiatric/Behavioral: Negative for hallucinations, substance abuse and suicidal ideas. The patient is not nervous/anxious.       Physical Exam  Laboratory/Imaging   Hemodynamics  Temp (24hrs), Av.2 °C (98.9 °F), Min:36.9 °C (98.5 °F), Max:37.7 °C (99.9 °F)   Temperature: 37.1 °C (98.8 °F)  Pulse  Av.6  Min: 79  Max: 110    Blood Pressure : 112/73      Respiratory      Respiration: 18, Pulse Oximetry: 94 %        RUL Breath Sounds: Clear, RML Breath Sounds: Clear, RLL Breath Sounds: Diminished, GREGORIA Breath Sounds: Clear, LLL Breath Sounds: Diminished    Fluids    Intake/Output Summary (Last 24 hours) at 03/10/18 1509  Last data filed at 03/10/18 1430   Gross per 24 hour   Intake             4953 ml   Output             1125 ml   Net             3828 ml       Nutrition  Orders Placed This Encounter   Procedures   • DIET ORDER     Standing Status:   Standing     Number of  Occurrences:   1     Order Specific Question:   Diet:     Answer:   Cardiac [6]     Physical Exam   Constitutional: He is oriented to person, place, and time. He appears well-developed and well-nourished. No distress.   HENT:   Head: Normocephalic and atraumatic.   Nose: Nose normal.   Mouth/Throat: No oropharyngeal exudate.   Eyes: Conjunctivae and EOM are normal. Pupils are equal, round, and reactive to light. No scleral icterus.   Neck: Normal range of motion. Neck supple. No tracheal deviation present.   Cardiovascular: Normal rate, regular rhythm, normal heart sounds and intact distal pulses.  Exam reveals no friction rub.    Pulmonary/Chest: Effort normal and breath sounds normal. No respiratory distress.   Abdominal: Soft. Bowel sounds are normal. He exhibits no distension. There is no tenderness.   Musculoskeletal: Normal range of motion. He exhibits no tenderness.   Neurological: He is alert and oriented to person, place, and time. No cranial nerve deficit.   Skin: Skin is warm and dry. No erythema.   Psychiatric: He has a normal mood and affect. His behavior is normal.   Nursing note and vitals reviewed.      Recent Labs      03/07/18   2357  03/08/18   2355  03/09/18   1425  03/10/18   0357   WBC  0.4*  0.5*   --   0.5*   RBC  2.74*  2.85*   --   2.28*   HEMOGLOBIN  7.6*  7.8*   --   6.3*   HEMATOCRIT  22.1*  23.2*   --   18.1*   MCV  80.7*  81.4   --   81.6   MCH  27.7  27.4   --   27.2   MCHC  34.4  33.6*   --   33.3*   RDW  46.0  46.5   --   47.0   PLATELETCT  15*  14*  36*  24*   MPV  11.6   --    --   12.1     Recent Labs      03/08/18   0933  03/09/18   0859  03/10/18   0600   SODIUM  134*  133*  134*   POTASSIUM  4.6  4.5  4.6   CHLORIDE  105  106  107   CO2  22  22  23   GLUCOSE  104*  103*  104*   BUN  17  16  24*   CREATININE  1.33  1.22  1.45*   CALCIUM  7.9*  7.5*  7.5*                      Assessment/Plan     * Pancytopenia (CMS-HCC)   Assessment & Plan    Secondary to severe bone marrow  infiltrate. Bone marrow biopsy showed T-cell clonality concerning for T-cell lymphoma or leukemia. Waiting for  molecular studies of BMB. Samples were sent to [place].   onc following.  Granix was stopped due to lack of benefit. May consider restarting if spiked a fever.          Thrombocytopenia (CMS-[facility])- (present on admission)   Assessment & Plan    Transfuse prn if less than 10K.         Neutropenic fever (CMS-[facility])- (present on admission)   Assessment & Plan    On Cefepime and fluconazole. Stop date 3/17/2018. Can be switched to oral levofloxacin if discharged prior to this date.        Maxillary sinusitis- (present on admission)   Assessment & Plan    On abx         Acute renal failure (ARF) (CMS-[facility])- (present on admission)   Assessment & Plan    resolved        Hyponatremia- (present on admission)   Assessment & Plan    Stable            Essential hypertension- (present on admission)   Assessment & Plan    Monitor blood pressure.          Hypogammaglobulinemia (CMS-[facility])- (present on admission)   Assessment & Plan    Severe. IVIG 30 g ×1 .        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  Campath 10 mg IV for 10 days . Awaiting arrival of this medicine. Onc on board.         Plan of care discussed with RN.   Quality-Core Measures   Reviewed items::  Radiology images reviewed, Labs reviewed and Medications reviewed  DVT prophylaxis pharmacological::  Contraindicated - High bleeding risk  Antibiotics:  Treating active infection/contamination beyond 24 hours perioperative coverage

## 2018-03-10 NOTE — CARE PLAN
Problem: Safety  Goal: Will remain free from falls    Intervention: Assess risk factors for falls  Fall precautions in place      Problem: Infection  Goal: Will remain free from infection    Intervention: Implement standard precautions and perform hand washing before and after patient contact  Neutropenic precautions in place        Problem: Knowledge Deficit  Goal: Knowledge of disease process/condition, treatment plan, diagnostic tests, and medications will improve    Intervention: Assess knowledge level of disease process/condition, treatment plan, diagnostic tests, and medications  Rbc transfusion with premedication

## 2018-03-10 NOTE — PROGRESS NOTES
Infectious Disease Progress Note    Author: Santa Lynne M.D. Date & Time of service: 3/10/2018  2:33 PM    Chief Complaint:  FU neutropenic fever      Interval History:  76 y/o WM admitted with febrile neutropenia     Tmax 100.5, having coughing fit and SOB, plan for BM biopsy  3/1Tmax 100.2, WBC 0.3, had a BM this am, cough better, repeat CXR with no infiltrate   3/2 Tmax 100.5, shortness of breath with exertion, had bloody nose this am, plts 22, denies abd pain or diarrhea, path neg for lyphoma  3/3/2018-Tmax 99.6. WBC 0.3 platelets 17 creatinine 1.38  3/4/2018-Tmax 99.2 WBCs 0.3 creatinine 1.38 complains of some shortness of breath  3/5 AF WBC 0.3 transferred to [facility] [place] no new complaints  3/6 AF WBC 0.3 no positive cxs No new complaints  3/7 AF WBC 0.4 no complaints except fatigue and CORRALES  3/8 AF (99.8) no CBC transferred to Oncology floor-no acute events  3/9 AF WBC 0.5 feels weak-no new complaints  3/10 AF (99.9) plan for Campath noted  Labs Reviewed, Medications Reviewed, Radiology Reviewed and Wound Reviewed.    Review of Systems:  Review of Systems   Constitutional: Negative for fever.   Respiratory: Negative for cough.    Cardiovascular: Negative for chest pain.   Gastrointestinal: Negative for nausea and vomiting.   Genitourinary: Negative for dysuria.   Neurological: Positive for weakness.   All other systems reviewed and are negative.      Hemodynamics:  Temp (24hrs), Av.2 °C (98.9 °F), Min:36.9 °C (98.5 °F), Max:37.7 °C (99.9 °F)  Temperature: 37 °C (98.6 °F)  Pulse  Av.6  Min: 79  Max: 110   Blood Pressure : 109/61       Physical Exam:  Physical Exam   Constitutional: He is oriented to person, place, and time. He appears well-developed. No distress.   HENT:   Head: Normocephalic and atraumatic.   Eyes: EOM are normal. Pupils are equal, round, and reactive to light.   Neck: Neck supple.   Cardiovascular: Normal rate.    Pulmonary/Chest: Effort normal. No respiratory distress. He has no  wheezes. He has no rales.   Abdominal: Soft. He exhibits no distension. There is no tenderness.   Musculoskeletal: He exhibits no edema.   Neurological: He is alert and oriented to person, place, and time.   Skin: No rash noted.   ecchymosis   Nursing note and vitals reviewed.      Meds:    Current Facility-Administered Medications:   •  acetaminophen  •  NS  •  PICC Line Insertion has been implemented **AND** May use Lidocaine 1% not to exceed 3 mls for local at insertion site **AND** NOTIFY MD **AND** Tip to dwell in the superior vena cava **AND** Do not use PICC Line until placement verified by Chest X Ray **AND** [CANCELED] DX-CHEST-FOR PICC LINE Perform procedure in: Other(comment f6 below): **AND** If radiologist reading of chest X-ray states any of the following the PICC should be used **AND** Further evaluation of the PICC placement can be retrieved from X-Ray and Imaging **AND** Blood draws through PICC line; draws by RN only **AND** FLUSHING GUIDELINES WHEN IN USE **AND** normal saline PF **AND** FLUSHING GUIDELINES WHEN NOT IN USE **AND** DRESSING MAINTENANCE **AND** Change needleless pressure ports and IV tubing every 72 hours per hospital policy **AND** TUBING **AND** If there is an MD order to remove the PICC line, any RN may remove the PICC line **AND** [] PATIENT EDUCATION MATERIALS **AND** NURSING COMMUNICATION  •  acyclovir  •  fluconazole  •  cefepime  •  NS  •  acetaminophen  •  benzocaine-menthol  •  benzonatate  •  labetalol  •  levothyroxine  •  ondansetron  •  potassium chloride SA  •  PARoxetine  •  polyethylene glycol/lytes  •  magnesium hydroxide  •  bisacodyl  •  zolpidem    Labs:  Recent Labs      18   2357  18   2355  18   1425  03/10/18   0357   WBC  0.4*  0.5*   --   0.5*   RBC  2.74*  2.85*   --   2.28*   HEMOGLOBIN  7.6*  7.8*   --   6.3*   HEMATOCRIT  22.1*  23.2*   --   18.1*   MCV  80.7*  81.4   --   81.6   MCH  27.7  27.4   --   27.2   RDW  46.0  46.5    --   47.0   PLATELETCT  15*  14*  36*  24*   MPV  11.6   --    --   12.1   NEUTSPOLYS  CANCEL  CANCEL   --   62.30   LYMPHOCYTES  CANCEL  CANCEL   --   31.20   MONOCYTES  CANCEL  CANCEL   --   3.90   EOSINOPHILS  CANCEL  CANCEL   --   0.00   BASOPHILS  CANCEL  CANCEL   --   0.00   RBCMORPHOLO   --    --    --   Present     Recent Labs      03/08/18   0933  03/09/18   0859  03/10/18   0600   SODIUM  134*  133*  134*   POTASSIUM  4.6  4.5  4.6   CHLORIDE  105  106  107   CO2  22  22  23   GLUCOSE  104*  103*  104*   BUN  17  16  24*     Recent Labs      03/08/18 0933  03/09/18   0859  03/10/18   0600   ALBUMIN  3.4  2.9*  2.8*   TBILIRUBIN  2.2*  1.9*  2.8*   ALKPHOSPHAT  104*  103*  92   TOTPROTEIN  4.8*  4.2*  4.2*   ALTSGPT  29  25  28   ASTSGOT  23  19  31   CREATININE  1.33  1.22  1.45*       Imaging:  Ct-abdomen-pelvis W/o    Result Date: 2/24/2018 2/24/2018 6:09 AM HISTORY/REASON FOR EXAM:  Fever. TECHNIQUE/EXAM DESCRIPTION: CT scan of the abdomen and pelvis without contrast. Noncontrast helical scanning was obtained from the diaphragmatic domes through the pubic symphysis. Low dose optimization technique was utilized for this CT exam including automated exposure control and adjustment of the mA and/or kV according to patient size. COMPARISON: 4/12/2017. FINDINGS: There are trace bilateral pleural effusions. There is a rounded opacity at the right lung base likely representing round atelectasis. There is trace pericardial fluid. No free air is seen in the abdomen or pelvis. Evaluation of parenchymal and vascular structures is limited by the lack of intravenous contrast. Liver is hypodense suggestive of hepatic steatosis. Subtle 1.1 cm hypodense lesion in the right lobe of the liver corresponds to the previously noted hemangioma. There are calcified granulomata within the liver. Gallbladder appears unremarkable. The spleen is not enlarged. No adrenal mass is identified on the left. There is a right adrenal  nodule measuring 2 cm which is compatible with an adrenal adenoma. There is renal cortical atrophy on the right. There are bilateral nonobstructing renal calculi. There are parapelvic cysts on the left. There is no evidence of hydronephrosis. Pancreas appears unremarkable.  Aorta is not aneurysmal. Atherosclerotic plaque is seen. Mildly enlarged dense right inguinal lymph node is again seen. There are dense retroperitoneal and right iliac lymph nodes. The dens retroperitoneal nodes measure up to 1.9 cm in short axis dimension, not significantly changed compared to prior. Haziness about the mesentery is not significantly changed compared to prior. Dense lymph node in the root of the mesentery measures 1.6 cm in short axis dimension, unchanged. There is no evidence of bowel obstruction. There is no evidence of acute appendicitis. Bladder is mildly distended. Prostate is enlarged. There is a fat-containing right inguinal hernia. Degenerative changes are seen in the spine. Wedge deformities of L1 and L2 appear unchanged.     Retroperitoneal, iliac and mesenteric lymphadenopathy is not significantly changed compared to prior. Haziness about the root of the mesentery is again noted. Nonobstructing bilateral renal calculi. Atrophic right kidney. Left parapelvic cysts. Enlarged prostate. Trace bilateral pleural effusions. Rounded opacity at the right lung base likely represents round atelectasis.    Ct-head W/o    Result Date: 2/24/2018 2/24/2018 6:09 AM HISTORY/REASON FOR EXAM:  Fever. TECHNIQUE/EXAM DESCRIPTION AND NUMBER OF VIEWS: CT of the head without contrast. Contiguous axial sections were obtained from the skull base through the vertex. Up to date radiation dose reduction adjustments have been utilized to meet ALARA standards for radiation dose reduction. COMPARISON:  None available FINDINGS: The is no evidence of intraparenchymal, intraventricular and extra-axial hemorrhage.  The ventricles and cortical sulci are  prominent compatible with age related change.  There is no mass effect or midline shift. There is minimal mucosal thickening within the maxillary sinuses. Visualized mastoid air cells are clear. The calvarium is intact. There is mild left frontal soft tissue swelling.     No acute intracranial abnormality is identified. Cortical atrophy. Mild left frontal soft tissue swelling.    Ct-maxillofacial W/o Plus Recons    Result Date: 2/26/2018 2/26/2018 7:24 PM HISTORY/REASON FOR EXAM:  Facial pain. Suspected sinusitis. TECHNIQUE/EXAM DESCRIPTION AND NUMBER OF VIEWS:  CT scan maxillofacial without contrast, with reconstructions. Thin-section helical imaging was obtained of the face from the orbital roofs through the mandible. Coronal multiplanar volume reformat images were generated from the axial data. Low dose optimization technique was utilized for this CT exam including automated exposure control and adjustment of the mA and/or kV according to patient size. FINDINGS: The frontal sinus is clear. There is minimal mucosal thickening in the ethmoid sinus bilaterally. There is mild bilateral mucosal thickening and/or polyp formation in the maxillary sinus. Both maxillary sinus infundibulum and ostia are patent. The sphenoid sinus is clear. No significant bony nasal septal deviation is present. No nasal turbinate edema is present.     1.  Mild chronic bilateral maxillary and ethmoid sinusitis. 2.  No evidence of acute sinusitis. 3.  Areas of rounded mucosal thickening or polyp formation in each maxillary sinus antrum. 4.  Otherwise clear paranasal sinuses.    Dx-chest-2 Views    Result Date: 2/28/2018 2/28/2018 8:43 AM HISTORY/REASON FOR EXAM: Cough.  Lymphoma TECHNIQUE/EXAM DESCRIPTION AND NUMBER OF VIEWS: Two views of the chest. COMPARISON:  February 23 FINDINGS: Cardiomediastinal contours are stable with some aortic ectasia and upper normal heart size. No pulmonary consolidation is seen. Stable mostly right pleural  space thickening, possible small effusions Chronic upper lumbar moderate severity compression fracture resulting in kyphosis     Stable chest with some chronic pleural space thickening more likely from pleural parenchymal scarring than pleural fluid, large lung volumes and mild cardiac silhouette enlargement    Dx-chest-portable (1 View)    Result Date: 2/23/2018 2/23/2018 6:01 PM HISTORY/REASON FOR EXAM:  Cough. TECHNIQUE/EXAM DESCRIPTION AND NUMBER OF VIEWS: Single portable view of the chest. COMPARISON: 3/22/2017 FINDINGS: Cardiomediastinal contour is within normal limits. Lungs show mild hypoinflation. No focal pulmonary consolidation. No pleural fluid collection or pneumothorax. No major bony abnormality is seen.     Hypoinflation without other evidence for acute cardiopulmonary disease.    Echocardiogram Comp W/o Cont    Result Date: 2/25/2018  Transthoracic Echo Report Echocardiography Laboratory CONCLUSIONS No prior study is available for comparison. Normal transthoracic echocardiogram. SHELLIE CASIANO Exam Date:         02/25/2018                    12:59 Exam Location:     Inpatient LV EF:  65    % FINDINGS Left Ventricle Normal left ventricular size, systolic function, and diastolic function. Mild concentric left ventricular hypertrophy. Normal regional wall motion. Left ventricular ejection fraction is visually estimated to be 65%. Right Ventricle The right ventricle was normal in size and function. Right Atrium The right atrium is normal in size.  Normal inferior vena cava size and inspiratory collapse. Left Atrium The left atrium is normal in size.  Left atrial volume index is 26 mL/sq m. Mitral Valve Structurally normal mitral valve without significant stenosis. Trace mitral regurgitation. Aortic Valve Tricuspid aortic valve. Aortic sclerosis without stenosis. No aortic insufficiency. Tricuspid Valve Structurally normal tricuspid valve without significant stenosis. Mild tricuspid regurgitation. Right  atrial pressure is estimated to be 3 mmHg. Estimated right ventricular systolic pressure is 40 mmHg. Pulmonic Valve Structurally normal pulmonic valve without significant stenosis or regurgitation. Pericardium Normal pericardium without effusion. Aorta The aortic root is normal. Jovany Heath (Electronically Signed) Final Date:     25 February 2018                 17:42      Micro:  Results     ** No results found for the last 168 hours. 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 Path - neg for malignancy, no blasts, +granulomatous inflammation [place] confirms Tcell malignancy-plan for Campath-will need Bactrim prophylaxis once started for at least 6 months     ROHITH, improved  Renally dose abx as needed  Avoid nephrotoxins  Monitor closely once Campath and Bactrim started

## 2018-03-11 PROBLEM — D64.9 ANEMIA: Status: ACTIVE | Noted: 2018-01-01

## 2018-03-11 NOTE — PROGRESS NOTES
[facility] Hospitalist Progress Note    Date of Service: 3/11/2018    Chief Complaint  77 y.o. male admitted 3/4/2018 with non-Hodgkin's versus Hodgkin's lymphoma presented from [facility] with pancytopenia and neutropenic fever associated with sinusitis.     Interval Problem Update  No new complaints to report. Tolerating by mouth intake well. Awaiting approval of the chemo medication.    Consultants/Specialty  Hematology oncology  Infectious disease        Disposition  hospital        Review of Systems   Constitutional: Positive for malaise/fatigue and weight loss.   HENT: Negative for ear pain and hearing loss.    Eyes: Negative for photophobia and pain.   Respiratory: Negative for hemoptysis and sputum production.    Cardiovascular: Negative for palpitations and orthopnea.   Gastrointestinal: Negative for abdominal pain and vomiting.   Genitourinary: Negative for frequency, hematuria and urgency.   Musculoskeletal: Negative for back pain, joint pain and neck pain.   Skin: Negative for itching.   Neurological: Positive for weakness. Negative for tingling, tremors and headaches.   Psychiatric/Behavioral: Negative for hallucinations, substance abuse and suicidal ideas. The patient is not nervous/anxious.       Physical Exam  Laboratory/Imaging   Hemodynamics  Temp (24hrs), Av.9 °C (98.4 °F), Min:36.4 °C (97.5 °F), Max:37.6 °C (99.7 °F)   Temperature: 36.7 °C (98.1 °F)  Pulse  Av.3  Min: 73  Max: 110    Blood Pressure : 130/66      Respiratory      Respiration: 18, Pulse Oximetry: 94 %        RUL Breath Sounds: Clear, RML Breath Sounds: Clear, RLL Breath Sounds: Clear, GREGORIA Breath Sounds: Clear, LLL Breath Sounds: Clear    Fluids    Intake/Output Summary (Last 24 hours) at 18 1627  Last data filed at 18 0800   Gross per 24 hour   Intake              600 ml   Output             1300 ml   Net             -700 ml       Nutrition  Orders Placed This Encounter   Procedures   • DIET ORDER     Standing  Status:   Standing     Number of Occurrences:   1     Order Specific Question:   Diet:     Answer:   Cardiac [6]     Physical Exam   Constitutional: He is oriented to person, place, and time. He appears well-developed and well-nourished. No distress.   HENT:   Head: Normocephalic and atraumatic.   Nose: Nose normal.   Mouth/Throat: No oropharyngeal exudate.   Eyes: Conjunctivae and EOM are normal. Pupils are equal, round, and reactive to light. Left eye exhibits no discharge. No scleral icterus.   Neck: Normal range of motion. Neck supple. No tracheal deviation present.   Cardiovascular: Normal rate and regular rhythm.  Exam reveals no gallop and no friction rub.    Pulmonary/Chest: Effort normal and breath sounds normal. No respiratory distress.   Abdominal: Soft. Bowel sounds are normal. He exhibits no distension. There is no tenderness.   Musculoskeletal: Normal range of motion. He exhibits no tenderness or deformity.   Neurological: He is alert and oriented to person, place, and time. No cranial nerve deficit.   Skin: Skin is warm and dry. He is not diaphoretic. No erythema.   Psychiatric: He has a normal mood and affect. His behavior is normal. Thought content normal.   Nursing note and vitals reviewed.      Recent Labs      03/08/18   2355  03/09/18   1425  03/10/18   0357  03/11/18   0430   WBC  0.5*   --   0.5*  0.7*   RBC  2.85*   --   2.28*  2.74*   HEMOGLOBIN  7.8*   --   6.3*  7.4*   HEMATOCRIT  23.2*   --   18.1*  22.5*   MCV  81.4   --   81.6  82.1   MCH  27.4   --   27.2  27.0   MCHC  33.6*   --   33.3*  32.9*   RDW  46.5   --   47.0  50.6*   PLATELETCT  14*  36*  24*  24*   MPV   --    --   12.1   --      Recent Labs      03/09/18   0859  03/10/18   0600  03/11/18   0430   SODIUM  133*  134*  133*   POTASSIUM  4.5  4.6  4.5   CHLORIDE  106  107  107   CO2  22  23  21   GLUCOSE  103*  104*  105*   BUN  16  24*  22   CREATININE  1.22  1.45*  1.55*   CALCIUM  7.5*  7.5*  7.6*                       Assessment/Plan     * Pancytopenia (CMS-[facility])   Assessment & Plan    Secondary to severe bone marrow infiltrate. Bone marrow biopsy showed T-cell clonality concerning for T-cell lymphoma or leukemia. Waiting for  molecular studies of BMB. Samples were sent to [place].   onc following.  Granix was stopped due to lack of benefit. May consider restarting if spiked a fever.          Thrombocytopenia (CMS-HCC)- (present on admission)   Assessment & Plan    Transfuse prn if less than 10K.         Neutropenic fever (CMS-HCC)- (present on admission)   Assessment & Plan    On Cefepime and fluconazole. Stop date 3/17/2018. Can be switched to oral levofloxacin if discharged prior to this date as per ID .         Maxillary sinusitis- (present on admission)   Assessment & Plan    On abx         Acute renal failure (ARF) (CMS-HCC)- (present on admission)   Assessment & Plan    resolved        Hyponatremia- (present on admission)   Assessment & Plan    Stable            Essential hypertension- (present on admission)   Assessment & Plan    Monitor blood pressure.          Anemia- (present on admission)   Assessment & Plan    Monitor H&H.  Transfuse if hemoglobin <7.5 g/dl.   Will need irradiated and CMV free product.        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 IVIG 30 g ×1 .        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  Campath 10 mg IV for 10 days . Awaiting arrival of this medicine. Onc on board.         Plan of care discussed with RN.   Quality-Core Measures   Reviewed items::  Radiology images reviewed, Labs reviewed and Medications reviewed  DVT prophylaxis  pharmacological::  Contraindicated - High bleeding risk  Antibiotics:  Treating active infection/contamination beyond 24 hours perioperative coverage

## 2018-03-11 NOTE — PROGRESS NOTES
"AAOx4, up self to BSC, up 1 otherwise. VSS /66   Pulse 91   Temp 36.7 °C (98.1 °F)   Resp 18   Ht 1.778 m (5' 10\")   Wt 85.7 kg (188 lb 15 oz)   SpO2 94%   BMI 27.11 kg/m² . Pt is fatigued- sleeping on and off throughout day. Denies pain/nausea. PICC line on assessment had small amount of bloody/sanginous drainage noted under dressing and saturating biopatch. PICC dressing changed maintaining sterility this shift, will cont to monitor for new drainage. Hourly rounding in effect.    "

## 2018-03-11 NOTE — PROGRESS NOTES
Received report from day RN, assumed care of PT at 1900. PT A&Ox 4, PICC running NS @125 mL/hr, discussed plan of care for this shift.  Pain managed with meds per MAR, no unmet pain needs at this time. PT up self to BSC, safety precautions in place. Call light and personal possessions within reach. Hourly rounding in place.

## 2018-03-11 NOTE — CARE PLAN
Problem: Safety  Goal: Will remain free from injury  Outcome: PROGRESSING AS EXPECTED  Educated PT on use of call light   PT oriented to room, all possessions within reach, call light within reach   Slip resistant socks on PT (yellow if fall risk)        Problem: Bowel/Gastric:  Goal: Will not experience complications related to bowel motility  Outcome: PROGRESSING AS EXPECTED  Bowel protocol given per MAR   PT and family educated about diet, fluid intake and activity to promote bowel function   Bathroom opportunities scheduled and/or offered

## 2018-03-11 NOTE — CARE PLAN
Problem: Safety  Goal: Will remain free from injury  Outcome: PROGRESSING AS EXPECTED  Pt up independently to BSC, 1a otherwise. Pt aware of limitations. Educated to call for assistance. Falls precautions in place. Hourly rounding in effect.     Problem: Infection  Goal: Will remain free from infection  Outcome: PROGRESSING AS EXPECTED  No s/s of new infections. Pt afebrile. Monitoring closely.

## 2018-03-11 NOTE — PROGRESS NOTES
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      has discussed his case with the [place] pathology and also lymphoma specialist who recommended Campath     Date & Time created: 3/11/2018  3:25 PM     Interval History:  Fatigue+ , feels the same.Afebrile    Review of Systems:  ROS Constitutional: Positive for fever, chills, weight loss , malaise/fatigue.    HEENT: No new auditory or visual complaints. No sore throat and neck pain.     Respiratory:No new cough, sputum production, shortness of breath and wheezing.    Cardiovascular: No new chest pain, palpitations, orthopnea and leg swelling.    Gastrointestinal: No heartburn, nausea, vomiting ,abdominal pain, hematochezia or melena     Genitourinary: Negative for dysuria, hematuria    Musculoskeletal: No new arthralgias or myalgias   Skin: Negative for rash and itching.    Neurological: Negative for focal weakness or headaches.    Endo/Heme/Allergies: No abnormal bleed/bruise.    Psychiatric/Behavioral: No new depression/anxiety.    Physical Exam:  Physical Exam General: Not in acute distress, alert and oriented x 3  HEENT: No pallor, icterus. Oropharynx clear.   Neck: Supple, no palpable masses.  Lymph nodes: No palpable cervical, supraclavicular, axillary or inguinal lymphadenopathy.    CVS: regular rate and rhythm, no rubs or gallops  RESP: Clear to auscultate bilaterally, no wheezing or crackles.   ABD: Soft, non tender, non distended, positive bowel sounds, no palpable organomegaly  EXT: No edema or cyanosis  CNS: Alert and oriented x3, No focal deficits.  Skin- No  rash    Labs:        Invalid input(s): [id number]      Recent Labs      1859  03/10/18   0618   043   SODIUM  133*  134*  133*   POTASSIUM  4.5  4.6  4.5   CHLORIDE  106  107  107   CO2  22  23  21   BUN  16  24*  22   CREATININE  1.22  1.45*  1.55*   MAGNESIUM   --    --   1.9   CALCIUM  7.5*  7.5*  7.6*     Recent Labs      1859  03/10/18   0618   043   ALTSGPT  25  28  25   ASTSGOT    23   ALKPHOSPHAT  103*  92  87   TBILIRUBIN  1.9*  2.8*  2.0*   GLUCOSE  103*  104*  105*     Recent Labs      03/08/18   2355  03/09/18   1425  03/10/18   0357  03/11/18   043   RBC  2.85*   --   2.28*  2.74*   HEMOGLOBIN  7.8*   --   6.3*  7.4*   HEMATOCRIT  23.2*   --   18.1*  22.5*   PLATELETCT  14*  36*  24*  24*     Recent Labs      03/08/18   2355  03/09/18   0859  03/10/18   0357  03/10/18   0600  03/11/18   0430   WBC  0.5*   --   0.5*   --   0.7*   NEUTSPOLYS  CANCEL   --   62.30   --   62.00   LYMPHOCYTES  CANCEL   --   31.20   --   25.40   MONOCYTES  CANCEL   --   3.90   --   3.70   EOSINOPHILS  CANCEL   --   0.00   --   0.00   BASOPHILS  CANCEL   --   0.00   --   2.20*   ASTSGOT   --      --   31  23   ALTSGPT   --   25   --   28  25   ALKPHOSPHAT   --   103*   --   92  87   TBILIRUBIN   --   1.9*   --   2.8*  2.0*     Recent Labs      03/09/18   0859  03/10/18   0600  03/11/18   043   SODIUM  133*  134*  133*   POTASSIUM  4.5  4.6  4.5   CHLORIDE  106  107  107   CO2  22  23  21   GLUCOSE  103*  104*  105*   BUN  16  24*  22   CREATININE  1.22  1.45*  1.55*   CALCIUM  7.5*  7.5*  7.6*     Hemodynamics:  Temp (24hrs), Av.9 °C (98.4 °F), Min:36.4 °C (97.5 °F), Max:37.6 °C (99.7 °F)  Temperature: 36.7 °C (98.1 °F)  Pulse  Av.3  Min: 73  Max: 110   Blood Pressure : 130/66     Respiratory:    Respiration: 18, Pulse Oximetry: 94 %        RUL Breath Sounds: Clear, RML Breath Sounds: Clear, RLL Breath Sounds: Clear, GREGORIA Breath Sounds: Clear, LLL Breath Sounds:  Clear  Fluids:    Intake/Output Summary (Last 24 hours) at 03/06/18 1511  Last data filed at 03/06/18 1200   Gross per 24 hour   Intake           2480.5 ml   Output             1300 ml   Net           1180.5 ml        GI/Nutrition:  Orders Placed This Encounter   Procedures   • DIET ORDER     Standing Status:   Standing     Number of Occurrences:   1     Order Specific Question:   Diet:     Answer:   Cardiac [6]     Medical Decision Making, by Problem:  Active Hospital Problems    Diagnosis   • *Pancytopenia (CMS-[facility]) [D61.818]   • Thrombocytopenia (CMS-[facility]) [D69.6]   • Neutropenic fever (CMS-[facility]) [D70.9, R50.81]   • Maxillary sinusitis [J32.0]   • Acute renal failure (ARF) (CMS-[facility]) [N17.9]   • Hyponatremia [E87.1]   • Essential hypertension [I10]   • 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 Klein  did discuss the case with  [facility] ., Even though these are usually indolent lymphomas however, in his case this is behaving more like a leukemia with severe bone marrow infiltration. Recent CT of the abdomen shows stable mild lymphadenopathy over the past many years .    Campath 10 mg IV for 10 days is the recommended treatment by [facility]. This may take a few days to get approved. I have submitted a nonformulary request and completed application to the company  He understands that this will take few days      LDH is only mildly elevated at 282. HIV, hepatitis screen, uric acid levels were not elevated..  Status post PICC line. Will obtain CMV titers as baseline.  Anemia, Winsome spositive, mild LDH elevation .Unclear there is componnt of hemolysis, check hapto and retic . Will start low dose prednisone . This may also help with LGL while we are waiting  Severe hypogammaglobulinemia.-Unclear whether  this is residual from prior lymphoma treatment or new.  May need IVIG as replacement given his immunosuppression. Serum protein electrophoresis did not reveal any monoclonal gammopathy.  Neutropenic fever- on empiric cefepime, Afebrile. Will cover him with acyclovir and Diflucan given his pancytopenia    Complex patient requiring complex decision making. Reviewed the laboratory data and images with the patient. Antineoplastic therapy requiring close monitoring and decision-making.    Quality-Core Measures

## 2018-03-11 NOTE — ASSESSMENT & PLAN NOTE
Monitor H&H.  Transfuse if hemoglobin <7 g/dl.   When Transfused give PRBC irradiated and CMV free product.

## 2018-03-11 NOTE — PROGRESS NOTES
Briana from Lab called with critical result of ANC 0.43 at 1047. Critical lab result read back to Briana.   Dr. Girard notified of critical lab result at 1055.  Critical lab result read back by Dr. Girard  .

## 2018-03-12 NOTE — THERAPY
"Physical Therapy Evaluation completed.   Bed Mobility:  Supine to Sit:  (found at EOB)  Transfers: Sit to Stand: Supervised  Gait: Level Of Assist: Contact Guard Assist with Front-Wheel Walker       Plan of Care: Will benefit from Physical Therapy 2 times per week  Discharge Recommendations: Equipment: Will Continue to Assess for Equipment Needs. See below.    pt presents to physical therapy with decreased endurance secondary to general deconditioning and medical co-morbidities. pt was able to demonstrate hallway ambulation with FWW and CGA. Anticipate with time OOB, pt will progress toward recent baseline. However, will continue to visit as pt will be starting chemo and anticipate will contribute to decrease endurance and gait and balance limitiations. Anticipate pt will be capeable of d/c to home when medically cleared, given current objective findings and social supports. May need FWW and time of d/c dependent on physical progression.     See \"Rehab Therapy-Acute\" Patient Summary Report for complete documentation.     "

## 2018-03-12 NOTE — PROGRESS NOTES
Doreen from Lab called with critical result of WBC  at 0.7 and platelets 21 at 0342. Critical lab result read back to Doreen.   This critical lab result is within parameters established by [facility] for this patient

## 2018-03-12 NOTE — CARE PLAN
Problem: Infection  Goal: Will remain free from infection    Intervention: Assess signs and symptoms of infection  Pt remained afebrile throughout shift, labs monitored. Pt has PICC with biopatch in place, site intact.       Problem: Urinary Elimination:  Goal: Ability to reestablish a normal urinary elimination pattern will improve    Intervention: Assist patient to sit on commode or toilet for voiding  Pt is up self to bedside commode; he has urgency. RN able to observe pt getting from bed to commode, he is able to do so safely and without assistance at this time.

## 2018-03-12 NOTE — PROGRESS NOTES
Infectious Disease Progress Note    Author: Marisabel Gomez M.D. Date & Time of service: 3/12/2018  3:42 PM    Chief Complaint:  FU neutropenic fever      Interval History:  78 y/o WM admitted with febrile neutropenia     Tmax 100.5, having coughing fit and SOB, plan for BM biopsy  3/1Tmax 100.2, WBC 0.3, had a BM this am, cough better, repeat CXR with no infiltrate   3/2 Tmax 100.5, shortness of breath with exertion, had bloody nose this am, plts 22, denies abd pain or diarrhea, path neg for lyphoma  3/3/2018-Tmax 99.6. WBC 0.3 platelets 17 creatinine 1.38  3/4/2018-Tmax 99.2 WBCs 0.3 creatinine 1.38 complains of some shortness of breath  3/5 AF WBC 0.3 [place] [facility] [place] complaints  3/6 AF WBC 0.3 no positive cxs No new complaints  3/7 AF WBC 0.4 no complaints except fatigue and CORRALES  3/8 AF (99.8) no CBC transferred to Oncology floor-no acute events  3/9 AF WBC 0.5 feels weak-no new complaints  3/10 AF (99.9) plan for Campath noted  3/12/2018-Tmax 98.4 WBC 0.7 creatinine 1.55  Labs Reviewed, Medications Reviewed, Radiology Reviewed and Wound Reviewed.    Review of Systems:  Review of Systems   Constitutional: Negative for fever.   Respiratory: Negative for cough.    Cardiovascular: Negative for chest pain.   Gastrointestinal: Negative for nausea and vomiting.   Genitourinary: Negative for dysuria.   Neurological: Positive for weakness.   All other systems reviewed and are negative.      Hemodynamics:  Temp (24hrs), Av.8 °C (98.2 °F), Min:36.5 °C (97.7 °F), Max:37 °C (98.6 °F)  Temperature: 36.8 °C (98.2 °F)  Pulse  Av.3  Min: 73  Max: 110   Blood Pressure : 158/72       Physical Exam:  Physical Exam   Constitutional: He is oriented to person, place, and time. He appears well-developed. No distress.   HENT:   Head: Normocephalic and atraumatic.   Eyes: EOM are normal. Pupils are equal, round, and reactive to light.   Neck: Neck supple.   Cardiovascular: Normal rate.    Pulmonary/Chest: Effort  normal. No respiratory distress. He has no wheezes. He has no rales.   Abdominal: Soft. He exhibits no distension. There is no tenderness.   Musculoskeletal: He exhibits no edema.   Neurological: He is alert and oriented to person, place, and time.   Skin: No rash noted.   ecchymosis   Nursing note and vitals reviewed.      Meds:    Current Facility-Administered Medications:   •  allopurinol  •  immune globulin **FOLLOWED BY** immune globulin **FOLLOWED BY** immune globulin **FOLLOWED BY** immune globulin **FOLLOWED BY** [COMPLETED] immune globulin  •  predniSONE  •  acetaminophen  •  NS  •  PICC Line Insertion has been implemented **AND** May use Lidocaine 1% not to exceed 3 mls for local at insertion site **AND** NOTIFY MD **AND** Tip to dwell in the superior vena cava **AND** Do not use PICC Line until placement verified by Chest X Ray **AND** [CANCELED] DX-CHEST-FOR PICC LINE Perform procedure in: Other(comment f6 below): **AND** If radiologist reading of chest X-ray states any of the following the PICC should be used **AND** Further evaluation of the PICC placement can be retrieved from X-Ray and Imaging **AND** Blood draws through PICC line; draws by RN only **AND** FLUSHING GUIDELINES WHEN IN USE **AND** normal saline PF **AND** FLUSHING GUIDELINES WHEN NOT IN USE **AND** DRESSING MAINTENANCE **AND** Change needleless pressure ports and IV tubing every 72 hours per hospital policy **AND** TUBING **AND** If there is an MD order to remove the PICC line, any RN may remove the PICC line **AND** [] PATIENT EDUCATION MATERIALS **AND** NURSING COMMUNICATION  •  acyclovir  •  fluconazole  •  cefepime  •  NS  •  acetaminophen  •  benzocaine-menthol  •  benzonatate  •  labetalol  •  levothyroxine  •  ondansetron  •  potassium chloride SA  •  PARoxetine  •  polyethylene glycol/lytes  •  magnesium hydroxide  •  bisacodyl  •  zolpidem    Labs:  Recent Labs      03/10/18   0357  18   0430  18   0247    WBC  0.5*  0.7*  0.7*   RBC  2.28*  2.74*  2.81*   HEMOGLOBIN  6.3*  7.4*  7.6*   HEMATOCRIT  18.1*  22.5*  22.9*   MCV  81.6  82.1  81.5   MCH  27.2  27.0  27.0   RDW  47.0  50.6*  47.8   PLATELETCT  24*  24*  21*   MPV  12.1   --    --    NEUTSPOLYS  62.30  62.00  75.80*   LYMPHOCYTES  31.20  25.40  23.20   MONOCYTES  3.90  3.70  1.00   EOSINOPHILS  0.00  0.00  0.00   BASOPHILS  0.00  2.20*  0.00   RBCMORPHOLO  Present  Present  Present     Recent Labs      03/10/18   0600 03/11/18   0430   SODIUM  134*  133*   POTASSIUM  4.6  4.5   CHLORIDE  107  107   CO2  23  21   GLUCOSE  104*  105*   BUN  24*  22     Recent Labs      03/10/18   0600 03/11/18   0430   ALBUMIN  2.8*  2.9*   TBILIRUBIN  2.8*  2.0*   ALKPHOSPHAT  92  87   TOTPROTEIN  4.2*  4.3*   ALTSGPT  28  25   ASTSGOT  31  23   CREATININE  1.45*  1.55*       Imaging:  Ct-abdomen-pelvis W/o    Result Date: 2/24/2018 2/24/2018 6:09 AM HISTORY/REASON FOR EXAM:  Fever. TECHNIQUE/EXAM DESCRIPTION: CT scan of the abdomen and pelvis without contrast. Noncontrast helical scanning was obtained from the diaphragmatic domes through the pubic symphysis. Low dose optimization technique was utilized for this CT exam including automated exposure control and adjustment of the mA and/or kV according to patient size. COMPARISON: 4/12/2017. FINDINGS: There are trace bilateral pleural effusions. There is a rounded opacity at the right lung base likely representing round atelectasis. There is trace pericardial fluid. No free air is seen in the abdomen or pelvis. Evaluation of parenchymal and vascular structures is limited by the lack of intravenous contrast. Liver is hypodense suggestive of hepatic steatosis. Subtle 1.1 cm hypodense lesion in the right lobe of the liver corresponds to the previously noted hemangioma. There are calcified granulomata within the liver. Gallbladder appears unremarkable. The spleen is not enlarged. No adrenal mass is identified on the left. There  is a right adrenal nodule measuring 2 cm which is compatible with an adrenal adenoma. There is renal cortical atrophy on the right. There are bilateral nonobstructing renal calculi. There are parapelvic cysts on the left. There is no evidence of hydronephrosis. Pancreas appears unremarkable.  Aorta is not aneurysmal. Atherosclerotic plaque is seen. Mildly enlarged dense right inguinal lymph node is again seen. There are dense retroperitoneal and right iliac lymph nodes. The dens retroperitoneal nodes measure up to 1.9 cm in short axis dimension, not significantly changed compared to prior. Haziness about the mesentery is not significantly changed compared to prior. Dense lymph node in the root of the mesentery measures 1.6 cm in short axis dimension, unchanged. There is no evidence of bowel obstruction. There is no evidence of acute appendicitis. Bladder is mildly distended. Prostate is enlarged. There is a fat-containing right inguinal hernia. Degenerative changes are seen in the spine. Wedge deformities of L1 and L2 appear unchanged.     Retroperitoneal, iliac and mesenteric lymphadenopathy is not significantly changed compared to prior. Haziness about the root of the mesentery is again noted. Nonobstructing bilateral renal calculi. Atrophic right kidney. Left parapelvic cysts. Enlarged prostate. Trace bilateral pleural effusions. Rounded opacity at the right lung base likely represents round atelectasis.    Ct-head W/o    Result Date: 2/24/2018 2/24/2018 6:09 AM HISTORY/REASON FOR EXAM:  Fever. TECHNIQUE/EXAM DESCRIPTION AND NUMBER OF VIEWS: CT of the head without contrast. Contiguous axial sections were obtained from the skull base through the vertex. Up to date radiation dose reduction adjustments have been utilized to meet ALARA standards for radiation dose reduction. COMPARISON:  None available FINDINGS: The is no evidence of intraparenchymal, intraventricular and extra-axial hemorrhage.  The ventricles and  cortical sulci are prominent compatible with age related change.  There is no mass effect or midline shift. There is minimal mucosal thickening within the maxillary sinuses. Visualized mastoid air cells are clear. The calvarium is intact. There is mild left frontal soft tissue swelling.     No acute intracranial abnormality is identified. Cortical atrophy. Mild left frontal soft tissue swelling.    Ct-maxillofacial W/o Plus Recons    Result Date: 2/26/2018 2/26/2018 7:24 PM HISTORY/REASON FOR EXAM:  Facial pain. Suspected sinusitis. TECHNIQUE/EXAM DESCRIPTION AND NUMBER OF VIEWS:  CT scan maxillofacial without contrast, with reconstructions. Thin-section helical imaging was obtained of the face from the orbital roofs through the mandible. Coronal multiplanar volume reformat images were generated from the axial data. Low dose optimization technique was utilized for this CT exam including automated exposure control and adjustment of the mA and/or kV according to patient size. FINDINGS: The frontal sinus is clear. There is minimal mucosal thickening in the ethmoid sinus bilaterally. There is mild bilateral mucosal thickening and/or polyp formation in the maxillary sinus. Both maxillary sinus infundibulum and ostia are patent. The sphenoid sinus is clear. No significant bony nasal septal deviation is present. No nasal turbinate edema is present.     1.  Mild chronic bilateral maxillary and ethmoid sinusitis. 2.  No evidence of acute sinusitis. 3.  Areas of rounded mucosal thickening or polyp formation in each maxillary sinus antrum. 4.  Otherwise clear paranasal sinuses.    Dx-chest-2 Views    Result Date: 2/28/2018 2/28/2018 8:43 AM HISTORY/REASON FOR EXAM: Cough.  Lymphoma TECHNIQUE/EXAM DESCRIPTION AND NUMBER OF VIEWS: Two views of the chest. COMPARISON:  February 23 FINDINGS: Cardiomediastinal contours are stable with some aortic ectasia and upper normal heart size. No pulmonary consolidation is seen. Stable  mostly right pleural space thickening, possible small effusions Chronic upper lumbar moderate severity compression fracture resulting in kyphosis     Stable chest with some chronic pleural space thickening more likely from pleural parenchymal scarring than pleural fluid, large lung volumes and mild cardiac silhouette enlargement    Dx-chest-portable (1 View)    Result Date: 2/23/2018 2/23/2018 6:01 PM HISTORY/REASON FOR EXAM:  Cough. TECHNIQUE/EXAM DESCRIPTION AND NUMBER OF VIEWS: Single portable view of the chest. COMPARISON: 3/22/2017 FINDINGS: Cardiomediastinal contour is within normal limits. Lungs show mild hypoinflation. No focal pulmonary consolidation. No pleural fluid collection or pneumothorax. No major bony abnormality is seen.     Hypoinflation without other evidence for acute cardiopulmonary disease.    Echocardiogram Comp W/o Cont    Result Date: 2/25/2018  Transthoracic Echo Report Echocardiography Laboratory CONCLUSIONS No prior study is available for comparison. Normal transthoracic echocardiogram. SHELLIE CASIANO Exam Date:         02/25/2018                    12:59 Exam Location:     Inpatient LV EF:  65    % FINDINGS Left Ventricle Normal left ventricular size, systolic function, and diastolic function. Mild concentric left ventricular hypertrophy. Normal regional wall motion. Left ventricular ejection fraction is visually estimated to be 65%. Right Ventricle The right ventricle was normal in size and function. Right Atrium The right atrium is normal in size.  Normal inferior vena cava size and inspiratory collapse. Left Atrium The left atrium is normal in size.  Left atrial volume index is 26 mL/sq m. Mitral Valve Structurally normal mitral valve without significant stenosis. Trace mitral regurgitation. Aortic Valve Tricuspid aortic valve. Aortic sclerosis without stenosis. No aortic insufficiency. Tricuspid Valve Structurally normal tricuspid valve without significant stenosis. Mild tricuspid  regurgitation. Right atrial pressure is estimated to be 3 mmHg. Estimated right ventricular systolic pressure is 40 mmHg. Pulmonic Valve Structurally normal pulmonic valve without significant stenosis or regurgitation. Pericardium Normal pericardium without effusion. Aorta The aortic root is normal. Jovany Heath (Electronically Signed) Final Date:     25 February 2018                 17:42      Micro:  Results     ** No results found for the last 168 hours. 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 Path - neg for malignancy, no blasts, +granulomatous inflammation [place] confirms Tcell malignancy-plan for Campath-will need Bactrim prophylaxis once started for at least 6 months     ROHITH, improved  Renally dose abx as needed  Avoid nephrotoxins  Monitor closely once Campath and Bactrim started

## 2018-03-12 NOTE — PROGRESS NOTES
Assumed pt care - bedside report received.  Pt is aaox4-fatigued.  Spouse at bedside.  Continues neutropenic precautions.  Good po today - two soft bm's.  piv patent/d/c.  picc patent/not bleeding today/fluids/ivig.  Set up second line for tko for double lumen picc.  Pt makes needs known - will continue to round.

## 2018-03-12 NOTE — CARE PLAN
Problem: Safety  Goal: Will remain free from falls    Intervention: Assess risk factors for falls  Fall precautions in place      Problem: Infection  Goal: Will remain free from infection    Intervention: Implement standard precautions and perform hand washing before and after patient contact  Neutropenic precautions in place  Will be giving ivig        Problem: Knowledge Deficit  Goal: Knowledge of disease process/condition, treatment plan, diagnostic tests, and medications will improve    Intervention: Assess knowledge level of disease process/condition, treatment plan, diagnostic tests, and medications

## 2018-03-12 NOTE — THERAPY
"Occupational Therapy Evaluation completed.   Functional Status:  Pt currently able to perform basic ADL's & functional mobility with supervision.  Pt reports he has been using BSC due to urgency.  Pt encouraged to be OOB for all meals & amb in [place] with staff daily.  Plan of Care: Will benefit from Occupational Therapy 1 times per week  Discharge Recommendations:  Equipment: Will Continue to Assess for Equipment Needs. Post-acute therapy Currently anticipate no further skilled therapy needs once patient is discharged from the inpatient setting.    Pt's wife present during OT eval & appeared irritated & annoyed during session.  Wife reports she is an RN in the ER, has lots of friends who are MD's & will have plenty of help at D/C!  Pt was very pleasant & receptive to new learning.  Will continue to follow pt at a distance to ensure he does not decline once his tx begins.    See \"Rehab Therapy-Acute\" Patient Summary Report for complete documentation.    "

## 2018-03-12 NOTE — PROGRESS NOTES
Received report from day RN, assumed care of pt 1920.  Pt is A&Ox4, up self to bsc, pt has urgency. Pt verbalized understanding to call if needs to ambulate further than bsc.  Assessment complete, documented in epic.  LUE PICC patent, + blood return, small amount of blood around biopatch, otherwise is CDI. Will continue to monitor.  Fluids/abx running.  All needs met at this time.

## 2018-03-13 NOTE — CARE PLAN
Problem: Safety  Goal: Will remain free from injury    Intervention: Provide assistance with mobility  Assisted pt in ambulating hallways with FWW. He ambulated with strong, steady gait.       Problem: Mobility  Goal: Risk for activity intolerance will decrease    Intervention: Assess and monitor signs of activity intolerance  Pt ambulated 2x and has set goal to ambulate 3x the following day.

## 2018-03-13 NOTE — PROGRESS NOTES
Candace from Lab called with critical result of WBC 0.8, ANC 0.37, and platelets 20 at 0327. Critical lab result read back to Candace.   This critical lab result is within parameters established by [facility] for this patient

## 2018-03-13 NOTE — PROGRESS NOTES
[facility] Hospitalist Progress Note    Date of Service: 3/12/2018    Chief Complaint  77 y.o. male admitted 3/4/2018 with non-Hodgkin's versus Hodgkin's lymphoma presented from [facility] ER with pancytopenia and neutropenic fever associated with sinusitis.     Interval Problem Update  Feels tired. Fair appetite. Afebrile.     Consultants/Specialty  Hematology oncology  Infectious disease        Disposition  hospital        Review of Systems   Constitutional: Positive for malaise/fatigue and weight loss. Negative for chills.   HENT: Negative for ear pain and tinnitus.    Eyes: Negative for double vision, photophobia and pain.   Respiratory: Negative for cough, hemoptysis and sputum production.    Cardiovascular: Negative for palpitations, orthopnea and claudication.   Gastrointestinal: Negative for abdominal pain, nausea and vomiting.   Genitourinary: Negative for frequency and urgency.   Musculoskeletal: Negative for back pain and neck pain.   Skin: Negative for rash.   Neurological: Positive for weakness. Negative for tingling, tremors and headaches.   Psychiatric/Behavioral: Negative for hallucinations, substance abuse and suicidal ideas.      Physical Exam  Laboratory/Imaging   Hemodynamics  Temp (24hrs), Av.8 °C (98.2 °F), Min:36.5 °C (97.7 °F), Max:36.9 °C (98.5 °F)   Temperature: 36.9 °C (98.4 °F)  Pulse  Av.1  Min: 73  Max: 110    Blood Pressure : 134/70      Respiratory      Respiration: 16, Pulse Oximetry: 97 %        RUL Breath Sounds: Clear, RML Breath Sounds: Clear, RLL Breath Sounds: Clear, GREGORIA Breath Sounds: Clear, LLL Breath Sounds: Clear    Fluids    Intake/Output Summary (Last 24 hours) at 18 2323  Last data filed at 18 2315   Gross per 24 hour   Intake             6193 ml   Output             3040 ml   Net             3153 ml       Nutrition  Orders Placed This Encounter   Procedures   • DIET ORDER     Standing Status:   Standing     Number of Occurrences:   1     Order Specific  Question:   Diet:     Answer:   Cardiac [6]     Physical Exam   Constitutional: He is oriented to person, place, and time. He appears well-developed and well-nourished. No distress.   HENT:   Head: Normocephalic and atraumatic.   Nose: Nose normal.   Mouth/Throat: No oropharyngeal exudate.   Eyes: Conjunctivae and EOM are normal. Pupils are equal, round, and reactive to light. No scleral icterus.   Neck: Normal range of motion. Neck supple. No JVD present. No tracheal deviation present.   Cardiovascular: Normal rate and regular rhythm.  Exam reveals no gallop and no friction rub.    Pulmonary/Chest: Effort normal and breath sounds normal. No respiratory distress. He has no wheezes.   Abdominal: Soft. Bowel sounds are normal. He exhibits no distension. There is no tenderness. There is no rebound.   Musculoskeletal: Normal range of motion. He exhibits no tenderness or deformity.   Neurological: He is alert and oriented to person, place, and time. No cranial nerve deficit.   Skin: Skin is warm and dry. No erythema.   Psychiatric: He has a normal mood and affect. His behavior is normal. Thought content normal.   Nursing note and vitals reviewed.      Recent Labs      03/10/18   0357  03/11/18   0430  03/12/18   0247   WBC  0.5*  0.7*  0.7*   RBC  2.28*  2.74*  2.81*   HEMOGLOBIN  6.3*  7.4*  7.6*   HEMATOCRIT  18.1*  22.5*  22.9*   MCV  81.6  82.1  81.5   MCH  27.2  27.0  27.0   MCHC  33.3*  32.9*  33.2*   RDW  47.0  50.6*  47.8   PLATELETCT  24*  24*  21*   MPV  12.1   --    --      Recent Labs      03/10/18   0600  03/11/18   0430   SODIUM  134*  133*   POTASSIUM  4.6  4.5   CHLORIDE  107  107   CO2  23  21   GLUCOSE  104*  105*   BUN  24*  22   CREATININE  1.45*  1.55*   CALCIUM  7.5*  7.6*                      Assessment/Plan     * Pancytopenia (CMS-HCC)   Assessment & Plan    Secondary to severe bone marrow infiltrate. Bone marrow biopsy showed T-cell clonality concerning for T-cell lymphoma or leukemia. Waiting  for  molecular studies of BMB. Samples were sent to [facility].   onc following.  Granix was stopped due to lack of benefit. May consider restarting if spiked a fever.          Thrombocytopenia (CMS-HCC)- (present on admission)   Assessment & Plan    Transfuse prn if less than 10K.         Neutropenic fever (CMS-HCC)- (present on admission)   Assessment & Plan    On Cefepime and fluconazole. Stop date 3/17/2018. Can be switched to oral levofloxacin if discharged prior to this date as per ID .         Maxillary sinusitis- (present on admission)   Assessment & Plan    On abx         Acute renal failure (ARF) (CMS-HCC)- (present on admission)   Assessment & Plan    On IVF  Avoid nephrotoxins   Renally dose all medications         Hyponatremia- (present on admission)   Assessment & Plan    Stable            Essential hypertension- (present on admission)   Assessment & Plan    Monitor blood pressure.          Anemia- (present on admission)   Assessment & Plan    Monitor H&H.  Transfuse if hemoglobin <7.5 g/dl.   Will need irradiated and CMV free product.        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 IVIG 30 g ×1 .        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  Campath 10 mg IV for 10 days . Awaiting arrival of this medicine. Onc on board.   Will need Bactrim for ppx when started.         Plan of care discussed with RN.   Quality-Core Measures   Reviewed items::  Radiology images reviewed, Labs reviewed and Medications reviewed  DVT prophylaxis pharmacological::  Contraindicated - High bleeding risk  Antibiotics:  Treating active infection/contamination beyond  24 hours perioperative coverage

## 2018-03-13 NOTE — PROGRESS NOTES
Received report from day RN, assumed care of pt 1910.  Ambulated hallway with pt and FWW after dinner. Pt denies pain, denies nausea.  Octagam running.   LUE PICC patent with + blood return, site is intact, just the same scant dried blood around biopatch from yesterday; no new bleeding at site.  Treaded sock on, bed in lowest and locked position. All needs met at this time. Pt still up self to BSC.

## 2018-03-13 NOTE — PROGRESS NOTES
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 Klein - Planned CAMP path x 10 days - Awaiting drug     Subjective:  He had no new new complaints.   He denies any fevers or chills.  No N/V/D/     Objective:  Medications reviewed and notable for:  Current Facility-Administered Medications   Medication Dose   • allopurinol (ZYLOPRIM) tablet 100 mg  100 mg   • Ganciclovir 0.15 % GEL 1 Drop  1 Drop   • dexamethasone (DECADRON) 0.1 % ophthalmic solution 1 Drop  1 Drop   • predniSONE (DELTASONE) tablet 20 mg  20 mg   • acetaminophen (TYLENOL) tablet 650 mg  650 mg   • NS infusion 250 mL  250 mL   • normal saline PF 10-20 mL  10-20 mL   • acyclovir (ZOVIRAX) capsule 400 mg  400 mg   • fluconazole (DIFLUCAN) tablet 100 mg  100 mg   • cefepime (MAXIPIME) 2 g in  mL IVPB  2 g   • NS infusion     • acetaminophen (TYLENOL) tablet 650 mg  650 mg   • benzocaine-menthol (CEPACOL) lozenge 1 Lozenge  1 Lozenge   • benzonatate (TESSALON) capsule 100 mg  100 mg   • labetalol (NORMODYNE,TRANDATE) injection 10 mg  10 mg   • levothyroxine (SYNTHROID) tablet 100 mcg  100 mcg   • ondansetron (ZOFRAN) syringe/vial injection 4 mg  4 mg   • potassium chloride SA (Kdur) tablet 40 mEq  40 mEq   • PARoxetine (PAXIL) tablet 20 mg  20 mg   • polyethylene glycol/lytes (MIRALAX) PACKET 1 Packet  1 Packet   • magnesium hydroxide (MILK OF MAGNESIA) suspension 30 mL  30 mL   • bisacodyl (DULCOLAX) suppository 10 mg  10 mg   • zolpidem (AMBIEN) tablet 5 mg  5 mg       ROS:   Constitutional: +  fatigue, no fevers or chills, no night sweats  Resp: No cough or SOB  Cardio:No chest pain or palpitations  Pschy: No depression or anxiety   Neuro: No headaches, no seizure, no vision changes  GI: no abdominal pain, nausea or vomiting. Two  "loose stools daily  All other ROS negative    Blood pressure 142/75, pulse 79, temperature 36.8 °C (98.3 °F), resp. rate 18, height 1.778 m (5' 10\"), weight 85.7 kg (188 lb 15 oz), SpO2 97 %.    General:  comfortable, NAD  HEENT:  sclera anicteric, pupils equal, round, reactive to light, oral cavity and oropharynx clear, mucous membranes moist  Neck:   supple, no lymphadenopathy  Cor:   regular rate and rhythm, no murmurs, rubs, or gallops  Pulm:   clear to auscultation bilaterally  Abd:   bowel sounds present, soft, nontender, nondistended, no palpable masses or organomegaly  Extremities:  warm, no lower extremity edema  Neurologic:  A&O x 3  Pyschiatric:  Appropriate mood and affect    Labs reviewed and notable for:  Recent Labs      03/11/18   0430  03/12/18   0247  03/13/18   0047   WBC  0.7*  0.7*  0.8*   RBC  2.74*  2.81*  2.62*   HEMOGLOBIN  7.4*  7.6*  7.3*   HEMATOCRIT  22.5*  22.9*  21.3*   MCV  82.1  81.5  81.3*   MCH  27.0  27.0  27.9   MCHC  32.9*  33.2*  34.3   RDW  50.6*  47.8  47.5   PLATELETCT  24*  21*  20*         .@CMP  Recent Results (from the past 24 hour(s))   CBC WITH DIFFERENTIAL    Collection Time: 03/13/18 12:47 AM   Result Value Ref Range    WBC 0.8 (LL) 4.8 - 10.8 K/uL    RBC 2.62 (L) 4.70 - 6.10 M/uL    Hemoglobin 7.3 (L) 14.0 - 18.0 g/dL    Hematocrit 21.3 (L) 42.0 - 52.0 %    MCV 81.3 (L) 81.4 - 97.8 fL    MCH 27.9 27.0 - 33.0 pg    MCHC 34.3 33.7 - 35.3 g/dL    RDW 47.5 35.9 - 50.0 fL    Platelet Count 20 (LL) 164 - 446 K/uL    Nucleated RBC 0.00 /100 WBC    NRBC (Absolute) 0.00 K/uL    Neutrophils-Polys 46.10 44.00 - 72.00 %    Lymphocytes 46.20 (H) 22.00 - 41.00 %    Monocytes 6.60 0.00 - 13.40 %    Eosinophils 0.00 0.00 - 6.90 %    Basophils 1.10 0.00 - 1.80 %    Neutrophils (Absolute) 0.37 (LL) 1.82 - 7.42 K/uL    Lymphs (Absolute) 0.37 (L) 1.00 - 4.80 K/uL    Monos (Absolute) 0.05 0.00 - 0.85 K/uL    Eos (Absolute) 0.00 0.00 - 0.51 K/uL    Baso (Absolute) 0.01 0.00 - 0.12 K/uL "    Anisocytosis 1+     Microcytosis 1+    DIFFERENTIAL MANUAL    Collection Time: 03/13/18 12:47 AM   Result Value Ref Range    Manual Diff Status PERFORMED    PERIPHERAL SMEAR REVIEW    Collection Time: 03/13/18 12:47 AM   Result Value Ref Range    Peripheral Smear Review see below    PLATELET ESTIMATE    Collection Time: 03/13/18 12:47 AM   Result Value Ref Range    Plt Estimation Marked Decrease    MORPHOLOGY    Collection Time: 03/13/18 12:47 AM   Result Value Ref Range    RBC Morphology Present     Large Platelets 2+     Poikilocytosis 1+     Ovalocytes 1+    IMMATURE PLT FRACTION    Collection Time: 03/13/18 12:47 AM   Result Value Ref Range    Imm. Plt Fraction 16.4 (H) 0.6 - 13.1 K/uL       Assessment and Recommendations:  - T cell LGL/Leukemia presented with pancytopenia awaiting CAMP path   - Pancytopenia due to # 1   - ROSE MARIE positive IGG. N Ret count, LDH, hapto pending , Bili stable 2.0 range. There is small component of hemolysis. Not Brisk . Patient already on low dose steroids - monitor   - Renal insufficiency : Cr slowly getting worse - Monitor.   - Neutropenic fever- on empiric cefepime, Afebrile. He is also on  acyclovir and Diflucan given his pancytopenia  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 Given IVIG on 3/12/18     Plan:   - Clinically and hemodynamically stable   - Awaiting CAMP path - once we receive will start today or tomorrow   - I reviewed hemolysis labs component of hemolysis. HGB stable.   - He tolerated IVIG well.   - BMP ordered today       High complexicity/Drug monitoring     We will continue to follow with you; please call with any questions, [phone].      Maggie Mitchell MD  [facility]   [phone]

## 2018-03-14 NOTE — PROGRESS NOTES
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 Klein - Planned CAMP path x 10 days - Awaiting drug    Subjective:  No overnight events, No fevers or chills. No abdominal pain. He continues to have 2-3 stools/day which is his baseline.     Objective:  Medications reviewed and notable for:  Current Facility-Administered Medications   Medication Dose   • diphenhydrAMINE (BENADRYL) tablet/capsule 25 mg  25 mg   • allopurinol (ZYLOPRIM) tablet 100 mg  100 mg   • Ganciclovir 0.15 % GEL 1 Drop  1 Drop   • dexamethasone (DECADRON) 0.1 % ophthalmic solution 1 Drop  1 Drop   • predniSONE (DELTASONE) tablet 20 mg  20 mg   • acetaminophen (TYLENOL) tablet 650 mg  650 mg   • NS infusion 250 mL  250 mL   • normal saline PF 10-20 mL  10-20 mL   • acyclovir (ZOVIRAX) capsule 400 mg  400 mg   • fluconazole (DIFLUCAN) tablet 100 mg  100 mg   • cefepime (MAXIPIME) 2 g in  mL IVPB  2 g   • NS infusion     • acetaminophen (TYLENOL) tablet 650 mg  650 mg   • benzocaine-menthol (CEPACOL) lozenge 1 Lozenge  1 Lozenge   • benzonatate (TESSALON) capsule 100 mg  100 mg   • labetalol (NORMODYNE,TRANDATE) injection 10 mg  10 mg   • levothyroxine (SYNTHROID) tablet 100 mcg  100 mcg   • ondansetron (ZOFRAN) syringe/vial injection 4 mg  4 mg   • potassium chloride SA (Kdur) tablet 40 mEq  40 mEq   • PARoxetine (PAXIL) tablet 20 mg  20 mg   • polyethylene glycol/lytes (MIRALAX) PACKET 1 Packet  1 Packet   • magnesium hydroxide (MILK OF MAGNESIA) suspension 30 mL  30 mL   • bisacodyl (DULCOLAX) suppository 10 mg  10 mg   • zolpidem (AMBIEN) tablet 5 mg  5 mg       ROS:   Constitutional: No fatigue, no fevers or chills, no night sweats  Resp: No cough or SOB  Cardio:No chest pain or palpitations  Pschy: No depression or  "anxiety   Neuro: No headaches, no seizure, no vision changes  GI: no abdominal pain, nausea or vomiting. No diarrhea or constipation   All other ROS negative    Blood pressure 146/74, pulse 75, temperature 36.7 °C (98.1 °F), resp. rate 16, height 1.778 m (5' 10\"), weight 85.7 kg (188 lb 15 oz), SpO2 96 %.    General:  comfortable, NAD  HEENT:  sclera anicteric, pupils equal, round, reactive to light, oral cavity and oropharynx clear, mucous membranes moist  Neck:   supple, no lymphadenopathy  Cor:   regular rate and rhythm, no murmurs, rubs, or gallops  Pulm:   clear to auscultation bilaterally  Abd:   bowel sounds present, soft, nontender, nondistended, no palpable masses or organomegaly  Extremities:  Bilateral LE edema trace   Neurologic:  A&O x 3  Pyschiatric:  Appropriate mood and affect    Labs reviewed and notable for:  Recent Labs      03/12/18   0247  03/13/18   0047  03/14/18   0058   WBC  0.7*  0.8*  0.6*   RBC  2.81*  2.62*  2.59*   HEMOGLOBIN  7.6*  7.3*  7.0*   HEMATOCRIT  22.9*  21.3*  21.2*   MCV  81.5  81.3*  81.9   MCH  27.0  27.9  27.0   MCHC  33.2*  34.3  33.0*   RDW  47.8  47.5  47.9   PLATELETCT  21*  20*  25*   MPV   --    --   11.8         .@CMP  Recent Results (from the past 24 hour(s))   COMP METABOLIC PANEL    Collection Time: 03/13/18 11:10 AM   Result Value Ref Range    Sodium 134 (L) 135 - 145 mmol/L    Potassium 4.0 3.6 - 5.5 mmol/L    Chloride 107 96 - 112 mmol/L    Co2 20 20 - 33 mmol/L    Anion Gap 7.0 0.0 - 11.9    Glucose 106 (H) 65 - 99 mg/dL    Bun 17 8 - 22 mg/dL    Creatinine 1.21 0.50 - 1.40 mg/dL    Calcium 7.3 (L) 8.5 - 10.5 mg/dL    AST(SGOT) 17 12 - 45 U/L    ALT(SGPT) 29 2 - 50 U/L    Alkaline Phosphatase 79 30 - 99 U/L    Total Bilirubin 1.5 0.1 - 1.5 mg/dL    Albumin 3.0 (L) 3.2 - 4.9 g/dL    Total Protein 5.0 (L) 6.0 - 8.2 g/dL    Globulin 2.0 1.9 - 3.5 g/dL    A-G Ratio 1.5 g/dL   ESTIMATED GFR    Collection Time: 03/13/18 11:10 AM   Result Value Ref Range    GFR If " African American >60 >60 mL/min/1.73 m 2    GFR If Non African American 58 (A) >60 mL/min/1.73 m 2   CBC WITH DIFFERENTIAL    Collection Time: 03/14/18 12:58 AM   Result Value Ref Range    Nucleated RBC 0.00 /100 WBC    NRBC (Absolute) 0.00 K/uL    WBC 0.6 (LL) 4.8 - 10.8 K/uL    RBC 2.59 (L) 4.70 - 6.10 M/uL    Hemoglobin 7.0 (L) 14.0 - 18.0 g/dL    Hematocrit 21.2 (L) 42.0 - 52.0 %    MCV 81.9 81.4 - 97.8 fL    MCH 27.0 27.0 - 33.0 pg    MCHC 33.0 (L) 33.7 - 35.3 g/dL    RDW 47.9 35.9 - 50.0 fL    Platelet Count 25 (LL) 164 - 446 K/uL    MPV 11.8 9.0 - 12.9 fL    Neutrophils-Polys 52.20 44.00 - 72.00 %    Lymphocytes 40.90 22.00 - 41.00 %    Monocytes 5.90 0.00 - 13.40 %    Eosinophils 0.00 0.00 - 6.90 %    Basophils 0.00 0.00 - 1.80 %    Neutrophils (Absolute) 0.31 (LL) 1.82 - 7.42 K/uL    Lymphs (Absolute) 0.25 (L) 1.00 - 4.80 K/uL    Monos (Absolute) 0.04 0.00 - 0.85 K/uL    Eos (Absolute) 0.00 0.00 - 0.51 K/uL    Baso (Absolute) 0.00 0.00 - 0.12 K/uL    Anisocytosis 1+     Microcytosis 1+    BASIC METABOLIC PANEL    Collection Time: 03/14/18 12:58 AM   Result Value Ref Range    Sodium 134 (L) 135 - 145 mmol/L    Potassium 4.9 3.6 - 5.5 mmol/L    Chloride 109 96 - 112 mmol/L    Co2 19 (L) 20 - 33 mmol/L    Glucose 115 (H) 65 - 99 mg/dL    Bun 22 8 - 22 mg/dL    Creatinine 1.44 (H) 0.50 - 1.40 mg/dL    Calcium 7.6 (L) 8.5 - 10.5 mg/dL    Anion Gap 6.0 0.0 - 11.9   ESTIMATED GFR    Collection Time: 03/14/18 12:58 AM   Result Value Ref Range    GFR If  58 (A) >60 mL/min/1.73 m 2    GFR If Non  48 (A) >60 mL/min/1.73 m 2   DIFFERENTIAL MANUAL    Collection Time: 03/14/18 12:58 AM   Result Value Ref Range    Metamyelocytes 0.50 %    Myelocytes 0.50 %    Manual Diff Status PERFORMED    PERIPHERAL SMEAR REVIEW    Collection Time: 03/14/18 12:58 AM   Result Value Ref Range    Peripheral Smear Review see below    PLATELET ESTIMATE    Collection Time: 03/14/18 12:58 AM   Result Value Ref  Range    Plt Estimation Marked Decrease    MORPHOLOGY    Collection Time: 03/14/18 12:58 AM   Result Value Ref Range    RBC Morphology Present     Giant Platelets 1+     Poikilocytosis 1+    IMMATURE PLT FRACTION    Collection Time: 03/14/18 12:58 AM   Result Value Ref Range    Imm. Plt Fraction 10.4 0.6 - 13.1 K/uL   COD (ADULT)    Collection Time: 03/14/18 12:58 AM   Result Value Ref Range    ABO Grouping Only A     Rh Grouping Only POS     Antibody Screen-Cod NEG     Component R       RI3                 RedBloodCellsIRR    [id number]   issued       03/14/18   04:33      Product Type Red Blood Cells IRR LR  AS-3     Dispense Status Issued     Unit Number (Barcoded) [id number]     Product Code (Barcoded) G6485M29     Blood Type (Barcoded) 6200        Diagnostic imaging:      Assessment and Recommendations:  - T cell LGL/Leukemia presented with pancytopenia awaiting [place] path   - Pancytopenia due to # 1   - ROSE MARIE positive IGG. N Ret count, LDH, hapto pending , Bili stable 2.0 range. There is small component of hemolysis. Not Brisk . Patient already on low dose steroids - monitor   - Renal insufficiency 1.2-1.5 : Cr slowly getting worse - Monitor.   - Neutropenic fever- on empiric cefepime, Afebrile. He is also on  acyclovir and Diflucan given his pancytopenia. 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 Given IVIG on 3/12/18     Plan:   - Clinically stable   - No new issues  - Awaiting CAMP path - ordered by pharmacy   - Cr slightly worse today and it keeps fluctuating between 1.2 - 1.5 . Monitor. Encouraged PO intake.   - No transfusions today     High complexicity/Drug monitoring     We will continue to follow with you; please call with any questions, [phone].      Maggie Mitchell MD  [facility]   [phone]

## 2018-03-14 NOTE — PROGRESS NOTES
Received report from day RN, assumed care of pt 1900.  LUE PICC, dual lumens patent with + blood return, same small amount of dried blood around biopatch, but no new bleeding around PICC site.   Pt having frequent loose stools.  Ambulated with pt in hallway with FWW, steady gait. Treaded socks on.   Medicated per MAR. All needs met at this time. Pt denies pain, denies nausea.

## 2018-03-14 NOTE — DOCUMENTATION QUERY
ALVIN LITTLE [[id number]]  DOCUMENTATION QUERY    PROVIDERS: Please select “Cosign w/ note” to reply to query.    To better represent the severity of illness of your patient, please review the following information and exercise your independent professional judgment in responding to this query.     The patient was admitted on 2/23/18 with neutropenic fever, acute renal failure, hyponatremia. Patient is also noted to have fever and tachycardia in the ED notes, as well as Lactic Acid of 2.96 and BP of 95/59. In the progress note of 2/24/18, patient is diagnosed with Severe Sepsis, with the associated organ dysfunctions of acute kidney failure and neutropenia.     Based upon the clinical findings, risk factors, and treatment, can the diagnosis of sepsis be further specified?    • Sepsis/Severe Sepsis was present on admission  • Sepsis/Severe Sepsis developed after admission  • Other explanation of clinical findings (Please document)  • Unable to determine      The medical record reflects the following:   Clinical Findings FROM ED NOTES  Patient's initial vitals are notable for fever and tachycardia. I am concerned about infectious etiology in patient given his symptoms. For his tachycardia elect to treat him with IV fluids. Labs returned are notable for an elevated lactate of 2.96, and neutropenia with white blood cell count of 0.3 and ANC of 0.05. At this point elect to start patient on broad-spectrum antibiotics as he has neutropenic fever with unknown source. I started him on vancomycin and Zosyn. Patient's labs are also notable for hyponatremia with sodium of 127 and acute kidney injury with a creatinine of 1.78. This is likely secondary to hypovolemia and will continue treatment with IV fluids. The chest x-ray returns and demonstrates no source of infection. Influenza swab is negative. I will admit the patient hospital for further infectious workup and continued broad-spectrum antibiotics. I discussed case with   Benson who has accepted the admission. Attempt admission patient is in stable condition.    FROM PROGRESS NOTE 2/24/18 (LEONID LAMBERT)  Sepsis (CMS-HCC)   Assessment & Plan     This is severe sepsis with the following associated acute organ dysfunction(s): acute kidney failure.and neutropenia.  Patient with hx of nonhodgkins lymphoma in remission, now presenting with fevers, tachycardia, lactic acidosis, ROHITH  Source could be acute maxillary sinusitis vs PNA, pro calcitonin is elevated as well  Will start on IV abx  IVF  trend lactates  Monitor closely      Treatment  Sepsis protocol, IV boluses, IV antibiotic therapy     Risk Factors     Location within medical record  ED NOTES, PROGRESS NOTES 2/24/18 (PETRA JAQUEZ)     Thank you,   Maira Flynn, CRC, CCA  Coding  1

## 2018-03-14 NOTE — PROGRESS NOTES
Assumed pt care - bedside report received.  Pt is aaox4-fatigued.  Spouse at bedside.  Continues neutropenic precautions.  Good po today - two soft bm's.  picc patent/not bleeding today/fluids-changed lines/hubs.  Up with x1/fww - ambulate halls.  Pt anxious about wanting to start chemo.  Pt makes needs known - will continue to round.

## 2018-03-14 NOTE — CARE PLAN
Problem: Safety  Goal: Will remain free from falls    Intervention: Assess risk factors for falls  Fall precautions in place      Problem: Infection  Goal: Will remain free from infection    Intervention: Implement standard precautions and perform hand washing before and after patient contact  Neutropenic precautions in place          Problem: Knowledge Deficit  Goal: Knowledge of disease process/condition, treatment plan, diagnostic tests, and medications will improve    Intervention: Assess knowledge level of disease process/condition, treatment plan, diagnostic tests, and medications  Chemo when received.

## 2018-03-14 NOTE — PROGRESS NOTES
Ambulated pt up in hallway with front wheel walker. Pt steady and no complaints of pain during ambulation.

## 2018-03-14 NOTE — PROGRESS NOTES
Infectious Disease Progress Note    Author: Marisabel Gomez M.D. Date & Time of service: 3/14/2018  4:09 PM    Chief Complaint:  FU neutropenic fever      Interval History:  78 y/o WM admitted with febrile neutropenia     Tmax 100.5, having coughing fit and SOB, plan for BM biopsy  3/1Tmax 100.2, WBC 0.3, had a BM this am, cough better, repeat CXR with no infiltrate   3/2 Tmax 100.5, shortness of breath with exertion, had bloody nose this am, plts 22, denies abd pain or diarrhea, path neg for lyphoma  3/3/2018-Tmax 99.6. WBC 0.3 platelets 17 creatinine 1.38  3/4/2018-Tmax 99.2 WBCs 0.3 creatinine 1.38 complains of some shortness of breath  3/5 AF WBC 0.3 transferred to [facility] no new complaints  3/6 AF WBC 0.3 no positive cxs No new complaints  3/7 AF WBC 0.4 no complaints except fatigue and CORRALES  3/8 AF (99.8) no CBC transferred to Oncology floor-no acute events  3/9 AF WBC 0.5 feels weak-no new complaints  3/10 AF (99.9) plan for Campath noted  3/12/2018-Tmax 98.4 WBC 0.7 creatinine 1.55  3/13/2018-no fevers. Remains neutropenic. No new issues overnight  3/14/2018-Tmax 98.6 WBC 0.6 creatinine 1.44 no new issues overnight  Labs Reviewed, Medications Reviewed, Radiology Reviewed and Wound Reviewed.    Review of Systems:  Review of Systems   Constitutional: Negative for fever.   Respiratory: Negative for cough.    Cardiovascular: Negative for chest pain.   Gastrointestinal: Negative for nausea and vomiting.   Genitourinary: Negative for dysuria.   Neurological: Positive for weakness.   All other systems reviewed and are negative.      Hemodynamics:  Temp (24hrs), Av.8 °C (98.2 °F), Min:36.6 °C (97.8 °F), Max:37 °C (98.6 °F)  Temperature: 36.8 °C (98.3 °F)  Pulse  Av.4  Min: 66  Max: 110   Blood Pressure : (!) 165/83       Physical Exam:  Physical Exam   Constitutional: He is oriented to person, place, and time. He appears well-developed. No distress.   HENT:   Head: Normocephalic and atraumatic.   Eyes: EOM  are normal. Pupils are equal, round, and reactive to light.   Neck: Neck supple.   Cardiovascular: Normal rate.    Pulmonary/Chest: Effort normal. No respiratory distress. He has no wheezes. He has no rales.   Abdominal: Soft. He exhibits no distension. There is no tenderness.   Musculoskeletal: He exhibits no edema.   Neurological: He is alert and oriented to person, place, and time.   Skin: No rash noted.   ecchymosis   Nursing note and vitals reviewed.      Meds:    Current Facility-Administered Medications:   •  diphenhydrAMINE  •  allopurinol  •  Ganciclovir  •  dexamethasone  •  predniSONE  •  acetaminophen  •  NS  •  PICC Line Insertion has been implemented **AND** May use Lidocaine 1% not to exceed 3 mls for local at insertion site **AND** NOTIFY MD **AND** Tip to dwell in the superior vena cava **AND** Do not use PICC Line until placement verified by Chest X Ray **AND** [CANCELED] DX-CHEST-FOR PICC LINE Perform procedure in: Other(comment f6 below): **AND** If radiologist reading of chest X-ray states any of the following the PICC should be used **AND** Further evaluation of the PICC placement can be retrieved from X-Ray and Imaging **AND** Blood draws through PICC line; draws by RN only **AND** FLUSHING GUIDELINES WHEN IN USE **AND** normal saline PF **AND** FLUSHING GUIDELINES WHEN NOT IN USE **AND** DRESSING MAINTENANCE **AND** Change needleless pressure ports and IV tubing every 72 hours per hospital policy **AND** TUBING **AND** If there is an MD order to remove the PICC line, any RN may remove the PICC line **AND** [] PATIENT EDUCATION MATERIALS **AND** NURSING COMMUNICATION  •  acyclovir  •  fluconazole  •  cefepime  •  NS  •  acetaminophen  •  benzocaine-menthol  •  benzonatate  •  labetalol  •  levothyroxine  •  ondansetron  •  potassium chloride SA  •  PARoxetine  •  polyethylene glycol/lytes  •  magnesium hydroxide  •  bisacodyl  •  zolpidem    Labs:  Recent Labs      18   [id number]   03/13/18   0047  03/14/18   0058   WBC  0.7*  0.8*  0.6*   RBC  2.81*  2.62*  2.59*   HEMOGLOBIN  7.6*  7.3*  7.0*   HEMATOCRIT  22.9*  21.3*  21.2*   MCV  81.5  81.3*  81.9   MCH  27.0  27.9  27.0   RDW  47.8  47.5  47.9   PLATELETCT  21*  20*  25*   MPV   --    --   11.8   NEUTSPOLYS  75.80*  46.10  52.20   LYMPHOCYTES  23.20  46.20*  40.90   MONOCYTES  1.00  6.60  5.90   EOSINOPHILS  0.00  0.00  0.00   BASOPHILS  0.00  1.10  0.00   RBCMORPHOLO  Present  Present  Present     Recent Labs      03/13/18   1110  03/14/18   0058   SODIUM  134*  134*   POTASSIUM  4.0  4.9   CHLORIDE  107  109   CO2  20  19*   GLUCOSE  106*  115*   BUN  17  22     Recent Labs      03/13/18   1110 03/14/18   0058   ALBUMIN  3.0*   --    TBILIRUBIN  1.5   --    ALKPHOSPHAT  79   --    TOTPROTEIN  5.0*   --    ALTSGPT  29   --    ASTSGOT  17   --    CREATININE  1.21  1.44*       Imaging:  Ct-abdomen-pelvis W/o    Result Date: 2/24/2018 2/24/2018 6:09 AM HISTORY/REASON FOR EXAM:  Fever. TECHNIQUE/EXAM DESCRIPTION: CT scan of the abdomen and pelvis without contrast. Noncontrast helical scanning was obtained from the diaphragmatic domes through the pubic symphysis. Low dose optimization technique was utilized for this CT exam including automated exposure control and adjustment of the mA and/or kV according to patient size. COMPARISON: 4/12/2017. FINDINGS: There are trace bilateral pleural effusions. There is a rounded opacity at the right lung base likely representing round atelectasis. There is trace pericardial fluid. No free air is seen in the abdomen or pelvis. Evaluation of parenchymal and vascular structures is limited by the lack of intravenous contrast. Liver is hypodense suggestive of hepatic steatosis. Subtle 1.1 cm hypodense lesion in the right lobe of the liver corresponds to the previously noted hemangioma. There are calcified granulomata within the liver. Gallbladder appears unremarkable. The spleen is not enlarged. No adrenal  mass is identified on the left. There is a right adrenal nodule measuring 2 cm which is compatible with an adrenal adenoma. There is renal cortical atrophy on the right. There are bilateral nonobstructing renal calculi. There are parapelvic cysts on the left. There is no evidence of hydronephrosis. Pancreas appears unremarkable.  Aorta is not aneurysmal. Atherosclerotic plaque is seen. Mildly enlarged dense right inguinal lymph node is again seen. There are dense retroperitoneal and right iliac lymph nodes. The dens retroperitoneal nodes measure up to 1.9 cm in short axis dimension, not significantly changed compared to prior. Haziness about the mesentery is not significantly changed compared to prior. Dense lymph node in the root of the mesentery measures 1.6 cm in short axis dimension, unchanged. There is no evidence of bowel obstruction. There is no evidence of acute appendicitis. Bladder is mildly distended. Prostate is enlarged. There is a fat-containing right inguinal hernia. Degenerative changes are seen in the spine. Wedge deformities of L1 and L2 appear unchanged.     Retroperitoneal, iliac and mesenteric lymphadenopathy is not significantly changed compared to prior. Haziness about the root of the mesentery is again noted. Nonobstructing bilateral renal calculi. Atrophic right kidney. Left parapelvic cysts. Enlarged prostate. Trace bilateral pleural effusions. Rounded opacity at the right lung base likely represents round atelectasis.    Ct-head W/o    Result Date: 2/24/2018 2/24/2018 6:09 AM HISTORY/REASON FOR EXAM:  Fever. TECHNIQUE/EXAM DESCRIPTION AND NUMBER OF VIEWS: CT of the head without contrast. Contiguous axial sections were obtained from the skull base through the vertex. Up to date radiation dose reduction adjustments have been utilized to meet ALARA standards for radiation dose reduction. COMPARISON:  None available FINDINGS: The is no evidence of intraparenchymal, intraventricular and  extra-axial hemorrhage.  The ventricles and cortical sulci are prominent compatible with age related change.  There is no mass effect or midline shift. There is minimal mucosal thickening within the maxillary sinuses. Visualized mastoid air cells are clear. The calvarium is intact. There is mild left frontal soft tissue swelling.     No acute intracranial abnormality is identified. Cortical atrophy. Mild left frontal soft tissue swelling.    Ct-maxillofacial W/o Plus Recons    Result Date: 2/26/2018 2/26/2018 7:24 PM HISTORY/REASON FOR EXAM:  Facial pain. Suspected sinusitis. TECHNIQUE/EXAM DESCRIPTION AND NUMBER OF VIEWS:  CT scan maxillofacial without contrast, with reconstructions. Thin-section helical imaging was obtained of the face from the orbital roofs through the mandible. Coronal multiplanar volume reformat images were generated from the axial data. Low dose optimization technique was utilized for this CT exam including automated exposure control and adjustment of the mA and/or kV according to patient size. FINDINGS: The frontal sinus is clear. There is minimal mucosal thickening in the ethmoid sinus bilaterally. There is mild bilateral mucosal thickening and/or polyp formation in the maxillary sinus. Both maxillary sinus infundibulum and ostia are patent. The sphenoid sinus is clear. No significant bony nasal septal deviation is present. No nasal turbinate edema is present.     1.  Mild chronic bilateral maxillary and ethmoid sinusitis. 2.  No evidence of acute sinusitis. 3.  Areas of rounded mucosal thickening or polyp formation in each maxillary sinus antrum. 4.  Otherwise clear paranasal sinuses.    Dx-chest-2 Views    Result Date: 2/28/2018 2/28/2018 8:43 AM HISTORY/REASON FOR EXAM: Cough.  Lymphoma TECHNIQUE/EXAM DESCRIPTION AND NUMBER OF VIEWS: Two views of the chest. COMPARISON:  February 23 FINDINGS: Cardiomediastinal contours are stable with some aortic ectasia and upper normal heart size. No  pulmonary consolidation is seen. Stable mostly right pleural space thickening, possible small effusions Chronic upper lumbar moderate severity compression fracture resulting in kyphosis     Stable chest with some chronic pleural space thickening more likely from pleural parenchymal scarring than pleural fluid, large lung volumes and mild cardiac silhouette enlargement    Dx-chest-portable (1 View)    Result Date: 2/23/2018 2/23/2018 6:01 PM HISTORY/REASON FOR EXAM:  Cough. TECHNIQUE/EXAM DESCRIPTION AND NUMBER OF VIEWS: Single portable view of the chest. COMPARISON: 3/22/2017 FINDINGS: Cardiomediastinal contour is within normal limits. Lungs show mild hypoinflation. No focal pulmonary consolidation. No pleural fluid collection or pneumothorax. No major bony abnormality is seen.     Hypoinflation without other evidence for acute cardiopulmonary disease.    Echocardiogram Comp W/o Cont    Result Date: 2/25/2018  Transthoracic Echo Report Echocardiography Laboratory CONCLUSIONS No prior study is available for comparison. Normal transthoracic echocardiogram. SHELLIE CASIANO Exam Date:         02/25/2018                    12:59 Exam Location:     Inpatient LV EF:  65    % FINDINGS Left Ventricle Normal left ventricular size, systolic function, and diastolic function. Mild concentric left ventricular hypertrophy. Normal regional wall motion. Left ventricular ejection fraction is visually estimated to be 65%. Right Ventricle The right ventricle was normal in size and function. Right Atrium The right atrium is normal in size.  Normal inferior vena cava size and inspiratory collapse. Left Atrium The left atrium is normal in size.  Left atrial volume index is 26 mL/sq m. Mitral Valve Structurally normal mitral valve without significant stenosis. Trace mitral regurgitation. Aortic Valve Tricuspid aortic valve. Aortic sclerosis without stenosis. No aortic insufficiency. Tricuspid Valve Structurally normal tricuspid valve  without significant stenosis. Mild tricuspid regurgitation. Right atrial pressure is estimated to be 3 mmHg. Estimated right ventricular systolic pressure is 40 mmHg. Pulmonic Valve Structurally normal pulmonic valve without significant stenosis or regurgitation. Pericardium Normal pericardium without effusion. Aorta The aortic root is normal. Jovany Heath (Electronically Signed) Final Date:     25 February 2018                 17:42      Micro:  Results     ** No results found for the last 168 hours. 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 Path - neg for malignancy, no blasts, +granulomatous inflammation Bath confirms Tcell malignancy-plan for Campath-will need Bactrim prophylaxis once started for at least 6 months     ROHITH, improved  Renally dose abx as needed  Avoid nephrotoxins  Monitor closely once Campath and Bactrim started

## 2018-03-14 NOTE — PROGRESS NOTES
[facility] Hospitalist Progress Note    Date of Service: 3/13/2018    Chief Complaint  77 y.o. male admitted 3/4/2018 with severe pancytopenia and neutropenic fever.  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    Interval Problem Update  Patient feeling okay today, neutropenia is worsening along with renal function.  Chemo delivery is pending.  Hopefully with initiation of chemo renal function will improve.  Likely all issues are tied to new diagnosis of leukemia.    Consultants/Specialty  Oncology - Reganti  ID - Jason    Disposition  Johnyd, will have prolonged hospital course secondary to neutropenia that will worsen with chemotherapy.        Review of Systems   Constitutional: Positive for malaise/fatigue. Negative for chills and fever.   HENT: Negative for congestion.    Eyes: Negative for blurred vision and photophobia.   Respiratory: Negative for cough and shortness of breath.    Cardiovascular: Negative for chest pain, claudication and leg swelling.   Gastrointestinal: Negative for abdominal pain, constipation, diarrhea, heartburn, nausea and vomiting.   Genitourinary: Negative for dysuria and hematuria.   Musculoskeletal: Negative for joint pain and myalgias.   Skin: Negative for itching and rash.   Neurological: Negative for dizziness, sensory change, speech change, weakness and headaches.   Psychiatric/Behavioral: Negative for depression. The patient is not nervous/anxious and does not have insomnia.       Physical Exam  Laboratory/Imaging   Hemodynamics  Temp (24hrs), Av.9 °C (98.5 °F), Min:36.8 °C (98.3 °F), Max:37.1 °C (98.8 °F)   Temperature: 37 °C (98.6 °F)  Pulse  Av.9  Min: 73  Max: 110    Blood Pressure : 138/78      Respiratory      Respiration: 19, Pulse Oximetry: 96 %        RUL Breath Sounds: Clear, RML Breath Sounds: Clear, RLL Breath Sounds: Clear, GERGORIA Breath Sounds: Clear, LLL Breath Sounds: Clear    Fluids    Intake/Output Summary (Last 24 hours) at 18 8656  Last data filed  at 03/13/18 1800   Gross per 24 hour   Intake             5424 ml   Output             3290 ml   Net             2134 ml       Nutrition  Orders Placed This Encounter   Procedures   • DIET ORDER     Standing Status:   Standing     Number of Occurrences:   1     Order Specific Question:   Diet:     Answer:   Cardiac [6]     Physical Exam   Constitutional: He is oriented to person, place, and time. He appears well-developed and well-nourished. No distress.   HENT:   Head: Normocephalic and atraumatic.   Eyes: Conjunctivae are normal. No scleral icterus.   Neck: Neck supple. No JVD present.   Cardiovascular: Normal rate, regular rhythm and normal heart sounds.  Exam reveals no gallop and no friction rub.    No murmur heard.  Pulmonary/Chest: Effort normal and breath sounds normal. No respiratory distress. He has no wheezes. He exhibits no tenderness.   Abdominal: Soft. Bowel sounds are normal. He exhibits no distension and no mass. There is no tenderness.   Musculoskeletal: He exhibits no edema or tenderness.   Neurological: He is alert and oriented to person, place, and time. No cranial nerve deficit.   Skin: Skin is warm and dry. He is not diaphoretic. No erythema. No pallor.   Psychiatric: He has a normal mood and affect. His behavior is normal.   Nursing note and vitals reviewed.      Recent Labs      03/11/18   0430  03/12/18   0247  03/13/18   0047   WBC  0.7*  0.7*  0.8*   RBC  2.74*  2.81*  2.62*   HEMOGLOBIN  7.4*  7.6*  7.3*   HEMATOCRIT  22.5*  22.9*  21.3*   MCV  82.1  81.5  81.3*   MCH  27.0  27.0  27.9   MCHC  32.9*  33.2*  34.3   RDW  50.6*  47.8  47.5   PLATELETCT  24*  21*  20*     Recent Labs      03/11/18   0430  03/13/18   1110   SODIUM  133*  134*   POTASSIUM  4.5  4.0   CHLORIDE  107  107   CO2  21  20   GLUCOSE  105*  106*   BUN  22  17   CREATININE  1.55*  1.21   CALCIUM  7.6*  7.3*                      Assessment/Plan     * Pancytopenia (CMS-HCC)   Assessment & Plan    Secondary to severe bone  marrow infiltrate.   T cell LGL - leukemia  Awaiting chemo delivery - Campath          Thrombocytopenia (CMS-HCC)- (present on admission)   Assessment & Plan    Transfuse prn if less than 10K.         Neutropenic fever (CMS-HCC)- (present on admission)   Assessment & Plan    On Cefepime and fluconazole. Stop date 3/17/2018.        Maxillary sinusitis- (present on admission)   Assessment & Plan    On abx         Acute renal failure (ARF) (CMS-HCC)- (present on admission)   Assessment & Plan    On IVF  Avoid nephrotoxins   Renally dose all medications         Hyponatremia- (present on admission)   Assessment & Plan    Mild, stable            Essential hypertension- (present on admission)   Assessment & Plan    Monitor blood pressure.          Anemia- (present on admission)   Assessment & Plan    Monitor H&H.  Transfuse if hemoglobin <7.5 g/dl.   Will need irradiated and CMV free product.        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            Quality-Core Measures   Reviewed items::  Labs reviewed, Medications reviewed and Radiology images reviewed  Joshi catheter::  No Joshi  DVT prophylaxis pharmacological::  Contraindicated - High bleeding risk  DVT prophylaxis - mechanical:  SCDs  Antibiotics:  Treating active infection/contamination beyond 24 hours perioperative coverage

## 2018-03-14 NOTE — PROGRESS NOTES
CLARI from Lab called with critical result of WBC 0.6, ANC 0.31, plts 25  at 0237. Critical lab result read back to CLARI.   This critical lab result is within parameters established by  for this patient

## 2018-03-14 NOTE — PROGRESS NOTES
Infectious Disease Progress Note    Author: Marisabel Gomez M.D. Date & Time of service: 3/13/2018  6:20 PM    Chief Complaint:  FU neutropenic fever      Interval History:  76 y/o WM admitted with febrile neutropenia     Tmax 100.5, having coughing fit and SOB, plan for BM biopsy  3/1Tmax 100.2, WBC 0.3, had a BM this am, cough better, repeat CXR with no infiltrate   3/2 Tmax 100.5, shortness of breath with exertion, had bloody nose this am, plts 22, denies abd pain or diarrhea, path neg for lyphoma  3/3/2018-Tmax 99.6. WBC 0.3 platelets 17 creatinine 1.38  3/4/2018-Tmax 99.2 WBCs 0.3 creatinine 1.38 complains of some shortness of breath  3/5 AF WBC 0.3 transferred to [facility] [place] no new complaints  3/6 AF WBC 0.3 no positive cxs No new complaints  3/7 AF WBC 0.4 no complaints except fatigue and CORRALES  3/8 AF (99.8) no CBC transferred to Oncology floor-no acute events  3/9 AF WBC 0.5 feels weak-no new complaints  3/10 AF (99.9) plan for Campath noted  3/12/2018-Tmax 98.4 WBC 0.7 creatinine 1.55  3/13/2018-no fevers. Remains neutropenic. No new issues overnight  Labs Reviewed, Medications Reviewed, Radiology Reviewed and Wound Reviewed.    Review of Systems:  Review of Systems   Constitutional: Negative for fever.   Respiratory: Negative for cough.    Cardiovascular: Negative for chest pain.   Gastrointestinal: Negative for nausea and vomiting.   Genitourinary: Negative for dysuria.   Neurological: Positive for weakness.   All other systems reviewed and are negative.      Hemodynamics:  Temp (24hrs), Av.9 °C (98.5 °F), Min:36.8 °C (98.3 °F), Max:37.1 °C (98.8 °F)  Temperature: 37 °C (98.6 °F)  Pulse  Av.9  Min: 73  Max: 110   Blood Pressure : 138/78       Physical Exam:  Physical Exam   Constitutional: He is oriented to person, place, and time. He appears well-developed. No distress.   HENT:   Head: Normocephalic and atraumatic.   Eyes: EOM are normal. Pupils are equal, round, and reactive to light.   Neck: Neck  supple.   Cardiovascular: Normal rate.    Pulmonary/Chest: Effort normal. No respiratory distress. He has no wheezes. He has no rales.   Abdominal: Soft. He exhibits no distension. There is no tenderness.   Musculoskeletal: He exhibits no edema.   Neurological: He is alert and oriented to person, place, and time.   Skin: No rash noted.   ecchymosis   Nursing note and vitals reviewed.      Meds:    Current Facility-Administered Medications:   •  allopurinol  •  Ganciclovir  •  dexamethasone  •  predniSONE  •  acetaminophen  •  NS  •  PICC Line Insertion has been implemented **AND** May use Lidocaine 1% not to exceed 3 mls for local at insertion site **AND** NOTIFY MD **AND** Tip to dwell in the superior vena cava **AND** Do not use PICC Line until placement verified by Chest X Ray **AND** [CANCELED] DX-CHEST-FOR PICC LINE Perform procedure in: Other(comment f6 below): **AND** If radiologist reading of chest X-ray states any of the following the PICC should be used **AND** Further evaluation of the PICC placement can be retrieved from X-Ray and Imaging **AND** Blood draws through PICC line; draws by RN only **AND** FLUSHING GUIDELINES WHEN IN USE **AND** normal saline PF **AND** FLUSHING GUIDELINES WHEN NOT IN USE **AND** DRESSING MAINTENANCE **AND** Change needleless pressure ports and IV tubing every 72 hours per hospital policy **AND** TUBING **AND** If there is an MD order to remove the PICC line, any RN may remove the PICC line **AND** [] PATIENT EDUCATION MATERIALS **AND** NURSING COMMUNICATION  •  acyclovir  •  fluconazole  •  cefepime  •  NS  •  acetaminophen  •  benzocaine-menthol  •  benzonatate  •  labetalol  •  levothyroxine  •  ondansetron  •  potassium chloride SA  •  PARoxetine  •  polyethylene glycol/lytes  •  magnesium hydroxide  •  bisacodyl  •  zolpidem    Labs:  Recent Labs      18   0430  18   0247  18   0047   WBC  0.7*  0.7*  0.8*   RBC  2.74*  2.81*  2.62*   HEMOGLOBIN   7.4*  7.6*  7.3*   HEMATOCRIT  22.5*  22.9*  21.3*   MCV  82.1  81.5  81.3*   MCH  27.0  27.0  27.9   RDW  50.6*  47.8  47.5   PLATELETCT  24*  21*  20*   NEUTSPOLYS  62.00  75.80*  46.10   LYMPHOCYTES  25.40  23.20  46.20*   MONOCYTES  3.70  1.00  6.60   EOSINOPHILS  0.00  0.00  0.00   BASOPHILS  2.20*  0.00  1.10   RBCMORPHOLO  Present  Present  Present     Recent Labs      03/11/18   0430  03/13/18   1110   SODIUM  133*  134*   POTASSIUM  4.5  4.0   CHLORIDE  107  107   CO2  21  20   GLUCOSE  105*  106*   BUN  22  17     Recent Labs      03/11/18   0430  03/13/18   1110   ALBUMIN  2.9*  3.0*   TBILIRUBIN  2.0*  1.5   ALKPHOSPHAT  87  79   TOTPROTEIN  4.3*  5.0*   ALTSGPT  25  29   ASTSGOT  23  17   CREATININE  1.55*  1.21       Imaging:  Ct-abdomen-pelvis W/o    Result Date: 2/24/2018 2/24/2018 6:09 AM HISTORY/REASON FOR EXAM:  Fever. TECHNIQUE/EXAM DESCRIPTION: CT scan of the abdomen and pelvis without contrast. Noncontrast helical scanning was obtained from the diaphragmatic domes through the pubic symphysis. Low dose optimization technique was utilized for this CT exam including automated exposure control and adjustment of the mA and/or kV according to patient size. COMPARISON: 4/12/2017. FINDINGS: There are trace bilateral pleural effusions. There is a rounded opacity at the right lung base likely representing round atelectasis. There is trace pericardial fluid. No free air is seen in the abdomen or pelvis. Evaluation of parenchymal and vascular structures is limited by the lack of intravenous contrast. Liver is hypodense suggestive of hepatic steatosis. Subtle 1.1 cm hypodense lesion in the right lobe of the liver corresponds to the previously noted hemangioma. There are calcified granulomata within the liver. Gallbladder appears unremarkable. The spleen is not enlarged. No adrenal mass is identified on the left. There is a right adrenal nodule measuring 2 cm which is compatible with an adrenal adenoma.  There is renal cortical atrophy on the right. There are bilateral nonobstructing renal calculi. There are parapelvic cysts on the left. There is no evidence of hydronephrosis. Pancreas appears unremarkable.  Aorta is not aneurysmal. Atherosclerotic plaque is seen. Mildly enlarged dense right inguinal lymph node is again seen. There are dense retroperitoneal and right iliac lymph nodes. The dens retroperitoneal nodes measure up to 1.9 cm in short axis dimension, not significantly changed compared to prior. Haziness about the mesentery is not significantly changed compared to prior. Dense lymph node in the root of the mesentery measures 1.6 cm in short axis dimension, unchanged. There is no evidence of bowel obstruction. There is no evidence of acute appendicitis. Bladder is mildly distended. Prostate is enlarged. There is a fat-containing right inguinal hernia. Degenerative changes are seen in the spine. Wedge deformities of L1 and L2 appear unchanged.     Retroperitoneal, iliac and mesenteric lymphadenopathy is not significantly changed compared to prior. Haziness about the root of the mesentery is again noted. Nonobstructing bilateral renal calculi. Atrophic right kidney. Left parapelvic cysts. Enlarged prostate. Trace bilateral pleural effusions. Rounded opacity at the right lung base likely represents round atelectasis.    Ct-head W/o    Result Date: 2/24/2018 2/24/2018 6:09 AM HISTORY/REASON FOR EXAM:  Fever. TECHNIQUE/EXAM DESCRIPTION AND NUMBER OF VIEWS: CT of the head without contrast. Contiguous axial sections were obtained from the skull base through the vertex. Up to date radiation dose reduction adjustments have been utilized to meet ALARA standards for radiation dose reduction. COMPARISON:  None available FINDINGS: The is no evidence of intraparenchymal, intraventricular and extra-axial hemorrhage.  The ventricles and cortical sulci are prominent compatible with age related change.  There is no mass  effect or midline shift. There is minimal mucosal thickening within the maxillary sinuses. Visualized mastoid air cells are clear. The calvarium is intact. There is mild left frontal soft tissue swelling.     No acute intracranial abnormality is identified. Cortical atrophy. Mild left frontal soft tissue swelling.    Ct-maxillofacial W/o Plus Recons    Result Date: 2/26/2018 2/26/2018 7:24 PM HISTORY/REASON FOR EXAM:  Facial pain. Suspected sinusitis. TECHNIQUE/EXAM DESCRIPTION AND NUMBER OF VIEWS:  CT scan maxillofacial without contrast, with reconstructions. Thin-section helical imaging was obtained of the face from the orbital roofs through the mandible. Coronal multiplanar volume reformat images were generated from the axial data. Low dose optimization technique was utilized for this CT exam including automated exposure control and adjustment of the mA and/or kV according to patient size. FINDINGS: The frontal sinus is clear. There is minimal mucosal thickening in the ethmoid sinus bilaterally. There is mild bilateral mucosal thickening and/or polyp formation in the maxillary sinus. Both maxillary sinus infundibulum and ostia are patent. The sphenoid sinus is clear. No significant bony nasal septal deviation is present. No nasal turbinate edema is present.     1.  Mild chronic bilateral maxillary and ethmoid sinusitis. 2.  No evidence of acute sinusitis. 3.  Areas of rounded mucosal thickening or polyp formation in each maxillary sinus antrum. 4.  Otherwise clear paranasal sinuses.    Dx-chest-2 Views    Result Date: 2/28/2018 2/28/2018 8:43 AM HISTORY/REASON FOR EXAM: Cough.  Lymphoma TECHNIQUE/EXAM DESCRIPTION AND NUMBER OF VIEWS: Two views of the chest. COMPARISON:  February 23 FINDINGS: Cardiomediastinal contours are stable with some aortic ectasia and upper normal heart size. No pulmonary consolidation is seen. Stable mostly right pleural space thickening, possible small effusions Chronic upper lumbar  moderate severity compression fracture resulting in kyphosis     Stable chest with some chronic pleural space thickening more likely from pleural parenchymal scarring than pleural fluid, large lung volumes and mild cardiac silhouette enlargement    Dx-chest-portable (1 View)    Result Date: 2/23/2018 2/23/2018 6:01 PM HISTORY/REASON FOR EXAM:  Cough. TECHNIQUE/EXAM DESCRIPTION AND NUMBER OF VIEWS: Single portable view of the chest. COMPARISON: 3/22/2017 FINDINGS: Cardiomediastinal contour is within normal limits. Lungs show mild hypoinflation. No focal pulmonary consolidation. No pleural fluid collection or pneumothorax. No major bony abnormality is seen.     Hypoinflation without other evidence for acute cardiopulmonary disease.    Echocardiogram Comp W/o Cont    Result Date: 2/25/2018  Transthoracic Echo Report Echocardiography Laboratory CONCLUSIONS No prior study is available for comparison. Normal transthoracic echocardiogram. SHELLIE CASIANO Exam Date:         02/25/2018                    12:59 Exam Location:     Inpatient LV EF:  65    % FINDINGS Left Ventricle Normal left ventricular size, systolic function, and diastolic function. Mild concentric left ventricular hypertrophy. Normal regional wall motion. Left ventricular ejection fraction is visually estimated to be 65%. Right Ventricle The right ventricle was normal in size and function. Right Atrium The right atrium is normal in size.  Normal inferior vena cava size and inspiratory collapse. Left Atrium The left atrium is normal in size.  Left atrial volume index is 26 mL/sq m. Mitral Valve Structurally normal mitral valve without significant stenosis. Trace mitral regurgitation. Aortic Valve Tricuspid aortic valve. Aortic sclerosis without stenosis. No aortic insufficiency. Tricuspid Valve Structurally normal tricuspid valve without significant stenosis. Mild tricuspid regurgitation. Right atrial pressure is estimated to be 3 mmHg. Estimated right  ventricular systolic pressure is 40 mmHg. Pulmonic Valve Structurally normal pulmonic valve without significant stenosis or regurgitation. Pericardium Normal pericardium without effusion. Aorta The aortic root is normal. Jovany Heath (Electronically Signed) Final Date:     25 February 2018                 17:42      Micro:  Results     ** No results found for the last 168 hours. 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 Path - neg for malignancy, no blasts, +granulomatous inflammation Nicola confirms Tcell malignancy-plan for Campath-will need Bactrim prophylaxis once started for at least 6 months     ROHITH, improved  Renally dose abx as needed  Avoid nephrotoxins  Monitor closely once Campath and Bactrim started

## 2018-03-14 NOTE — PROGRESS NOTES
Received report from night shift RN, assumed care of patient at 0700. AOx4. Stand by assist. Denies pain or nausea at this time. PICC w/ positive blood return. Pt up to commode for urinary urgency and frequency. Call light within reach, bed in low and locked position. No other needs at this time.

## 2018-03-15 NOTE — PROGRESS NOTES
Slava from Lab called with critical result of WBC 0.6, PLT 19 at 0400. Critical lab result read back to Slava.   This critical lab result is within parameters established by Dr. [facility] policy for this patient.

## 2018-03-15 NOTE — PROGRESS NOTES
"Pharmacy Chemotherapy Calculations    Dx: T cell Large Granular Lymphocytic Leukemia  Cycle: 1 day 1-10 recommendation per [facility]  (Previous treatment = s/p RCVP, R bendamustine at [facility] with long term remission)  Regimen and Dosing Reference  Alemtuzumab (Campath) 10 mg IV daily, day 1-day 10 over 2 hours.  Marek RAMIREZ et al Alemtuzumab in T cell Large Granular Lymphocytic leukaemia: interim results from a single arm, open label phase 2 study. Lancet Haematol. 2016 Peter:3 (1);e22-9    Blood pressure 142/70, pulse 89, temperature 37.5 °C (99.5 °F), resp. rate 18, height 1.778 m (5' 10\"), weight 85.7 kg (188 lb 15 oz), SpO2 96 %.  Body surface area is 2.06 meters squared.      Labs 3/16/18 ANC ~ 220   Plt = 29 k  Hgb = 8.2   Scr =  1.22      Crcl ~61 ml/min    3/15/18 LFT = AST/ALT/AlkPhos/Tbili = 28/42/73/1.6 No dose adj recommended  2/25/18 ECHO LVEF = 65%  3/16/18 TSH = 6.7 T3 free 1.38   Results for SHELLIE CASIANO (MRN [id number]) as of 3/16/2018 16:57   Ref. Range 3/11/2018 04:30   Cmv Ab Igm Latest Ref Range: <=29.9 AU/mL <8.0   CMV IgG Qnt Latest Units: U/mL <0.20     Day 1  Alemtuzumab (Campath) 1 mg(per EPIC rounding = 0.9 mg) test dose over 30 min, observe for 1 hour and if no s/s reaction continue with  Remaining 9 mg over 2 hours on day 1    Day 2-10 = 10 mg IV in 100 ml NS over 2 hours.      Theresa Schumacher, PharmD      "

## 2018-03-15 NOTE — PROGRESS NOTES
Infectious Disease Progress Note    Author: Marisabel Gomez M.D. Date & Time of service: 3/15/2018  11:08 AM    Chief Complaint:  FU neutropenic fever      Interval History:  76 y/o WM admitted with febrile neutropenia     Tmax 100.5, having coughing fit and SOB, plan for BM biopsy  3/1Tmax 100.2, WBC 0.3, had a BM this am, cough better, repeat CXR with no infiltrate   3/2 Tmax 100.5, shortness of breath with exertion, had bloody nose this am, plts 22, denies abd pain or diarrhea, path neg for lyphoma  3/3/2018-Tmax 99.6. WBC 0.3 platelets 17 creatinine 1.38  3/4/2018-Tmax 99.2 WBCs 0.3 creatinine 1.38 complains of some shortness of breath  3/5 AF WBC 0.3 transferred to [facility] no new complaints  3/6 AF WBC 0.3 no positive cxs No new complaints  3/7 AF WBC 0.4 no complaints except fatigue and CORRALES  3/8 AF (99.8) no CBC transferred to Oncology floor-no acute events  3/9 AF WBC 0.5 feels weak-no new complaints  3/10 AF (99.9) plan for Campath noted  3/12/2018-Tmax 98.4 WBC 0.7 creatinine 1.55  3/13/2018-no fevers. Remains neutropenic. No new issues overnight  3/14/2018-Tmax 98.6 WBC 0.6 creatinine 1.44 no new issues overnight  3/15/2018-no fevers WBC 0.6 ambulating in the halls  Labs Reviewed, Medications Reviewed, Radiology Reviewed and Wound Reviewed.    Review of Systems:  Review of Systems   Constitutional: Negative for fever.   Respiratory: Negative for cough.    Cardiovascular: Negative for chest pain.   Gastrointestinal: Negative for nausea and vomiting.   Genitourinary: Negative for dysuria.   Neurological: Positive for weakness.   All other systems reviewed and are negative.      Hemodynamics:  Temp (24hrs), Av.9 °C (98.4 °F), Min:36.7 °C (98 °F), Max:37.2 °C (98.9 °F)  Temperature: 37.2 °C (98.9 °F)  Pulse  Av.8  Min: 66  Max: 110   Blood Pressure : 144/62       Physical Exam:  Physical Exam   Constitutional: He is oriented to person, place, and time. He appears well-developed. No distress.   HENT:    Head: Normocephalic and atraumatic.   Eyes: EOM are normal. Pupils are equal, round, and reactive to light.   Neck: Neck supple.   Cardiovascular: Normal rate.    Pulmonary/Chest: Effort normal. No respiratory distress. He has no wheezes. He has no rales.   Abdominal: Soft. He exhibits no distension. There is no tenderness.   Musculoskeletal: He exhibits no edema.   Neurological: He is alert and oriented to person, place, and time.   Skin: No rash noted.   ecchymosis   Nursing note and vitals reviewed.      Meds:    Current Facility-Administered Medications:   •  cefepime  •  fluconazole  •  diphenhydrAMINE  •  allopurinol  •  Ganciclovir  •  dexamethasone  •  predniSONE  •  acetaminophen  •  NS  •  PICC Line Insertion has been implemented **AND** May use Lidocaine 1% not to exceed 3 mls for local at insertion site **AND** NOTIFY MD **AND** Tip to dwell in the superior vena cava **AND** Do not use PICC Line until placement verified by Chest X Ray **AND** [CANCELED] DX-CHEST-FOR PICC LINE Perform procedure in: Other(comment f6 below): **AND** If radiologist reading of chest X-ray states any of the following the PICC should be used **AND** Further evaluation of the PICC placement can be retrieved from X-Ray and Imaging **AND** Blood draws through PICC line; draws by RN only **AND** FLUSHING GUIDELINES WHEN IN USE **AND** normal saline PF **AND** FLUSHING GUIDELINES WHEN NOT IN USE **AND** DRESSING MAINTENANCE **AND** Change needleless pressure ports and IV tubing every 72 hours per hospital policy **AND** TUBING **AND** If there is an MD order to remove the PICC line, any RN may remove the PICC line **AND** [] PATIENT EDUCATION MATERIALS **AND** NURSING COMMUNICATION  •  acyclovir  •  fluconazole  •  NS  •  acetaminophen  •  benzocaine-menthol  •  benzonatate  •  labetalol  •  levothyroxine  •  ondansetron  •  potassium chloride SA  •  PARoxetine  •  polyethylene glycol/lytes  •  magnesium hydroxide  •   bisacodyl  •  zolpidem    Labs:  Recent Labs      03/13/18   0047  03/14/18   0058  03/15/18   0044   WBC  0.8*  0.6*  0.6*   RBC  2.62*  2.59*  2.87*   HEMOGLOBIN  7.3*  7.0*  8.1*   HEMATOCRIT  21.3*  21.2*  23.5*   MCV  81.3*  81.9  81.9   MCH  27.9  27.0  28.2   RDW  47.5  47.9  46.3   PLATELETCT  20*  25*  29*   MPV   --   11.8  12.9   NEUTSPOLYS  46.10  52.20  49.00   LYMPHOCYTES  46.20*  40.90  46.20*   MONOCYTES  6.60  5.90  0.00   EOSINOPHILS  0.00  0.00  0.00   BASOPHILS  1.10  0.00  0.00   RBCMORPHOLO  Present  Present  Present     Recent Labs      03/13/18   1110  03/14/18   0058  03/15/18   0044   SODIUM  134*  134*  132*   POTASSIUM  4.0  4.9  4.9   CHLORIDE  107  109  107   CO2  20  19*  20   GLUCOSE  106*  115*  101*   BUN  17  22  24*     Recent Labs      03/13/18   1110  03/14/18   0058  03/15/18   0044   ALBUMIN  3.0*   --    --    TBILIRUBIN  1.5   --    --    ALKPHOSPHAT  79   --    --    TOTPROTEIN  5.0*   --    --    ALTSGPT  29   --    --    ASTSGOT  17   --    --    CREATININE  1.21  1.44*  1.29       Imaging:  Ct-abdomen-pelvis W/o    Result Date: 2/24/2018 2/24/2018 6:09 AM HISTORY/REASON FOR EXAM:  Fever. TECHNIQUE/EXAM DESCRIPTION: CT scan of the abdomen and pelvis without contrast. Noncontrast helical scanning was obtained from the diaphragmatic domes through the pubic symphysis. Low dose optimization technique was utilized for this CT exam including automated exposure control and adjustment of the mA and/or kV according to patient size. COMPARISON: 4/12/2017. FINDINGS: There are trace bilateral pleural effusions. There is a rounded opacity at the right lung base likely representing round atelectasis. There is trace pericardial fluid. No free air is seen in the abdomen or pelvis. Evaluation of parenchymal and vascular structures is limited by the lack of intravenous contrast. Liver is hypodense suggestive of hepatic steatosis. Subtle 1.1 cm hypodense lesion in the right lobe of the  liver corresponds to the previously noted hemangioma. There are calcified granulomata within the liver. Gallbladder appears unremarkable. The spleen is not enlarged. No adrenal mass is identified on the left. There is a right adrenal nodule measuring 2 cm which is compatible with an adrenal adenoma. There is renal cortical atrophy on the right. There are bilateral nonobstructing renal calculi. There are parapelvic cysts on the left. There is no evidence of hydronephrosis. Pancreas appears unremarkable.  Aorta is not aneurysmal. Atherosclerotic plaque is seen. Mildly enlarged dense right inguinal lymph node is again seen. There are dense retroperitoneal and right iliac lymph nodes. The dens retroperitoneal nodes measure up to 1.9 cm in short axis dimension, not significantly changed compared to prior. Haziness about the mesentery is not significantly changed compared to prior. Dense lymph node in the root of the mesentery measures 1.6 cm in short axis dimension, unchanged. There is no evidence of bowel obstruction. There is no evidence of acute appendicitis. Bladder is mildly distended. Prostate is enlarged. There is a fat-containing right inguinal hernia. Degenerative changes are seen in the spine. Wedge deformities of L1 and L2 appear unchanged.     Retroperitoneal, iliac and mesenteric lymphadenopathy is not significantly changed compared to prior. Haziness about the root of the mesentery is again noted. Nonobstructing bilateral renal calculi. Atrophic right kidney. Left parapelvic cysts. Enlarged prostate. Trace bilateral pleural effusions. Rounded opacity at the right lung base likely represents round atelectasis.    Ct-head W/o    Result Date: 2/24/2018 2/24/2018 6:09 AM HISTORY/REASON FOR EXAM:  Fever. TECHNIQUE/EXAM DESCRIPTION AND NUMBER OF VIEWS: CT of the head without contrast. Contiguous axial sections were obtained from the skull base through the vertex. Up to date radiation dose reduction adjustments  have been utilized to meet ALARA standards for radiation dose reduction. COMPARISON:  None available FINDINGS: The is no evidence of intraparenchymal, intraventricular and extra-axial hemorrhage.  The ventricles and cortical sulci are prominent compatible with age related change.  There is no mass effect or midline shift. There is minimal mucosal thickening within the maxillary sinuses. Visualized mastoid air cells are clear. The calvarium is intact. There is mild left frontal soft tissue swelling.     No acute intracranial abnormality is identified. Cortical atrophy. Mild left frontal soft tissue swelling.    Ct-maxillofacial W/o Plus Recons    Result Date: 2/26/2018 2/26/2018 7:24 PM HISTORY/REASON FOR EXAM:  Facial pain. Suspected sinusitis. TECHNIQUE/EXAM DESCRIPTION AND NUMBER OF VIEWS:  CT scan maxillofacial without contrast, with reconstructions. Thin-section helical imaging was obtained of the face from the orbital roofs through the mandible. Coronal multiplanar volume reformat images were generated from the axial data. Low dose optimization technique was utilized for this CT exam including automated exposure control and adjustment of the mA and/or kV according to patient size. FINDINGS: The frontal sinus is clear. There is minimal mucosal thickening in the ethmoid sinus bilaterally. There is mild bilateral mucosal thickening and/or polyp formation in the maxillary sinus. Both maxillary sinus infundibulum and ostia are patent. The sphenoid sinus is clear. No significant bony nasal septal deviation is present. No nasal turbinate edema is present.     1.  Mild chronic bilateral maxillary and ethmoid sinusitis. 2.  No evidence of acute sinusitis. 3.  Areas of rounded mucosal thickening or polyp formation in each maxillary sinus antrum. 4.  Otherwise clear paranasal sinuses.    Dx-chest-2 Views    Result Date: 2/28/2018 2/28/2018 8:43 AM HISTORY/REASON FOR EXAM: Cough.  Lymphoma TECHNIQUE/EXAM DESCRIPTION AND  NUMBER OF VIEWS: Two views of the chest. COMPARISON:  February 23 FINDINGS: Cardiomediastinal contours are stable with some aortic ectasia and upper normal heart size. No pulmonary consolidation is seen. Stable mostly right pleural space thickening, possible small effusions Chronic upper lumbar moderate severity compression fracture resulting in kyphosis     Stable chest with some chronic pleural space thickening more likely from pleural parenchymal scarring than pleural fluid, large lung volumes and mild cardiac silhouette enlargement    Dx-chest-portable (1 View)    Result Date: 2/23/2018 2/23/2018 6:01 PM HISTORY/REASON FOR EXAM:  Cough. TECHNIQUE/EXAM DESCRIPTION AND NUMBER OF VIEWS: Single portable view of the chest. COMPARISON: 3/22/2017 FINDINGS: Cardiomediastinal contour is within normal limits. Lungs show mild hypoinflation. No focal pulmonary consolidation. No pleural fluid collection or pneumothorax. No major bony abnormality is seen.     Hypoinflation without other evidence for acute cardiopulmonary disease.    Echocardiogram Comp W/o Cont    Result Date: 2/25/2018  Transthoracic Echo Report Echocardiography Laboratory CONCLUSIONS No prior study is available for comparison. Normal transthoracic echocardiogram. SHELLIE CASIANO Exam Date:         02/25/2018                    12:59 Exam Location:     Inpatient LV EF:  65    % FINDINGS Left Ventricle Normal left ventricular size, systolic function, and diastolic function. Mild concentric left ventricular hypertrophy. Normal regional wall motion. Left ventricular ejection fraction is visually estimated to be 65%. Right Ventricle The right ventricle was normal in size and function. Right Atrium The right atrium is normal in size.  Normal inferior vena cava size and inspiratory collapse. Left Atrium The left atrium is normal in size.  Left atrial volume index is 26 mL/sq m. Mitral Valve Structurally normal mitral valve without significant stenosis. Trace mitral  regurgitation. Aortic Valve Tricuspid aortic valve. Aortic sclerosis without stenosis. No aortic insufficiency. Tricuspid Valve Structurally normal tricuspid valve without significant stenosis. Mild tricuspid regurgitation. Right atrial pressure is estimated to be 3 mmHg. Estimated right ventricular systolic pressure is 40 mmHg. Pulmonic Valve Structurally normal pulmonic valve without significant stenosis or regurgitation. Pericardium Normal pericardium without effusion. Aorta The aortic root is normal. Jovany Heath (Electronically Signed) Final Date:     25 February 2018                 17:42      Micro:  Results     ** No results found for the last 168 hours. 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

## 2018-03-15 NOTE — PROGRESS NOTES
Lex from Lab called with critical result of ANC 0.31 at 0447. Critical lab result read back to Lex.   This critical lab result is within parameters established by Dr. [facility] policy for this patient

## 2018-03-15 NOTE — PROGRESS NOTES
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 Klein - Planned CAMP path x 10 days - Awaiting drug    Subjective:  He denies any fevers or chills. Stable BM 2-3 stools stable. No mouth sores     Objective:  Medications reviewed and notable for:  Current Facility-Administered Medications   Medication Dose   • diphenhydrAMINE (BENADRYL) tablet/capsule 25 mg  25 mg   • allopurinol (ZYLOPRIM) tablet 100 mg  100 mg   • Ganciclovir 0.15 % GEL 1 Drop  1 Drop   • dexamethasone (DECADRON) 0.1 % ophthalmic solution 1 Drop  1 Drop   • predniSONE (DELTASONE) tablet 20 mg  20 mg   • acetaminophen (TYLENOL) tablet 650 mg  650 mg   • NS infusion 250 mL  250 mL   • normal saline PF 10-20 mL  10-20 mL   • acyclovir (ZOVIRAX) capsule 400 mg  400 mg   • fluconazole (DIFLUCAN) tablet 100 mg  100 mg   • cefepime (MAXIPIME) 2 g in  mL IVPB  2 g   • NS infusion     • acetaminophen (TYLENOL) tablet 650 mg  650 mg   • benzocaine-menthol (CEPACOL) lozenge 1 Lozenge  1 Lozenge   • benzonatate (TESSALON) capsule 100 mg  100 mg   • labetalol (NORMODYNE,TRANDATE) injection 10 mg  10 mg   • levothyroxine (SYNTHROID) tablet 100 mcg  100 mcg   • ondansetron (ZOFRAN) syringe/vial injection 4 mg  4 mg   • potassium chloride SA (Kdur) tablet 40 mEq  40 mEq   • PARoxetine (PAXIL) tablet 20 mg  20 mg   • polyethylene glycol/lytes (MIRALAX) PACKET 1 Packet  1 Packet   • magnesium hydroxide (MILK OF MAGNESIA) suspension 30 mL  30 mL   • bisacodyl (DULCOLAX) suppository 10 mg  10 mg   • zolpidem (AMBIEN) tablet 5 mg  5 mg       ROS:   I did do 10 point system review which is negative except as mentioned above     Blood pressure 144/62, pulse 73, temperature 37.2 °C (98.9 °F), resp. rate 16, height 1.778 m (5' 10\"), weight 85.7 kg " (188 lb 15 oz), SpO2 97 %.    General:  comfortable, NAD  HEENT:  sclera anicteric, pupils equal, round, reactive to light, oral cavity and oropharynx clear, mucous membranes moist  Neck:   supple, no lymphadenopathy  Cor:   regular rate and rhythm, no murmurs, rubs, or gallops  Pulm:   clear to auscultation bilaterally  Abd:   bowel sounds present, soft, nontender, nondistended, no palpable masses or organomegaly  Extremities:  warm, no lower extremity edema  Neurologic:  A&O x 3  Pyschiatric:  Appropriate mood and affect    Labs reviewed and notable for:  Recent Labs      03/13/18   0047  03/14/18   0058  03/15/18   0044   WBC  0.8*  0.6*  0.6*   RBC  2.62*  2.59*  2.87*   HEMOGLOBIN  7.3*  7.0*  8.1*   HEMATOCRIT  21.3*  21.2*  23.5*   MCV  81.3*  81.9  81.9   MCH  27.9  27.0  28.2   MCHC  34.3  33.0*  34.5   RDW  47.5  47.9  46.3   PLATELETCT  20*  25*  29*   MPV   --   11.8  12.9         .@CMP  Recent Results (from the past 24 hour(s))   CBC WITH DIFFERENTIAL    Collection Time: 03/15/18 12:44 AM   Result Value Ref Range    WBC 0.6 (LL) 4.8 - 10.8 K/uL    RBC 2.87 (L) 4.70 - 6.10 M/uL    Hemoglobin 8.1 (L) 14.0 - 18.0 g/dL    Hematocrit 23.5 (L) 42.0 - 52.0 %    MCV 81.9 81.4 - 97.8 fL    MCH 28.2 27.0 - 33.0 pg    MCHC 34.5 33.7 - 35.3 g/dL    RDW 46.3 35.9 - 50.0 fL    Platelet Count 29 (LL) 164 - 446 K/uL    MPV 12.9 9.0 - 12.9 fL    Nucleated RBC 0.00 /100 WBC    NRBC (Absolute) 0.00 K/uL    Neutrophils-Polys 49.00 44.00 - 72.00 %    Lymphocytes 46.20 (H) 22.00 - 41.00 %    Monocytes 0.00 0.00 - 13.40 %    Eosinophils 0.00 0.00 - 6.90 %    Basophils 0.00 0.00 - 1.80 %    Neutrophils (Absolute) 0.31 (LL) 1.82 - 7.42 K/uL    Lymphs (Absolute) 0.28 (L) 1.00 - 4.80 K/uL    Monos (Absolute) 0.00 0.00 - 0.85 K/uL    Eos (Absolute) 0.00 0.00 - 0.51 K/uL    Baso (Absolute) 0.00 0.00 - 0.12 K/uL    Anisocytosis 1+     Microcytosis 1+    BASIC METABOLIC PANEL    Collection Time: 03/15/18 12:44 AM   Result Value Ref  Range    Sodium 132 (L) 135 - 145 mmol/L    Potassium 4.9 3.6 - 5.5 mmol/L    Chloride 107 96 - 112 mmol/L    Co2 20 20 - 33 mmol/L    Glucose 101 (H) 65 - 99 mg/dL    Bun 24 (H) 8 - 22 mg/dL    Creatinine 1.29 0.50 - 1.40 mg/dL    Calcium 7.3 (L) 8.5 - 10.5 mg/dL    Anion Gap 5.0 0.0 - 11.9   ESTIMATED GFR    Collection Time: 03/15/18 12:44 AM   Result Value Ref Range    GFR If African American >60 >60 mL/min/1.73 m 2    GFR If Non African American 54 (A) >60 mL/min/1.73 m 2   DIFFERENTIAL MANUAL    Collection Time: 03/15/18 12:44 AM   Result Value Ref Range    Bands-Stabs 1.90 0.00 - 10.00 %    Myelocytes 2.90 %    Manual Diff Status PERFORMED    PERIPHERAL SMEAR REVIEW    Collection Time: 03/15/18 12:44 AM   Result Value Ref Range    Peripheral Smear Review see below    PLATELET ESTIMATE    Collection Time: 03/15/18 12:44 AM   Result Value Ref Range    Plt Estimation Marked Decrease    MORPHOLOGY    Collection Time: 03/15/18 12:44 AM   Result Value Ref Range    RBC Morphology Present     Giant Platelets 2+     Poikilocytosis 1+     Ovalocytes 1+    IMMATURE PLT FRACTION    Collection Time: 03/15/18 12:44 AM   Result Value Ref Range    Imm. Plt Fraction 10.1 0.6 - 13.1 K/uL         Assessment and Recommendations:    - T cell LGL/Leukemia presented with pancytopenia awaiting CAMP path   - Pancytopenia due to # 1   - ROSE MARIE positive IGG. N Ret count, LDH, hapto pending , Bili stable 2.0 range. There is small component of hemolysis. Not Brisk . Patient already on low dose steroids - monitor   - Renal insufficiency 1.2-1.5 : Cr slowly getting worse - Monitor.   - Neutropenic fever- on empiric cefepime, Afebrile. He is also on  acyclovir and Diflucan given his pancytopenia. 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 Given IVIG on 3/12/18      Plan:   - As per pharmacy we should get CAMP today and will start   - Cr stable   - Counts stable.   - Given ok to pharmacy to start if we get  medication today .     High complexicity/Drug monitoring     We will continue to follow with you; please call with any questions, [phone].      Maggie Mitchell MD  [facility]   [phone]

## 2018-03-15 NOTE — PROGRESS NOTES
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 Alemtuzumab in T-cell large granular lymphocytic leukaemia: a phase 2 study. Lancet Haematol. 2016 Jan; 3(1):e22-e29.     Allergies:  Nitroglycerin and Losartan     /72   Pulse 82   Temp 36.8 °C (98.3 °F)   Resp 16   Ht 1.778 m (5' 10\")   Wt 85.7 kg (188 lb 15 oz)   SpO2 96%   BMI 27.11 kg/m²  Body surface area is 2.06 meters squared.    Labs 3/16/18:  ANC~ 220 Plt = 29k   Hgb = 8.2     SCr = 1.22mg/dL CrCl ~ 61 mL/min     3/15/18  LFT's = WNL TBili = 1.6 (no dose adjustments recommended)    K+ = 4.7  **MD aware of all current lab results. Orders received to proceed with treatment 3/16/18**     3/11/2018 04:30   Cmv Ab Igm <8.0   CMV IgG Qnt <0.20     Drug Order   (Drug name, dose, route, IV Fluid & volume, frequency, number of doses) Cycle: C1 Day 1 of 10      Previous treatment: s/p RCVP and Rituxan/bendamustin at [facility] with long term remission.      Medication = Alemtuzumab  Base Dose= 1 mg fixed test dose  Calc Dose: N/A  Final Dose = 0.9 mg (per EPIC rounding)  Route = IV   Fluid & Volume = NSS 50 mL  Admin Duration = Over 30 min    Day 1- observe 1 hour, in no rxn proceed with remaining 9 mg       <5% difference, ok to treat with final written dose     Medication = Alemtuzumab  Base Dose= 9 mg fixed dose  Calc Dose: N/A  Final Dose = 9 mg   Route = IV   Fluid & Volume =  mL  Admin Duration = Over 2 hours  Day 1     <5% difference, ok to treat with final written dose     Medication = Alemtuzumab  Base Dose= 10 mg fixed dose  Calc Dose: N/A  Final Dose = 10 mg   Route = IV   Fluid & Volume =  mL  Admin Duration = Over 2 hours     Days 2-10       <5% difference, ok to " treat with final written dose     By my signature below, I confirm this process was performed independently with the BSA and all final chemotherapy dosing calculations congruent. I have reviewed the above chemotherapy order and that my calculation of the final dose and BSA (when applicable) corroborate those calculations of the  pharmacist. Discrepancies of 5% or greater in the written dose have been addressed and documented within the EPIC Progress notes.    Signature: Debbie May, PharmD, BCOP

## 2018-03-15 NOTE — PROGRESS NOTES
Received bedside report from day shift RN. Evan [email or web address]. Up x1, FWW. Double lumen PICC + blood return, both lumens flushing. Patient denies pain, nausea, vomiting. Will continue to monitor. POC discussed with patient. Calls appropriately for assistance. Call light within reach. Bed in lowest and locked position. Q2 rounding in place. Patient on Neutropenic precautions. Q4 vitals signs in place.

## 2018-03-15 NOTE — CARE PLAN
Problem: Communication  Goal: The ability to communicate needs accurately and effectively will improve  AxO x4. Able to make needs known. Hard of hearing. Calls appropriately for assistance. Call light within reach.     Problem: Urinary Elimination:  Goal: Ability to reestablish a normal urinary elimination pattern will improve  AxO x4. Patient up self to BSC. Urinary elimination adequate. See I/O's. Calls appropriately for assistance. Call light within reach.

## 2018-03-15 NOTE — PROGRESS NOTES
[facility] Hospitalist Progress Note    Date of Service: 3/14/2018    Chief Complaint  77 y.o. male admitted 3/4/2018 with severe pancytopenia and neutropenic fever.  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    Interval Problem Update  3/13 Patient feeling okay today, neutropenia is worsening along with renal function.  Chemo delivery is pending.  Hopefully with initiation of chemo renal function will improve.  Likely all issues are tied to new diagnosis of leukemia.  3/14 Patient without complaints today, states urinating frequently.  He is well hydrated now and renal function is improving, will decrease IVF rate to 75cc and continue to follow creatinine.  He may need rate increase with chemotherapy but for now is well hydrated.    Chemotherapy not yet available.    Consultants/Specialty  Oncology - Paula FISHER - Jason    Disposition  Tbd, will have prolonged hospital course secondary to neutropenia that will worsen with chemotherapy.        Review of Systems   Constitutional: Positive for malaise/fatigue. Negative for chills and fever.   HENT: Negative for congestion.    Eyes: Negative for blurred vision and photophobia.   Respiratory: Negative for cough and shortness of breath.    Cardiovascular: Negative for chest pain, claudication and leg swelling.   Gastrointestinal: Negative for abdominal pain, constipation, diarrhea, heartburn, nausea and vomiting.   Genitourinary: Positive for frequency. Negative for dysuria and hematuria.   Musculoskeletal: Negative for joint pain and myalgias.   Skin: Negative for itching and rash.   Neurological: Negative for dizziness, sensory change, speech change, weakness and headaches.   Psychiatric/Behavioral: Negative for depression. The patient is not nervous/anxious and does not have insomnia.       Physical Exam  Laboratory/Imaging   Hemodynamics  Temp (24hrs), Av.7 °C (98.1 °F), Min:36.6 °C (97.8 °F), Max:36.9 °C (98.4 °F)   Temperature: 36.9 °C (98.4 °F)  Pulse  Avg:  87.3  Min: 66  Max: 110    Blood Pressure : 118/59      Respiratory      Respiration: 17, Pulse Oximetry: 92 %        RUL Breath Sounds: Clear, RML Breath Sounds: Clear, RLL Breath Sounds: Clear, GREGORIA Breath Sounds: Clear, LLL Breath Sounds: Clear    Fluids    Intake/Output Summary (Last 24 hours) at 03/14/18 1724  Last data filed at 03/14/18 1200   Gross per 24 hour   Intake             2378 ml   Output             3450 ml   Net            -1072 ml       Nutrition  Orders Placed This Encounter   Procedures   • DIET ORDER     Standing Status:   Standing     Number of Occurrences:   1     Order Specific Question:   Diet:     Answer:   Cardiac [6]     Physical Exam   Constitutional: He is oriented to person, place, and time. He appears well-developed and well-nourished. No distress.   HENT:   Head: Normocephalic and atraumatic.   Eyes: Conjunctivae are normal. No scleral icterus.   Neck: Neck supple. No JVD present.   Cardiovascular: Normal rate, regular rhythm and normal heart sounds.  Exam reveals no gallop and no friction rub.    No murmur heard.  Pulmonary/Chest: Effort normal and breath sounds normal. No respiratory distress. He has no wheezes. He exhibits no tenderness.   Abdominal: Soft. Bowel sounds are normal. He exhibits no distension and no mass. There is no tenderness.   Musculoskeletal: He exhibits no edema or tenderness.   Neurological: He is alert and oriented to person, place, and time. No cranial nerve deficit.   Skin: Skin is warm and dry. He is not diaphoretic. No erythema. No pallor.   Psychiatric: He has a normal mood and affect. His behavior is normal.   Nursing note and vitals reviewed.      Recent Labs      03/12/18   0247  03/13/18   0047  03/14/18   0058   WBC  0.7*  0.8*  0.6*   RBC  2.81*  2.62*  2.59*   HEMOGLOBIN  7.6*  7.3*  7.0*   HEMATOCRIT  22.9*  21.3*  21.2*   MCV  81.5  81.3*  81.9   MCH  27.0  27.9  27.0   MCHC  33.2*  34.3  33.0*   RDW  47.8  47.5  47.9   PLATELETCT  21*  20*  25*    MPV   --    --   11.8     Recent Labs      03/13/18   1110  03/14/18   0058   SODIUM  134*  134*   POTASSIUM  4.0  4.9   CHLORIDE  107  109   CO2  20  19*   GLUCOSE  106*  115*   BUN  17  22   CREATININE  1.21  1.44*   CALCIUM  7.3*  7.6*                      Assessment/Plan     * Pancytopenia ([facility])   Assessment & Plan    Secondary to severe bone marrow infiltrate.   T cell LGL - leukemia  Awaiting chemo delivery - Campath          Thrombocytopenia (CMS-HCC)- (present on admission)   Assessment & Plan    Transfuse prn if less than 10K.         Neutropenic fever (CMS-HCC)- (present on admission)   Assessment & Plan    On Cefepime and fluconazole. Stop date 3/17/2018.        Maxillary sinusitis- (present on admission)   Assessment & Plan    On abx         Acute renal failure (ARF) (CMS-HCC)- (present on admission)   Assessment & Plan    On IVF  Avoid nephrotoxins   Renally dose all medications         Hyponatremia- (present on admission)   Assessment & Plan    Mild, stable            Essential hypertension- (present on admission)   Assessment & Plan    Monitor blood pressure.          Anemia- (present on admission)   Assessment & Plan    Monitor H&H.  Transfuse if hemoglobin <7.5 g/dl.   Will need irradiated and CMV free product.        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            Quality-Core Measures   Reviewed items::  Labs reviewed, Medications reviewed and Radiology images reviewed  Joshi catheter::  No Joshi  DVT prophylaxis pharmacological::  Contraindicated - High bleeding risk  DVT prophylaxis - mechanical:   SCDs  Antibiotics:  Treating active infection/contamination beyond 24 hours perioperative coverage

## 2018-03-15 NOTE — PROGRESS NOTES
"/62   Pulse 73   Temp 37.2 °C (98.9 °F)   Resp 16   Ht 1.778 m (5' 10\")   Wt 85.7 kg (188 lb 15 oz)   SpO2 97%   BMI 27.11 kg/m²  Patient is on neutropenic precautions.  Denies pain. Call light within reach. Q4 vital signs.    "

## 2018-03-15 NOTE — CARE PLAN
Problem: Infection  Goal: Will remain free from infection  Outcome: PROGRESSING AS EXPECTED  Pt educated on hand hygiene.  Neutropenic precautions in place with q4hr vitals.    Problem: Urinary Elimination:  Goal: Ability to reestablish a normal urinary elimination pattern will improve  Outcome: PROGRESSING SLOWER THAN EXPECTED  Pt shows frequent urination patterns. Urine is asad yellow and moderate.

## 2018-03-16 NOTE — PROGRESS NOTES
Assumed care at 0700.   Received bedside report from day RN.   Pt A/Ox4. Pt denies pain.   Pt assessed, labs and notes reviewed. Medicated per MAR.   POC discussed; pt agreeable to goals.   PICC patent with +blood return both lumens.   Anxious about starting new regimen of chemo. Offered reassurance and education.   Pt board updated and pt informed of phone ext's, and hourly rounding.   Pt is up self to BSC and up x1 with FWW, calls appropriately.   Pt needs are met at this time. Call light and phone within reach.

## 2018-03-16 NOTE — THERAPY
attempted visit with pt politely declining as he just started chemo (1st round). noted pt has been ambulatory in hallways with FWW with CNA/SBA. Will re-attempt as able/appropriate to assess functional response to chemo.

## 2018-03-16 NOTE — PROGRESS NOTES
Chemotherapy Verification - PRIMARY RN      Height = 177.8 cm  Weight = 85.7 kg  BSA = 2.06 m2       Medication: alemtuzumab  Dose: 1 mg test dose  Calculated Dose: 1mg test dose; ordered dose 0.9 mg test dose (rounded per EPIC)                            (In mg/m2, AUC, mg/kg)     Medication: alemtuzumab  Dose: 9 mg flat rate  Calculated Dose: 9 mg flat rate of the 10 mg original dose (ordered dose: 9 mg)                              (In mg/m2, AUC, mg/kg)    I confirm this process was performed independently with the BSA and all final chemotherapy dosing calculations congruent.  Any discrepancies of 5% or greater have been addressed with the chemotherapy pharmacist. The resolution of the discrepancy has been documented in the EPIC progress notes.

## 2018-03-16 NOTE — CARE PLAN
Problem: Communication  Goal: The ability to communicate needs accurately and effectively will improve  AxO x4. Able to make needs known. Calls appropriately for assistance. Call light within reach. Q2 rounding in place.     Problem: Safety  Goal: Will remain free from injury  Patient up x1 ambulate. Up self, BSC. Calls appropriately for assistance. Call light within reach. Q2 rounding in place. Bed in lowest and locked position. Neutropenic precautions in place.

## 2018-03-16 NOTE — PROGRESS NOTES
HEMATOLOGY-ONCOLOGY PROGRESS NOTE    History of Follicular lymphoma treated R CVP - R Lucita at [place] longterm remission   Now diagnosed with T cell LGL confirmed with T cell receptor rearrangement positive presented with Pancytopenia  - As per Nicola and Dr. Klein - Planned CAMP path x 10 days -   - Day 1 on 3/16/18 - Monitor during treatment - CD4+ lymphocyte counts (after treatment until recovery); CMV antigen (routinely during and for 2 months after treatment); consider TSH at baseline and then every 2 to 3 months during alemtuzumab treatment        Subjective:  No overnight events  He denies any fevers or chills. He has 2-3 BM daily     Objective:  Medications reviewed and notable for:  Current Facility-Administered Medications   Medication Dose   • diphenhydrAMINE (BENADRYL) injection 50 mg  50 mg   • acetaminophen (TYLENOL) tablet 650 mg  650 mg   • alemtuzumab 0.9 mg in NS 50 mL Chemo     • alemtuzumab 9 mg in  mL Chemo     • cefepime (MAXIPIME) 2 g in  mL IVPB  2 g   • fluconazole (DIFLUCAN) tablet 100 mg  100 mg   • diphenhydrAMINE (BENADRYL) tablet/capsule 25 mg  25 mg   • allopurinol (ZYLOPRIM) tablet 100 mg  100 mg   • Ganciclovir 0.15 % GEL 1 Drop  1 Drop   • dexamethasone (DECADRON) 0.1 % ophthalmic solution 1 Drop  1 Drop   • predniSONE (DELTASONE) tablet 20 mg  20 mg   • acetaminophen (TYLENOL) tablet 650 mg  650 mg   • NS infusion 250 mL  250 mL   • normal saline PF 10-20 mL  10-20 mL   • acyclovir (ZOVIRAX) capsule 400 mg  400 mg   • NS infusion     • acetaminophen (TYLENOL) tablet 650 mg  650 mg   • benzocaine-menthol (CEPACOL) lozenge 1 Lozenge  1 Lozenge   • benzonatate (TESSALON) capsule 100 mg  100 mg   • labetalol (NORMODYNE,TRANDATE) injection 10 mg  10 mg   • levothyroxine (SYNTHROID) tablet 100 mcg  100 mcg   • ondansetron (ZOFRAN) syringe/vial injection 4 mg  4 mg   • potassium chloride SA (Kdur) tablet 40 mEq  40 mEq   • PARoxetine (PAXIL) tablet 20 mg  20 mg   •  "polyethylene glycol/lytes (MIRALAX) PACKET 1 Packet  1 Packet   • magnesium hydroxide (MILK OF MAGNESIA) suspension 30 mL  30 mL   • bisacodyl (DULCOLAX) suppository 10 mg  10 mg   • zolpidem (AMBIEN) tablet 5 mg  5 mg       ROS:   Constitutional: +  fatigue, no fevers or chills, no night sweats  Resp: No cough or SOB  Cardio:No chest pain or palpitations  Pschy: No depression or anxiety   Neuro: No headaches, no seizure, no vision changes  GI: no abdominal pain, nausea or vomiting. No diarrhea or constipation   All other ROS negative    Blood pressure 142/70, pulse 89, temperature 37.5 °C (99.5 °F), resp. rate 18, height 1.778 m (5' 10\"), weight 85.7 kg (188 lb 15 oz), SpO2 96 %.    General:  comfortable, NAD  HEENT:  sclera anicteric, pupils equal, round, reactive to light, oral cavity and oropharynx clear, mucous membranes moist  Neck:   supple, no lymphadenopathy  Cor:   regular rate and rhythm, no murmurs, rubs, or gallops  Pulm:   clear to auscultation bilaterally  Abd:   bowel sounds present, soft, nontender, nondistended, no palpable masses or organomegaly  Extremities:  warm, no lower extremity edema  Neurologic:  A&O x 3  Pyschiatric:  Appropriate mood and affect    Labs reviewed and notable for:  Recent Labs      03/14/18   0058  03/15/18   0044  03/16/18   0006   WBC  0.6*  0.6*  0.5*   RBC  2.59*  2.87*  2.96*   HEMOGLOBIN  7.0*  8.1*  8.2*   HEMATOCRIT  21.2*  23.5*  24.2*   MCV  81.9  81.9  81.8   MCH  27.0  28.2  27.7   MCHC  33.0*  34.5  33.9   RDW  47.9  46.3  46.2   PLATELETCT  25*  29*  29*   MPV  11.8  12.9  11.3         .@CMP  Recent Results (from the past 24 hour(s))   CBC WITH DIFFERENTIAL    Collection Time: 03/16/18 12:06 AM   Result Value Ref Range    WBC 0.5 (LL) 4.8 - 10.8 K/uL    RBC 2.96 (L) 4.70 - 6.10 M/uL    Hemoglobin 8.2 (L) 14.0 - 18.0 g/dL    Hematocrit 24.2 (L) 42.0 - 52.0 %    MCV 81.8 81.4 - 97.8 fL    MCH 27.7 27.0 - 33.0 pg    MCHC 33.9 33.7 - 35.3 g/dL    RDW 46.2 35.9 - " 50.0 fL    Platelet Count 29 (LL) 164 - 446 K/uL    MPV 11.3 9.0 - 12.9 fL    Nucleated RBC 0.00 /100 WBC    NRBC (Absolute) 0.00 K/uL    Neutrophils-Polys 43.90 (L) 44.00 - 72.00 %    Lymphocytes 50.90 (H) 22.00 - 41.00 %    Monocytes 1.70 0.00 - 13.40 %    Eosinophils 0.00 0.00 - 6.90 %    Basophils 0.00 0.00 - 1.80 %    Neutrophils (Absolute) 0.22 (LL) 1.82 - 7.42 K/uL    Lymphs (Absolute) 0.25 (L) 1.00 - 4.80 K/uL    Monos (Absolute) 0.01 0.00 - 0.85 K/uL    Eos (Absolute) 0.00 0.00 - 0.51 K/uL    Baso (Absolute) 0.00 0.00 - 0.12 K/uL    Anisocytosis 1+     Microcytosis 1+    BASIC METABOLIC PANEL    Collection Time: 03/16/18 12:06 AM   Result Value Ref Range    Sodium 134 (L) 135 - 145 mmol/L    Potassium 4.7 3.6 - 5.5 mmol/L    Chloride 106 96 - 112 mmol/L    Co2 22 20 - 33 mmol/L    Glucose 102 (H) 65 - 99 mg/dL    Bun 24 (H) 8 - 22 mg/dL    Creatinine 1.22 0.50 - 1.40 mg/dL    Calcium 7.4 (L) 8.5 - 10.5 mg/dL    Anion Gap 6.0 0.0 - 11.9   ESTIMATED GFR    Collection Time: 03/16/18 12:06 AM   Result Value Ref Range    GFR If African American >60 >60 mL/min/1.73 m 2    GFR If Non African American 58 (A) >60 mL/min/1.73 m 2   DIFFERENTIAL MANUAL    Collection Time: 03/16/18 12:06 AM   Result Value Ref Range    Myelocytes 3.50 %    Manual Diff Status PERFORMED    PERIPHERAL SMEAR REVIEW    Collection Time: 03/16/18 12:06 AM   Result Value Ref Range    Peripheral Smear Review see below    PLATELET ESTIMATE    Collection Time: 03/16/18 12:06 AM   Result Value Ref Range    Plt Estimation Marked Decrease    MORPHOLOGY    Collection Time: 03/16/18 12:06 AM   Result Value Ref Range    RBC Morphology Present     Large Platelets 1+    IMMATURE PLT FRACTION    Collection Time: 03/16/18 12:06 AM   Result Value Ref Range    Imm. Plt Fraction 8.4 0.6 - 13.1 K/uL           Assessment and Recommendations:  - T cell LGL/Leukemia presented with pancytopenia - CAMP path starting on 3/16/18   - Pancytopenia due to # 1   - ROSE MARIE  positive IGG. N Ret count, LDH, hapto N, Bili stable 2.0 range. There is small component of hemolysis. Not Brisk . Patient already on low dose steroids - monitor   - Renal insufficiency 1.2-1.5 : Cr slowly getting worse - Monitor.   - Neutropenic fever- on empiric cefepime, Afebrile. He is also on  acyclovir and Diflucan given his pancytopenia. 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 Given IVIG on 3/12/18    Plan:   - Will start CAMP path today day 1 . I went over side effects mainly reactivation of infections. Monitor CMV titres, infusion related reactions   (including hypotension, rigors, fever, shortness of breath, bronchospasm, chills, and/or rash) , antibody development. Thyroid abnormalities, HA, diarrhea .   Checking baseline TSH panel, Also baseline CMV titres. After completion of CAMP path monitor CD 4 counts.   - Patient agreed to proceed    High complexicity/Drug monitoring     We will continue to follow with you; please call with any questions, [phone].      Maggie Mitchell MD  [facility]   [phone]

## 2018-03-16 NOTE — PROGRESS NOTES
[facility] Hospitalist Progress Note    Date of Service: 3/16/2018    Chief Complaint  77 y.o. male admitted 3/4/2018 with severe pancytopenia and neutropenic fever.  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    Interval Problem Update  3/13 Patient feeling okay today, neutropenia is worsening along with renal function.  Chemo delivery is pending.  Hopefully with initiation of chemo renal function will improve.  Likely all issues are tied to new diagnosis of leukemia.  3/14 Patient without complaints today, states urinating frequently.  He is well hydrated now and renal function is improving, will decrease IVF rate to 75cc and continue to follow creatinine.  He may need rate increase with chemotherapy but for now is well hydrated.    Chemotherapy not yet available.  3/15 Patient states he feels okay, has ambulated in hallways with assistance.  States his urinary stream is strong and he need not go quite so often since decrease in rate of fluids.  Campath delivered this afternoon and will start administration tomorrow for 10 days.  3/16 Patient without complaint, states he is happy that treatment can finally start today.  He tolerated his test dose without complication and full dose to be administered.  Day 1 of 10 Campath.    Consultants/Specialty  Oncology - Reganti  ID - Jason    Disposition  Tbd, will have prolonged hospital course secondary to neutropenia that will worsen with chemotherapy.        Review of Systems   Constitutional: Positive for malaise/fatigue. Negative for chills and fever.   HENT: Negative for congestion.    Eyes: Negative for blurred vision and photophobia.   Respiratory: Negative for cough and shortness of breath.    Cardiovascular: Negative for chest pain, claudication and leg swelling.   Gastrointestinal: Negative for abdominal pain, constipation, diarrhea, heartburn, nausea and vomiting.   Genitourinary: Positive for frequency. Negative for dysuria and hematuria.   Musculoskeletal:  Negative for joint pain and myalgias.   Skin: Negative for itching and rash.   Neurological: Negative for dizziness, sensory change, speech change, weakness and headaches.   Psychiatric/Behavioral: Negative for depression. The patient is not nervous/anxious and does not have insomnia.       Physical Exam  Laboratory/Imaging   Hemodynamics  Temp (24hrs), Av.9 °C (98.5 °F), Min:36.6 °C (97.9 °F), Max:37.5 °C (99.5 °F)   Temperature: 37.1 °C (98.8 °F)  Pulse  Av.2  Min: 66  Max: 110    Blood Pressure : 128/74      Respiratory      Respiration: 18, Pulse Oximetry: 96 %        RUL Breath Sounds: Clear, RML Breath Sounds: Diminished, RLL Breath Sounds: Diminished, GREGORIA Breath Sounds: Clear, LLL Breath Sounds: Diminished    Fluids    Intake/Output Summary (Last 24 hours) at 18 1535  Last data filed at 18 1300   Gross per 24 hour   Intake           1618.7 ml   Output             2600 ml   Net           -981.3 ml       Nutrition  Orders Placed This Encounter   Procedures   • DIET ORDER     Standing Status:   Standing     Number of Occurrences:   1     Order Specific Question:   Diet:     Answer:   Cardiac [6]     Physical Exam   Constitutional: He is oriented to person, place, and time. He appears well-developed and well-nourished. No distress.   HENT:   Head: Normocephalic and atraumatic.   Eyes: Conjunctivae are normal. No scleral icterus.   Neck: Neck supple. No JVD present.   Cardiovascular: Normal rate, regular rhythm and normal heart sounds.  Exam reveals no gallop and no friction rub.    No murmur heard.  Pulmonary/Chest: Effort normal and breath sounds normal. No respiratory distress. He has no wheezes. He exhibits no tenderness.   Abdominal: Soft. Bowel sounds are normal. He exhibits no distension and no mass. There is no tenderness.   Musculoskeletal: He exhibits no edema or tenderness.   Neurological: He is alert and oriented to person, place, and time. No cranial nerve deficit.   Skin: Skin is  warm and dry. He is not diaphoretic. No erythema. No pallor.   Psychiatric: He has a normal mood and affect. His behavior is normal.   Nursing note and vitals reviewed.      Recent Labs      03/14/18   0058  03/15/18   0044  03/16/18   0006   WBC  0.6*  0.6*  0.5*   RBC  2.59*  2.87*  2.96*   HEMOGLOBIN  7.0*  8.1*  8.2*   HEMATOCRIT  21.2*  23.5*  24.2*   MCV  81.9  81.9  81.8   MCH  27.0  28.2  27.7   MCHC  33.0*  34.5  33.9   RDW  47.9  46.3  46.2   PLATELETCT  25*  29*  29*   MPV  11.8  12.9  11.3     Recent Labs      03/14/18   0058  03/15/18   0044  03/16/18   0006   SODIUM  134*  132*  132*  134*   POTASSIUM  4.9  4.9  4.9  4.7   CHLORIDE  109  107  107  106   CO2  19*  20  20  22   GLUCOSE  115*  101*  101*  102*   BUN  22  24*  24*  24*   CREATININE  1.44*  1.29  1.29  1.22   CALCIUM  7.6*  7.3*  7.3*  7.4*                      Assessment/Plan     * Pancytopenia ([facility])   Assessment & Plan    Secondary to severe bone marrow infiltrate.   T cell LGL - leukemia  Awaiting chemo delivery - Campath delivered PM 3/15, will start admin 3/16 am.          Thrombocytopenia (CMS-HCC)- (present on admission)   Assessment & Plan    Transfuse prn if less than 10K.         Neutropenic fever (CMS-HCC)- (present on admission)   Assessment & Plan    On Cefepime and fluconazole. Stop date 3/17/2018.  Persistent neutropenia but afebrile          Maxillary sinusitis- (present on admission)   Assessment & Plan    On abx         Acute renal failure (ARF) (CMS-HCC)- (present on admission)   Assessment & Plan    On IVF  Avoid nephrotoxins   Renally dose all medications         Hyponatremia- (present on admission)   Assessment & Plan    Mild, stable            Essential hypertension- (present on admission)   Assessment & Plan    Monitor blood pressure.          Anemia- (present on admission)   Assessment & Plan    Monitor H&H.  Transfuse if hemoglobin <7.5 g/dl.   Will need irradiated and CMV free product.         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            Quality-Core Measures   Reviewed items::  Labs reviewed, Medications reviewed and Radiology images reviewed  Joshi catheter::  No Joshi  DVT prophylaxis pharmacological::  Contraindicated - High bleeding risk  DVT prophylaxis - mechanical:  SCDs  Antibiotics:  Treating active infection/contamination beyond 24 hours perioperative coverage

## 2018-03-16 NOTE — PROGRESS NOTES
Chemotherapy Verification - PRIMARY RN      Height = 177.8 cm  Weight = 85.7 kg  BSA = 2.06 m2       Medication: Alemtuzumab  Dose: 1 mg test dose   Calculated Dose: 1 mg test dose (EPIC rounded to 0.9 mg okay per pharmacy)                            (In mg/m2, AUC, mg/kg)     Medication: Alemtuzumab  Dose: 9 mg flat rate  Calculated Dose: 9 mg flat rate of total 10 mg dose. (ordered dose: 9 mg)                             (In mg/m2, AUC, mg/kg)      I confirm this process was performed independently with the BSA and all final chemotherapy dosing calculations congruent.  Any discrepancies of 5% or greater have been addressed with the chemotherapy pharmacist. The resolution of the discrepancy has been documented in the EPIC progress notes.

## 2018-03-16 NOTE — PROGRESS NOTES
CLARI from Lab called with critical result of ANC 0.22 at 0109. Critical lab result read back to CLARI.   This critical lab result is within parameters established by Dr. [facility] policy for this patient

## 2018-03-16 NOTE — PROGRESS NOTES
CLARI from Lab called with critical result of WBC 0.5, PLT 29 at 0045. Critical lab result read back to CLARI.   This critical lab result is within parameters established by Dr. [facility] policy for this patient

## 2018-03-16 NOTE — PROGRESS NOTES
Premedications for chemotherapy given. Test dose administered, pt tolerated well, VSS, no signs of reaction.   Second bag verified and hung with two chemo certified RNs, as was the test dose.

## 2018-03-16 NOTE — PROGRESS NOTES
"Received bedside report from day shift RN. Evan [email or web address]. Up x1 ambulation, up self BSC. Double lumen PICC + blood return both lumens patent, flushing. Patient denies pain, nausea, vomiting. Will continue to monitor. POC discussed with patient. Wife at bedside, both patient and wife anxious to start chemo tomorrow AM. Wife stated \"How are you certain that the chemo is here?\" Educated wife on arrival of chemo. Per MD note, patient to start tomorrow am. Patient and wife verbalizing understanding. Patient calls appropriately for assistance. Call light within reach. Bed in lowest and locked position. Q2 rounding in place. Neutropenic precautions in place. Q4 vital signs in place. Hand hygiene provided prior to providing patient care. No sick staff/visitors implemented.    "

## 2018-03-17 NOTE — PROGRESS NOTES
Infectious Disease Progress Note    Author: Marisabel Gomez M.D. Date & Time of service: 3/16/2018  5:34 PM    Chief Complaint:  FU neutropenic fever      Interval History:  76 y/o WM admitted with febrile neutropenia     Tmax 100.5, having coughing fit and SOB, plan for BM biopsy  3/1Tmax 100.2, WBC 0.3, had a BM this am, cough better, repeat CXR with no infiltrate   3/2 Tmax 100.5, shortness of breath with exertion, had bloody nose this am, plts 22, denies abd pain or diarrhea, path neg for lyphoma  3/3/2018-Tmax 99.6. WBC 0.3 platelets 17 creatinine 1.38  3/4/2018-Tmax 99.2 WBCs 0.3 creatinine 1.38 complains of some shortness of breath  3/5 AF WBC 0.3 [place] [facility] [place]  3/6 AF WBC 0.3 no positive cxs No new complaints  3/7 AF WBC 0.4 no complaints except fatigue and CORRALES  3/8 AF (99.8) no CBC transferred to Oncology floor-no acute events  3/9 AF WBC 0.5 feels weak-no new complaints  3/10 AF (99.9) plan for Campath noted  3/12/2018-Tmax 98.4 WBC 0.7 creatinine 1.55  3/13/2018-no fevers. Remains neutropenic. No new issues overnight  3/14/2018-Tmax 98.6 WBC 0.6 creatinine 1.44 no new issues overnight  3/15/2018-no fevers WBC 0.6 ambulating in the halls  3/16/2018-no fevers. No new issues overnight. WBC 0.5  Labs Reviewed, Medications Reviewed, Radiology Reviewed and Wound Reviewed.    Review of Systems:  Review of Systems   Constitutional: Negative for fever.   Respiratory: Negative for cough.    Cardiovascular: Negative for chest pain.   Gastrointestinal: Negative for nausea and vomiting.   Genitourinary: Negative for dysuria.   Neurological: Positive for weakness.   All other systems reviewed and are negative.      Hemodynamics:  Temp (24hrs), Av °C (98.6 °F), Min:36.7 °C (98 °F), Max:37.5 °C (99.5 °F)  Temperature: 37.1 °C (98.8 °F)  Pulse  Av.2  Min: 66  Max: 110   Blood Pressure : 128/74       Physical Exam:  Physical Exam   Constitutional: He is oriented to person, place, and  time. He appears well-developed. No distress.   HENT:   Head: Normocephalic and atraumatic.   Eyes: EOM are normal. Pupils are equal, round, and reactive to light.   Neck: Neck supple.   Cardiovascular: Normal rate.    Pulmonary/Chest: Effort normal. No respiratory distress. He has no wheezes. He has no rales.   Abdominal: Soft. He exhibits no distension. There is no tenderness.   Musculoskeletal: He exhibits no edema.   Neurological: He is alert and oriented to person, place, and time.   Skin: No rash noted.   ecchymosis   Nursing note and vitals reviewed.      Meds:    Current Facility-Administered Medications:   •  [START ON 3/17/2018] diphenhydrAMINE  •  [START ON 3/17/2018] acetaminophen  •  [START ON 3/17/2018] hydrocortisone sodium succinate PF  •  [START ON 3/17/2018] ondansetron  •  [START ON 3/17/2018] generic chemo template  •  cefepime  •  fluconazole  •  diphenhydrAMINE  •  allopurinol  •  Ganciclovir  •  dexamethasone  •  predniSONE  •  acetaminophen  •  NS  •  PICC Line Insertion has been implemented **AND** May use Lidocaine 1% not to exceed 3 mls for local at insertion site **AND** NOTIFY MD **AND** Tip to dwell in the superior vena cava **AND** Do not use PICC Line until placement verified by Chest X Ray **AND** [CANCELED] DX-CHEST-FOR PICC LINE Perform procedure in: Other(comment f6 below): **AND** If radiologist reading of chest X-ray states any of the following the PICC should be used **AND** Further evaluation of the PICC placement can be retrieved from X-Ray and Imaging **AND** Blood draws through PICC line; draws by RN only **AND** FLUSHING GUIDELINES WHEN IN USE **AND** normal saline PF **AND** FLUSHING GUIDELINES WHEN NOT IN USE **AND** DRESSING MAINTENANCE **AND** Change needleless pressure ports and IV tubing every 72 hours per hospital policy **AND** TUBING **AND** If there is an MD order to remove the PICC line, any RN may remove the PICC line **AND** [] PATIENT EDUCATION  MATERIALS **AND** NURSING COMMUNICATION  •  acyclovir  •  NS  •  acetaminophen  •  benzocaine-menthol  •  benzonatate  •  labetalol  •  levothyroxine  •  ondansetron  •  potassium chloride SA  •  PARoxetine  •  polyethylene glycol/lytes  •  magnesium hydroxide  •  bisacodyl  •  zolpidem    Labs:  Recent Labs      03/14/18   0058  03/15/18   0044  03/16/18   0006   WBC  0.6*  0.6*  0.5*   RBC  2.59*  2.87*  2.96*   HEMOGLOBIN  7.0*  8.1*  8.2*   HEMATOCRIT  21.2*  23.5*  24.2*   MCV  81.9  81.9  81.8   MCH  27.0  28.2  27.7   RDW  47.9  46.3  46.2   PLATELETCT  25*  29*  29*   MPV  11.8  12.9  11.3   NEUTSPOLYS  52.20  49.00  43.90*   LYMPHOCYTES  40.90  46.20*  50.90*   MONOCYTES  5.90  0.00  1.70   EOSINOPHILS  0.00  0.00  0.00   BASOPHILS  0.00  0.00  0.00   RBCMORPHOLO  Present  Present  Present     Recent Labs      03/14/18   0058  03/15/18   0044  03/16/18   0006   SODIUM  134*  132*  132*  134*   POTASSIUM  4.9  4.9  4.9  4.7   CHLORIDE  109  107  107  106   CO2  19*  20  20  22   GLUCOSE  115*  101*  101*  102*   BUN  22  24*  24*  24*     Recent Labs      03/14/18   0058  03/15/18   0044  03/16/18   0006   ALBUMIN   --   2.9*   --    TBILIRUBIN   --   1.6*   --    ALKPHOSPHAT   --   73   --    TOTPROTEIN   --   4.9*   --    ALTSGPT   --   42   --    ASTSGOT   --   28   --    CREATININE  1.44*  1.29  1.29  1.22       Imaging:  Ct-abdomen-pelvis W/o    Result Date: 2/24/2018 2/24/2018 6:09 AM HISTORY/REASON FOR EXAM:  Fever. TECHNIQUE/EXAM DESCRIPTION: CT scan of the abdomen and pelvis without contrast. Noncontrast helical scanning was obtained from the diaphragmatic domes through the pubic symphysis. Low dose optimization technique was utilized for this CT exam including automated exposure control and adjustment of the mA and/or kV according to patient size. COMPARISON: 4/12/2017. FINDINGS: There are trace bilateral pleural effusions. There is a rounded opacity at the right lung base likely  representing round atelectasis. There is trace pericardial fluid. No free air is seen in the abdomen or pelvis. Evaluation of parenchymal and vascular structures is limited by the lack of intravenous contrast. Liver is hypodense suggestive of hepatic steatosis. Subtle 1.1 cm hypodense lesion in the right lobe of the liver corresponds to the previously noted hemangioma. There are calcified granulomata within the liver. Gallbladder appears unremarkable. The spleen is not enlarged. No adrenal mass is identified on the left. There is a right adrenal nodule measuring 2 cm which is compatible with an adrenal adenoma. There is renal cortical atrophy on the right. There are bilateral nonobstructing renal calculi. There are parapelvic cysts on the left. There is no evidence of hydronephrosis. Pancreas appears unremarkable.  Aorta is not aneurysmal. Atherosclerotic plaque is seen. Mildly enlarged dense right inguinal lymph node is again seen. There are dense retroperitoneal and right iliac lymph nodes. The dens retroperitoneal nodes measure up to 1.9 cm in short axis dimension, not significantly changed compared to prior. Haziness about the mesentery is not significantly changed compared to prior. Dense lymph node in the root of the mesentery measures 1.6 cm in short axis dimension, unchanged. There is no evidence of bowel obstruction. There is no evidence of acute appendicitis. Bladder is mildly distended. Prostate is enlarged. There is a fat-containing right inguinal hernia. Degenerative changes are seen in the spine. Wedge deformities of L1 and L2 appear unchanged.     Retroperitoneal, iliac and mesenteric lymphadenopathy is not significantly changed compared to prior. Haziness about the root of the mesentery is again noted. Nonobstructing bilateral renal calculi. Atrophic right kidney. Left parapelvic cysts. Enlarged prostate. Trace bilateral pleural effusions. Rounded opacity at the right lung base likely represents  round atelectasis.    Ct-head W/o    Result Date: 2/24/2018 2/24/2018 6:09 AM HISTORY/REASON FOR EXAM:  Fever. TECHNIQUE/EXAM DESCRIPTION AND NUMBER OF VIEWS: CT of the head without contrast. Contiguous axial sections were obtained from the skull base through the vertex. Up to date radiation dose reduction adjustments have been utilized to meet ALARA standards for radiation dose reduction. COMPARISON:  None available FINDINGS: The is no evidence of intraparenchymal, intraventricular and extra-axial hemorrhage.  The ventricles and cortical sulci are prominent compatible with age related change.  There is no mass effect or midline shift. There is minimal mucosal thickening within the maxillary sinuses. Visualized mastoid air cells are clear. The calvarium is intact. There is mild left frontal soft tissue swelling.     No acute intracranial abnormality is identified. Cortical atrophy. Mild left frontal soft tissue swelling.    Ct-maxillofacial W/o Plus Recons    Result Date: 2/26/2018 2/26/2018 7:24 PM HISTORY/REASON FOR EXAM:  Facial pain. Suspected sinusitis. TECHNIQUE/EXAM DESCRIPTION AND NUMBER OF VIEWS:  CT scan maxillofacial without contrast, with reconstructions. Thin-section helical imaging was obtained of the face from the orbital roofs through the mandible. Coronal multiplanar volume reformat images were generated from the axial data. Low dose optimization technique was utilized for this CT exam including automated exposure control and adjustment of the mA and/or kV according to patient size. FINDINGS: The frontal sinus is clear. There is minimal mucosal thickening in the ethmoid sinus bilaterally. There is mild bilateral mucosal thickening and/or polyp formation in the maxillary sinus. Both maxillary sinus infundibulum and ostia are patent. The sphenoid sinus is clear. No significant bony nasal septal deviation is present. No nasal turbinate edema is present.     1.  Mild chronic bilateral maxillary and  ethmoid sinusitis. 2.  No evidence of acute sinusitis. 3.  Areas of rounded mucosal thickening or polyp formation in each maxillary sinus antrum. 4.  Otherwise clear paranasal sinuses.    Dx-chest-2 Views    Result Date: 2/28/2018 2/28/2018 8:43 AM HISTORY/REASON FOR EXAM: Cough.  Lymphoma TECHNIQUE/EXAM DESCRIPTION AND NUMBER OF VIEWS: Two views of the chest. COMPARISON:  February 23 FINDINGS: Cardiomediastinal contours are stable with some aortic ectasia and upper normal heart size. No pulmonary consolidation is seen. Stable mostly right pleural space thickening, possible small effusions Chronic upper lumbar moderate severity compression fracture resulting in kyphosis     Stable chest with some chronic pleural space thickening more likely from pleural parenchymal scarring than pleural fluid, large lung volumes and mild cardiac silhouette enlargement    Dx-chest-portable (1 View)    Result Date: 2/23/2018 2/23/2018 6:01 PM HISTORY/REASON FOR EXAM:  Cough. TECHNIQUE/EXAM DESCRIPTION AND NUMBER OF VIEWS: Single portable view of the chest. COMPARISON: 3/22/2017 FINDINGS: Cardiomediastinal contour is within normal limits. Lungs show mild hypoinflation. No focal pulmonary consolidation. No pleural fluid collection or pneumothorax. No major bony abnormality is seen.     Hypoinflation without other evidence for acute cardiopulmonary disease.    Echocardiogram Comp W/o Cont    Result Date: 2/25/2018  Transthoracic Echo Report Echocardiography Laboratory CONCLUSIONS No prior study is available for comparison. Normal transthoracic echocardiogram. SHELLIE CASIANO Exam Date:         02/25/2018                    12:59 Exam Location:     Inpatient LV EF:  65    % FINDINGS Left Ventricle Normal left ventricular size, systolic function, and diastolic function. Mild concentric left ventricular hypertrophy. Normal regional wall motion. Left ventricular ejection fraction is visually estimated to be 65%. Right Ventricle The right  ventricle was normal in size and function. Right Atrium The right atrium is normal in size.  Normal inferior vena cava size and inspiratory collapse. Left Atrium The left atrium is normal in size.  Left atrial volume index is 26 mL/sq m. Mitral Valve Structurally normal mitral valve without significant stenosis. Trace mitral regurgitation. Aortic Valve Tricuspid aortic valve. Aortic sclerosis without stenosis. No aortic insufficiency. Tricuspid Valve Structurally normal tricuspid valve without significant stenosis. Mild tricuspid regurgitation. Right atrial pressure is estimated to be 3 mmHg. Estimated right ventricular systolic pressure is 40 mmHg. Pulmonic Valve Structurally normal pulmonic valve without significant stenosis or regurgitation. Pericardium Normal pericardium without effusion. Aorta The aortic root is normal. Jovany Heath (Electronically Signed) Final Date:     25 February 2018                 17:42      Micro:  Results     ** No results found for the last 168 hours. 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 Endpoints the duration of neutropenia. But if no chance of recovery of neutrophils in that case cefepime may be discontinued ,and patient remains afebrile,n 3/18/2018 and started on by mouth Levaquin  If febrile again can change diflucan to vfend  Currently on ganciclovir as well     ROHITH, improved  Renally dose  abx as needed  Avoid nephrotoxins  Monitor closely once Campath and Bactrim started    ID will sign off please call as needed

## 2018-03-17 NOTE — PROGRESS NOTES
Chemotherapy Verification - PRIMARY RN  Cycle #1 Day #2      Height = 177.8 cm  Weight = 85 kg  BSA = 2.05 m2       Medication: Alemtuzumab  Dose: 10 mg  Calculated Dose: 9.9 mg flat dose (ordered 10mg rounded per EPIC, okay per pharmacist).                             (In mg/m2, AUC, mg/kg)       I confirm this process was performed independently with the BSA and all final chemotherapy dosing calculations congruent.  Any discrepancies of 5% or greater have been addressed with the chemotherapy pharmacist. The resolution of the discrepancy has been documented in the EPIC progress notes.

## 2018-03-17 NOTE — PROGRESS NOTES
Assumed patient care at 0700 with Esha SUMMERS. Patient A&Ox4, VSS, no complaints of pain. No current nausea.  Patient up self to bedside commode, ambulates SBA with FWW. Neutropenic precautions in place. Has a double lumen PICC running Cam Path 2/10. Patient calls appropriately. Will continue to hourly monitor.

## 2018-03-17 NOTE — PROGRESS NOTES
"[facility] Hospitalist Progress Note    Date of Service: 3/17/2018    Chief Complaint  77 y.o. male admitted 3/4/2018 with severe pancytopenia and neutropenic fever.  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    Interval Problem Update  3/13 Patient feeling okay today, neutropenia is worsening along with renal function.  Chemo delivery is pending.  Hopefully with initiation of chemo renal function will improve.  Likely all issues are tied to new diagnosis of leukemia.  3/14 Patient without complaints today, states urinating frequently.  He is well hydrated now and renal function is improving, will decrease IVF rate to 75cc and continue to follow creatinine.  He may need rate increase with chemotherapy but for now is well hydrated.    Chemotherapy not yet available.  3/15 Patient states he feels okay, has ambulated in hallways with assistance.  States his urinary stream is strong and he need not go quite so often since decrease in rate of fluids.  Campath delivered this afternoon and will start administration tomorrow for 10 days.  3/16 Patient without complaint, states he is happy that treatment can finally start today.  He tolerated his test dose without complication and full dose to be administered.  Day 1 of 10 Campath.  3/17 Patient states he \"feels great\" and has no complaints.  RN reports temperature elevation this am of 100.8.  Patient not symptomatic and like with chemo/leukemia fever but given neutropenia will continue on antibiotics and I've ordered blood cultures, UA and CXR for further evaluation.  He tolerated the alemtuzumab without issue.    Consultants/Specialty  Oncology - Paula  ID - Jason    Disposition  Tbd, will have prolonged hospital course secondary to neutropenia that will worsen with chemotherapy.        Review of Systems   Constitutional: Positive for fever and malaise/fatigue. Negative for chills.   HENT: Negative for congestion.    Eyes: Negative for blurred vision and photophobia. "   Respiratory: Negative for cough and shortness of breath.    Cardiovascular: Negative for chest pain, claudication and leg swelling.   Gastrointestinal: Negative for abdominal pain, constipation, diarrhea, heartburn, nausea and vomiting.   Genitourinary: Positive for frequency. Negative for dysuria and hematuria.   Musculoskeletal: Negative for joint pain and myalgias.   Skin: Negative for itching and rash.   Neurological: Negative for dizziness, sensory change, speech change, weakness and headaches.   Psychiatric/Behavioral: Negative for depression. The patient is not nervous/anxious and does not have insomnia.       Physical Exam  Laboratory/Imaging   Hemodynamics  Temp (24hrs), Av.2 °C (99 °F), Min:36.6 °C (97.9 °F), Max:38.2 °C (100.8 °F)   Temperature: 37.1 °C (98.8 °F)  Pulse  Av  Min: 64  Max: 110    Blood Pressure : 110/63      Respiratory      Respiration: 18, Pulse Oximetry: 94 %        RUL Breath Sounds: Clear, RML Breath Sounds: Diminished, RLL Breath Sounds: Diminished, GREGORIA Breath Sounds: Clear, LLL Breath Sounds: Diminished    Fluids    Intake/Output Summary (Last 24 hours) at 18 1434  Last data filed at 18 0600   Gross per 24 hour   Intake             1957 ml   Output             1800 ml   Net              157 ml       Nutrition  Orders Placed This Encounter   Procedures   • DIET ORDER     Standing Status:   Standing     Number of Occurrences:   1     Order Specific Question:   Diet:     Answer:   Cardiac [6]     Physical Exam   Constitutional: He is oriented to person, place, and time. He appears well-developed and well-nourished. No distress.   HENT:   Head: Normocephalic and atraumatic.   Eyes: Conjunctivae are normal. No scleral icterus.   Neck: Neck supple. No JVD present.   Cardiovascular: Normal rate, regular rhythm and normal heart sounds.  Exam reveals no gallop and no friction rub.    No murmur heard.  Pulmonary/Chest: Effort normal and breath sounds normal. No  respiratory distress. He has no wheezes. He exhibits no tenderness.   Abdominal: Soft. Bowel sounds are normal. He exhibits no distension and no mass. There is no tenderness.   Musculoskeletal: He exhibits no edema or tenderness.   Neurological: He is alert and oriented to person, place, and time. No cranial nerve deficit.   Skin: Skin is warm and dry. He is not diaphoretic. No erythema. No pallor.   Psychiatric: He has a normal mood and affect. His behavior is normal.   Nursing note and vitals reviewed.      Recent Labs      03/15/18   0044  03/16/18   0006  03/17/18   0204   WBC  0.6*  0.5*  0.6*   RBC  2.87*  2.96*  2.83*   HEMOGLOBIN  8.1*  8.2*  7.7*   HEMATOCRIT  23.5*  24.2*  23.4*   MCV  81.9  81.8  82.7   MCH  28.2  27.7  27.2   MCHC  34.5  33.9  32.9*   RDW  46.3  46.2  47.6   PLATELETCT  29*  29*  26*   MPV  12.9  11.3   --      Recent Labs      03/15/18   0044  03/16/18   0006  03/17/18   0204   SODIUM  132*  132*  134*  134*   POTASSIUM  4.9  4.9  4.7  4.7   CHLORIDE  107  107  106  106   CO2  20  20  22  21   GLUCOSE  101*  101*  102*  125*   BUN  24*  24*  24*  32*   CREATININE  1.29  1.29  1.22  1.44*   CALCIUM  7.3*  7.3*  7.4*  7.4*                      Assessment/Plan     * Pancytopenia ([facility])   Assessment & Plan    Secondary to severe bone marrow infiltrate.   T cell LGL - leukemia  Awaiting chemo delivery - Campath delivered PM 3/15, will start admin 3/16 am.          Thrombocytopenia (CMS-HCC)- (present on admission)   Assessment & Plan    Transfuse prn if less than 10K.         Neutropenic fever (CMS-HCC)- (present on admission)   Assessment & Plan    On Cefepime and fluconazole.   Persistent neutropenia but afebrile  Neutropenia improving but mild fever 100.8 today - continue cefepime.  Could be d/t leukemia/chemo rather than infection  Repeat blood culture, CXR, UA to be sure.        Maxillary sinusitis- (present on admission)   Assessment & Plan    On abx         Acute renal  failure (ARF) (CMS-[facility])- (present on admission)   Assessment & Plan    On IVF  Avoid nephrotoxins   Renally dose all medications         Hyponatremia- (present on admission)   Assessment & Plan    Mild, stable            Essential hypertension- (present on admission)   Assessment & Plan    Monitor blood pressure.          Anemia- (present on admission)   Assessment & Plan    Monitor H&H.  Transfuse if hemoglobin <7.5 g/dl.   Will need irradiated and CMV free product.        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 10 days of daily infusion.            Quality-Core Measures   Reviewed items::  Labs reviewed, Medications reviewed and Radiology images reviewed  Joshi catheter::  No Joshi  DVT prophylaxis pharmacological::  Contraindicated - High bleeding risk  DVT prophylaxis - mechanical:  SCDs  Antibiotics:  Treating active infection/contamination beyond 24 hours perioperative coverage

## 2018-03-17 NOTE — PROGRESS NOTES
Chemotherapy Verification - SECONDARY RN   Cycle # 1 Day # 2       Height = 177.8 cm  Weight = 85 kg  BSA = 2.05 m2       Medication: Alemtuzumab  Dose: 10 mg  Calculated Dose: 10 mg- flat dose (ordered=10 mg)                             (In mg/m2, AUC, mg/kg)     I confirm that this process was performed independently.

## 2018-03-17 NOTE — PROGRESS NOTES
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 Klein - Planned CAMP path x 10 days - He started on 3/16/18     Daily events :   - Day 1 on 3/16/18 - Monitor during treatment - CD4+ lymphocyte counts (after treatment until recovery); CMV antigen (routinely during and for 2 months after treatment); consider TSH at baseline and then every 2 to 3 months during alemtuzumab treatment    - Day 2 - He spiked 100.8 this AM. He denies any SOB, no abdominal pain, nausea or vomiting. No cough. No rash.       Objective:  Medications reviewed and notable for:  Current Facility-Administered Medications   Medication Dose   • diphenhydrAMINE (BENADRYL) injection 50 mg  50 mg   • acetaminophen (TYLENOL) tablet 650 mg  650 mg   • hydrocortisone sodium succinate PF (SOLU-CORTEF) 100 MG injection 100 mg  100 mg   • ondansetron (ZOFRAN) syringe/vial injection 8 mg  8 mg   • alemtuzumab 9.9 mg in  mL Chemo     • cefepime (MAXIPIME) 2 g in  mL IVPB  2 g   • fluconazole (DIFLUCAN) tablet 100 mg  100 mg   • diphenhydrAMINE (BENADRYL) tablet/capsule 25 mg  25 mg   • allopurinol (ZYLOPRIM) tablet 100 mg  100 mg   • Ganciclovir 0.15 % GEL 1 Drop  1 Drop   • dexamethasone (DECADRON) 0.1 % ophthalmic solution 1 Drop  1 Drop   • predniSONE (DELTASONE) tablet 20 mg  20 mg   • acetaminophen (TYLENOL) tablet 650 mg  650 mg   • NS infusion 250 mL  250 mL   • normal saline PF 10-20 mL  10-20 mL   • acyclovir (ZOVIRAX) capsule 400 mg  400 mg   • NS infusion     • acetaminophen (TYLENOL) tablet 650 mg  650 mg   • benzocaine-menthol (CEPACOL) lozenge 1 Lozenge  1 Lozenge   • benzonatate (TESSALON) capsule 100 mg  100 mg   • labetalol (NORMODYNE,TRANDATE) injection 10 mg  10 mg   • levothyroxine (SYNTHROID) tablet 100 mcg  100  "mcg   • ondansetron (ZOFRAN) syringe/vial injection 4 mg  4 mg   • potassium chloride SA (Kdur) tablet 40 mEq  40 mEq   • PARoxetine (PAXIL) tablet 20 mg  20 mg   • polyethylene glycol/lytes (MIRALAX) PACKET 1 Packet  1 Packet   • magnesium hydroxide (MILK OF MAGNESIA) suspension 30 mL  30 mL   • bisacodyl (DULCOLAX) suppository 10 mg  10 mg   • zolpidem (AMBIEN) tablet 5 mg  5 mg       ROS:   Constitutional: +  fatigue, + fevers or chills, no night sweats  Resp: No cough or SOB  Cardio:No chest pain or palpitations  Pschy: No depression or anxiety   Neuro: No headaches, no seizure, no vision changes  GI: no abdominal pain, nausea or vomiting. + 2-3 BM daily   All other ROS negative    Blood pressure 131/69, pulse (!) 102, temperature (!) 38.2 °C (100.8 °F), resp. rate 18, height 1.778 m (5' 10\"), weight 85.7 kg (188 lb 15 oz), SpO2 97 %.    General:  comfortable, NAD  HEENT:  sclera anicteric, pupils equal, round, reactive to light, oral cavity and oropharynx clear, mucous membranes moist  Neck:   supple, no lymphadenopathy  Cor:   regular rate and rhythm, no murmurs, rubs, or gallops  Pulm:   clear to auscultation bilaterally  Abd:   bowel sounds present, soft, nontender, nondistended, no palpable masses or organomegaly  Extremities:  warm, no lower extremity edema  Neurologic:  A&O x 3  Pyschiatric:  Appropriate mood and affect    Labs reviewed and notable for:  Recent Labs      03/15/18   0044  03/16/18   0006  03/17/18   0204   WBC  0.6*  0.5*  0.6*   RBC  2.87*  2.96*  2.83*   HEMOGLOBIN  8.1*  8.2*  7.7*   HEMATOCRIT  23.5*  24.2*  23.4*   MCV  81.9  81.8  82.7   MCH  28.2  27.7  27.2   MCHC  34.5  33.9  32.9*   RDW  46.3  46.2  47.6   PLATELETCT  29*  29*  26*   MPV  12.9  11.3   --          .@CMP  Recent Results (from the past 24 hour(s))   CMV BY PCR,QUANT    Collection Time: 03/16/18  9:45 AM   Result Value Ref Range    CMV by PCR, Source Plasma    TSH    Collection Time: 03/16/18  9:45 AM   Result Value " Ref Range    TSH 6.710 (H) 0.380 - 5.330 uIU/mL   T3 FREE    Collection Time: 03/16/18  9:45 AM   Result Value Ref Range    T3,Free 1.38 (L) 2.40 - 4.20 pg/mL   CBC WITH DIFFERENTIAL    Collection Time: 03/17/18  2:04 AM   Result Value Ref Range    WBC 0.6 (LL) 4.8 - 10.8 K/uL    RBC 2.83 (L) 4.70 - 6.10 M/uL    Hemoglobin 7.7 (L) 14.0 - 18.0 g/dL    Hematocrit 23.4 (L) 42.0 - 52.0 %    MCV 82.7 81.4 - 97.8 fL    MCH 27.2 27.0 - 33.0 pg    MCHC 32.9 (L) 33.7 - 35.3 g/dL    RDW 47.6 35.9 - 50.0 fL    Platelet Count 26 (LL) 164 - 446 K/uL    Nucleated RBC 0.00 /100 WBC    NRBC (Absolute) 0.00 K/uL    Neutrophils-Polys 92.80 (H) 44.00 - 72.00 %    Lymphocytes 2.70 (L) 22.00 - 41.00 %    Monocytes 0.00 0.00 - 13.40 %    Eosinophils 0.00 0.00 - 6.90 %    Basophils 0.90 0.00 - 1.80 %    Neutrophils (Absolute) 0.58 (L) 1.82 - 7.42 K/uL    Lymphs (Absolute) 0.02 (L) 1.00 - 4.80 K/uL    Monos (Absolute) 0.00 0.00 - 0.85 K/uL    Eos (Absolute) 0.00 0.00 - 0.51 K/uL    Baso (Absolute) 0.01 0.00 - 0.12 K/uL    Anisocytosis 1+     Microcytosis 1+    BASIC METABOLIC PANEL    Collection Time: 03/17/18  2:04 AM   Result Value Ref Range    Sodium 134 (L) 135 - 145 mmol/L    Potassium 4.7 3.6 - 5.5 mmol/L    Chloride 106 96 - 112 mmol/L    Co2 21 20 - 33 mmol/L    Glucose 125 (H) 65 - 99 mg/dL    Bun 32 (H) 8 - 22 mg/dL    Creatinine 1.44 (H) 0.50 - 1.40 mg/dL    Calcium 7.4 (L) 8.5 - 10.5 mg/dL    Anion Gap 7.0 0.0 - 11.9   ESTIMATED GFR    Collection Time: 03/17/18  2:04 AM   Result Value Ref Range    GFR If  58 (A) >60 mL/min/1.73 m 2    GFR If Non  48 (A) >60 mL/min/1.73 m 2   DIFFERENTIAL MANUAL    Collection Time: 03/17/18  2:04 AM   Result Value Ref Range    Bands-Stabs 3.60 0.00 - 10.00 %    Manual Diff Status PERFORMED    PERIPHERAL SMEAR REVIEW    Collection Time: 03/17/18  2:04 AM   Result Value Ref Range    Peripheral Smear Review see below    PLATELET ESTIMATE    Collection Time: 03/17/18   2:04 AM   Result Value Ref Range    Plt Estimation Marked Decrease    MORPHOLOGY    Collection Time: 03/17/18  2:04 AM   Result Value Ref Range    RBC Morphology Present     Poikilocytosis 1+     Ovalocytes 1+    IMMATURE PLT FRACTION    Collection Time: 03/17/18  2:04 AM   Result Value Ref Range    Imm. Plt Fraction 10.9 0.6 - 13.1 K/uL         Assessment and Recommendations:  - T cell LGL/Leukemia presented with pancytopenia - CAMP path started on 3/16/18 . Day 2   - Pancytopenia due to # 1   - ROSE MARIE positive IGG. N Ret count, LDH, hapto N, Bili stable 2.0 range. There is small component of hemolysis. Not Brisk . Patient already on low dose steroids - monitor   - Renal insufficiency 1.2-1.5 : Cr stable    - Neutropenic fever- on cefepime. He is also on  acyclovir and Diflucan given his pancytopenia. ID following patient . He spiked again on 3/17/18. Pancultured   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 Given IVIG on 3/12/18   - Baseline TSH and free T3 slightly high prior to starting CAMP path . Monitor likely due to stress. Check again next week with also free T4.     Plan:   - He tolerated first dose of CAMP path well.   - Fevers could be due to treatment , r/o infection. He is pancultured. Continue broad spectrum antibiotics.   - Proceed with Day 2 as planned.   - Check CMP tomorrow along with uric acid       High complexicity/Drug monitoring     We will continue to follow with you; please call with any questions, [phone].      Maggie Mitchell MD  [facility]   [phone]

## 2018-03-17 NOTE — PROGRESS NOTES
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 Alemtuzumab in T-cell large granular lymphocytic leukaemia: a phase 2 study. Lancet Haematol. 2016 Jan; 3(1):e22-e29.     Allergies:  Nitroglycerin and Losartan     /69   Pulse (!) 102   Temp (!) 38.2 °C (100.8 °F)   Resp 18   Ht 1.778 m (5' 10\")   Wt 85.7 kg (188 lb 15 oz)   SpO2 97%   BMI 27.11 kg/m²  Body surface area is 2.06 meters squared.    Labs 3/17/18:  ANC~ 580 Plt = 26 k   Hgb = 7.7     SCr = 1.44mg/dL CrCl ~ 52 mL/min     3/15/18  LFT's = WNL TBili = 1.6 (no dose adjustments recommended)    K+ = 4.7  **MD aware of all current lab results. Orders received to proceed with treatment 3/17/18**     3/11/2018 04:30   Cmv Ab Igm <8.0   CMV IgG Qnt <0.20     2/25/18 ECHO LVEF = 65%  3/16/18 TSH = 6.7 T3 free 1.38      Monitor during treatment - CD4+ lymphocyte counts (after treatment until recovery); CMV antigen (routinely during and for 2 months after treatment); consider TSH at baseline and then every 2 to 3 months during alemtuzumab treatment      ID following    Premeds: diphenhydramine 50 mg IV                   Acetaminophen 650 mg PO                   Hydrocortisone 100 mg IV (verified Dr. Mitchell wants this in addition to oral prednisone)    Drug Order   (Drug name, dose, route, IV Fluid & volume, frequency, number of doses) Cycle: C1 Day 2 of 10      Previous treatment: s/p RCVP and Rituxan/bendamustin at [facility] with long term remission.      Medication = Alemtuzumab  Base Dose= 10 mg fixed dose  Calc Dose: N/A  Final Dose = 10 mg (EPIC rounds to 9.9 mg)  Route = IV   Fluid & Volume =  mL  Admin Duration = Over 2 hours     Days 2-10       <5% difference, ok to treat " with final written dose     By my signature below, I confirm this process was performed independently with the BSA and all final chemotherapy dosing calculations congruent. I have reviewed the above chemotherapy order and that my calculation of the final dose and BSA (when applicable) corroborate those calculations of the  pharmacist. Discrepancies of 5% or greater in the written dose have been addressed and documented within the EPIC Progress notes.    Signature: Theresa Schumacher, JackieD

## 2018-03-18 NOTE — PROGRESS NOTES
Chemotherapy Verification - SECONDARY RN   Cycle # 1 Day # 3      Height = 177.8 cm  Weight = 85 kg  BSA = 2.05 m2       Medication: Alemtuzumab  Dose: 10 mg  Calculated Dose: fixed dose 10 mg                             (In mg/m2, AUC, mg/kg)     I confirm that this process was performed independently.

## 2018-03-18 NOTE — PROGRESS NOTES
HEMATOLOGY-ONCOLOGY PROGRESS NOTE    History of Follicular lymphoma treated R CVP - R Lucita at [place] longterm remission in the past ( Dr. 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 Klein - Planned CAMP path x 10 days - He started on 3/16/18   - Day 3     Subjective:  - Day 1 on 3/16/18 - Monitor during treatment - CD4+ lymphocyte counts (after treatment until recovery); CMV antigen baseline neg (routinely during and for 2 months after treatment); consider TSH at baseline ( slight elevation TSH and free T3)  and then every 2 to 3 months during alemtuzumab treatment    - Day 2 - He spiked 100.8 this AM. He denies any SOB, no abdominal pain, nausea or vomiting. No cough. No rash.   - Day 3 - No fevers, no N/V.     Objective:  Medications reviewed and notable for:  Current Facility-Administered Medications   Medication Dose   • diphenhydrAMINE (BENADRYL) injection 50 mg  50 mg   • acetaminophen (TYLENOL) tablet 650 mg  650 mg   • hydrocortisone sodium succinate PF (SOLU-CORTEF) 100 MG injection 100 mg  100 mg   • ondansetron (ZOFRAN) syringe/vial injection 8 mg  8 mg   • alemtuzumab 9.9 mg in  mL Chemo     • cefepime (MAXIPIME) 2 g in  mL IVPB  2 g   • fluconazole (DIFLUCAN) tablet 100 mg  100 mg   • diphenhydrAMINE (BENADRYL) tablet/capsule 25 mg  25 mg   • allopurinol (ZYLOPRIM) tablet 100 mg  100 mg   • Ganciclovir 0.15 % GEL 1 Drop  1 Drop   • dexamethasone (DECADRON) 0.1 % ophthalmic solution 1 Drop  1 Drop   • predniSONE (DELTASONE) tablet 20 mg  20 mg   • acetaminophen (TYLENOL) tablet 650 mg  650 mg   • NS infusion 250 mL  250 mL   • normal saline PF 10-20 mL  10-20 mL   • acyclovir (ZOVIRAX) capsule 400 mg  400 mg   • NS infusion     • acetaminophen (TYLENOL) tablet 650 mg  650 mg   • benzocaine-menthol (CEPACOL) lozenge 1 Lozenge  1 Lozenge   • benzonatate (TESSALON) capsule 100  "mg  100 mg   • labetalol (NORMODYNE,TRANDATE) injection 10 mg  10 mg   • levothyroxine (SYNTHROID) tablet 100 mcg  100 mcg   • ondansetron (ZOFRAN) syringe/vial injection 4 mg  4 mg   • potassium chloride SA (Kdur) tablet 40 mEq  40 mEq   • PARoxetine (PAXIL) tablet 20 mg  20 mg   • polyethylene glycol/lytes (MIRALAX) PACKET 1 Packet  1 Packet   • magnesium hydroxide (MILK OF MAGNESIA) suspension 30 mL  30 mL   • bisacodyl (DULCOLAX) suppository 10 mg  10 mg   • zolpidem (AMBIEN) tablet 5 mg  5 mg       ROS:   Constitutional: +  Fatigue and malaise, no fevers or chills, no night sweats  Resp: No cough or SOB  Cardio:No chest pain or palpitations  Pschy: No depression or anxiety   Neuro: No headaches, no seizure, no vision changes  GI: no abdominal pain, nausea or vomiting. 2 BM baseline loose.   All other ROS negative    Blood pressure 128/78, pulse 86, temperature 37.4 °C (99.3 °F), resp. rate 18, height 1.778 m (5' 10\"), weight 85 kg (187 lb 6.3 oz), SpO2 97 %.    General:  comfortable, NAD  HEENT:  sclera anicteric, pupils equal, round, reactive to light, oral cavity and oropharynx clear, mucous membranes moist  Neck:   supple, no lymphadenopathy  Cor:   regular rate and rhythm, no murmurs, rubs, or gallops  Pulm:   clear to auscultation bilaterally  Abd:   bowel sounds present, soft, nontender, nondistended, no palpable masses or organomegaly  Extremities:  warm, no lower extremity edema  Neurologic:  A&O x 3  Pyschiatric:  Appropriate mood and affect    Labs reviewed and notable for:  Recent Labs      03/16/18   0006  03/17/18   0204  03/18/18   0200   WBC  0.5*  0.6*  0.3*   RBC  2.96*  2.83*  2.65*   HEMOGLOBIN  8.2*  7.7*  7.5*   HEMATOCRIT  24.2*  23.4*  22.2*   MCV  81.8  82.7  83.8   MCH  27.7  27.2  28.3   MCHC  33.9  32.9*  33.8   RDW  46.2  47.6  49.4   PLATELETCT  29*  26*  25*   MPV  11.3   --   11.8         .@[facility]  Recent Results (from the past 24 hour(s))   URIC ACID    Collection Time: 03/17/18 " 10:00 AM   Result Value Ref Range    Uric Acid 3.2 2.5 - 8.3 mg/dL   URINALYSIS    Collection Time: 03/17/18  1:30 PM   Result Value Ref Range    Color Yellow     Character Clear     Specific Gravity 1.007 <1.035    Ph 5.5 5.0 - 8.0    Glucose Negative Negative mg/dL    Ketones Negative Negative mg/dL    Protein 30 (A) Negative mg/dL    Bilirubin Negative Negative    Urobilinogen, Urine 0.2 Negative    Nitrite Negative Negative    Leukocyte Esterase Negative Negative    Occult Blood Small (A) Negative    Micro Urine Req Microscopic    URINE MICROSCOPIC (W/UA)    Collection Time: 03/17/18  1:30 PM   Result Value Ref Range    WBC 0-2 (A) /hpf    RBC 2-5 (A) /hpf    Bacteria Negative None /hpf    Epithelial Cells Negative /hpf    Hyaline Cast 0-2 /lpf   CBC WITH DIFFERENTIAL    Collection Time: 03/18/18  2:00 AM   Result Value Ref Range    WBC 0.3 (LL) 4.8 - 10.8 K/uL    RBC 2.65 (L) 4.70 - 6.10 M/uL    Hemoglobin 7.5 (L) 14.0 - 18.0 g/dL    Hematocrit 22.2 (L) 42.0 - 52.0 %    MCV 83.8 81.4 - 97.8 fL    MCH 28.3 27.0 - 33.0 pg    MCHC 33.8 33.7 - 35.3 g/dL    RDW 49.4 35.9 - 50.0 fL    Platelet Count 25 (LL) 164 - 446 K/uL    MPV 11.8 9.0 - 12.9 fL    Nucleated RBC 0.00 /100 WBC    NRBC (Absolute) 0.00 K/uL    Neutrophils-Polys 93.60 (H) 44.00 - 72.00 %    Lymphocytes 1.80 (L) 22.00 - 41.00 %    Monocytes 0.90 0.00 - 13.40 %    Eosinophils 0.00 0.00 - 6.90 %    Basophils 0.00 0.00 - 1.80 %    Neutrophils (Absolute) 0.29 (LL) 1.82 - 7.42 K/uL    Lymphs (Absolute) 0.01 (L) 1.00 - 4.80 K/uL    Monos (Absolute) 0.00 0.00 - 0.85 K/uL    Eos (Absolute) 0.00 0.00 - 0.51 K/uL    Baso (Absolute) 0.00 0.00 - 0.12 K/uL    Anisocytosis 1+     Microcytosis 1+    COMP METABOLIC PANEL    Collection Time: 03/18/18  2:00 AM   Result Value Ref Range    Sodium 135 135 - 145 mmol/L    Potassium 4.9 3.6 - 5.5 mmol/L    Chloride 107 96 - 112 mmol/L    Co2 22 20 - 33 mmol/L    Anion Gap 6.0 0.0 - 11.9    Glucose 122 (H) 65 - 99 mg/dL    Bun  34 (H) 8 - 22 mg/dL    Creatinine 1.48 (H) 0.50 - 1.40 mg/dL    Calcium 7.3 (L) 8.5 - 10.5 mg/dL    AST(SGOT) 28 12 - 45 U/L    ALT(SGPT) 37 2 - 50 U/L    Alkaline Phosphatase 53 30 - 99 U/L    Total Bilirubin 1.2 0.1 - 1.5 mg/dL    Albumin 2.7 (L) 3.2 - 4.9 g/dL    Total Protein 4.5 (L) 6.0 - 8.2 g/dL    Globulin 1.8 (L) 1.9 - 3.5 g/dL    A-G Ratio 1.5 g/dL   ESTIMATED GFR    Collection Time: 03/18/18  2:00 AM   Result Value Ref Range    GFR If  56 (A) >60 mL/min/1.73 m 2    GFR If Non  46 (A) >60 mL/min/1.73 m 2   DIFFERENTIAL MANUAL    Collection Time: 03/18/18  2:00 AM   Result Value Ref Range    Bands-Stabs 3.70 0.00 - 10.00 %    Manual Diff Status PERFORMED    PERIPHERAL SMEAR REVIEW    Collection Time: 03/18/18  2:00 AM   Result Value Ref Range    Peripheral Smear Review see below    PLATELET ESTIMATE    Collection Time: 03/18/18  2:00 AM   Result Value Ref Range    Plt Estimation Marked Decrease    MORPHOLOGY    Collection Time: 03/18/18  2:00 AM   Result Value Ref Range    RBC Morphology Present     Large Platelets 1+     Giant Platelets 1+    IMMATURE PLT FRACTION    Collection Time: 03/18/18  2:00 AM   Result Value Ref Range    Imm. Plt Fraction 9.3 0.6 - 13.1 K/uL       Diagnostic imaging:      Assessment and Recommendations:  - T cell LGL/Leukemia presented with pancytopenia - CAMP path started on 3/16/18 . Day 3  - Pancytopenia due to # 1   - RSOE MARIE positive IGG. N Ret count, LDH, hapto N, Bili stable 2.0 range. There is small component of hemolysis. Not Brisk . Patient already on low dose steroids - monitor   - Renal insufficiency 1.2-1.5 : Cr stable    - Neutropenic fever- on cefepime. He is also on  acyclovir and Diflucan given his pancytopenia. ID following patient . He spiked again on 3/17/18 100.8 ?? Due to treatment . Pancultured.   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 Given IVIG on 3/12/18   - Baseline TSH and free T3 slightly high prior to starting  CAMP path . Monitor likely due to stress. Check again next week with also free T4.     Plan:   - He is tolerating treatment well  - Proceed day 3 as planned.   - Continue to monitor side effects       High complexicity/Drug monitoring     We will continue to follow with you; please call with any questions, [phone].      Maggie Mitchell MD  [facility]   [phone]

## 2018-03-18 NOTE — PROGRESS NOTES
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 Alemtuzumab in T-cell large granular lymphocytic leukaemia: a phase 2 study. Lancet Haematol. 2016 Jan; 3(1):e22-e29.     Allergies:  Nitroglycerin and Losartan     /78   Pulse 86   Temp 37.4 °C (99.3 °F)   Resp 18   Ht 1.778 m (5' 10\")   Wt 85 kg (187 lb 6.3 oz)   SpO2 97%   BMI 26.89 kg/m²  Body surface area is 2.05 meters squared.    ANC~ 290 Plt = 25k   Hgb = 7.5     SCr = 1.48mg/dL CrCl ~ 50mL/min   LFT's = WNL TBili = 1.2     MD aware of all current lab results. Orders received to proceed with treatment per Dr. Mitchell.       3/11/2018 04:30   Cmv Ab Igm <8.0   CMV IgG Qnt <0.20     2/25/18 ECHO LVEF = 65%  3/16/18 TSH = 6.7 T3 free 1.38     Monitor during treatment - CD4+ lymphocyte counts (after treatment until recovery); CMV antigen (routinely during and for 2 months after treatment); consider TSH at baseline and then every 2 to 3 months during alemtuzumab treatment      ID following    Premeds: diphenhydramine 50 mg IV                   Acetaminophen 650 mg PO                   Hydrocortisone 100 mg IV (verified Dr. Mitchell wants this in addition to oral prednisone)    Drug Order   (Drug name, dose, route, IV Fluid & volume, frequency, number of doses) Cycle: C1 Day 3 of 10      Previous treatment: s/p RCVP and Rituxan/bendamustin at [place] with long term remission.      Medication = Alemtuzumab  Base Dose= 10 mg fixed dose  Calc Dose: N/A  Final Dose = 10 mg (EPIC rounds to 9.9 mg)  Route = IV   Fluid & Volume =  mL  Admin Duration = Over 2 hours     Days 2-10       <5% difference, ok to treat with final written dose     By my " signature below, I confirm this process was performed independently with the BSA and all final chemotherapy dosing calculations congruent. I have reviewed the above chemotherapy order and that my calculation of the final dose and BSA (when applicable) corroborate those calculations of the  pharmacist. Discrepancies of 5% or greater in the written dose have been addressed and documented within the EPIC Progress notes.    Tony GrissomD.

## 2018-03-18 NOTE — PROGRESS NOTES
Pt care assumed, discussed plan of care, report received, Pt has no needs at this time, will monitor, call light within reach and safety precautions in place.

## 2018-03-18 NOTE — PROGRESS NOTES
Assumed patient care at 0700 with Esha SUMMERS. Patient A&Ox4, VSS, no complaints of pain or nausea. Patient up self to commode, SBA with FWW ambulating. Neutropenic precautions in place. Has a double lumen PICC. Chemo today. Patient calls appropriately. Will continue to hourly monitor.

## 2018-03-18 NOTE — PROGRESS NOTES
Chemotherapy Verification - PRIMARY RN      Height = 177.8 cm  Weight = 85 kg  BSA = 2.05 m2       Medication: Alemtuzumab  Dose: 10 mg fixed dose  Calculated Dose: 10 mg  (EPIC rounds to 9.9 mg bria per pharmacy)                             (In mg/m2, AUC, mg/kg)       I confirm this process was performed independently with the BSA and all final chemotherapy dosing calculations congruent.  Any discrepancies of 5% or greater have been addressed with the chemotherapy pharmacist. The resolution of the discrepancy has been documented in the EPIC progress notes.

## 2018-03-18 NOTE — PROGRESS NOTES
Pt care assumed, discussed plan of care, report received, Pt has no needs at this time, will continue to monitor, call light within reach and safety precautions in place.

## 2018-03-18 NOTE — PROGRESS NOTES
"[facility] Hospitalist Progress Note    Date of Service: 3/18/2018    Chief Complaint  77 y.o. male admitted 3/4/2018 with severe pancytopenia and neutropenic fever.  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    Interval Problem Update  3/13 Patient feeling okay today, neutropenia is worsening along with renal function.  Chemo delivery is pending.  Hopefully with initiation of chemo renal function will improve.  Likely all issues are tied to new diagnosis of leukemia.  3/14 Patient without complaints today, states urinating frequently.  He is well hydrated now and renal function is improving, will decrease IVF rate to 75cc and continue to follow creatinine.  He may need rate increase with chemotherapy but for now is well hydrated.    Chemotherapy not yet available.  3/15 Patient states he feels okay, has ambulated in hallways with assistance.  States his urinary stream is strong and he need not go quite so often since decrease in rate of fluids.  Campath delivered this afternoon and will start administration tomorrow for 10 days.  3/16 Patient without complaint, states he is happy that treatment can finally start today.  He tolerated his test dose without complication and full dose to be administered.  Day 1 of 10 Campath.  3/17 Patient states he \"feels great\" and has no complaints.  RN reports temperature elevation this am of 100.8.  Patient not symptomatic and like with chemo/leukemia fever but given neutropenia will continue on antibiotics and I've ordered blood cultures, UA and CXR for further evaluation.  He tolerated the alemtuzumab without issue.  3/18 Patient states he is still doing very well, no issues with chemo, good appetite.  Infection w/u from yesterday unrevealing so likely d/t chemo/cancer than infection.  Cefepime to dc tomorrow if blood cultures remain negative and patient remains afebrile.  Day 3 Campath.    Consultants/Specialty  Oncology - Paula  ID - Jason    Disposition  Tbd, will have " prolonged hospital course secondary to neutropenia that will worsen with chemotherapy.        Review of Systems   Constitutional: Positive for fever and malaise/fatigue. Negative for chills.   HENT: Negative for congestion.    Eyes: Negative for blurred vision and photophobia.   Respiratory: Negative for cough and shortness of breath.    Cardiovascular: Negative for chest pain, claudication and leg swelling.   Gastrointestinal: Negative for abdominal pain, constipation, diarrhea, heartburn, nausea and vomiting.   Genitourinary: Negative for dysuria, frequency and hematuria.   Musculoskeletal: Negative for joint pain and myalgias.   Skin: Negative for itching and rash.   Neurological: Negative for dizziness, sensory change, speech change, weakness and headaches.   Psychiatric/Behavioral: Negative for depression. The patient is not nervous/anxious and does not have insomnia.       Physical Exam  Laboratory/Imaging   Hemodynamics  Temp (24hrs), Av.2 °C (98.9 °F), Min:36.7 °C (98.1 °F), Max:37.4 °C (99.3 °F)   Temperature: 36.7 °C (98.1 °F)  Pulse  Av.8  Min: 64  Max: 110    Blood Pressure : 119/74      Respiratory      Respiration: 18, Pulse Oximetry: 97 %        RUL Breath Sounds: Clear, RML Breath Sounds: Clear, RLL Breath Sounds: Diminished, GREGORIA Breath Sounds: Clear, LLL Breath Sounds: Diminished    Fluids    Intake/Output Summary (Last 24 hours) at 18 1640  Last data filed at 18 1100   Gross per 24 hour   Intake              100 ml   Output             2600 ml   Net            -2500 ml       Nutrition  Orders Placed This Encounter   Procedures   • DIET ORDER     Standing Status:   Standing     Number of Occurrences:   1     Order Specific Question:   Diet:     Answer:   Cardiac [6]     Physical Exam   Constitutional: He is oriented to person, place, and time. He appears well-developed and well-nourished. No distress.   HENT:   Head: Normocephalic and atraumatic.   Eyes: Conjunctivae are normal.  No scleral icterus.   Neck: Neck supple. No JVD present.   Cardiovascular: Normal rate, regular rhythm and normal heart sounds.  Exam reveals no gallop and no friction rub.    No murmur heard.  Pulmonary/Chest: Effort normal and breath sounds normal. No respiratory distress. He has no wheezes. He exhibits no tenderness.   Abdominal: Soft. Bowel sounds are normal. He exhibits no distension and no mass. There is no tenderness.   Musculoskeletal: He exhibits no edema or tenderness.   Neurological: He is alert and oriented to person, place, and time. No cranial nerve deficit.   Skin: Skin is warm and dry. He is not diaphoretic. No erythema. No pallor.   Psychiatric: He has a normal mood and affect. His behavior is normal.   Nursing note and vitals reviewed.      Recent Labs      03/16/18   0006  03/17/18   0204  03/18/18   0200   WBC  0.5*  0.6*  0.3*   RBC  2.96*  2.83*  2.65*   HEMOGLOBIN  8.2*  7.7*  7.5*   HEMATOCRIT  24.2*  23.4*  22.2*   MCV  81.8  82.7  83.8   MCH  27.7  27.2  28.3   MCHC  33.9  32.9*  33.8   RDW  46.2  47.6  49.4   PLATELETCT  29*  26*  25*   MPV  11.3   --   11.8     Recent Labs      03/16/18   0006  03/17/18   0204  03/18/18   0200   SODIUM  134*  134*  135   POTASSIUM  4.7  4.7  4.9   CHLORIDE  106  106  107   CO2  22  21  22   GLUCOSE  102*  125*  122*   BUN  24*  32*  34*   CREATININE  1.22  1.44*  1.48*   CALCIUM  7.4*  7.4*  7.3*                      Assessment/Plan     * Pancytopenia (CMS-HCC)   Assessment & Plan    Secondary to severe bone marrow infiltrate.   T cell LGL - leukemia  Awaiting chemo delivery - Campath delivered PM 3/15, will start admin 3/16 am.          Thrombocytopenia (CMS-HCC)- (present on admission)   Assessment & Plan    Transfuse prn if less than 10K.         Neutropenic fever (CMS-HCC)- (present on admission)   Assessment & Plan    On Cefepime and fluconazole.   Persistent neutropenia but afebrile  Neutropenia improving but mild fever 100.8 today - continue  cefepime.  Could be d/t leukemia/chemo rather than infection  Repeat blood culture, CXR, UA all okay        Maxillary sinusitis- (present on admission)   Assessment & Plan    On abx         Acute renal failure (ARF) (CMS-[facility])- (present on admission)   Assessment & Plan    On IVF  Avoid nephrotoxins   Renally dose all medications         Hyponatremia- (present on admission)   Assessment & Plan    Mild, stable            Essential hypertension- (present on admission)   Assessment & Plan    Monitor blood pressure.          Anemia- (present on admission)   Assessment & Plan    Monitor H&H.  Transfuse if hemoglobin <7.5 g/dl.   Will need irradiated and CMV free product.        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 10 days of daily infusion.            Quality-Core Measures   Reviewed items::  Labs reviewed, Medications reviewed and Radiology images reviewed  Joshi catheter::  No Joshi  DVT prophylaxis pharmacological::  Contraindicated - High bleeding risk  DVT prophylaxis - mechanical:  SCDs  Antibiotics:  Treating active infection/contamination beyond 24 hours perioperative coverage

## 2018-03-19 NOTE — CARE PLAN
Problem: Mobility  Goal: Risk for activity intolerance will decrease    Intervention: Assess and monitor signs of activity intolerance  Patient up to bedside commode himself. Patient is able to transfer self in a steady safe manner      Problem: Pain Management  Goal: Pain level will decrease to patient's comfort goal    Intervention: Follow pain managment plan developed in collaboration with patient and Interdisciplinary Team  Patient denies pain at this time

## 2018-03-19 NOTE — PROGRESS NOTES
Pt care assumed, discussed plan of care, report received, No pain, afebrile, will continue to monitor,  call light within reach and safety precautions in place.

## 2018-03-19 NOTE — PROGRESS NOTES
Chemotherapy Verification - PRIMARY RN      Height = 177.8 cm  Weight = 85 kg BSA = 2.05 m2       Medication: Alemtuzumab  Dose: 10 mg fixed dose  Calculated Dose: 10 mg Fixed dose, Order dose 10 mg Fixed dose                             (In mg/m2, AUC, mg/kg)            I confirm this process was performed independently with the BSA and all final chemotherapy dosing calculations congruent.  Any discrepancies of 5% or greater have been addressed with the chemotherapy pharmacist. The resolution of the discrepancy has been documented in the EPIC progress notes.

## 2018-03-19 NOTE — PROGRESS NOTES
Chemotherapy Verification - SECONDARY RN       Height = 1.7m  Weight = 85kg  BSA = 2.05m2       Medication: Alemtuzumab  Dose: 10mg fixed dose  Calculated Dose: 10mg fixed dose                             (In mg/m2, AUC, mg/kg)         I confirm that this process was performed independently.

## 2018-03-19 NOTE — PROGRESS NOTES
Patient is alert and oriented. Patient denies pain at this time. Patient did receive chemo therapy today, tolerated well. No family present at this time. Reviewed POC with patient call light within reach hourly rounding in practice.

## 2018-03-19 NOTE — PROGRESS NOTES
"[facility] Hospitalist Progress Note    Date of Service: 3/19/2018    Chief Complaint  77 y.o. male admitted 3/4/2018 with severe pancytopenia and neutropenic fever.  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    Interval Problem Update  3/13 Patient feeling okay today, neutropenia is worsening along with renal function.  Chemo delivery is pending.  Hopefully with initiation of chemo renal function will improve.  Likely all issues are tied to new diagnosis of leukemia.  3/14 Patient without complaints today, states urinating frequently.  He is well hydrated now and renal function is improving, will decrease IVF rate to 75cc and continue to follow creatinine.  He may need rate increase with chemotherapy but for now is well hydrated.    Chemotherapy not yet available.  3/15 Patient states he feels okay, has ambulated in hallways with assistance.  States his urinary stream is strong and he need not go quite so often since decrease in rate of fluids.  Campath delivered this afternoon and will start administration tomorrow for 10 days.  3/16 Patient without complaint, states he is happy that treatment can finally start today.  He tolerated his test dose without complication and full dose to be administered.  Day 1 of 10 Campath.  3/17 Patient states he \"feels great\" and has no complaints.  RN reports temperature elevation this am of 100.8.  Patient not symptomatic and like with chemo/leukemia fever but given neutropenia will continue on antibiotics and I've ordered blood cultures, UA and CXR for further evaluation.  He tolerated the alemtuzumab without issue.  3/18 Patient states he is still doing very well, no issues with chemo, good appetite.  Infection w/u from yesterday unrevealing so likely d/t chemo/cancer than infection.  Cefepime to dc tomorrow if blood cultures remain negative and patient remains afebrile.  Day 3 Campath.  3/19 Patient states he continues to feel well.  Chemo day 4.  Cultures still negative so " will dc cefepime.    Consultants/Specialty  Oncology - [facility]  ID - Jason    Disposition  Tbd, will have prolonged hospital course secondary to neutropenia that will worsen with chemotherapy.        Review of Systems   Constitutional: Positive for fever and malaise/fatigue. Negative for chills.   HENT: Negative for congestion.    Eyes: Negative for blurred vision and photophobia.   Respiratory: Negative for cough and shortness of breath.    Cardiovascular: Negative for chest pain, claudication and leg swelling.   Gastrointestinal: Negative for abdominal pain, constipation, diarrhea, heartburn, nausea and vomiting.   Genitourinary: Negative for dysuria, frequency and hematuria.   Musculoskeletal: Negative for joint pain and myalgias.   Skin: Negative for itching and rash.   Neurological: Negative for dizziness, sensory change, speech change, weakness and headaches.   Psychiatric/Behavioral: Negative for depression. The patient is not nervous/anxious and does not have insomnia.       Physical Exam  Laboratory/Imaging   Hemodynamics  Temp (24hrs), Av.1 °C (98.7 °F), Min:36.6 °C (97.8 °F), Max:37.6 °C (99.7 °F)   Temperature: 37.2 °C (98.9 °F)  Pulse  Av.8  Min: 64  Max: 110    Blood Pressure : 133/79      Respiratory      Respiration: 18, Pulse Oximetry: 94 %        RUL Breath Sounds: Clear, RML Breath Sounds: Clear, RLL Breath Sounds: Clear, GREGORIA Breath Sounds: Clear, LLL Breath Sounds: Clear    Fluids    Intake/Output Summary (Last 24 hours) at 18 1518  Last data filed at 18 0600   Gross per 24 hour   Intake                0 ml   Output              925 ml   Net             -925 ml       Nutrition  Orders Placed This Encounter   Procedures   • DIET ORDER     Standing Status:   Standing     Number of Occurrences:   1     Order Specific Question:   Diet:     Answer:   Cardiac [6]     Physical Exam   Constitutional: He is oriented to person, place, and time. He appears well-developed and  well-nourished. No distress.   HENT:   Head: Normocephalic and atraumatic.   Eyes: Conjunctivae are normal. No scleral icterus.   Neck: Neck supple. No JVD present.   Cardiovascular: Normal rate, regular rhythm and normal heart sounds.  Exam reveals no gallop and no friction rub.    No murmur heard.  Pulmonary/Chest: Effort normal and breath sounds normal. No respiratory distress. He has no wheezes. He exhibits no tenderness.   Abdominal: Soft. Bowel sounds are normal. He exhibits no distension and no mass. There is no tenderness.   Musculoskeletal: He exhibits no edema or tenderness.   Neurological: He is alert and oriented to person, place, and time. No cranial nerve deficit.   Skin: Skin is warm and dry. He is not diaphoretic. No erythema. No pallor.   Psychiatric: He has a normal mood and affect. His behavior is normal.   Nursing note and vitals reviewed.      Recent Labs      03/17/18   0204  03/18/18   0200  03/19/18   0008   WBC  0.6*  0.3*  0.3*   RBC  2.83*  2.65*  2.83*   HEMOGLOBIN  7.7*  7.5*  8.1*   HEMATOCRIT  23.4*  22.2*  23.7*   MCV  82.7  83.8  83.7   MCH  27.2  28.3  28.6   MCHC  32.9*  33.8  34.2   RDW  47.6  49.4  48.0   PLATELETCT  26*  25*  25*   MPV   --   11.8  13.3*     Recent Labs      03/17/18   0204  03/18/18   0200   SODIUM  134*  135   POTASSIUM  4.7  4.9   CHLORIDE  106  107   CO2  21  22   GLUCOSE  125*  122*   BUN  32*  34*   CREATININE  1.44*  1.48*   CALCIUM  7.4*  7.3*                      Assessment/Plan     * Pancytopenia (CMS-HCC)   Assessment & Plan    Secondary to severe bone marrow infiltrate.   T cell LGL - leukemia  Campath start admin 3/16          Thrombocytopenia (CMS-HCC)- (present on admission)   Assessment & Plan    Transfuse prn if less than 10K.         Neutropenic fever (CMS-HCC)- (present on admission)   Assessment & Plan    ANC 0  On Cefepime and fluconazole.   Persistent neutropenia but afebrile  Could be d/t leukemia/chemo rather than infection  Repeat blood  culture, CXR, UA all okay        Maxillary sinusitis- (present on admission)   Assessment & Plan    On abx         Acute renal failure (ARF) (CMS-[facility])- (present on admission)   Assessment & Plan    On IVF  Avoid nephrotoxins   Renally dose all medications         Hyponatremia- (present on admission)   Assessment & Plan    Mild, stable            Essential hypertension- (present on admission)   Assessment & Plan    Monitor blood pressure.          Anemia- (present on admission)   Assessment & Plan    Monitor H&H.  Transfuse if hemoglobin <7.5 g/dl.   Will need irradiated and CMV free product.        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 10 days of daily infusion.            Quality-Core Measures   Reviewed items::  Labs reviewed, Medications reviewed and Radiology images reviewed  Joshi catheter::  No Joshi  DVT prophylaxis pharmacological::  Contraindicated - High bleeding risk  DVT prophylaxis - mechanical:  SCDs  Antibiotics:  Treating active infection/contamination beyond 24 hours perioperative coverage

## 2018-03-19 NOTE — PROGRESS NOTES
Oncology/Hematology Progress Note               Author: Miquel Horta    Cc:  T-cell LGL Date & Time created: 3/19/2018  11:48 AM     Interval History:  He is tolerating the Campath well. He has no fevers or chills. He has a slight cough over the last couple days with some rare clear sputum production. He has no nausea or vomiting. His diarrhea has actually resolved.      PMHx, PSurgHx, SocHx, FAMHx:  All reviewed and no changes    Review of Systems:  Review of Systems   Constitutional: Negative for chills, fever and weight loss.   HENT: Negative for ear pain, nosebleeds and sore throat.    Eyes: Negative for blurred vision, double vision and photophobia.   Respiratory: Positive for cough and sputum production. Negative for hemoptysis and shortness of breath.    Cardiovascular: Negative for chest pain, palpitations, orthopnea and leg swelling.   Gastrointestinal: Negative for abdominal pain, constipation, diarrhea, heartburn, nausea and vomiting.   Genitourinary: Negative for dysuria, frequency, hematuria and urgency.   Skin: Negative for itching and rash.       Physical Exam:  Physical Exam   Constitutional: He is oriented to person, place, and time. He appears well-developed and well-nourished. No distress.   HENT:   Head: Normocephalic and atraumatic.   Mouth/Throat: Oropharynx is clear and moist. No oropharyngeal exudate.   Eyes: Conjunctivae and EOM are normal. Right eye exhibits no discharge. Left eye exhibits no discharge. No scleral icterus.   Neck: Normal range of motion. Neck supple. No tracheal deviation present. No thyromegaly present.   Cardiovascular: Normal rate, regular rhythm and normal heart sounds.  Exam reveals no gallop and no friction rub.    No murmur heard.  Pulmonary/Chest: Effort normal and breath sounds normal. No respiratory distress. He has no wheezes. He has no rales.   Abdominal: Soft. Bowel sounds are normal. He exhibits no distension. There is no tenderness. There is no rebound and  no guarding.   Musculoskeletal: Normal range of motion. He exhibits edema.   Trace bilateral lower extremity edema   Lymphadenopathy:     He has no cervical adenopathy.   Neurological: He is alert and oriented to person, place, and time.   Skin: Skin is warm. No rash noted. He is not diaphoretic. No erythema.   Psychiatric: He has a normal mood and affect. His behavior is normal. Thought content normal.       Labs:        Invalid input(s): [id number]      Recent Labs      18   SODIUM  134*  135   POTASSIUM  4.7  4.9   CHLORIDE  106  107   CO2  21  22   BUN  32*  34*   CREATININE  1.44*  1.48*   CALCIUM  7.4*  7.3*     Recent Labs      18   ALTSGPT   --   37   ASTSGOT   --   28   ALKPHOSPHAT   --   53   TBILIRUBIN   --   1.2   GLUCOSE  125*  122*     Recent Labs      18   0008   RBC  2.83*  2.65*  2.83*   HEMOGLOBIN  7.7*  7.5*  8.1*   HEMATOCRIT  23.4*  22.2*  23.7*   PLATELETCT  26*  25*  25*     Recent Labs      180  18   0008   WBC  0.6*  0.3*  0.3*   NEUTSPOLYS  92.80*  93.60*  CANCEL   LYMPHOCYTES  2.70*  1.80*  CANCEL   MONOCYTES  0.00  0.90  CANCEL   EOSINOPHILS  0.00  0.00  CANCEL   BASOPHILS  0.90  0.00  CANCEL   ASTSGOT   --   28   --    ALTSGPT   --   37   --    ALKPHOSPHAT   --   53   --    TBILIRUBIN   --   1.2   --      Recent Labs      18   SODIUM  134*  135   POTASSIUM  4.7  4.9   CHLORIDE  106  107   CO2  21  22   GLUCOSE  125*  122*   BUN  32*  34*   CREATININE  1.44*  1.48*   CALCIUM  7.4*  7.3*     Hemodynamics:  Temp (24hrs), Av.1 °C (98.7 °F), Min:36.6 °C (97.8 °F), Max:37.6 °C (99.7 °F)  Temperature: 37.2 °C (98.9 °F)  Pulse  Av.8  Min: 64  Max: 110   Blood Pressure : 133/79     Respiratory:    Respiration: 18, Pulse Oximetry: 94 %        RUL Breath Sounds: Clear, RML Breath Sounds: Clear, RLL Breath Sounds: Clear, GREGORIA  Breath Sounds: Clear, LLL Breath Sounds: Clear  Fluids:    Intake/Output Summary (Last 24 hours) at 03/19/18 1148  Last data filed at 03/19/18 0600   Gross per 24 hour   Intake                0 ml   Output              925 ml   Net             -925 ml        GI/Nutrition:  Orders Placed This Encounter   Procedures   • DIET ORDER     Standing Status:   Standing     Number of Occurrences:   1     Order Specific Question:   Diet:     Answer:   Cardiac [6]     Medical Decision Making, by Problem:  Active Hospital Problems    Diagnosis   • *Pancytopenia (CMS-[facility]) [D61.818]   • Thrombocytopenia (CMS-[facility]) [D69.6]   • Neutropenic fever (CMS-[facility]) [D70.9, R50.81]   • Maxillary sinusitis [J32.0]   • Acute renal failure (ARF) (CMS-[facility]) [N17.9]   • Hyponatremia [E87.1]   • Essential hypertension [I10]   • 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  T cell LGL/Leukemia-- presented with pancytopenia.  Campath started on 3/16/18, per Dr. Klein's discussion with [place].  He seems to be tolerating well.     Monitor for Campath side effects. These can include autoimmune side effects such as pneumonitis,  Hypothyroidism, kidney disease, etc. sometimes can see in autoimmune ITP or hemolytic picture with this treatment.  Can certainly see bone marrow suppression (but in his case we must treat through the low blood counts).  Also will be at high risk for opportunistic infections.   represents HSV prophylaxis, and PCP prophylaxis.    Monitor during treatment:  CD4+ lymphocyte counts (after treatment until recovery); CMV antigen baseline neg (routinely during and for 2 months after treatment); consider TSH at baseline ( slight elevation TSH and free T3)  and then every 2 to 3 months during alemtuzumab treatment    -- Day 4 of 10 today  --Continue  Campath as ordered  --Continue prophylactic acyclovir  --Add prophylactic Bactrim DS every Monday Wednesday Friday      2.  Anemia--  ROSE MARIE positive IGG but normal Ret count, LDH, and hapto.  Bili stable 2.0 range. There may be a small component of hemolysis. Not Brisk . Patient already on low dose steroids  -- monitor   --Transfuse if hemoglobin less than 7    3.  Thrombocytopenia--related to problem #1. No obvious signs of bleeding. We will continue to monitor.  --Transfuse if platelets less than 10, or if bleeding  --All blood products CMV negative and irradiated    4. Renal insufficiency--  Cr stable at 1.2-1.5    5. Neutropenic fever--  on cefepime, acyclovir, and fluconazole.  ID following patient. He spiked again on 3/17/18 100.8 ?? Due to treatment . Pancultured.   --Continue broad-spectrum antibiotics  --Continue to monitor.    6. Severe hypogammaglobulinemia-- possibly Due to lymphoma . Given IVIG on 3/12/18     7. Elevated Baseline TSH--high prior to starting Campath.  Monitor.  Likely due to stress.   --Will Check again this week,  Also with a free T4.         High complexicity/Drug monitoring       Quality-Core Measures   Reviewed items::  Labs reviewed and Medications reviewed  Joshi catheter::  No Joshi  DVT prophylaxis pharmacological::  Contraindicated - High bleeding risk

## 2018-03-19 NOTE — PROGRESS NOTES
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 Alemtuzumab in T-cell large granular lymphocytic leukaemia: a phase 2 study. Lancet Haematol. 2016 Jan; 3(1):e22-e29.     Allergies:  Nitroglycerin and Losartan     /79   Pulse (!) 101   Temp 37.2 °C (98.9 °F)   Resp 18   Ht 1.778 m (5' 10\")   Wt 85 kg (187 lb 6.3 oz)   SpO2 94%   BMI 26.89 kg/m²  Body surface area is 2.05 meters squared.      3/19/18-  ANC~ 0(WBC = 0.3) Plt = 25k   Hgb = 8.1       3/18/18-  SCr = 1.48mg/dL         CrCl ~ 50mL/min   LFT's = WNL   TBili = 1.2     MD aware of all current lab results. Orders received to continue with treatment.       3/11/2018 04:30   Cmv Ab Igm <8.0   CMV IgG Qnt <0.20     2/25/18 ECHO LVEF = 65%  3/16/18 TSH = 6.7 T3 free 1.38     Monitor during treatment - CD4+ lymphocyte counts (after treatment until recovery); CMV antigen (routinely during and for 2 months after treatment); consider TSH at baseline and then every 2 to 3 months during alemtuzumab treatment      ID following    Premeds: diphenhydramine 50 mg IV                   Acetaminophen 650 mg PO                   Hydrocortisone 100 mg IV (verified Dr. Mitchell wants this in addition to oral prednisone)    Drug Order   (Drug name, dose, route, IV Fluid & volume, frequency, number of doses) Cycle: C1 Day 4 of 10      Previous treatment: s/p RCVP and Rituxan/bendamustin at [facility] with long term remission.      Medication = Alemtuzumab  Base Dose= 10 mg fixed dose  Calc Dose: N/A  Final Dose = 10 mg (EPIC rounds to 9.9 mg)  Route = IV   Fluid & Volume =  mL  Admin Duration = Over 2 hours     Days 2-10       <5% difference, ok to treat with " final written dose     By my signature below, I confirm this process was performed independently with the BSA and all final chemotherapy dosing calculations congruent. I have reviewed the above chemotherapy order and that my calculation of the final dose and BSA (when applicable) corroborate those calculations of the  pharmacist. Discrepancies of 5% or greater in the written dose have been addressed and documented within the EPIC Progress notes.    FALGUNI Matthew Pharm.D.

## 2018-03-20 NOTE — THERAPY
Spoke with pt who states he is not concerned about going home. States that he is up ambulating the halls with a FWW. Pt may need FWW at time of d/c though pt reports wife is trying to obtain one as well as wife obtaining a BSC. Pt no longer requires acute physical therapy services and is often ambulating with staff with FWW. May require outpatient physical therapy at a later date for weaning from AD. Available for d/c planning as necessary.

## 2018-03-20 NOTE — PROGRESS NOTES
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 Alemtuzumab in T-cell large granular lymphocytic leukaemia: a phase 2 study. Lancet Haematol. 2016 Jan; 3(1):e22-e29.     Allergies:  Nitroglycerin and Losartan     /58   Pulse 80   Temp 36.9 °C (98.5 °F)   Resp 18   Ht 1.778 m (5' 10\")   Wt 85 kg (187 lb 6.3 oz)   SpO2 96%   BMI 26.89 kg/m²  Body surface area is 2.05 meters squared.      3/20/18-  ANC~ 0(WBC = 0.2) Plt = 26k   Hgb = 7.4  SCr = 1.67mg/dL         CrCl ~ 45mL/min (no renal dose adjustment recommended)       3/18/18-  LFT's = WNL   TBili = 1.2     MD aware of all current lab results. Orders received to continue with treatment per Dr. Horta  Per MD will transfuse if hemoglobin less than 7 and/or if platelets less than 10, or if bleeding.     3/11/2018 04:30   Cmv Ab Igm <8.0   CMV IgG Qnt <0.20     2/25/18 ECHO LVEF = 65%  3/16/18 TSH = 6.7 T3 free 1.38     Monitor during treatment - CD4+ lymphocyte counts (after treatment until recovery); CMV antigen (routinely during and for 2 months after treatment); consider TSH at baseline and then every 2 to 3 months during alemtuzumab treatment      ID following    Premeds: diphenhydramine 50 mg IV                   Acetaminophen 650 mg PO                   Hydrocortisone 100 mg IV (verified Dr. Mitchell wants this in addition to oral prednisone)    Drug Order   (Drug name, dose, route, IV Fluid & volume, frequency, number of doses) Cycle: C1 Day 5 of 10      Previous treatment: s/p RCVP and Rituxan/bendamustine at [facility] with long term remission.      Medication = Alemtuzumab  Base Dose= 10 mg fixed dose  Calc Dose: N/A  Final Dose = 10 mg " (EPIC rounds to 9.9 mg)  Route = IV   Fluid & Volume =  mL  Admin Duration = Over 2 hours     Days 2-10       <5% difference, ok to treat with final written dose     By my signature below, I confirm this process was performed independently with the BSA and all final chemotherapy dosing calculations congruent. I have reviewed the above chemotherapy order and that my calculation of the final dose and BSA (when applicable) corroborate those calculations of the  pharmacist. Discrepancies of 5% or greater in the written dose have been addressed and documented within the [facility] Progress notes.    FALGUNI Matthew, Pharm.D.

## 2018-03-20 NOTE — DIETARY
Brief Nutrition Services Note: Pt seen for weekly re-screening. Pt reports good PO, eating ~75% of meals and drinks one Premier Protein drink/day (160 kcals and 30 g pro/carton) from home. Pt did not want Boost supplements from hospital, prefers brand wife brings in. Per ADL, pt eating >50% of last six documented meals. Expect adequate intake at this time. RD available PRN.

## 2018-03-20 NOTE — PROGRESS NOTES
Patient is alert and oriented. Up self to the BSC. Up with one assist when ambulating. Chemo therapy infusing. Patient has had multiple loose to watery stools today. Reviewed POC with patient call light within reach hourly rounding in practice.

## 2018-03-20 NOTE — THERAPY
"Occupational Therapy Treatment completed with focus on ADLs, ADL transfers and patient education.  Functional Status:  Pt seen for OT tx today, demonstrating fair activity tolerance with mild sob with ADLs, educated on activity pacing, and AE for EC techniques; stated understanding. Pt performed ADLs and functional mobility with supervision, will follow 1x more next week to assess for function post chemo treatments. Pt reports will have support through spouse and friends upon return home as much as needed.   Plan of Care: Will benefit from Occupational Therapy 1 times per week  Discharge Recommendations:  Equipment Will Continue to Assess for Equipment Needs. Post-acute therapy Discharge to home with outpatient or home health for additional skilled therapy services.    See \"Rehab Therapy-Acute\" Patient Summary Report for complete documentation.   "

## 2018-03-20 NOTE — CARE PLAN
Problem: Pain Management  Goal: Pain level will decrease to patient's comfort goal    Intervention: Follow pain managment plan developed in collaboration with patient and Interdisciplinary Team  Patient denies pain at this time      Problem: Psychosocial Needs:  Goal: Level of anxiety will decrease    Intervention: Identify and develop with patient strategies to cope with anxiety triggers  Emotional support provided

## 2018-03-20 NOTE — PROGRESS NOTES
Chemotherapy Verification - SECONDARY RN       Height = 177.8 cm  Weight = 85 kg  BSA = 2.05 m2       Medication: alemtuzumab  Dose: 10 mg fixed dose  Calculated Dose: 10 mg fixed dose                             (In mg/m2, AUC, mg/kg)       I confirm that this process was performed independently.

## 2018-03-20 NOTE — PROGRESS NOTES
Pt care assumed, discussed plan of care, report received, Pt eating dinner at edge of bed, no needs at this time will monitor, call light within reach and safety precautions in place.

## 2018-03-20 NOTE — PROGRESS NOTES
"[facility] Hospitalist Progress Note    Date of Service: 3/20/2018    Chief Complaint  77 y.o. male admitted 3/4/2018 with severe pancytopenia and neutropenic fever.  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    Interval Problem Update  3/13 Patient feeling okay today, neutropenia is worsening along with renal function.  Chemo delivery is pending.  Hopefully with initiation of chemo renal function will improve.  Likely all issues are tied to new diagnosis of leukemia.  3/14 Patient without complaints today, states urinating frequently.  He is well hydrated now and renal function is improving, will decrease IVF rate to 75cc and continue to follow creatinine.  He may need rate increase with chemotherapy but for now is well hydrated.    Chemotherapy not yet available.  3/15 Patient states he feels okay, has ambulated in hallways with assistance.  States his urinary stream is strong and he need not go quite so often since decrease in rate of fluids.  Campath delivered this afternoon and will start administration tomorrow for 10 days.  3/16 Patient without complaint, states he is happy that treatment can finally start today.  He tolerated his test dose without complication and full dose to be administered.  Day 1 of 10 Campath.  3/17 Patient states he \"feels great\" and has no complaints.  RN reports temperature elevation this am of 100.8.  Patient not symptomatic and like with chemo/leukemia fever but given neutropenia will continue on antibiotics and I've ordered blood cultures, UA and CXR for further evaluation.  He tolerated the alemtuzumab without issue.  3/18 Patient states he is still doing very well, no issues with chemo, good appetite.  Infection w/u from yesterday unrevealing so likely d/t chemo/cancer than infection.  Cefepime to dc tomorrow if blood cultures remain negative and patient remains afebrile.  Day 3 Campath.  3/19 Patient states he continues to feel well.  Chemo day 4.  Cultures still negative so " will dc cefepime.  3/20: chemo day 5/10, tolerating campath with the premedication. Two loose stools today, no fever or chills, no abdominal pain. Getting drops in eye as usual.    Consultants/Specialty  Oncology - Paula FISHER - Jason    Disposition  Tbd, will have prolonged hospital course secondary to neutropenia that will worsen with chemotherapy.        Review of Systems   Constitutional: Positive for fever and malaise/fatigue. Negative for chills.   HENT: Negative for congestion.    Eyes: Negative for blurred vision and photophobia.   Respiratory: Negative for cough and shortness of breath.    Cardiovascular: Negative for chest pain, claudication and leg swelling.   Gastrointestinal: Positive for diarrhea. Negative for abdominal pain, constipation, heartburn, nausea and vomiting.   Genitourinary: Negative for dysuria, frequency and hematuria.   Musculoskeletal: Negative for joint pain and myalgias.   Skin: Negative for itching and rash.   Neurological: Negative for dizziness, sensory change, speech change, weakness and headaches.   Psychiatric/Behavioral: Negative for depression. The patient is not nervous/anxious and does not have insomnia.       Physical Exam  Laboratory/Imaging   Hemodynamics  Temp (24hrs), Av °C (98.6 °F), Min:36.5 °C (97.7 °F), Max:37.4 °C (99.3 °F)   Temperature: 36.9 °C (98.5 °F)  Pulse  Av.7  Min: 64  Max: 110    Blood Pressure : 105/58      Respiratory      Respiration: 18, Pulse Oximetry: 96 %        RUL Breath Sounds: Clear, RML Breath Sounds: Clear, RLL Breath Sounds: Diminished, GREGORIA Breath Sounds: Clear, LLL Breath Sounds: Diminished    Fluids    Intake/Output Summary (Last 24 hours) at 18 0834  Last data filed at 18 0600   Gross per 24 hour   Intake             1589 ml   Output             2125 ml   Net             -536 ml       Nutrition  Orders Placed This Encounter   Procedures   • DIET ORDER     Standing Status:   Standing     Number of Occurrences:   1      Order Specific Question:   Diet:     Answer:   Cardiac [6]     Physical Exam   Constitutional: He is oriented to person, place, and time. He appears well-developed and well-nourished. No distress.   HENT:   Head: Normocephalic and atraumatic.   Eyes: Conjunctivae are normal. No scleral icterus.   Neck: Neck supple. No JVD present.   Cardiovascular: Normal rate, regular rhythm and normal heart sounds.  Exam reveals no gallop and no friction rub.    No murmur heard.  Pulmonary/Chest: Effort normal and breath sounds normal. No respiratory distress. He has no wheezes. He exhibits no tenderness.   Abdominal: Soft. Bowel sounds are normal. He exhibits no distension and no mass. There is no tenderness.   Musculoskeletal: He exhibits no edema or tenderness.   Neurological: He is alert and oriented to person, place, and time. No cranial nerve deficit.   Skin: Skin is warm and dry. He is not diaphoretic. No erythema. No pallor.   Psychiatric: He has a normal mood and affect. His behavior is normal.   Nursing note and vitals reviewed.      Recent Labs      03/18/18   0200  03/19/18   0008  03/20/18   0300   WBC  0.3*  0.3*  0.2*   RBC  2.65*  2.83*  2.71*   HEMOGLOBIN  7.5*  8.1*  7.4*   HEMATOCRIT  22.2*  23.7*  22.7*   MCV  83.8  83.7  83.8   MCH  28.3  28.6  27.3   MCHC  33.8  34.2  32.6*   RDW  49.4  48.0  49.1   PLATELETCT  25*  25*  26*   MPV  11.8  13.3*  11.7     Recent Labs      03/18/18   0200  03/20/18   0300   SODIUM  135  133*   POTASSIUM  4.9  4.9   CHLORIDE  107  105   CO2  22  19*   GLUCOSE  122*  103*   BUN  34*  35*   CREATININE  1.48*  1.67*   CALCIUM  7.3*  7.4*                      Assessment/Plan     * Pancytopenia (CMS-HCC)   Assessment & Plan    Secondary to severe bone marrow infiltrate.   T cell LGL - leukemia  Campath start admin 3/16 discussed with Dr. Horta, patient will be here for the duration of campath administration.          Thrombocytopenia (CMS-HCC)- (present on admission)   Assessment  & Plan    Transfuse prn if less than 10K. No bleeding.        Neutropenic fever (CMS-HCC)- (present on admission)   Assessment & Plan    ANC 0  On Cefepime and fluconazole.   Persistent neutropenia but afebrile  Could be d/t leukemia/chemo rather than infection  Repeat blood culture, CXR, UA all okay continue to monitor        Maxillary sinusitis- (present on admission)   Assessment & Plan    On abx         Acute renal failure (ARF) (CMS-HCC)- (present on admission)   Assessment & Plan    On IVF  Avoid nephrotoxins   Renally dose all medications         Hyponatremia- (present on admission)   Assessment & Plan    Mild, stable            Essential hypertension- (present on admission)   Assessment & Plan    Monitor blood pressure.          Anemia- (present on admission)   Assessment & Plan    Monitor H&H.  Transfuse if hemoglobin <7.5 g/dl.   Will need irradiated and CMV free product.        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 10 days of daily infusion.  Patient will remain inpatient to complete campath discussed with Dr. Horta            Quality-Core Measures   Reviewed items::  Labs reviewed, Medications reviewed and Radiology images reviewed  Joshi catheter::  No Joshi  DVT prophylaxis pharmacological::  Contraindicated - High bleeding risk  DVT prophylaxis - mechanical:  SCDs  Antibiotics:  Treating active infection/contamination beyond 24 hours perioperative coverage

## 2018-03-21 NOTE — PROGRESS NOTES
Infectious Disease Progress Note    Author: Gricel Murguia M.D. Date & Time of service: 3/21/2018  4:00 PM    Chief Complaint:  FU neutropenic fever    Interval History:  76 y/o WM admitted with febrile neutropenia    2/28 Tmax 100.5, having coughing fit and SOB, plan for BM biopsy  3/1Tmax 100.2, WBC 0.3, had a BM this am, cough better, repeat CXR with no infiltrate   3/2 Tmax 100.5, shortness of breath with exertion, had bloody nose this am, plts 22, denies abd pain or diarrhea, path neg for lyphoma  3/3/2018-Tmax 99.6. WBC 0.3 platelets 17 creatinine 1.38  3/4/2018-Tmax 99.2 WBCs 0.3 creatinine 1.38 complains of some shortness of breath  3/5 AF WBC 0.3 transferred to [facility] no new complaints  3/6 AF WBC 0.3 no positive cxs No new complaints  3/7 AF WBC 0.4 no complaints except fatigue and CORRALES  3/8 AF (99.8) no CBC transferred to Oncology floor-no acute events  3/9 AF WBC 0.5 feels weak-no new complaints  3/10 AF (99.9) plan for Campath noted  3/12/2018-Tmax 98.4 WBC 0.7 creatinine 1.55  3/13/2018-no fevers. Remains neutropenic. No new issues overnight  3/14/2018-Tmax 98.6 WBC 0.6 creatinine 1.44 no new issues overnight  3/15/2018-no fevers WBC 0.6 ambulating in the halls  3/16/2018-no fevers. No new issues overnight. WBC 0.5  3/21 Tmax 103, WBC 0.1, ID recalled for recurrent fevers, pt started having watery diarrhea yesterday with poor appetite but no abdominal pain, C diff pending. Denies any cough, SOB or new skin rash  Labs Reviewed, Medications Reviewed, Radiology Reviewed and Wound Reviewed.    Review of Systems:  Review of Systems   Constitutional: Positive for fever.   Respiratory: Negative for cough and shortness of breath.    Cardiovascular: Negative for chest pain.   Gastrointestinal: Positive for diarrhea. Negative for abdominal pain, nausea and vomiting.   Genitourinary: Negative for dysuria.   Skin: Negative for rash.   Neurological: Positive for weakness.   All other systems reviewed and are  negative.      Hemodynamics:  Temp (24hrs), Av.4 °C (101.1 °F), Min:37 °C (98.6 °F), Max:39.4 °C (103 °F)  Temperature: 37 °C (98.6 °F)  Pulse  Av.3  Min: 64  Max: 110   Blood Pressure : 122/75       Physical Exam:  Physical Exam   Constitutional: He is oriented to person, place, and time. He appears well-developed. No distress.   HENT:   Head: Normocephalic and atraumatic.   Eyes: EOM are normal. Pupils are equal, round, and reactive to light.   Neck: Neck supple.   Cardiovascular: Normal rate.    Pulmonary/Chest: Effort normal. No respiratory distress. He has no wheezes. He has no rales.   Abdominal: Soft. He exhibits no distension. There is no tenderness.   Musculoskeletal: He exhibits no edema.   LUE PICC   Neurological: He is alert and oriented to person, place, and time.   Skin: No rash noted.   ecchymosis   Nursing note and vitals reviewed.      Meds:    Current Facility-Administered Medications:   •  cefepime  •  NS  •  Pharmacy  •  sulfamethoxazole-trimethoprim  •  diphenhydrAMINE  •  acetaminophen  •  hydrocortisone sodium succinate PF  •  ondansetron  •  generic chemo template  •  fluconazole  •  diphenhydrAMINE  •  allopurinol  •  Ganciclovir  •  dexamethasone  •  acetaminophen  •  NS  •  PICC Line Insertion has been implemented **AND** May use Lidocaine 1% not to exceed 3 mls for local at insertion site **AND** NOTIFY MD **AND** Tip to dwell in the superior vena cava **AND** Do not use PICC Line until placement verified by Chest X Ray **AND** [CANCELED] DX-CHEST-FOR PICC LINE Perform procedure in: Other(comment f6 below): **AND** If radiologist reading of chest X-ray states any of the following the PICC should be used **AND** Further evaluation of the PICC placement can be retrieved from X-Ray and Imaging **AND** Blood draws through PICC line; draws by RN only **AND** FLUSHING GUIDELINES WHEN IN USE **AND** normal saline PF **AND** FLUSHING GUIDELINES WHEN NOT IN USE **AND** DRESSING  MAINTENANCE **AND** Change needleless pressure ports and IV tubing every 72 hours per hospital policy **AND** TUBING **AND** If there is an MD order to remove the PICC line, any RN may remove the PICC line **AND** [] PATIENT EDUCATION MATERIALS **AND** NURSING COMMUNICATION  •  acyclovir  •  NS  •  acetaminophen  •  benzocaine-menthol  •  benzonatate  •  labetalol  •  levothyroxine  •  ondansetron  •  potassium chloride SA  •  PARoxetine  •  zolpidem    Labs:  Recent Labs      18   0008  18   0300  18   0440   WBC  0.3*  0.2*  0.1*   RBC  2.83*  2.71*  2.74*   HEMOGLOBIN  8.1*  7.4*  7.5*   HEMATOCRIT  23.7*  22.7*  22.6*   MCV  83.7  83.8  82.5   MCH  28.6  27.3  27.4   RDW  48.0  49.1  48.5   PLATELETCT  25*  26*  20*   MPV  13.3*  11.7   --    NEUTSPOLYS  CANCEL  cancel  CANCEL   LYMPHOCYTES  CANCEL  cancel  CANCEL   MONOCYTES  CANCEL  cancel  CANCEL   EOSINOPHILS  CANCEL  cancel  CANCEL   BASOPHILS  CANCEL  cancel  CANCEL     Recent Labs      18   0300  18   0440   SODIUM  133*  130*   POTASSIUM  4.9  4.0   CHLORIDE  105  103   CO2  19*  20   GLUCOSE  103*  93   BUN  35*  36*     Recent Labs      18   0300  18   0440   CREATININE  1.67*  1.59*       Imaging:  CXR 3/21 with small right pleural effusion    Micro:  Results     Procedure Component Value Units Date/Time    URINALYSIS [[id number]]  (Abnormal) Collected:  18 1254    Order Status:  Completed Specimen:  Urine from Urine, Clean Catch Updated:  18 1414     Color Yellow     Character Cloudy (A)     Specific Gravity 1.015     Ph 5.0     Glucose Negative mg/dL      Ketones Negative mg/dL      Protein 100 (A) mg/dL      Bilirubin Negative     Urobilinogen, Urine 0.2     Nitrite Negative     Leukocyte Esterase Negative     Occult Blood Small (A)     Micro Urine Req Microscopic    Narrative:       Special Contact [id number] SHARMIN ALICEA  Does this patient have risk factors for  "C-diff?->Yes  Has patient taken stool softeners or laxatives in the last 5  days?->No    CDIFF BY PCR WITH TOXIN [[id number]] Collected:  03/21/18 1306    Order Status:  Completed Specimen:  Stool from Stool Updated:  03/21/18 1330    Narrative:       Special Contact [id number] SHARMIN ALICEA  Does this patient have risk factors for C-diff?->Yes  Has patient taken stool softeners or laxatives in the last 5  days?->No    Culture Respiratory W/ GRM STN [[id number]]     Order Status:  Completed Specimen:  Respirate from Sputum     BLOOD CULTURE [[id number]] Collected:  03/21/18 1049    Order Status:  Completed Specimen:  Blood from Peripheral Updated:  03/21/18 1131    Narrative:       Protective  Per Hospital Policy: Only change Specimen Src: to \"Line\" if  specified by physician order.    BLOOD CULTURE [[id number]] Collected:  03/21/18 1049    Order Status:  Completed Specimen:  Blood from Peripheral Updated:  03/21/18 1130    Narrative:       Protective  Per Hospital Policy: Only change Specimen Src: to \"Line\" if  specified by physician order.    BLOOD CULTURE [[id number]] Collected:  03/17/18 0907    Order Status:  Completed Specimen:  Blood from Peripheral Updated:  03/18/18 0915     Significant Indicator NEG     Source BLD     Site PERIPHERAL     Blood Culture No Growth    Note: Blood cultures are incubated for 5 days and  are monitored continuously.Positive blood cultures  are called to the RN and reported as soon as  they are identified.      Narrative:       Protective  Per Hospital Policy: Only change Specimen Src: to \"Line\" if  specified by physician order.    BLOOD CULTURE [[id number]] Collected:  03/17/18 0907    Order Status:  Completed Specimen:  Blood from Peripheral Updated:  03/18/18 0915     Significant Indicator NEG     Source BLD     Site PERIPHERAL     Blood Culture No Growth    Note: Blood cultures are incubated for 5 days and  are monitored continuously.Positive blood cultures  are called to " "the RN and reported as soon as  they are identified.      Narrative:       Protective  Per Hospital Policy: Only change Specimen Src: to \"Line\" if  specified by physician order.    URINALYSIS [[id number]]  (Abnormal) Collected:  03/17/18 1330    Order Status:  Completed Specimen:  Urine from Urine, Clean Catch Updated:  03/17/18 1413     Color Yellow     Character Clear     Specific Gravity 1.007     Ph 5.5     Glucose Negative mg/dL      Ketones Negative mg/dL      Protein 30 (A) mg/dL      Bilirubin Negative     Urobilinogen, Urine 0.2     Nitrite Negative     Leukocyte Esterase Negative     Occult Blood Small (A)     Micro Urine Req Microscopic    Narrative:       [id number] LEE ALICEA          Assessment:  Active Hospital Problems    Diagnosis   • *Pancytopenia (CMS-[facility]) [D61.818]   • Thrombocytopenia (CMS-HCC) [D69.6]   • Neutropenic fever (CMS-HCC) [D70.9, R50.81]   • Maxillary sinusitis [J32.0]   • Acute renal failure (ARF) (CMS-HCC) [N17.9]   • Hyponatremia [E87.1]   • Essential hypertension [I10]   • 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    Repeat Bcx today  Repeat Cdiff pending    Diarrhea  Repeat C diff pending    ROHITH, worse  Renally dose abx as needed  Avoid nephrotoxins  Monitor closely once Campath and Bactrim started    DW Dr Melendez  "

## 2018-03-21 NOTE — CARE PLAN
Problem: Safety  Goal: Will remain free from falls  Outcome: PROGRESSING AS EXPECTED  Encouraged pt to call for assistance when needed. Call light and personal belongings within reach. Bed in lowest, locked position. Adequate lighting provided. Room free from clutter.     Problem: Infection  Goal: Will remain free from infection  Outcome: PROGRESSING AS EXPECTED  Encouraged frequent hand hygiene. Assessed for signs/symptoms of infection.

## 2018-03-21 NOTE — PROGRESS NOTES
MD notified of oral temperature of 103F, orders received.  Cultures drawn peripherally.  Lactic drawn.  CXR called to bedside and will be coming to take image.  Pt instructed to provide an UA.   Antibiotic administrated after blood culture draw.   Pt medicate per MAR with tylenol to decrease fever.

## 2018-03-21 NOTE — PROGRESS NOTES
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 Alemtuzumab in T-cell large granular lymphocytic leukaemia: a phase 2 study. Lancet Haematol. 2016 Jan; 3(1):e22-e29.     Allergies:  Nitroglycerin and Losartan     /79   Pulse 100   Temp (!) 39.4 °C (103 °F)   Resp 19   Ht 1.778 m (5' 10\")   Wt 85 kg (187 lb 6.3 oz)   SpO2 100%   BMI 26.89 kg/m²  Body surface area is 2.05 meters squared.      3/21/18-  ANC~ 0(WBC = 0.1) Plt = 20k   Hgb = 7.5  SCr = 1.59mg/dL         CrCl ~ 47mL/min (no renal dose adjustment recommended)       3/18/18-  LFT's = WNL   TBili = 1.2     MD aware of all current lab results.  New onset fevers/diarrhea d/w Dr. Horta. Orders received to continue with treatment per Dr. Horta.  Per MD will transfuse if hemoglobin less than 7 and/or if platelets less than 10, or if bleeding.     3/11/2018 04:30   Cmv Ab Igm <8.0   CMV IgG Qnt <0.20     2/25/18 ECHO LVEF = 65%  3/16/18 TSH = 6.7 T3 free 1.38     Monitor during treatment - CD4+ lymphocyte counts (after treatment until recovery); CMV antigen (routinely during and for 2 months after treatment); consider TSH at baseline and then every 2 to 3 months during alemtuzumab treatment      ID following    Premeds: diphenhydramine 50 mg IV                   Acetaminophen 650 mg PO                   Hydrocortisone 100 mg IV (verified Dr. Mitchell wants this in addition to oral prednisone)    Drug Order   (Drug name, dose, route, IV Fluid & volume, frequency, number of doses) Cycle: C1 Day 6 of 10      Previous treatment: s/p RCVP and Rituxan/bendamustine at [place] with long term remission.      Medication = Alemtuzumab  Base Dose= 10 mg " fixed dose  Calc Dose: N/A  Final Dose = 10 mg (EPIC rounds to 9.9 mg)  Route = IV   Fluid & Volume =  mL  Admin Duration = Over 2 hours     Days 2-10       <5% difference, ok to treat with final written dose     By my signature below, I confirm this process was performed independently with the BSA and all final chemotherapy dosing calculations congruent. I have reviewed the above chemotherapy order and that my calculation of the final dose and BSA (when applicable) corroborate those calculations of the  pharmacist. Discrepancies of 5% or greater in the written dose have been addressed and documented within the [facility] Progress notes.    FALGUNI Matthew, Pharm.D.

## 2018-03-21 NOTE — PROGRESS NOTES
Assumed care of pt at 1845. Received report from MELINA Clemens. Pt A/Ox 4, sitting at edge of bed, eating dinner with spouse.  R PICC in place - NS @ 125mL/hr. Pt is currently on day 5 of chemotherapy. Pt has been experiencing diarrhea, MD is aware. Will continue to assess vital signs every 4 hours with oral temp / on neutropenic precautions. Discussed POC with pt at bedside. No complaints of pain or nausea. Pt up self with FWW. Up self to BSC. Bed in lowest locked position, call light within reach.

## 2018-03-21 NOTE — PROGRESS NOTES
Pat from Lab called with critical result of WBC 0.1 and Plt 20 at 0542. Critical lab result read back to Pat.   This critical lab result is within parameters established by [facility] policy for this patient

## 2018-03-21 NOTE — PROGRESS NOTES
"[facility] Hospitalist Progress Note    Date of Service: 3/21/2018    Chief Complaint  77 y.o. male admitted 3/4/2018 with severe pancytopenia and neutropenic fever.  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    Interval Problem Update  3/13 Patient feeling okay today, neutropenia is worsening along with renal function.  Chemo delivery is pending.  Hopefully with initiation of chemo renal function will improve.  Likely all issues are tied to new diagnosis of leukemia.  3/14 Patient without complaints today, states urinating frequently.  He is well hydrated now and renal function is improving, will decrease IVF rate to 75cc and continue to follow creatinine.  He may need rate increase with chemotherapy but for now is well hydrated.    Chemotherapy not yet available.  3/15 Patient states he feels okay, has ambulated in hallways with assistance.  States his urinary stream is strong and he need not go quite so often since decrease in rate of fluids.  Campath delivered this afternoon and will start administration tomorrow for 10 days.  3/16 Patient without complaint, states he is happy that treatment can finally start today.  He tolerated his test dose without complication and full dose to be administered.  Day 1 of 10 Campath.  3/17 Patient states he \"feels great\" and has no complaints.  RN reports temperature elevation this am of 100.8.  Patient not symptomatic and like with chemo/leukemia fever but given neutropenia will continue on antibiotics and I've ordered blood cultures, UA and CXR for further evaluation.  He tolerated the alemtuzumab without issue.  3/18 Patient states he is still doing very well, no issues with chemo, good appetite.  Infection w/u from yesterday unrevealing so likely d/t chemo/cancer than infection.  Cefepime to dc tomorrow if blood cultures remain negative and patient remains afebrile.  Day 3 Campath.  3/19 Patient states he continues to feel well.  Chemo day 4.  Cultures still negative so " will dc cefepime.  3/20: chemo day 5/10, tolerating campath with the premedication. Two loose stools today, no fever or chills, no abdominal pain. Getting drops in eye as usual.  3/21: recurrent fever, up to 103, watery diarrhea, weakness and malaise    Consultants/Specialty  Oncology - [facility]  ID - Jason    Disposition  Tbd, will have prolonged hospital course secondary to neutropenia that will worsen with chemotherapy.        Review of Systems   Constitutional: Positive for fever and malaise/fatigue. Negative for chills.   HENT: Negative for congestion.    Eyes: Negative for blurred vision and photophobia.   Respiratory: Negative for cough and shortness of breath.    Cardiovascular: Negative for chest pain, claudication and leg swelling.   Gastrointestinal: Positive for diarrhea. Negative for abdominal pain, constipation, heartburn, nausea and vomiting.   Genitourinary: Negative for dysuria, frequency and hematuria.   Musculoskeletal: Negative for joint pain and myalgias.   Skin: Negative for itching and rash.   Neurological: Negative for dizziness, sensory change, speech change, weakness and headaches.   Psychiatric/Behavioral: Negative for depression. The patient is not nervous/anxious and does not have insomnia.       Physical Exam  Laboratory/Imaging   Hemodynamics  Temp (24hrs), Av.4 °C (101.1 °F), Min:37 °C (98.6 °F), Max:39.4 °C (103 °F)   Temperature: 37 °C (98.6 °F)  Pulse  Av.3  Min: 64  Max: 110    Blood Pressure : 122/75      Respiratory      Respiration: 19, Pulse Oximetry: 92 %        RUL Breath Sounds: Clear, RML Breath Sounds: Clear, RLL Breath Sounds: Diminished, GREGORIA Breath Sounds: Clear, LLL Breath Sounds: Diminished    Fluids  No intake or output data in the 24 hours ending 18 1709    Nutrition  Orders Placed This Encounter   Procedures   • DIET ORDER     Standing Status:   Standing     Number of Occurrences:   1     Order Specific Question:   Diet:     Answer:   Cardiac [6]      Physical Exam   Constitutional: He is oriented to person, place, and time. He appears well-developed and well-nourished. No distress.   HENT:   Head: Normocephalic and atraumatic.   Eyes: Conjunctivae are normal. No scleral icterus.   Neck: Neck supple. No JVD present.   Cardiovascular: Normal rate, regular rhythm and normal heart sounds.  Exam reveals no gallop and no friction rub.    No murmur heard.  Pulmonary/Chest: Effort normal and breath sounds normal. No respiratory distress. He has no wheezes. He exhibits no tenderness.   Abdominal: Soft. Bowel sounds are normal. He exhibits no distension and no mass. There is no tenderness.   Musculoskeletal: He exhibits no edema or tenderness.   Neurological: He is alert and oriented to person, place, and time. No cranial nerve deficit.   Skin: Skin is warm and dry. He is not diaphoretic. No erythema. No pallor.   Psychiatric: He has a normal mood and affect. His behavior is normal.   Nursing note and vitals reviewed.      Recent Labs      03/19/18   0008  03/20/18   0300  03/21/18   0440   WBC  0.3*  0.2*  0.1*   RBC  2.83*  2.71*  2.74*   HEMOGLOBIN  8.1*  7.4*  7.5*   HEMATOCRIT  23.7*  22.7*  22.6*   MCV  83.7  83.8  82.5   MCH  28.6  27.3  27.4   MCHC  34.2  32.6*  33.2*   RDW  48.0  49.1  48.5   PLATELETCT  25*  26*  20*   MPV  13.3*  11.7   --      Recent Labs      03/20/18   0300  03/21/18   0440   SODIUM  133*  130*   POTASSIUM  4.9  4.0   CHLORIDE  105  103   CO2  19*  20   GLUCOSE  103*  93   BUN  35*  36*   CREATININE  1.67*  1.59*   CALCIUM  7.4*  7.1*                      Assessment/Plan     * Pancytopenia (CMS-HCC)   Assessment & Plan    Secondary to severe bone marrow infiltrate.   T cell LGL - leukemia  Campath start admin 3/16 discussed with Dr. Horta, patient will be here for the duration of campath administration.          Thrombocytopenia (CMS-HCC)- (present on admission)   Assessment & Plan    Transfuse prn if less than 10K. No bleeding.         Neutropenic fever (CMS-HCC)- (present on admission)   Assessment & Plan    ANC 0  On Cefepime and fluconazole.   Persistent neutropenia febrile again to 103, cough and diarrhea  Could be d/t leukemia/chemo rather than infection but cannot rule out sepsis at this time  Repeat cultures again.  blood culture, CXR, UA all okay continue to monitor  ID consult        Maxillary sinusitis- (present on admission)   Assessment & Plan    On abx         Acute renal failure (ARF) (CMS-HCC)- (present on admission)   Assessment & Plan    Back at baseline  Avoid nephrotoxins   Renally dose all medications         Hyponatremia- (present on admission)   Assessment & Plan    Mild, stable            Essential hypertension- (present on admission)   Assessment & Plan    Monitor blood pressure.          Anemia- (present on admission)   Assessment & Plan    Monitor H&H.  Transfuse if hemoglobin <7.5 g/dl.   Will need irradiated and CMV free product.        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 10 days of daily infusion.  Patient will remain inpatient to complete campath discussed with Dr. Horta  Severe neutropenia            Quality-Core Measures   Reviewed items::  Labs reviewed, Medications reviewed and Radiology images reviewed  Joshi catheter::  No Joshi  DVT prophylaxis pharmacological::  Contraindicated - High bleeding risk  DVT prophylaxis - mechanical:  SCDs  Antibiotics:  Treating active infection/contamination beyond 24 hours perioperative coverage

## 2018-03-22 PROBLEM — A04.72 CLOSTRIDIUM DIFFICILE COLITIS: Status: ACTIVE | Noted: 2018-01-01

## 2018-03-22 NOTE — PROGRESS NOTES
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 Alemtuzumab in T-cell large granular lymphocytic leukaemia: a phase 2 study. Lancet Haematol. 2016 Jan; 3(1):e22-e29.     Allergies:  Nitroglycerin and Losartan     /70   Pulse 81   Temp 36.7 °C (98.1 °F)   Resp 20   Ht 1.778 m (5' 10\")   Wt 85 kg (187 lb 6.3 oz)   SpO2 94%   BMI 26.89 kg/m²  Body surface area is 2.05 meters squared.      Labs 3/22/18  ANC (none - WBC = 0.2) Plt = 19k   Hgb = 7.9  SCr = 1.72 mg/dL         CrCl ~43 mL/min (no renal dose adjustment recommended)       3/18/18  LFTs = WNL   TBili = 1.2     MD aware of current lab results. No fevers in last 24 hours. Orders received to continue with treatment per Dr. Horta.  Per MD will transfuse if hemoglobin less than 7 and/or if platelets less than 10, or if bleeding.     3/11/2018 04:30   Cmv Ab Igm <8.0   CMV IgG Qnt <0.20      2/25/18 ECHO LVEF = 65%  3/16/18 TSH = 6.7 T3 free 1.38     Monitor during treatment - CD4+ lymphocyte counts (after treatment until recovery); CMV antigen (routinely during and for 2 months after treatment); consider TSH at baseline and then every 2 to 3 months during alemtuzumab treatment      ID following    Premeds: Diphenhydramine 50 mg IV                   Acetaminophen 650 mg PO                   Hydrocortisone 100 mg IV (verified Dr. Mitchell wants this in addition to oral prednisone)    Drug Order   (Drug name, dose, route, IV Fluid & volume, frequency, number of doses) Cycle: C1 Day 7 of 10      Previous treatment: s/p RCVP and Rituxan/bendamustine at [place] with long term remission.      Medication = Alemtuzumab  Base " Dose= 10 mg fixed dose  Calc Dose: N/A  Final Dose = 10 mg (EPIC rounds to 9.9 mg)  Route = IV   Fluid & Volume =  mL  Admin Duration = Over 2 hours     Days 2-10       <5% difference, ok to treat with final written dose     By my signature below, I confirm this process was performed independently with the BSA and all final chemotherapy dosing calculations congruent. I have reviewed the above chemotherapy order and that my calculation of the final dose and BSA (when applicable) corroborate those calculations of the  pharmacist. Discrepancies of 5% or greater in the written dose have been addressed and documented within the [facility] Progress notes.    Stewart Mora, JackieD

## 2018-03-22 NOTE — PROGRESS NOTES
Oncology/Hematology Progress Note               Author: Miquelmena Horta    Cc:  T-cell LGL Date & Time created: 3/22/2018  4:27 PM     Interval History:  He is doing about the same. He continues to have frequent diarrhea. He still has a mild cough which has not changed. He still having fevers as well.      PMHx, PSurgHx, SocHx, FAMHx:  All reviewed and no changes    Review of Systems:  Review of Systems   Constitutional: Positive for malaise/fatigue. Negative for chills, fever and weight loss.   HENT: Negative for ear pain, nosebleeds and sore throat.    Eyes: Negative for blurred vision, double vision and photophobia.   Respiratory: Positive for cough and sputum production. Negative for hemoptysis and shortness of breath.    Cardiovascular: Positive for leg swelling. Negative for chest pain, palpitations and orthopnea.   Gastrointestinal: Positive for abdominal pain and diarrhea. Negative for constipation, heartburn, nausea and vomiting.   Genitourinary: Negative for dysuria, frequency, hematuria and urgency.   Skin: Negative for itching and rash.   Neurological: Positive for weakness.       Physical Exam:  Physical Exam   Constitutional: He is oriented to person, place, and time. He appears well-developed and well-nourished. No distress.   ECOG=2   HENT:   Head: Normocephalic and atraumatic.   Mouth/Throat: Oropharynx is clear and moist. No oropharyngeal exudate.   Eyes: Conjunctivae and EOM are normal. Right eye exhibits no discharge. Left eye exhibits no discharge. No scleral icterus.   Neck: Normal range of motion. Neck supple. No tracheal deviation present. No thyromegaly present.   Cardiovascular: Normal rate, regular rhythm and normal heart sounds.  Exam reveals no gallop and no friction rub.    No murmur heard.  Pulmonary/Chest: Effort normal and breath sounds normal. No respiratory distress. He has no wheezes. He has no rales.   Abdominal: Soft. Bowel sounds are normal. He exhibits no distension. There is  no tenderness. There is no rebound and no guarding.   Musculoskeletal: Normal range of motion. He exhibits edema.   Trace bilateral lower extremity edema, left>right   Lymphadenopathy:     He has no cervical adenopathy.   Neurological: He is alert and oriented to person, place, and time.   Skin: Skin is warm. No rash noted. He is not diaphoretic. No erythema.   Psychiatric: He has a normal mood and affect. His behavior is normal. Thought content normal.       Labs:        Invalid input(s): [id number]      Recent Labs      18   0152   SODIUM  133*  130*  133*   POTASSIUM  4.9  4.0  4.4   CHLORIDE  105  103  108   CO2  19*  20  14*   BUN  35*  36*  35*   CREATININE  1.67*  1.59*  1.72*   CALCIUM  7.4*  7.1*  6.8*     Recent Labs      18   0152   GLUCOSE  103*  93  112*     Recent Labs      18   0152   RBC  2.71*  2.74*  2.77*   HEMOGLOBIN  7.4*  7.5*  7.9*   HEMATOCRIT  22.7*  22.6*  23.4*   PLATELETCT  26*  20*  19*     Recent Labs      18   0152   WBC  0.2*  0.1*  0.2*   NEUTSPOLYS  cancel  CANCEL   --    LYMPHOCYTES  cancel  CANCEL   --    MONOCYTES  cancel  CANCEL   --    EOSINOPHILS  cancel  CANCEL   --    BASOPHILS  cancel  CANCEL   --      Recent Labs      18   0152   SODIUM  133*  130*  133*   POTASSIUM  4.9  4.0  4.4   CHLORIDE  105  103  108   CO2  19*  20  14*   GLUCOSE  103*  93  112*   BUN  35*  36*  35*   CREATININE  1.67*  1.59*  1.72*   CALCIUM  7.4*  7.1*  6.8*     Hemodynamics:  Temp (24hrs), Av.4 °C (99.4 °F), Min:36.6 °C (97.9 °F), Max:39.3 °C (102.7 °F)  Temperature: 36.8 °C (98.3 °F)  Pulse  Av.6  Min: 64  Max: 110   Blood Pressure : (!) 97/64     Respiratory:    Respiration: 20, Pulse Oximetry: 98 %        RUL Breath Sounds: Clear, RML Breath Sounds: Clear, RLL Breath Sounds:  Diminished, GREGORIA Breath Sounds: Clear, LLL Breath Sounds: Diminished  Fluids:    Intake/Output Summary (Last 24 hours) at 03/19/18 1148  Last data filed at 03/19/18 0600   Gross per 24 hour   Intake                0 ml   Output              925 ml   Net             -925 ml        GI/Nutrition:  Orders Placed This Encounter   Procedures   • DIET ORDER     Standing Status:   Standing     Number of Occurrences:   1     Order Specific Question:   Diet:     Answer:   Cardiac [6]     Medical Decision Making, by Problem:  Active Hospital Problems    Diagnosis   • *Pancytopenia (CMS-[facility]) [D61.818]   • Thrombocytopenia (CMS-[facility]) [D69.6]   • Neutropenic fever (CMS-[facility]) [D70.9, R50.81]   • Maxillary sinusitis [J32.0]   • Acute renal failure (ARF) (CMS-[facility]) [N17.9]   • Hyponatremia [E87.1]   • Essential hypertension [I10]   • 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  T cell LGL/Leukemia-- presented with pancytopenia. Admitted to Hospital since March 4th. Admitted for neutropenic fever.  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   He seems to have an aggressive form of this disease. Campath started on 3/16/18, per Dr. Klein's discussion with [facility].  He seems to be tolerating well.     Monitoring for Campath side effects. These can include autoimmune side effects such as pneumonitis,  Hypothyroidism, kidney disease, etc. sometimes can see in autoimmune ITP or hemolytic picture with this treatment.  Can certainly see bone marrow suppression (but in his case we must treat through the low blood counts).  Also will be at high risk for opportunistic infections.   represents HSV prophylaxis, and PCP prophylaxis.    Monitor during treatment:  CD4+ lymphocyte counts (after treatment until recovery); CMV antigen baseline  neg (routinely during and for 2 months after treatment); consider TSH at baseline ( slight elevation TSH and free T3)  and then every 2 to 3 months during alemtuzumab treatment    -- Day 7 of 10 today  --Continue Campath as ordered-- not sure how long it will take him to respond to this treatment. 50% responded by 3 months. He may have prolonged cytopenias waiting for response. Despite fevers, we will proceed forward with Campath. His infections are not likely to improve on a whole, unless we can get improvement in his counts.  --Continue prophylactic acyclovir  --Continue prophylactic Bactrim DS every MWF      2.  Anemia--  ROSE MARIE positive IGG, but normal Ret count, LDH, and hapto.  Bili stable 2.0 range. There may be a small component of hemolysis. Not Brisk . Patient put on 20 mg per day of prednisone on 3/11, then stopped on 3/21.  --Hemoglobin stable  -- monitor   --Transfuse if hemoglobin less than 7    3.  Thrombocytopenia--related to problem #1. No obvious signs of bleeding. We will continue to monitor.  --Will transfuse one unit of platelets today  --Transfuse if platelets less than 20 while febrile, or at any level if bleeding  --All blood products CMV negative and irradiated    4. Renal insufficiency--baseline creatinine at 1.2-1.5.  Uric acid level normal at 3.1. Creatinine starting to creep up. Current level 1.72--likely related to fluid loss with diarrhea.  --Medicine to increase IV fluid rate  --Continue IV fluids    5. Neutropenic fever--  on cefepime, acyclovir, and fluconazole.  ID following patient. Fevers could be related to infection, infusion reaction, or tumor. Pancultured.   --Continue broad-spectrum antibiotics for prolonged cytopenias  --Continue to monitor.    6. 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  Given IVIG on 3/12/18     7. Elevated Baseline TSH--high prior to starting Campath.  Monitor.  Likely due to stress.   --Will Check again later this week,  Also with a free  T4.       High complexicity/Drug monitoring       Quality-Core Measures   Reviewed items::  Labs reviewed, Medications reviewed and Radiology images reviewed  Joshi catheter::  No Joshi  DVT prophylaxis pharmacological::  Contraindicated - High bleeding risk

## 2018-03-22 NOTE — PROGRESS NOTES
Dr. Horta and Dr. Melendez notified of this afternoon fever of 102.7F oral, ok to proceed with chemotherapy, pt was pre-medicated per MAR with tylenol to decrease temp along with other pre-medications.

## 2018-03-22 NOTE — PROGRESS NOTES
Oncology/Hematology Progress Note               Author: Miquel Horta    Cc:  T-cell LGL Date & Time created: 3/21/2018  5:00 PM     Interval History:  He unfortunately spiked a fever over the last 24 hours.  He was put back on cefepime.  He has also had recurrence of his profuse diarrhea.  He is fatigued, and hasn't walked much today.  Occasional crampy abdominal pain. He still has a mild cough which has not changed.      PMHx, PSurgHx, SocHx, FAMHx:  All reviewed and no changes    Review of Systems:  Review of Systems   Constitutional: Positive for malaise/fatigue. Negative for chills, fever and weight loss.   HENT: Negative for ear pain, nosebleeds and sore throat.    Eyes: Negative for blurred vision, double vision and photophobia.   Respiratory: Positive for cough and sputum production. Negative for hemoptysis and shortness of breath.    Cardiovascular: Negative for chest pain, palpitations, orthopnea and leg swelling.   Gastrointestinal: Positive for abdominal pain and diarrhea. Negative for constipation, heartburn, nausea and vomiting.   Genitourinary: Negative for dysuria, frequency, hematuria and urgency.   Skin: Negative for itching and rash.   Neurological: Positive for weakness.       Physical Exam:  Physical Exam   Constitutional: He is oriented to person, place, and time. He appears well-developed and well-nourished. No distress.   ECOG=2   HENT:   Head: Normocephalic and atraumatic.   Mouth/Throat: Oropharynx is clear and moist. No oropharyngeal exudate.   Eyes: Conjunctivae and EOM are normal. Right eye exhibits no discharge. Left eye exhibits no discharge. No scleral icterus.   Neck: Normal range of motion. Neck supple. No tracheal deviation present. No thyromegaly present.   Cardiovascular: Normal rate, regular rhythm and normal heart sounds.  Exam reveals no gallop and no friction rub.    No murmur heard.  Pulmonary/Chest: Effort normal and breath sounds normal. No respiratory distress. He has  no wheezes. He has no rales.   Abdominal: Soft. Bowel sounds are normal. He exhibits no distension. There is no tenderness. There is no rebound and no guarding.   Musculoskeletal: Normal range of motion. He exhibits edema.   Trace bilateral lower extremity edema, left>right   Lymphadenopathy:     He has no cervical adenopathy.   Neurological: He is alert and oriented to person, place, and time.   Skin: Skin is warm. No rash noted. He is not diaphoretic. No erythema.   Psychiatric: He has a normal mood and affect. His behavior is normal. Thought content normal.       Labs:        Invalid input(s): [id number]      Recent Labs      180  18   0440   SODIUM  133*  130*   POTASSIUM  4.9  4.0   CHLORIDE  105  103   CO2  19*  20   BUN  35*  36*   CREATININE  1.67*  1.59*   CALCIUM  7.4*  7.1*     Recent Labs      18   0440   GLUCOSE  103*  93     Recent Labs      18   00018   0440   RBC  2.83*  2.71*  2.74*   HEMOGLOBIN  8.1*  7.4*  7.5*   HEMATOCRIT  23.7*  22.7*  22.6*   PLATELETCT  25*  26*  20*     Recent Labs      18   0440   WBC  0.3*  0.2*  0.1*   NEUTSPOLYS  CANCEL  cancel  CANCEL   LYMPHOCYTES  CANCEL  cancel  CANCEL   MONOCYTES  CANCEL  cancel  CANCEL   EOSINOPHILS  CANCEL  cancel  CANCEL   BASOPHILS  CANCEL  cancel  CANCEL     Recent Labs      18   0440   SODIUM  133*  130*   POTASSIUM  4.9  4.0   CHLORIDE  105  103   CO2  19*  20   GLUCOSE  103*  93   BUN  35*  36*   CREATININE  1.67*  1.59*   CALCIUM  7.4*  7.1*     Hemodynamics:  Temp (24hrs), Av.4 °C (101.1 °F), Min:37 °C (98.6 °F), Max:39.4 °C (103 °F)  Temperature: 37 °C (98.6 °F)  Pulse  Av.3  Min: 64  Max: 110   Blood Pressure : 122/75     Respiratory:    Respiration: 19, Pulse Oximetry: 92 %        RUL Breath Sounds: Clear, RML Breath Sounds: Clear, RLL Breath Sounds: Diminished, GREGORIA Breath Sounds: Clear,  LLL Breath Sounds: Diminished  Fluids:    Intake/Output Summary (Last 24 hours) at 03/19/18 1148  Last data filed at 03/19/18 0600   Gross per 24 hour   Intake                0 ml   Output              925 ml   Net             -925 ml        GI/Nutrition:  Orders Placed This Encounter   Procedures   • DIET ORDER     Standing Status:   Standing     Number of Occurrences:   1     Order Specific Question:   Diet:     Answer:   Cardiac [6]     Medical Decision Making, by Problem:  Active Hospital Problems    Diagnosis   • *Pancytopenia (CMS-[facility]) [D61.818]   • Thrombocytopenia (CMS-[facility]) [D69.6]   • Neutropenic fever (CMS-[facility]) [D70.9, R50.81]   • Maxillary sinusitis [J32.0]   • Acute renal failure (ARF) (CMS-[facility]) [N17.9]   • Hyponatremia [E87.1]   • Essential hypertension [I10]   • 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  T cell LGL/Leukemia-- presented with pancytopenia. Admitted to Hospital since March 4th. Admitted for neutropenic fever.  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   He seems to have an aggressive form of this disease. Campath started on 3/16/18, per Dr. Klein's discussion with [facility].  He seems to be tolerating well.     Monitoring for Campath side effects. These can include autoimmune side effects such as pneumonitis,  Hypothyroidism, kidney disease, etc. sometimes can see in autoimmune ITP or hemolytic picture with this treatment.  Can certainly see bone marrow suppression (but in his case we must treat through the low blood counts).  Also will be at high risk for opportunistic infections.   represents HSV prophylaxis, and PCP prophylaxis.    Monitor during treatment:  CD4+ lymphocyte counts (after treatment until recovery); CMV antigen baseline neg (routinely during and for 2  months after treatment); consider TSH at baseline ( slight elevation TSH and free T3)  and then every 2 to 3 months during alemtuzumab treatment    -- Day 6 of 10 today  --Continue Campath as ordered-- not sure how long it will take him to respond to this treatment. 50% responded by 3 months. He may have prolonged cytopenias waiting for response. Despite fevers, we will proceed forward with Campath. His infections are not likely to improve on a whole, unless we can get improvement in his counts.  --Continue prophylactic acyclovir  --Continue prophylactic Bactrim DS every MWF      2.  Anemia--  ROSE MARIE positive IGG, but normal Ret count, LDH, and hapto.  Bili stable 2.0 range. There may be a small component of hemolysis. Not Brisk . Patient put on 20 mg per day of prednisone on 3/11, then stopped on 3/21.  --Hemoglobin stable  -- monitor   --Transfuse if hemoglobin less than 7    3.  Thrombocytopenia--related to problem #1. No obvious signs of bleeding. We will continue to monitor.  --Transfuse if platelets less than 10, or if bleeding  --If febrile, we will keep platelets greater than 20  --All blood products CMV negative and irradiated    4. Renal insufficiency--baseline creatinine at 1.2-1.5.  Uric acid level normal at 3.1.  Currently close to baseline   --Continue IV fluids  --May need to increase IV fluids if starts to climb    5. Neutropenic fever--  on cefepime, acyclovir, and fluconazole.  ID following patient. Fevers could be related to infection, infusion reaction, or tumor.. Pancultured.   --Continue broad-spectrum antibiotics for prolonged cytopenias  --Continue to monitor.    6. 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  Given IVIG on 3/12/18     7. Elevated Baseline TSH--high prior to starting Campath.  Monitor.  Likely due to stress.   --Will Check again later this week,  Also with a free T4.     CODE STATUS: He is currently listed as a full code. I talked with he and his wife about what  this means. I explained he has a high risk of infectious complications and even the risk of death related to infections, given his prolonged cytopenias. He is more elderly, and I worry if he went through a full code and ICU stay, that he would not be strong enough for further treatments. There would be a high risk of complications. We talked about the difference between no code and a full code. He and his wife will think about these options. Although many patients with LGL leukemia can live for 10 years or more, he seems to have a more aggressive form of this disease. He has a high risk for complications, morbidity, and mortality during this hospitalization. I explained this fully to them.    High complexicity/Drug monitoring       Quality-Core Measures   Reviewed items::  Labs reviewed and Medications reviewed  Joshi catheter::  No Joshi  DVT prophylaxis pharmacological::  Contraindicated - High bleeding risk

## 2018-03-22 NOTE — PROGRESS NOTES
Infectious Disease Progress Note    Author: Gricel Murguia M.D. Date & Time of service: 3/22/2018  8:29 AM    Chief Complaint:  FU neutropenic fever    Interval History:  78 y/o WM admitted with febrile neutropenia    2/28 Tmax 100.5, having coughing fit and SOB, plan for BM biopsy  3/1Tmax 100.2, WBC 0.3, had a BM this am, cough better, repeat CXR with no infiltrate   3/2 Tmax 100.5, shortness of breath with exertion, had bloody nose this am, plts 22, denies abd pain or diarrhea, path neg for lyphoma  3/3/2018-Tmax 99.6. WBC 0.3 platelets 17 creatinine 1.38  3/4/2018-Tmax 99.2 WBCs 0.3 creatinine 1.38 complains of some shortness of breath  3/5 AF WBC 0.3 [place] [facility] [place]  3/6 AF WBC 0.3 no positive cxs No new complaints  3/7 AF WBC 0.4 no complaints except fatigue and CORRALES  3/8 AF (99.8) no CBC transferred to Oncology floor-no acute events  3/9 AF WBC 0.5 feels weak-no new complaints  3/10 AF (99.9) plan for Campath noted  3/12/2018-Tmax 98.4 WBC 0.7 creatinine 1.55  3/13/2018-no fevers. Remains neutropenic. No new issues overnight  3/14/2018-Tmax 98.6 WBC 0.6 creatinine 1.44 no new issues overnight  3/15/2018-no fevers WBC 0.6 ambulating in the halls  3/16/2018-no fevers. No new issues overnight. WBC 0.5  3/21 Tmax 103, WBC 0.1, ID recalled for recurrent fevers, pt started having watery diarrhea yesterday with poor appetite but no abdominal pain, C diff pending. Denies any cough, SOB or new skin rash  3/22 Tmax 102.7, WBC 0.2, has night sweats, poor appetite did not have dinner, nausea but no vomiting  Labs Reviewed, Medications Reviewed, Radiology Reviewed and Wound Reviewed.    Review of Systems:  Review of Systems   Constitutional: Positive for diaphoresis and fever.   Respiratory: Negative for cough and shortness of breath.    Cardiovascular: Negative for chest pain.   Gastrointestinal: Positive for diarrhea and nausea. Negative for abdominal pain and vomiting.   Genitourinary: Negative  for dysuria.   Skin: Negative for rash.   Neurological: Positive for weakness.   All other systems reviewed and are negative.      Hemodynamics:  Temp (24hrs), Av.2 °C (100.8 °F), Min:36.6 °C (97.9 °F), Max:39.4 °C (103 °F)  Temperature: 36.8 °C (98.2 °F)  Pulse  Av.6  Min: 64  Max: 110   Blood Pressure : 116/78       Physical Exam:  Physical Exam   Constitutional: He is oriented to person, place, and time. He appears well-developed. No distress.   HENT:   Head: Normocephalic and atraumatic.   Eyes: EOM are normal. Pupils are equal, round, and reactive to light.   Neck: Neck supple.   Cardiovascular: Normal rate.    Pulmonary/Chest: Effort normal. No respiratory distress. He has no wheezes. He has no rales.   Abdominal: Soft. He exhibits no distension. There is tenderness.   Musculoskeletal: He exhibits no edema.   LUE PICC   Neurological: He is alert and oriented to person, place, and time.   Skin: No rash noted.   ecchymosis   Nursing note and vitals reviewed.      Meds:    Current Facility-Administered Medications:   •  cefepime  •  NS  •  Pharmacy  •  vancomycin 50 mg/mL  •  sulfamethoxazole-trimethoprim  •  diphenhydrAMINE  •  acetaminophen  •  hydrocortisone sodium succinate PF  •  ondansetron  •  generic chemo template  •  fluconazole  •  diphenhydrAMINE  •  allopurinol  •  Ganciclovir  •  dexamethasone  •  acetaminophen  •  NS  •  PICC Line Insertion has been implemented **AND** May use Lidocaine 1% not to exceed 3 mls for local at insertion site **AND** NOTIFY MD **AND** Tip to dwell in the superior vena cava **AND** Do not use PICC Line until placement verified by Chest X Ray **AND** [CANCELED] DX-CHEST-FOR PICC LINE Perform procedure in: Other(comment f6 below): **AND** If radiologist reading of chest X-ray states any of the following the PICC should be used **AND** Further evaluation of the PICC placement can be retrieved from X-Ray and Imaging **AND** Blood draws through PICC line; draws by RN  only **AND** FLUSHING GUIDELINES WHEN IN USE **AND** normal saline PF **AND** FLUSHING GUIDELINES WHEN NOT IN USE **AND** DRESSING MAINTENANCE **AND** Change needleless pressure ports and IV tubing every 72 hours per hospital policy **AND** TUBING **AND** If there is an MD order to remove the PICC line, any RN may remove the PICC line **AND** [] PATIENT EDUCATION MATERIALS **AND** NURSING COMMUNICATION  •  acyclovir  •  NS  •  acetaminophen  •  benzocaine-menthol  •  benzonatate  •  labetalol  •  levothyroxine  •  ondansetron  •  potassium chloride SA  •  PARoxetine  •  zolpidem    Labs:  Recent Labs      18   0152   WBC  0.2*  0.1*  0.2*   RBC  2.71*  2.74*  2.77*   HEMOGLOBIN  7.4*  7.5*  7.9*   HEMATOCRIT  22.7*  22.6*  23.4*   MCV  83.8  82.5  84.5   MCH  27.3  27.4  28.5   RDW  49.1  48.5  49.7   PLATELETCT  26*  20*  19*   MPV  11.7   --    --    NEUTSPOLYS  cancel  CANCEL   --    LYMPHOCYTES  cancel  CANCEL   --    MONOCYTES  cancel  CANCEL   --    EOSINOPHILS  cancel  CANCEL   --    BASOPHILS  cancel  CANCEL   --      Recent Labs      18   0152   SODIUM  133*  130*  133*   POTASSIUM  4.9  4.0  4.4   CHLORIDE  105  103  108   CO2  19*  20  14*   GLUCOSE  103*  93  112*   BUN  35*  36*  35*     Recent Labs      18   0300  18   04418   0152   ALBUMIN   --    --   2.9*   CREATININE  1.67*  1.59*  1.72*       Imaging:  CXR 3/21 with small right pleural effusion    Micro:  Results     Procedure Component Value Units Date/Time    BLOOD CULTURE [[id number]] Collected:  18 1049    Order Status:  Completed Specimen:  Blood from Peripheral Updated:  18 0739     Significant Indicator NEG     Source BLD     Site PERIPHERAL     Blood Culture No Growth    Note: Blood cultures are incubated for 5 days and  are monitored continuously.Positive blood cultures  are called to the RN and reported as soon as  they  "are identified.      Narrative:       Protective  Per Hospital Policy: Only change Specimen Src: to \"Line\" if  specified by physician order.    BLOOD CULTURE [[id number]] Collected:  03/21/18 1049    Order Status:  Completed Specimen:  Blood from Peripheral Updated:  03/22/18 0739     Significant Indicator NEG     Source BLD     Site PERIPHERAL     Blood Culture No Growth    Note: Blood cultures are incubated for 5 days and  are monitored continuously.Positive blood cultures  are called to the RN and reported as soon as  they are identified.      Narrative:       Protective  Per Hospital Policy: Only change Specimen Src: to \"Line\" if  specified by physician order.    C DIFF TOXIN [[id number]]  (Abnormal) Collected:  03/21/18 1306    Order Status:  Completed Updated:  03/21/18 1901     C.Diff Toxin A&B Positive (A)     Comment: TOXIN POSITIVE  Toxin detected by EIA; C. difficile detected by PCR.  If clinically correlated, treatment indicated per guidelines.  Test of cure is not recommended.         Narrative:       Special Contact [id number] SHARMIN MCDUFFIE.  Does this patient have risk factors for C-diff?->Yes  Has patient taken stool softeners or laxatives in the last 5  days?->No    CDIFF BY PCR WITH TOXIN [[id number]] Collected:  03/21/18 1306    Order Status:  Completed Specimen:  Stool from Stool Updated:  03/21/18 1859     C Diff by PCR See Toxin     027-NAP1-BI Presumptive Negative     Comment: Presumptive 027/NAP1/BI target DNA sequences are NOT DETECTED.       Narrative:       Special Contact [id number] SHARMIN MCDUFFIE.  Does this patient have risk factors for C-diff?->Yes  Has patient taken stool softeners or laxatives in the last 5  days?->No    URINALYSIS [[id number]]  (Abnormal) Collected:  03/21/18 1254    Order Status:  Completed Specimen:  Urine from Urine, Clean Catch Updated:  03/21/18 1414     Color Yellow     Character Cloudy (A)     Specific Gravity 1.015     Ph 5.0     Glucose Negative " "mg/dL      Ketones Negative mg/dL      Protein 100 (A) mg/dL      Bilirubin Negative     Urobilinogen, Urine 0.2     Nitrite Negative     Leukocyte Esterase Negative     Occult Blood Small (A)     Micro Urine Req Microscopic    Narrative:       Special Contact [id number] SHARMIN ALICEA  Does this patient have risk factors for C-diff?->Yes  Has patient taken stool softeners or laxatives in the last 5  days?->No    Culture Respiratory W/ GRM STN [[id number]]     Order Status:  Completed Specimen:  Respirate from Sputum     BLOOD CULTURE [[id number]] Collected:  03/17/18 0907    Order Status:  Completed Specimen:  Blood from Peripheral Updated:  03/18/18 0915     Significant Indicator NEG     Source BLD     Site PERIPHERAL     Blood Culture No Growth    Note: Blood cultures are incubated for 5 days and  are monitored continuously.Positive blood cultures  are called to the RN and reported as soon as  they are identified.      Narrative:       Protective  Per Hospital Policy: Only change Specimen Src: to \"Line\" if  specified by physician order.    BLOOD CULTURE [[id number]] Collected:  03/17/18 0907    Order Status:  Completed Specimen:  Blood from Peripheral Updated:  03/18/18 0915     Significant Indicator NEG     Source BLD     Site PERIPHERAL     Blood Culture No Growth    Note: Blood cultures are incubated for 5 days and  are monitored continuously.Positive blood cultures  are called to the RN and reported as soon as  they are identified.      Narrative:       Protective  Per Hospital Policy: Only change Specimen Src: to \"Line\" if  specified by physician order.    URINALYSIS [[id number]]  (Abnormal) Collected:  03/17/18 1330    Order Status:  Completed Specimen:  Urine from Urine, Clean Catch Updated:  03/17/18 1413     Color Yellow     Character Clear     Specific Gravity 1.007     Ph 5.5     Glucose Negative mg/dL      Ketones Negative mg/dL      Protein 30 (A) mg/dL      Bilirubin Negative     Urobilinogen, " Urine 0.2     Nitrite Negative     Leukocyte Esterase Negative     Occult Blood Small (A)     Micro Urine Req Microscopic    Narrative:       [id number] LEE ALICEA          Assessment:  Active Hospital Problems    Diagnosis   • *Pancytopenia (CMS-HCC) [D61.818]   • Thrombocytopenia (CMS-HCC) [D69.6]   • Neutropenic fever (CMS-HCC) [D70.9, R50.81]   • Maxillary sinusitis [J32.0]   • Acute renal failure (ARF) (CMS-HCC) [N17.9]   • Hyponatremia [E87.1]   • Essential hypertension [I10]   • 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    Repeat Bcx 3/21 - NGTD    Clostridium difficile diarrhea  Repeat C diff + 3/21  Continue PO vanco QID x 14 days then BID while on IV abx    ROHITH, worse  Renally dose abx as needed  Avoid nephrotoxins  Monitor closely once Campath  DC bactrim given worsening ROHIHT   Transition to at[facility]quone for PCP prophylaxis while on vivienne Melendez

## 2018-03-22 NOTE — PROGRESS NOTES
Chemotherapy Verification - SECONDARY RN   Cycle 1  Day 6 of 10    Height = 177.8cm  Weight = 85.kg  BSA = 2.05m2       Medication: alemtuzumab - compath  Dose: set/fixed dose 10mg    Calculated Dose: set/fixed dose 10mg                               (In mg/m2, AUC, mg/kg)       I confirm that this process was performed independently.

## 2018-03-22 NOTE — PROGRESS NOTES
Notified Dr. Horta of pt spiking fever today and that pt has been started on sepsis protocol. Per MD will evaluation and review patient medical condition to decide if ok to proceed with chemotherapy today.

## 2018-03-22 NOTE — PROGRESS NOTES
Chemotherapy Verification - PRIMARY RN      Height = 177.8 cm  Weight = 85 kg  BSA = 2.05 m2         Medication: alemtuzumab  Dose: 10 mg fixed dose  Calculated Dose: 10 mg fixed dose                             (In mg/m2, AUC, mg/kg)         I confirm that this process was performed independently.

## 2018-03-22 NOTE — PROGRESS NOTES
Received report from day RN, assumed care of pt 1915.  Pt is A&Ox4, tired.   Pt denies pain, denies nausea. Poor appetite, consumed 0% of dinner.  LUE PICC dual lumens, patent with + blood return, dressing is CDI.  Pt on reverse iso for neutropenia as well as contact iso for c-diff.  Medicated per MAR. Chemo has finished infusing for tonight.   Pt up with one to BSC.   Will continue to monitor fever. Reviewed POC with pt, he is in agreement.   Call light within reach, bed in lowest & locked position, treaded socks on.

## 2018-03-22 NOTE — PROGRESS NOTES
Chemotherapy Verification - PRIMARY RN  Cycle 1 Day 7 of 10    Height = 177.8 cm  Weight = 85 kg  BSA = 2.05 m2       Medication: Alemtuzumab (Campath)  Dose: 10 mg fixed dose  Calculated Dose: 9.9 mg rounded by [facility], approved by pharmacy and oncologist (<5% difference)                            (In mg/m2, AUC, mg/kg)     I confirm this process was performed independently with the BSA and all final chemotherapy dosing calculations congruent.  Any discrepancies of 5% or greater have been addressed with the chemotherapy pharmacist. The resolution of the discrepancy has been documented in the EPIC progress notes.

## 2018-03-22 NOTE — PROGRESS NOTES
Slava from Lab called with critical result of calcium 6.8 at 0315. Critical lab result read back to Slava. Corrected calcium 7.7 with albumin of 2.9.  Patient also has lactic acid of 2.8  Dr. Kiran notified of critical lab results at 0455.  Critical lab result read back by Dr. Kiran. No new orders received at this time.

## 2018-03-22 NOTE — PROGRESS NOTES
"[facility] Hospitalist Progress Note    Date of Service: 3/22/2018    Chief Complaint  77 y.o. male admitted 3/4/2018 with severe pancytopenia and neutropenic fever.  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    Interval Problem Update  3/13 Patient feeling okay today, neutropenia is worsening along with renal function.  Chemo delivery is pending.  Hopefully with initiation of chemo renal function will improve.  Likely all issues are tied to new diagnosis of leukemia.  3/14 Patient without complaints today, states urinating frequently.  He is well hydrated now and renal function is improving, will decrease IVF rate to 75cc and continue to follow creatinine.  He may need rate increase with chemotherapy but for now is well hydrated.    Chemotherapy not yet available.  3/15 Patient states he feels okay, has ambulated in hallways with assistance.  States his urinary stream is strong and he need not go quite so often since decrease in rate of fluids.  Campath delivered this afternoon and will start administration tomorrow for 10 days.  3/16 Patient without complaint, states he is happy that treatment can finally start today.  He tolerated his test dose without complication and full dose to be administered.  Day 1 of 10 Campath.  3/17 Patient states he \"feels great\" and has no complaints.  RN reports temperature elevation this am of 100.8.  Patient not symptomatic and like with chemo/leukemia fever but given neutropenia will continue on antibiotics and I've ordered blood cultures, UA and CXR for further evaluation.  He tolerated the alemtuzumab without issue.  3/18 Patient states he is still doing very well, no issues with chemo, good appetite.  Infection w/u from yesterday unrevealing so likely d/t chemo/cancer than infection.  Cefepime to dc tomorrow if blood cultures remain negative and patient remains afebrile.  Day 3 Campath.  3/19 Patient states he continues to feel well.  Chemo day 4.  Cultures still negative so " will dc cefepime.  3/20: chemo day 5/10, tolerating campath with the premedication. Two loose stools today, no fever or chills, no abdominal pain. Getting drops in eye as usual.  3/21: recurrent fever, up to 103, watery diarrhea, weakness and malaise  3/22: renal function worsening; continued watery diarrhea positive cdiff toxin. Feels malaise and weak. No fever since yesterday    Consultants/Specialty  Oncology - [facility]    Disposition  Tbd, will have prolonged hospital course secondary to neutropenia that will worsen with chemotherapy.        Review of Systems   Constitutional: Positive for malaise/fatigue. Negative for chills and fever.   HENT: Negative for congestion.    Eyes: Negative for blurred vision and photophobia.   Respiratory: Negative for cough and shortness of breath.    Cardiovascular: Negative for chest pain, claudication and leg swelling.   Gastrointestinal: Positive for diarrhea. Negative for abdominal pain, constipation, heartburn, nausea and vomiting.   Genitourinary: Negative for dysuria, frequency and hematuria.   Musculoskeletal: Negative for joint pain and myalgias.   Skin: Negative for itching and rash.   Neurological: Positive for weakness. Negative for dizziness, sensory change, speech change and headaches.   Psychiatric/Behavioral: Negative for depression. The patient is not nervous/anxious and does not have insomnia.       Physical Exam  Laboratory/Imaging   Hemodynamics  Temp (24hrs), Av.4 °C (99.4 °F), Min:36.6 °C (97.9 °F), Max:39.3 °C (102.7 °F)   Temperature: 36.8 °C (98.3 °F)  Pulse  Av.6  Min: 64  Max: 110    Blood Pressure : (!) 97/64      Respiratory      Respiration: 20, Pulse Oximetry: 98 %        RUL Breath Sounds: Clear, RML Breath Sounds: Clear, RLL Breath Sounds: Diminished, GREGORIA Breath Sounds: Clear, LLL Breath Sounds: Diminished    Fluids    Intake/Output Summary (Last 24 hours) at 18 1636  Last data filed at 18 1200   Gross per 24 hour    Intake             6663 ml   Output                0 ml   Net             6663 ml       Nutrition  Orders Placed This Encounter   Procedures   • DIET ORDER     Standing Status:   Standing     Number of Occurrences:   1     Order Specific Question:   Diet:     Answer:   Cardiac [6]     Physical Exam   Constitutional: He is oriented to person, place, and time. He appears well-developed and well-nourished. No distress.   HENT:   Head: Normocephalic and atraumatic.   Eyes: Conjunctivae are normal. No scleral icterus.   Neck: Neck supple. No JVD present.   Cardiovascular: Normal rate, regular rhythm and normal heart sounds.  Exam reveals no gallop and no friction rub.    No murmur heard.  Pulmonary/Chest: Effort normal and breath sounds normal. No respiratory distress. He has no wheezes. He exhibits no tenderness.   Abdominal: Soft. Bowel sounds are normal. He exhibits no distension and no mass. There is no tenderness.   Musculoskeletal: He exhibits no edema or tenderness.   Neurological: He is alert and oriented to person, place, and time. No cranial nerve deficit.   Skin: Skin is warm and dry. He is not diaphoretic. No erythema. No pallor.   Psychiatric: He has a normal mood and affect. His behavior is normal.   Nursing note and vitals reviewed.      Recent Labs      03/20/18   0300  03/21/18   0440  03/22/18   0152   WBC  0.2*  0.1*  0.2*   RBC  2.71*  2.74*  2.77*   HEMOGLOBIN  7.4*  7.5*  7.9*   HEMATOCRIT  22.7*  22.6*  23.4*   MCV  83.8  82.5  84.5   MCH  27.3  27.4  28.5   MCHC  32.6*  33.2*  33.8   RDW  49.1  48.5  49.7   PLATELETCT  26*  20*  19*   MPV  11.7   --    --      Recent Labs      03/20/18   0300  03/21/18   0440  03/22/18   0152   SODIUM  133*  130*  133*   POTASSIUM  4.9  4.0  4.4   CHLORIDE  105  103  108   CO2  19*  20  14*   GLUCOSE  103*  93  112*   BUN  35*  36*  35*   CREATININE  1.67*  1.59*  1.72*   CALCIUM  7.4*  7.1*  6.8*                      Assessment/Plan     * Pancytopenia (CMS-[facility])    Assessment & Plan    Secondary to severe bone marrow infiltrate.   T cell LGL - leukemia  Campath start admin 3/16 discussed with Dr. Horta, patient will be here for the duration of campath administration.          Thrombocytopenia (CMS-HCC)- (present on admission)   Assessment & Plan    Transfuse prn if less than 10K. No bleeding.        Neutropenic fever (CMS-HCC)- (present on admission)   Assessment & Plan    ANC 0  On Cefepime and fluconazole.   Persistent neutropenia febrile again to 103, cough and diarrhea  Could be d/t leukemia/chemo rather than infection but cannot rule out sepsis at this time  Repeat cultures again.  blood culture, CXR, UA all okay continue to monitor  ID consult        Maxillary sinusitis- (present on admission)   Assessment & Plan    On abx         Acute renal failure (ARF) (CMS-HCC)- (present on admission)   Assessment & Plan    Worsening with diarrhea/cdiff; increase IV fluid rate to 125 cc per hour and observe  Avoid nephrotoxins   Renally dose all medications         Hyponatremia- (present on admission)   Assessment & Plan    Mild, stable            Essential hypertension- (present on admission)   Assessment & Plan    Monitor blood pressure.          Clostridium difficile colitis   Assessment & Plan    Oral vancomycin added, special contact isolation.  ID following.  Increase IV fluids due to fluid losses        Anemia- (present on admission)   Assessment & Plan    Monitor H&H.  Transfuse if hemoglobin <7.5 g/dl.   Will need irradiated and CMV free product.        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 10 days  of daily infusion.  Patient will remain inpatient to complete campath discussed with Dr. Horta  Severe neutropenia            Quality-Core Measures   Reviewed items::  Labs reviewed, Medications reviewed and Radiology images reviewed  Joshi catheter::  No Joshi  DVT prophylaxis pharmacological::  Contraindicated - High bleeding risk  DVT prophylaxis - mechanical:  SCDs  Antibiotics:  Treating active infection/contamination beyond 24 hours perioperative coverage

## 2018-03-22 NOTE — PROGRESS NOTES
Rec'd report & assumed care of pt at 0700. Assessment completed. Pt is A&Ox4, c-diff +, neutropenic. Pt denies any pain, nausea, vomiting at this time. Medications provided per MAR.  L PICC in place, running NS at 75 mL/hr. Pt is up self to BSC. Plan of care discussed & questions answered. Bed locked and in lowest position, call light within reach, non-skid socks in place, hourly rounding. Pt reports no further needs at this time.

## 2018-03-22 NOTE — PROGRESS NOTES
Paged on call physician, spoke with Dr. Kiran and relayed the + c-diff results. He stated that he will put orders in.

## 2018-03-22 NOTE — PROGRESS NOTES
Alexandra from Lab called with critical result of WBC 0.2 and platelets 19 at 0232. Critical lab result read back to Alexandra.   This critical lab result is within parameters established by  for this patient

## 2018-03-22 NOTE — PROGRESS NOTES
Chemotherapy Verification - SECONDARY RN   Cycle #1 Day #7      Height = 177.8 cm  Weight = 85 kg  BSA = 2.05 m2        Medication: Alemtuzumab  Dose: 10 mg  Calculated Dose: 10 mg (fixed dose)                             (In mg/m2, AUC, mg/kg)     I confirm that this process was performed independently.

## 2018-03-23 PROBLEM — Z66 DNR (DO NOT RESUSCITATE): Status: ACTIVE | Noted: 2018-01-01

## 2018-03-23 NOTE — CARE PLAN
Problem: Discharge Barriers/Planning  Goal: Patient's continuum of care needs will be met  Outcome: PROGRESSING AS EXPECTED  Pt has had poor oral intake the past few days. Discussed food preferences for pt, pt stating he would like plain greek yogurt. Message left on dietary voice mail for his request.     Problem: Respiratory:  Goal: Respiratory status will improve    Intervention: Educate and encourage incentive spirometry usage  IS at bedside, pt able to pull 1500 and demonstrate correct use. Encouraged use 10x /hr while awake.

## 2018-03-23 NOTE — PROGRESS NOTES
Oncology/Hematology Progress Note               Author: Miquel Horta    Cc:  T-cell LGL Date & Time created: 3/23/2018  1:02 PM     Interval History:  He is still having diarrhea. It may be slightly better but for the most part it's the same. He has no fevers or chills. He has no abdominal pain. He has no nausea or vomiting. He did get transfused 2 units of blood this morning.      PMHx, PSurgHx, SocHx, FAMHx:  All reviewed and no changes    Review of Systems:  Review of Systems   Constitutional: Positive for malaise/fatigue. Negative for chills, fever and weight loss.   HENT: Negative for ear pain, nosebleeds and sore throat.    Eyes: Negative for blurred vision, double vision and photophobia.   Respiratory: Positive for cough and sputum production. Negative for hemoptysis and shortness of breath.    Cardiovascular: Positive for leg swelling. Negative for chest pain, palpitations and orthopnea.   Gastrointestinal: Positive for abdominal pain and diarrhea. Negative for constipation, heartburn, nausea and vomiting.   Genitourinary: Negative for dysuria, frequency, hematuria and urgency.   Skin: Negative for itching and rash.   Neurological: Positive for weakness.       Physical Exam:  Physical Exam   Constitutional: He is oriented to person, place, and time. He appears well-developed and well-nourished. No distress.   ECOG=2   HENT:   Head: Normocephalic and atraumatic.   Mouth/Throat: Oropharynx is clear and moist. No oropharyngeal exudate.   Eyes: Conjunctivae and EOM are normal. Right eye exhibits no discharge. Left eye exhibits no discharge. No scleral icterus.   Neck: Normal range of motion. Neck supple. No tracheal deviation present. No thyromegaly present.   Cardiovascular: Normal rate, regular rhythm and normal heart sounds.  Exam reveals no gallop and no friction rub.    No murmur heard.  Pulmonary/Chest: Effort normal and breath sounds normal. No respiratory distress. He has no wheezes. He has no rales.    Abdominal: Soft. Bowel sounds are normal. He exhibits no distension. There is no tenderness. There is no rebound and no guarding.   Musculoskeletal: Normal range of motion. He exhibits edema.   3+ bilateral lower extremity edema, left>right   Lymphadenopathy:     He has no cervical adenopathy.   Neurological: He is alert and oriented to person, place, and time.   Skin: Skin is warm. No rash noted. He is not diaphoretic. No erythema.   Psychiatric: He has a normal mood and affect. His behavior is normal. Thought content normal.       Labs:        Invalid input(s): [id number]      Recent Labs      03/21/18 0440 03/22/18 0152 03/23/18 0053   SODIUM  130*  133*  137   POTASSIUM  4.0  4.4  4.1   CHLORIDE  103  108  111   CO2  20  14*  16*   BUN  36*  35*  35*   CREATININE  1.59*  1.72*  1.74*   CALCIUM  7.1*  6.8*  6.5*     Recent Labs      03/21/18 0440 03/22/18 0152 03/23/18 0053   ALTSGPT   --    --   60*   ASTSGOT   --    --   49*   ALKPHOSPHAT   --    --   50   TBILIRUBIN   --    --   1.0   GLUCOSE  93  112*  138*     Recent Labs      03/21/18 0440 03/22/18 0152 03/23/18 0145 03/23/18   0925   RBC  2.74*  2.77*  2.04*   --    HEMOGLOBIN  7.5*  7.9*  5.8*  7.1*   HEMATOCRIT  22.6*  23.4*  17.2*  21.0*   PLATELETCT  20*  19*  24*   --      Recent Labs      03/21/18 0440 03/22/18 0152 03/23/18 0053 03/23/18 0145   WBC  0.1*  0.2*   --   0.1*   NEUTSPOLYS  CANCEL   --    --    --    LYMPHOCYTES  CANCEL   --    --    --    MONOCYTES  CANCEL   --    --    --    EOSINOPHILS  CANCEL   --    --    --    BASOPHILS  CANCEL   --    --    --    ASTSGOT   --    --   49*   --    ALTSGPT   --    --   60*   --    ALKPHOSPHAT   --    --   50   --    TBILIRUBIN   --    --   1.0   --      Recent Labs      03/21/18 0440 03/22/18   0152  03/23/18   0053   SODIUM  130*  133*  137   POTASSIUM  4.0  4.4  4.1   CHLORIDE  103  108  111   CO2  20  14*  16*   GLUCOSE  93  112*  138*   BUN  36*  35*   35*   CREATININE  1.59*  1.72*  1.74*   CALCIUM  7.1*  6.8*  6.5*     Hemodynamics:  Temp (24hrs), Av.7 °C (98 °F), Min:35.9 °C (96.7 °F), Max:37.2 °C (98.9 °F)  Temperature: 36.9 °C (98.5 °F)  Pulse  Av.8  Min: 64  Max: 110   Blood Pressure : 123/82     Respiratory:    Respiration: 18, Pulse Oximetry: 97 %        RUL Breath Sounds: Clear, RML Breath Sounds: Clear, RLL Breath Sounds: Clear, GREGORIA Breath Sounds: Clear, LLL Breath Sounds: Clear  Fluids:    Intake/Output Summary (Last 24 hours) at 18 1148  Last data filed at 18 0600   Gross per 24 hour   Intake                0 ml   Output              925 ml   Net             -925 ml        GI/Nutrition:  Orders Placed This Encounter   Procedures   • DIET ORDER     Standing Status:   Standing     Number of Occurrences:   1     Order Specific Question:   Diet:     Answer:   Cardiac [6]     Medical Decision Making, by Problem:  Active Hospital Problems    Diagnosis   • *Pancytopenia (CMS-HCC) [D61.818]   • Thrombocytopenia (CMS-HCC) [D69.6]   • Neutropenic fever (CMS-HCC) [D70.9, R50.81]   • Maxillary sinusitis [J32.0]   • Acute renal failure (ARF) (CMS-HCC) [N17.9]   • Hyponatremia [E87.1]   • Essential hypertension [I10]   • 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  T cell LGL/Leukemia-- presented with pancytopenia. Admitted to Hospital since . Admitted for neutropenic fever.  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   He seems to have an aggressive form of this disease. Campath started on 3/16/18, per Dr. Klein's discussion with [place].  He seems to be tolerating well.     Monitoring for Campath side effects. These can include autoimmune side effects such as pneumonitis,  Hypothyroidism, kidney disease, etc. sometimes can see in  autoimmune ITP or hemolytic picture with this treatment.  Can certainly see bone marrow suppression (but in his case we must treat through the low blood counts).  Also will be at high risk for opportunistic infections.   represents HSV prophylaxis, and PCP prophylaxis.    Monitor during treatment:  CD4+ lymphocyte counts (after treatment until recovery); CMV antigen baseline neg (routinely during and for 2 months after treatment); consider TSH at baseline ( slight elevation TSH and free T3)  and then every 2 to 3 months during alemtuzumab treatment    -- Day 8 of 10 today  --Continue Campath as ordered-- not sure how long it will take him to respond to this treatment. 50% responded by 3 months. He may have prolonged cytopenias waiting for response. Despite fevers, we will proceed forward with Campath. His infections are not likely to improve on a whole, unless we can get improvement in his counts.  --Continue prophylactic acyclovir  --Continue prophylactic Bactrim DS every MWF      2.  Anemia--  ROSE MARIE positive IGG, but normal Ret count, LDH, and hapto.  Bili stable 2.0 range. There may be a small component of hemolysis. Not Brisk . Patient put on 20 mg per day of prednisone on 3/11, then stopped on 3/21.  --Hemoglobin stable  -- monitor   --Transfuse if hemoglobin less than 7  --With recent drop in his hemoglobin, which to consider whether he might be starting to hemolyze again. What speaks against this is the bilirubin is normal at 1.0. We will continue to monitor.    3.  Thrombocytopenia--related to problem #1. No obvious signs of bleeding. We will continue to monitor.  --Transfuse if platelets less than 20 while febrile, or at any level if bleeding  --All blood products CMV negative and irradiated    4. Renal insufficiency--baseline creatinine at 1.2-1.5.  Uric acid level normal at 3.1. Creatinine starting to creep up--likely related to fluid loss with diarrhea.  --Medicine to increase IV fluid  rate  --Continue IV fluids  --Nephrology seen today    5. Neutropenic fever--  on cefepime, acyclovir, and fluconazole.  ID following patient. Fevers could be related to infection, infusion reaction, or tumor. Pancultured.   --Continue broad-spectrum antibiotics for prolonged cytopenias  --Continue to monitor.    6. 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  Given IVIG on 3/12/18     7. Elevated Baseline TSH--high prior to starting Campath.  Monitor.  Likely due to stress.   --TSH has gone up a little bit. We will continue to monitor and consider replacement if it continues to go up.      High complexicity/Drug monitoring       Quality-Core Measures   Reviewed items::  Labs reviewed, Medications reviewed and Radiology images reviewed  Joshi catheter::  No Joshi  DVT prophylaxis pharmacological::  Contraindicated - High bleeding risk

## 2018-03-23 NOTE — PROGRESS NOTES
Assumed care at 0700.   Received bedside report from day RN.   Pt A/Ox4. Pt denies pain or nausea.   Pt assessed, labs and notes reviewed. Medicated per MAR.   POC discussed; pt agreeable to goals.    Pt wanted to talk to MD regarding code status and to PC regarding a living will. MD aware.   Chemotherapy, day 8 of 10, today.   PICC, patent with +blood return.   Pt board updated and pt informed of phone ext's, and hourly rounding.   Pt is up self and steady to BSC, up x1 when ambulating.  Contact, protective, and chemotherapy precautions in place.   Pt needs are met at this time. Call light and phone within reach.

## 2018-03-23 NOTE — PROGRESS NOTES
Infectious Disease Progress Note    Author: Gricel Murguia M.D. Date & Time of service: 3/23/2018  8:37 AM    Chief Complaint:  FU neutropenic fever    Interval History:  76 y/o WM admitted with febrile neutropenia    2/28 Tmax 100.5, having coughing fit and SOB, plan for BM biopsy  3/1Tmax 100.2, WBC 0.3, had a BM this am, cough better, repeat CXR with no infiltrate   3/2 Tmax 100.5, shortness of breath with exertion, had bloody nose this am, plts 22, denies abd pain or diarrhea, path neg for lyphoma  3/3/2018-Tmax 99.6. WBC 0.3 platelets 17 creatinine 1.38  3/4/2018-Tmax 99.2 WBCs 0.3 creatinine 1.38 complains of some shortness of breath  3/5 AF WBC 0.3 transferred to [facility] [place] no new complaints  3/6 AF WBC 0.3 no positive cxs No new complaints  3/7 AF WBC 0.4 no complaints except fatigue and CORRALES  3/8 AF (99.8) no CBC transferred to Oncology floor-no acute events  3/9 AF WBC 0.5 feels weak-no new complaints  3/10 AF (99.9) plan for Campath noted  3/12/2018-Tmax 98.4 WBC 0.7 creatinine 1.55  3/13/2018-no fevers. Remains neutropenic. No new issues overnight  3/14/2018-Tmax 98.6 WBC 0.6 creatinine 1.44 no new issues overnight  3/15/2018-no fevers WBC 0.6 ambulating in the halls  3/16/2018-no fevers. No new issues overnight. WBC 0.5  3/21 Tmax 103, WBC 0.1, ID recalled for recurrent fevers, pt started having watery diarrhea yesterday with poor appetite but no abdominal pain, C diff pending. Denies any cough, SOB or new skin rash  3/22 Tmax 102.7, WBC 0.2, has night sweats, poor appetite did not have dinner, nausea but no vomiting  3/23 AF, WBC 0.1, had 3 watery BM overnight, no night sweats, denies abd pain, nausea or vomiting, getting blood transfusion  Labs Reviewed, Medications Reviewed, Radiology Reviewed and Wound Reviewed.    Review of Systems:  Review of Systems   Constitutional: Negative for diaphoresis and fever.   Respiratory: Negative for cough and shortness of breath.    Cardiovascular: Negative for chest  pain.   Gastrointestinal: Positive for diarrhea. Negative for abdominal pain, nausea and vomiting.   Genitourinary: Negative for dysuria.   Skin: Negative for rash.   Neurological: Positive for weakness.   All other systems reviewed and are negative.      Hemodynamics:  Temp (24hrs), Av.6 °C (97.9 °F), Min:35.9 °C (96.7 °F), Max:37.2 °C (98.9 °F)  Temperature: 36.6 °C (97.8 °F)  Pulse  Av.9  Min: 64  Max: 110   Blood Pressure : 122/80       Physical Exam:  Physical Exam   Constitutional: He is oriented to person, place, and time. He appears well-developed. No distress.   HENT:   Head: Normocephalic and atraumatic.   Eyes: EOM are normal. Pupils are equal, round, and reactive to light.   Neck: Neck supple.   Cardiovascular: Normal rate.    Pulmonary/Chest: Effort normal. No respiratory distress. He has no wheezes. He has no rales.   Abdominal: Soft. He exhibits no distension. There is no tenderness.   Musculoskeletal: He exhibits no edema.   LUE PICC   Neurological: He is alert and oriented to person, place, and time.   Skin: No rash noted.   ecchymosis   Nursing note and vitals reviewed.      Meds:    Current Facility-Administered Medications:   •  atovaquone  •  cefepime  •  NS  •  Pharmacy  •  vancomycin 50 mg/mL  •  diphenhydrAMINE  •  acetaminophen  •  hydrocortisone sodium succinate PF  •  ondansetron  •  generic chemo template  •  fluconazole  •  diphenhydrAMINE  •  allopurinol  •  Ganciclovir  •  dexamethasone  •  acetaminophen  •  NS  •  PICC Line Insertion has been implemented **AND** May use Lidocaine 1% not to exceed 3 mls for local at insertion site **AND** NOTIFY MD **AND** Tip to dwell in the superior vena cava **AND** Do not use PICC Line until placement verified by Chest X Ray **AND** [CANCELED] DX-CHEST-FOR PICC LINE Perform procedure in: Other(comment f6 below): **AND** If radiologist reading of chest X-ray states any of the following the PICC should be used **AND** Further evaluation of  the PICC placement can be retrieved from X-Ray and Imaging **AND** Blood draws through PICC line; draws by RN only **AND** FLUSHING GUIDELINES WHEN IN USE **AND** normal saline PF **AND** FLUSHING GUIDELINES WHEN NOT IN USE **AND** DRESSING MAINTENANCE **AND** Change needleless pressure ports and IV tubing every 72 hours per hospital policy **AND** TUBING **AND** If there is an MD order to remove the PICC line, any RN may remove the PICC line **AND** [] PATIENT EDUCATION MATERIALS **AND** NURSING COMMUNICATION  •  acyclovir  •  NS  •  acetaminophen  •  benzocaine-menthol  •  benzonatate  •  labetalol  •  levothyroxine  •  ondansetron  •  potassium chloride SA  •  PARoxetine  •  zolpidem    Labs:  Recent Labs      18   0145   WBC  0.1*  0.2*  0.1*   RBC  2.74*  2.77*  2.04*   HEMOGLOBIN  7.5*  7.9*  5.8*   HEMATOCRIT  22.6*  23.4*  17.2*   MCV  82.5  84.5  84.3   MCH  27.4  28.5  28.4   RDW  48.5  49.7  50.4*   PLATELETCT  20*  19*  24*   MPV   --    --   11.1   NEUTSPOLYS  CANCEL   --    --    LYMPHOCYTES  CANCEL   --    --    MONOCYTES  CANCEL   --    --    EOSINOPHILS  CANCEL   --    --    BASOPHILS  CANCEL   --    --      Recent Labs      18   0053   SODIUM  130*  133*  137   POTASSIUM  4.0  4.4  4.1   CHLORIDE  103  108  111   CO2  20  14*  16*   GLUCOSE  93  112*  138*   BUN  36*  35*  35*     Recent Labs      18   0053   ALBUMIN   --   2.9*  2.5*   TBILIRUBIN   --    --   1.0   ALKPHOSPHAT   --    --   50   TOTPROTEIN   --    --   4.3*   ALTSGPT   --    --   60*   ASTSGOT   --    --   49*   CREATININE  1.59*  1.72*  1.74*       Imaging:  CXR 3/21 with small right pleural effusion    Micro:  Results     Procedure Component Value Units Date/Time    CULTURE RESPIRATORY W/ GRM STN [[id number]] Collected:  18 0300    Order Status:  Completed Updated:  18 032    BLOOD CULTURE  "[[id number]] Collected:  03/17/18 0907    Order Status:  Completed Specimen:  Blood from Peripheral Updated:  03/22/18 1100     Significant Indicator NEG     Source BLD     Site PERIPHERAL     Blood Culture No growth after 5 days of incubation.    Narrative:       Protective  Per Hospital Policy: Only change Specimen Src: to \"Line\" if  specified by physician order.    BLOOD CULTURE [[id number]] Collected:  03/17/18 0907    Order Status:  Completed Specimen:  Blood from Peripheral Updated:  03/22/18 1100     Significant Indicator NEG     Source BLD     Site PERIPHERAL     Blood Culture No growth after 5 days of incubation.    Narrative:       Protective  Per Hospital Policy: Only change Specimen Src: to \"Line\" if  specified by physician order.    BLOOD CULTURE [[id number]] Collected:  03/21/18 1049    Order Status:  Completed Specimen:  Blood from Peripheral Updated:  03/22/18 0739     Significant Indicator NEG     Source BLD     Site PERIPHERAL     Blood Culture No Growth    Note: Blood cultures are incubated for 5 days and  are monitored continuously.Positive blood cultures  are called to the RN and reported as soon as  they are identified.      Narrative:       Protective  Per Hospital Policy: Only change Specimen Src: to \"Line\" if  specified by physician order.    BLOOD CULTURE [[id number]] Collected:  03/21/18 1049    Order Status:  Completed Specimen:  Blood from Peripheral Updated:  03/22/18 0739     Significant Indicator NEG     Source BLD     Site PERIPHERAL     Blood Culture No Growth    Note: Blood cultures are incubated for 5 days and  are monitored continuously.Positive blood cultures  are called to the RN and reported as soon as  they are identified.      Narrative:       Protective  Per Hospital Policy: Only change Specimen Src: to \"Line\" if  specified by physician order.    C DIFF TOXIN [[id number]]  (Abnormal) Collected:  03/21/18 1306    Order Status:  Completed Updated:  03/21/18 1901     C.Diff Toxin " A&B Positive (A)     Comment: TOXIN POSITIVE  Toxin detected by EIA; C. difficile detected by PCR.  If clinically correlated, treatment indicated per guidelines.  Test of cure is not recommended.         Narrative:       Special Contact [id number] SHARMIN MCDUFFIE.  Does this patient have risk factors for C-diff?->Yes  Has patient taken stool softeners or laxatives in the last 5  days?->No    CDIFF BY PCR WITH TOXIN [[id number]] Collected:  03/21/18 1306    Order Status:  Completed Specimen:  Stool from Stool Updated:  03/21/18 1859     C Diff by PCR See Toxin     027-NAP1-BI Presumptive Negative     Comment: Presumptive 027/NAP1/BI target DNA sequences are NOT DETECTED.       Narrative:       Special Contact [id number] SHARMIN PHOENIX FROY.  Does this patient have risk factors for C-diff?->Yes  Has patient taken stool softeners or laxatives in the last 5  days?->No    URINALYSIS [[id number]]  (Abnormal) Collected:  03/21/18 1254    Order Status:  Completed Specimen:  Urine from Urine, Clean Catch Updated:  03/21/18 1414     Color Yellow     Character Cloudy (A)     Specific Gravity 1.015     Ph 5.0     Glucose Negative mg/dL      Ketones Negative mg/dL      Protein 100 (A) mg/dL      Bilirubin Negative     Urobilinogen, Urine 0.2     Nitrite Negative     Leukocyte Esterase Negative     Occult Blood Small (A)     Micro Urine Req Microscopic    Narrative:       Special Contact [id number] SHARMIN ALICEA  Does this patient have risk factors for C-diff?->Yes  Has patient taken stool softeners or laxatives in the last 5  days?->No    Culture Respiratory W/ GRM STN [[id number]]     Order Status:  Completed Specimen:  Respirate from Sputum     URINALYSIS [[id number]]  (Abnormal) Collected:  03/17/18 1330    Order Status:  Completed Specimen:  Urine from Urine, Clean Catch Updated:  03/17/18 1413     Color Yellow     Character Clear     Specific Gravity 1.007     Ph 5.5     Glucose Negative mg/dL      Ketones  Negative mg/dL      Protein 30 (A) mg/dL      Bilirubin Negative     Urobilinogen, Urine 0.2     Nitrite Negative     Leukocyte Esterase Negative     Occult Blood Small (A)     Micro Urine Req Microscopic    Narrative:       [id number] LEE ALICEA          Assessment:  Active Hospital Problems    Diagnosis   • *Pancytopenia (CMS-[facility]) [D61.818]   • Thrombocytopenia (CMS-[facility]) [D69.6]   • Neutropenic fever (CMS-[facility]) [D70.9, R50.81]   • Maxillary sinusitis [J32.0]   • Acute renal failure (ARF) (CMS-[facility]) [N17.9]   • Hyponatremia [E87.1]   • Essential hypertension [I10]   • 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    Repeat Bcx 3/21 - NGTD    Clostridium difficile diarrhea  Repeat C diff + 3/21  Continue PO vanco QID x 14 days then BID while on IV abx    ROHITH, worse  Renally dose abx as needed  Avoid nephrotoxins  Monitor closely once Aparna  DC'd bactrim given worsening ROHITH   Transitioned to atovaquone for PCP prophylaxis while on aparna [place]

## 2018-03-23 NOTE — PROGRESS NOTES
"[facility] Hospitalist Progress Note    Date of Service: 3/23/2018    Chief Complaint  77 y.o. male admitted 3/4/2018 with severe pancytopenia and neutropenic fever.  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    Interval Problem Update  3/13 Patient feeling okay today, neutropenia is worsening along with renal function.  Chemo delivery is pending.  Hopefully with initiation of chemo renal function will improve.  Likely all issues are tied to new diagnosis of leukemia.  3/14 Patient without complaints today, states urinating frequently.  He is well hydrated now and renal function is improving, will decrease IVF rate to 75cc and continue to follow creatinine.  He may need rate increase with chemotherapy but for now is well hydrated.    Chemotherapy not yet available.  3/15 Patient states he feels okay, has ambulated in hallways with assistance.  States his urinary stream is strong and he need not go quite so often since decrease in rate of fluids.  Campath delivered this afternoon and will start administration tomorrow for 10 days.  3/16 Patient without complaint, states he is happy that treatment can finally start today.  He tolerated his test dose without complication and full dose to be administered.  Day 1 of 10 Campath.  3/17 Patient states he \"feels great\" and has no complaints.  RN reports temperature elevation this am of 100.8.  Patient not symptomatic and like with chemo/leukemia fever but given neutropenia will continue on antibiotics and I've ordered blood cultures, UA and CXR for further evaluation.  He tolerated the alemtuzumab without issue.  3/18 Patient states he is still doing very well, no issues with chemo, good appetite.  Infection w/u from yesterday unrevealing so likely d/t chemo/cancer than infection.  Cefepime to dc tomorrow if blood cultures remain negative and patient remains afebrile.  Day 3 Campath.  3/19 Patient states he continues to feel well.  Chemo day 4.  Cultures still negative so " will dc cefepime.  3/20: chemo day 5/10, tolerating campath with the premedication. Two loose stools today, no fever or chills, no abdominal pain. Getting drops in eye as usual.  3/21: recurrent fever, up to 103, watery diarrhea, weakness and malaise  3/22: renal function worsening; continued watery diarrhea positive cdiff toxin. Feels malaise and weak. No fever since yesterday  3/23: increase in creatinine again, chart reviewed no history I can find of nephrology consult, discussed with Dr. Grimes. Patient discussed with his wife and he requests to be DNR we discussed the options associated with this, he would like to complete an advanced directive as well.    Consultants/Specialty  Oncology - Paula Villanueva, will have prolonged hospital course secondary to neutropenia that will worsen with chemotherapy.        Review of Systems   Constitutional: Positive for malaise/fatigue. Negative for chills and fever.   HENT: Negative for congestion.    Eyes: Negative for blurred vision and photophobia.   Respiratory: Negative for cough and shortness of breath.    Cardiovascular: Negative for chest pain, claudication and leg swelling.   Gastrointestinal: Positive for diarrhea. Negative for abdominal pain, constipation, heartburn, nausea and vomiting.   Genitourinary: Negative for dysuria, frequency and hematuria.   Musculoskeletal: Negative for joint pain and myalgias.   Skin: Negative for itching and rash.   Neurological: Positive for weakness. Negative for dizziness, sensory change, speech change and headaches.   Psychiatric/Behavioral: Negative for depression. The patient is not nervous/anxious and does not have insomnia.       Physical Exam  Laboratory/Imaging   Hemodynamics  Temp (24hrs), Av.8 °C (98.2 °F), Min:36.4 °C (97.5 °F), Max:37.2 °C (98.9 °F)   Temperature: 36.8 °C (98.2 °F)  Pulse  Av.7  Min: 64  Max: 110    Blood Pressure : 127/73      Respiratory      Respiration: 18, Pulse  Oximetry: 93 %        RUL Breath Sounds: Clear, RML Breath Sounds: Clear, RLL Breath Sounds: Clear, GREGORIA Breath Sounds: Clear, LLL Breath Sounds: Clear    Fluids    Intake/Output Summary (Last 24 hours) at 03/23/18 1758  Last data filed at 03/23/18 1300   Gross per 24 hour   Intake             5349 ml   Output                0 ml   Net             5349 ml       Nutrition  Orders Placed This Encounter   Procedures   • DIET ORDER     Standing Status:   Standing     Number of Occurrences:   1     Order Specific Question:   Diet:     Answer:   Cardiac [6]     Physical Exam   Constitutional: He is oriented to person, place, and time. He appears well-developed and well-nourished. No distress.   HENT:   Head: Normocephalic and atraumatic.   Eyes: Conjunctivae are normal. No scleral icterus.   Neck: Neck supple. No JVD present.   Cardiovascular: Normal rate, regular rhythm and normal heart sounds.  Exam reveals no gallop and no friction rub.    No murmur heard.  Pulmonary/Chest: Effort normal and breath sounds normal. No respiratory distress. He has no wheezes. He exhibits no tenderness.   Abdominal: Soft. Bowel sounds are normal. He exhibits no distension and no mass. There is no tenderness.   Musculoskeletal: He exhibits no edema or tenderness.   Neurological: He is alert and oriented to person, place, and time. No cranial nerve deficit.   Skin: Skin is warm and dry. He is not diaphoretic. No erythema. No pallor.   Psychiatric: He has a normal mood and affect. His behavior is normal.   Nursing note and vitals reviewed.      Recent Labs      03/21/18   0440  03/22/18   0152  03/23/18   0145  03/23/18   0925   WBC  0.1*  0.2*  0.1*   --    RBC  2.74*  2.77*  2.04*   --    HEMOGLOBIN  7.5*  7.9*  5.8*  7.1*   HEMATOCRIT  22.6*  23.4*  17.2*  21.0*   MCV  82.5  84.5  84.3   --    MCH  27.4  28.5  28.4   --    MCHC  33.2*  33.8  33.7   --    RDW  48.5  49.7  50.4*   --    PLATELETCT  20*  19*  24*   --    MPV   --    --   11.1    --      Recent Labs      03/21/18   0440  03/22/18   0152  03/23/18   0053   SODIUM  130*  133*  137   POTASSIUM  4.0  4.4  4.1   CHLORIDE  103  108  111   CO2  20  14*  16*   GLUCOSE  93  112*  138*   BUN  36*  35*  35*   CREATININE  1.59*  1.72*  1.74*   CALCIUM  7.1*  6.8*  6.5*                      Assessment/Plan     * Pancytopenia ([facility])   Assessment & Plan    Secondary to severe bone marrow infiltrate.   T cell LGL - leukemia  Campath start admin 3/16 discussed with Dr. Horta, patient will be here for the duration of campath administration.          Thrombocytopenia (CMS-HCC)- (present on admission)   Assessment & Plan    Transfuse prn if less than 10K. No bleeding.        Neutropenic fever (CMS-HCC)- (present on admission)   Assessment & Plan    ANC 0  On Cefepime and fluconazole.   Persistent neutropenia febrile again to 103, cough and diarrhea  Cdiff positive    blood culture, CXR, UA all okay continue to monitor  ID following        Maxillary sinusitis- (present on admission)   Assessment & Plan    On abx         Acute renal failure (ARF) (CMS-HCC)- (present on admission)   Assessment & Plan    Worsening with diarrhea/cdiff, still worse, nephrology consulted recommending change to bicarbonate drip and continue to follow labs  Avoid nephrotoxins   Renally dose all medications         Hyponatremia- (present on admission)   Assessment & Plan    Mild, stable            Essential hypertension- (present on admission)   Assessment & Plan    Monitor blood pressure.          DNR (do not resuscitate)   Assessment & Plan    Patient request.  He would like to complete an advanced directive, recommend POLST as well.  Palliative care consult requested.        Clostridium difficile colitis   Assessment & Plan    Oral vancomycin added, special contact isolation.  ID following.  Increase IV fluids due to fluid losses        Anemia- (present on admission)   Assessment & Plan    Monitor H&H.  Transfuse if hemoglobin  <7.5 g/dl.   Transfuse PRBC irradiated and CMV free product.        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 10 days of daily infusion.  Patient will remain inpatient to complete campath discussed with Dr. Horta  Severe neutropenia            Quality-Core Measures   Reviewed items::  Labs reviewed, Medications reviewed and Radiology images reviewed  Joshi catheter::  No Joshi  DVT prophylaxis pharmacological::  Contraindicated - High bleeding risk  DVT prophylaxis - mechanical:  SCDs  Antibiotics:  Treating active infection/contamination beyond 24 hours perioperative coverage

## 2018-03-23 NOTE — PROGRESS NOTES
Received report from day RN, assumed care of pt 1900.  Platelets just finished transfusing, no adverse reaction noted. VSS.  Assessment complete, pt still having diarrhea. No appetite again tonight, did not want dinner.   Still up self to BSC.   LUE PICC both lumens patent with + blood return, dressing is CDI.  Will continue to monitor for fever, monitor labs.   Pt denies pain, denies nausea. All needs met at this time. Call light within reach.

## 2018-03-23 NOTE — PROGRESS NOTES
Chemotherapy Verification - PRIMARY RN      Height = 177.8 cm  Weight = 85 kg  BSA = 2.05 m2       Medication: Aletuzumab (Campath)  Dose: 10 mg  Calculated Dose: 10 mg flat dose ([facility])                             (In mg/m2, AUC, mg/kg)     I confirm this process was performed independently with the BSA and all final chemotherapy dosing calculations congruent.  Any discrepancies of 5% or greater have been addressed with the chemotherapy pharmacist. The resolution of the discrepancy has been documented in the EPIC progress notes.

## 2018-03-23 NOTE — PROGRESS NOTES
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 Alemtuzumab in T-cell large granular lymphocytic leukaemia: a phase 2 study. Lancet Haematol. 2016 Jan; 3(1):e22-e29.     Allergies:  Nitroglycerin and Losartan     /80   Pulse 74   Temp 36.6 °C (97.8 °F)   Resp 18   Ht 1.778 m (5' 10\")   Wt 85 kg (187 lb 6.3 oz)   SpO2 96%   BMI 26.89 kg/m²  Body surface area is 2.05 meters squared.      Labs 3/22/18  ANC (none - WBC = 0.1) Plt = 24k   Hgb = 5.8 -transfused 1 unit PRBC this AM, Hgb improved to 7.1  SCr = 1.74 mg/dL         CrCl ~42 mL/min (no renal dose adjustment recommended)     LFTs = 49/60/50  (no dose adjustment for LFT elevation)    TBili = 1.0    MD aware of current labs. No fevers in last 24 hours. Continue with treatment per Dr. Horta.  Per MD will transfuse if hemoglobin less than 7 and/or if platelets less than 10, or if bleeding.     3/11/2018 04:30   Cmv Ab Igm <8.0   CMV IgG Qnt <0.20      2/25/18 ECHO LVEF = 65%  3/16/18 TSH = 6.7 T3 free 1.38     Monitor during treatment - CD4+ lymphocyte counts (after treatment until recovery); CMV antigen (routinely during and for 2 months after treatment); consider TSH at baseline and then every 2 to 3 months during alemtuzumab treatment      ID following    Premeds: Diphenhydramine 50 mg IV                   Acetaminophen 650 mg PO                   Hydrocortisone 100 mg IV (verified Dr. Mitchell wants this in addition to oral prednisone)    Drug Order   (Drug name, dose, route, IV Fluid & volume, frequency, number of doses) Cycle: C1 Day 8 of 10      Previous treatment: s/p RCVP and Rituxan/bendamustine at [facility] " with long term remission.      Medication = Alemtuzumab  Base Dose= 10 mg fixed dose  Calc Dose: N/A  Final Dose = 10 mg (EPIC rounds to 9.9 mg)  Route = IV   Fluid & Volume =  mL  Admin Duration = Over 2 hours     Days 2-10       <5% difference, ok to treat with final written dose     By my signature below, I confirm this process was performed independently with the BSA and all final chemotherapy dosing calculations congruent. I have reviewed the above chemotherapy order and that my calculation of the final dose and BSA (when applicable) corroborate those calculations of the  pharmacist. Discrepancies of 5% or greater in the written dose have been addressed and documented within the [facility] Progress notes.    Stewart Mora, JackieD

## 2018-03-23 NOTE — PROGRESS NOTES
Chemotherapy Verification - SECONDARY RN   Cycle #1 Day 8      Height = 177.8 cm  Weight = 85 kg  BSA = 2.05 m2       Medication: Alemtuzumab  Dose: 10 mg  Calculated Dose: 10 mg flat dose (EPIC rounds to 9.9 mg bria per pharmacy)                            (In mg/m2, AUC, mg/kg)     I confirm that this process was performed independently.

## 2018-03-23 NOTE — PROGRESS NOTES
Alexandra from Lab called with critical result of hgb 5.8, hct 17.2, platelets 24, WBC 0.1, and calcium 6.5 (corrected calcium is 7.7)  at 0220. Critical lab result read back to Alexandra.   Dr. Kiran notified of critical lab result at 0238.  Critical lab result read back by Dr. Kiran. Orders received to transfuse one unit PRBC CMV neg and irradiated.

## 2018-03-24 NOTE — ASSESSMENT & PLAN NOTE
Patient request.  He would like to complete an advanced directive, recommend POLST as well.  Palliative care consult requested.

## 2018-03-24 NOTE — DOCUMENTATION QUERY
DOCUMENTATION QUERY    PROVIDERS: Please select “Cosign w/ note”to reply to query.    To better represent the severity of illness of your patient, please review the following information and exercise your independent professional judgment in responding to this query.     Lactic acid 2.8 is noted in the 3/22 Lab Results . Based upon the clinical findings, risk factors, and treatment, can a diagnosis be provided to support this finding?    • Lactic acidosis   • Findings of no clinical significance   • Other explanation of clinical findings  • Unable to determine (no explanation for clinical findings)      The medical record reflects the following:   Clinical Findings  3/22 Lactic acid 2.8, 3/21 Lactic acid 3.1   Treatment  IV NS, Sodium Bicarb   Risk Factors  age, ROHITH, dehydration   Location within medical record  Progress Notes, Lab Results  and MAR     Thank you,   Leanna Miranda RN, BSN  Clinical   [phone]

## 2018-03-24 NOTE — CONSULTS
DATE OF SERVICE:  03/23/2018    NEPHROLOGY CONSULTATION    CONSULT REQUESTING PHYSICIAN:  Raquel Melendez MD.    REASON FOR CONSULTATION:  To evaluate patient with elevated serum creatinine   level.    HISTORY OF PRESENT ILLNESS:  The patient is a 77-year-old male with history of   Hodgkin lymphoma treated with chemotherapy, admitted with neutropenic fever   and pancytopenia.  Patient's baseline creatinine level at 1.6 and his chronic   kidney disease stage III, currently at 1.7, also noticed mild metabolic   acidosis.  Had episodes of diarrhea, suspicious of C. diff colitis.    Currently, patient states doing better, no abdominal pain, nausea, vomiting.    Diarrhea improved.  Remains good urine output.  No difficulties to urinate.    No fever or chills.    REVIEW OF SYSTEMS:  GENERAL:  Positive for fatigue, weakness, and no fever or chills.  HEENT:  No congestion, no headache, no sore throat.  NECK:  No pain, no stiffness.  RESPIRATORY:  No chest pain, no shortness of breath.  No wheezes.  No rhonchi.    Positive for cough.  CARDIOVASCULAR:  Positive for edema.  No dyspnea.  No chest pain.  GASTROINTESTINAL:  Positive for diarrhea.  No abdominal pain, no nausea or   vomiting.  GENITOURINARY:  No dysuria or hematuria.  No increased hesitancy, no increased   frequency to urinate.  All other review of systems are negative.    PAST MEDICAL HISTORY:  Chronic kidney disease stage III, Hodgkin disease,   bronchitis, gastroesophageal reflux disease, and hypertension.    PAST SURGICAL HISTORY:  Tonsillectomy.    FAMILY HISTORY:  Pancreatic cancer.  No history of kidney disease.    SOCIAL HISTORY:  No tobacco, alcohol or drug use.    ALLERGIES:  ALLERGIC TO [place].    OUTPATIENT AND INPATIENT MEDICATIONS:  Reviewed.    PHYSICAL EXAMINATION:  VITAL SIGNS:  Blood pressure 127/73, heart rate 73, temperature 36.8 Celsius.  GENERAL APPEARANCE:  Well-developed, well-nourished male, in no acute   distress.  HEENT:   Normocephalic, atraumatic.  Pupil round and reactive to light.    Extraocular movements intact.  Nares patent.  Oropharynx clear.  Moist mucosa,   no erythema or exudate.  NECK:  Supple, no lymphadenopathy, no thyromegaly appreciated.  LUNGS:  Clear to auscultation bilaterally.  No rales, no wheezes, no rhonchi.  HEART:  Regular rate.  No rub or gallop.  ABDOMEN:  Soft, nontender.  Bowel sounds present.  No palpable masses.  EXTREMITIES:  Trace pedal edema bilaterally.  No cyanosis.  NEUROLOGIC:  Alert and oriented x3.  No focal deficits.  Cranial nerves II-XII   grossly intact.    LABORATORY RESULTS:  Reviewed, revealed hemoglobin 7.1, white blood count 0.1.    Sodium 137, potassium 4.1, CO2 of 16, BUN 35 and creatinine 1.74.    ASSESSMENT AND PLAN:  The patient is a very pleasant 77-year-old male with   chronic kidney disease stage III, Hodgkin lymphoma, on chemotherapy, admitted   with neutropenic fever, pancytopenia, now with diarrhea and possibly C. diff   colitis, worsening serum creatinine level.    PROBLEMS:  1.  Chronic kidney disease stage III with worsening serum creatinine level.    Continue the IV fluids, to monitor closely.  2.  Metabolic acidosis.  Add bicarbonate drip.  3.  Electrolytes remain well controlled.  4.  Anemia, transfusion per hematology/oncology team.    RECOMMENDATIONS:  Bicarbonate drip, daily monitoring of basic metabolic panel, avoid nephrotoxic   agents.  We will follow the patient closely.    Thank you for the consult.       ____________________________________     MD ALCON MURILLO / PARKER    DD:  03/23/2018 19:25:33  DT:  03/23/2018 20:37:18    D#:  [id number]  Job#:  [id number]

## 2018-03-24 NOTE — CARE PLAN
Problem: Safety  Goal: Will remain free from falls  Outcome: PROGRESSING AS EXPECTED  Pt is free from accidental injury. Fall precautions are in place. Treaded socks in use, appropriate sign on the door, personal possessions and call light within reach, bed in low, and gait assessed. Up self to BSC, up x1 when ambulating further.     Problem: Bowel/Gastric:  Goal: Normal bowel function is maintained or improved  Outcome: PROGRESSING SLOWER THAN EXPECTED  Still experiencing diarrhea.

## 2018-03-24 NOTE — PROGRESS NOTES
"[facility] Hospitalist Progress Note    Date of Service: 3/24/2018    Chief Complaint  77 y.o. male admitted 3/4/2018 with severe pancytopenia and neutropenic fever.  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    Interval Problem Update  3/13 Patient feeling okay today, neutropenia is worsening along with renal function.  Chemo delivery is pending.  Hopefully with initiation of chemo renal function will improve.  Likely all issues are tied to new diagnosis of leukemia.  3/14 Patient without complaints today, states urinating frequently.  He is well hydrated now and renal function is improving, will decrease IVF rate to 75cc and continue to follow creatinine.  He may need rate increase with chemotherapy but for now is well hydrated.    Chemotherapy not yet available.  3/15 Patient states he feels okay, has ambulated in hallways with assistance.  States his urinary stream is strong and he need not go quite so often since decrease in rate of fluids.  Campath delivered this afternoon and will start administration tomorrow for 10 days.  3/16 Patient without complaint, states he is happy that treatment can finally start today.  He tolerated his test dose without complication and full dose to be administered.  Day 1 of 10 Campath.  3/17 Patient states he \"feels great\" and has no complaints.  RN reports temperature elevation this am of 100.8.  Patient not symptomatic and like with chemo/leukemia fever but given neutropenia will continue on antibiotics and I've ordered blood cultures, UA and CXR for further evaluation.  He tolerated the alemtuzumab without issue.  3/18 Patient states he is still doing very well, no issues with chemo, good appetite.  Infection w/u from yesterday unrevealing so likely d/t chemo/cancer than infection.  Cefepime to dc tomorrow if blood cultures remain negative and patient remains afebrile.  Day 3 Campath.  3/19 Patient states he continues to feel well.  Chemo day 4.  Cultures still negative so " will dc cefepime.  3/20: chemo day 5/10, tolerating campath with the premedication. Two loose stools today, no fever or chills, no abdominal pain. Getting drops in eye as usual.  3/21: recurrent fever, up to 103, watery diarrhea, weakness and malaise  3/22: renal function worsening; continued watery diarrhea positive cdiff toxin. Feels malaise and weak. No fever since yesterday  3/23: increase in creatinine again, chart reviewed no history I can find of nephrology consult, discussed with Dr. Grimes. Patient discussed with his wife and he requests to be DNR we discussed the options associated with this, he would like to complete an advanced directive as well.  3/24: Complaining of edema mostly in legs and arms as well. Nursing wait him and found he had a 16 pound weight gain in the last week. Creatinine continues to increase. Complains of chronic cough and sputum production.     Consultants/Specialty  Oncology -   ID - [place]    Disposition  Tbd, will have prolonged hospital course secondary to neutropenia that will worsen with chemotherapy.        Review of Systems   Constitutional: Positive for malaise/fatigue. Negative for chills and fever.   HENT: Negative for congestion.    Eyes: Negative for blurred vision and photophobia.   Respiratory: Positive for cough and sputum production. Negative for shortness of breath.    Cardiovascular: Positive for leg swelling. Negative for chest pain and claudication.   Gastrointestinal: Positive for diarrhea. Negative for abdominal pain, constipation, heartburn, nausea and vomiting.   Genitourinary: Negative for dysuria, frequency and hematuria.   Musculoskeletal: Negative for joint pain and myalgias.   Skin: Negative for itching and rash.   Neurological: Positive for weakness. Negative for dizziness, sensory change, speech change and headaches.   Psychiatric/Behavioral: Negative for depression. The patient is not nervous/anxious and does not have insomnia.       Physical Exam   Laboratory/Imaging   Hemodynamics  Temp (24hrs), Av.8 °C (98.3 °F), Min:36.6 °C (97.8 °F), Max:37.1 °C (98.7 °F)   Temperature: 36.6 °C (97.8 °F)  Pulse  Av.4  Min: 64  Max: 110    Blood Pressure : 154/97      Respiratory      Respiration: 18, Pulse Oximetry: 96 %        RUL Breath Sounds: Clear, RML Breath Sounds: Clear, RLL Breath Sounds: Clear, GREGORIA Breath Sounds: Clear, LLL Breath Sounds: Clear    Fluids    Intake/Output Summary (Last 24 hours) at 18 1407  Last data filed at 18 2359   Gross per 24 hour   Intake             1441 ml   Output                0 ml   Net             1441 ml       Nutrition  Orders Placed This Encounter   Procedures   • DIET ORDER     Standing Status:   Standing     Number of Occurrences:   1     Order Specific Question:   Diet:     Answer:   Cardiac [6]     Physical Exam   Constitutional: He is oriented to person, place, and time. He appears well-developed and well-nourished. No distress.   HENT:   Head: Normocephalic and atraumatic.   Eyes: Conjunctivae are normal. No scleral icterus.   Neck: Neck supple. No JVD present.   Cardiovascular: Normal rate, regular rhythm and normal heart sounds.  Exam reveals no gallop and no friction rub.    No murmur heard.  Pulmonary/Chest: Effort normal and breath sounds normal. No respiratory distress. He has no wheezes. He exhibits no tenderness.   Abdominal: Soft. Bowel sounds are normal. He exhibits no distension and no mass. There is no tenderness.   Musculoskeletal: He exhibits no edema or tenderness.   Neurological: He is alert and oriented to person, place, and time. No cranial nerve deficit.   Skin: Skin is warm and dry. He is not diaphoretic. No erythema. No pallor.   Psychiatric: He has a normal mood and affect. His behavior is normal.   Nursing note and vitals reviewed.      Recent Labs      18   0152  18   0145  18   0925  18   0042   WBC  0.2*  0.1*   --   0.1*   RBC  2.77*  2.04*   --   2.67*    HEMOGLOBIN  7.9*  5.8*  7.1*  7.7*   HEMATOCRIT  23.4*  17.2*  21.0*  22.0*   MCV  84.5  84.3   --   82.4   MCH  28.5  28.4   --   28.8   MCHC  33.8  33.7   --   35.0   RDW  49.7  50.4*   --   48.9   PLATELETCT  19*  24*   --   23*   MPV   --   11.1   --    --      Recent Labs      03/22/18   0152  03/23/18   0053  03/24/18   0042   SODIUM  133*  137  137   POTASSIUM  4.4  4.1  3.9   CHLORIDE  108  111  112   CO2  14*  16*  17*   GLUCOSE  112*  138*  133*   BUN  35*  35*  35*   CREATININE  1.72*  1.74*  1.77*   CALCIUM  6.8*  6.5*  6.5*                      Assessment/Plan     * Pancytopenia (CMS-[facility])   Assessment & Plan    Secondary to severe bone marrow infiltrate.   T cell LGL - leukemia  Campath start admin 3/16 discussed with Dr. Horta, patient will be here for the duration of campath administration.          Thrombocytopenia (CMS-[facility])- (present on admission)   Assessment & Plan    Transfuse prn if less than 10K. No bleeding.        Neutropenic fever (CMS-[facility])- (present on admission)   Assessment & Plan    ANC 0  On Cefepime and fluconazole.   Persistent neutropenia febrile again to 103, cough and diarrhea  Cdiff positive    blood culture, CXR, UA all okay continue to monitor  ID following        Maxillary sinusitis- (present on admission)   Assessment & Plan    On abx         Acute renal failure (ARF) (CMS-[facility])- (present on admission)   Assessment & Plan    Unfortunately continues to be Worsening with diarrhea/cdiff, still worse, nephrology consulted hold bicarbonate drip due to edema and 16 pound weight gain  Avoid nephrotoxins   Renally dose all medications         Hyponatremia- (present on admission)   Assessment & Plan    Mild, stable            Essential hypertension- (present on admission)   Assessment & Plan    Monitor blood pressure.          DNR (do not resuscitate)   Assessment & Plan    Patient request.  He would like to complete an advanced directive, recommend POLST as well.  Palliative care  consult requested.        Clostridium difficile colitis   Assessment & Plan    Oral vancomycin added, special contact isolation.  ID following.  Decrease IV fluids patient getting quite edematous discussed with nephrology        Anemia- (present on admission)   Assessment & Plan    Monitor H&H.  Transfuse if hemoglobin <7.5 g/dl.   When Transfused give PRBC irradiated and CMV free product.        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 10 days of daily infusion.  Patient will remain inpatient to complete campath discussed with Dr. Horta  Severe neutropenia            Quality-Core Measures   Reviewed items::  Labs reviewed, Medications reviewed and Radiology images reviewed  Joshi catheter::  No Joshi  DVT prophylaxis pharmacological::  Contraindicated - High bleeding risk  DVT prophylaxis - mechanical:  SCDs  Antibiotics:  Treating active infection/contamination beyond 24 hours perioperative coverage

## 2018-03-24 NOTE — CONSULTS
"Reason for Palliative Care Consult: Advance Care Planning    Consulted by: Dr. Melendez     General: Mr. Chang Unger is a 77 year old male transferred from [facility] 3/4/18 for pancytopenia, neutropenic fever, and need for hematology evaluation. Patient has T cell LGL/Leukemia (diagnosed from bone marrow biopsy 2/28/18 which required review at [facility]) and is being treated with Alemtuzumab (campath). He has developed renal insufficiency, continues to have pancytopenia, thrombocytopenia, hypogammaglobulinemia, anemia, and neutropenic fever. He also has maxillary sinusitis, Clostridium difficile colitis,  PMH significant for non-Hodgkin's lymphoma treated in 2010 by Dr. Klein and HTN. Patient is awake, alert, and oriented x 4. He has a pleasant affect.     Consults:   Hematology/oncology   Infectious disease  Nephrology     Assessment:  Dyspnea: Present on exertion only. 96% on room air.  Last BM: 03/24/18.    Pain: Denies.     Depression: No    Dementia: No     Spiritual:  Is Congregation or spirituality important for coping with this illness? Yes; patient requested a  when available. He states that he grew up [employer] and doesn't really practice anymore.   Has a  or spiritual provider visit been requested? No; will request on Monday as it is a non urgent visit.     Palliative Performance Scale: 50%    Advance Directive: None on file. Assisted with completion. Patient chose 2, 3, and 5 for directives.     DPOA: None on file. Assisted with completion. Patient would like his wife to be DPOA-HC: Willow Unger cell [phone], work [phone], home [phone]. He did not list alternates. He stated his son \"doesn't do well with illness.\"    POLST: None on file. Assisted patient with completion. He chose: DNR, selective treatment, no feeding tube, and trial of IVF. Asked specifically about ICU transfer, and after some thought patient expressed he would not want to be transferred " "to the ICU. Updated code status to reflect.       Code Status: DNR.      Outcome:  Met with patient at his bedside and explained the role of palliative care and reason for consult \"Patient wants to complete an advanced directive and polst.\" Patient has a very good understanding of his current health. He expressed he would like to complete documents independently from his wife \"to take the burden off of her.\" She is a nurse and currently works part time in our Er. He did ask \"if she wants to change anything, is that possible.\" I emphasized that the choices on ACP documents are to reflect his own wishes, but that he can change them at any time he wishes. Reviewed both documents (advance directive and POLST) with patient. All questions were answered. Assisted with completion (see above for choices). POLST scanned into EPIC. Original placed on hard chart in a protective sleeve with a note requesting to send with patient at discharge.     Discussed that notary will be available to assist with completion of AD possibly today, and if not today then Monday. Provided two extra copies of POLST. Discussed that I would be following patient for any needs.     Provided therapeutic communication including active listening throughout encounter. Provided business card with palliative care contact information  and encouraged patient or his wife to call with any questions or needs.     Plan: DNR. AD and POLST done. AD awaiting notary. Patient currently on immunotherapy treatment and would like to continue.     Recommendations: I do not recommend an ethics or hospice consult at this time because patient is receiving immunotherapy and full treatment; no ethical dilemma exists at this time .    Updated: Dr. Melendez who provided certificate of competency for patient to have AD notarized.    Thank you for allowing Palliative Care to participate in this patient's care. Please call our team with questions and/or additional needs.    Total " visit time was 50 minutes discussing advance care planning.     Marina RAMIREZ  Palliative Care Nurse Practitioner  [phone]

## 2018-03-24 NOTE — PROGRESS NOTES
Patient AAOx4. Patient denies nausea or pain. PICC patent with + blood return. Patient up self to BSC and 1x assist for ambulating with walker. Contact precautions continued.  No further needs at this time.  Will continue to monitor.

## 2018-03-24 NOTE — PROGRESS NOTES
Infectious Disease Progress Note    Author: Gricel Murguia M.D. Date & Time of service: 3/24/2018  8:00 AM    Chief Complaint:  FU neutropenic fever    Interval History:  78 y/o WM admitted with febrile neutropenia    2/28 Tmax 100.5, having coughing fit and SOB, plan for BM biopsy  3/1Tmax 100.2, WBC 0.3, had a BM this am, cough better, repeat CXR with no infiltrate   3/2 Tmax 100.5, shortness of breath with exertion, had bloody nose this am, plts 22, denies abd pain or diarrhea, path neg for lyphoma  3/3/2018-Tmax 99.6. WBC 0.3 platelets 17 creatinine 1.38  3/4/2018-Tmax 99.2 WBCs 0.3 creatinine 1.38 complains of some shortness of breath  3/5 AF WBC 0.3 transferred to [facility] [place] no new complaints  3/6 AF WBC 0.3 no positive cxs No new complaints  3/7 AF WBC 0.4 no complaints except fatigue and CORRALES  3/8 AF (99.8) no CBC transferred to Oncology floor-no acute events  3/9 AF WBC 0.5 feels weak-no new complaints  3/10 AF (99.9) plan for Campath noted  3/12/2018-Tmax 98.4 WBC 0.7 creatinine 1.55  3/13/2018-no fevers. Remains neutropenic. No new issues overnight  3/14/2018-Tmax 98.6 WBC 0.6 creatinine 1.44 no new issues overnight  3/15/2018-no fevers WBC 0.6 ambulating in the halls  3/16/2018-no fevers. No new issues overnight. WBC 0.5  3/21 Tmax 103, WBC 0.1, ID recalled for recurrent fevers, pt started having watery diarrhea yesterday with poor appetite but no abdominal pain, C diff pending. Denies any cough, SOB or new skin rash  3/22 Tmax 102.7, WBC 0.2, has night sweats, poor appetite did not have dinner, nausea but no vomiting  3/23 AF, WBC 0.1, had 3 watery BM overnight, no night sweats, denies abd pain, nausea or vomiting, getting blood transfusion  3/24 AF, WBC 0.1, has slept well in 2 nights due to productive cough of whitish sputum, diarrhea improving, appetite improved  Labs Reviewed, Medications Reviewed, Radiology Reviewed and Wound Reviewed.    Review of Systems:  Review of Systems   Constitutional: Negative  for diaphoresis and fever.   Respiratory: Positive for cough and sputum production. Negative for shortness of breath.    Cardiovascular: Negative for chest pain.   Gastrointestinal: Positive for diarrhea. Negative for abdominal pain, nausea and vomiting.        Improved   Genitourinary: Negative for dysuria.   Skin: Negative for rash.   Neurological: Positive for weakness.   All other systems reviewed and are negative.      Hemodynamics:  Temp (24hrs), Av.8 °C (98.3 °F), Min:36.6 °C (97.8 °F), Max:37.1 °C (98.7 °F)  Temperature: 36.6 °C (97.8 °F)  Pulse  Av.4  Min: 64  Max: 110   Blood Pressure : 154/97       Physical Exam:  Physical Exam   Constitutional: He is oriented to person, place, and time. He appears well-developed. No distress.   Chronically ill   HENT:   Head: Normocephalic and atraumatic.   Eyes: EOM are normal. Pupils are equal, round, and reactive to light.   Neck: Neck supple.   Cardiovascular: Normal rate.    Pulmonary/Chest: Effort normal. No respiratory distress. He has no wheezes. He has no rales.   Abdominal: Soft. He exhibits no distension. There is no tenderness.   Musculoskeletal: He exhibits no edema.   LUE PICC   Neurological: He is alert and oriented to person, place, and time.   Skin: No rash noted.   ecchymosis   Nursing note and vitals reviewed.      Meds:    Current Facility-Administered Medications:   •  sodium bicarbonate in 0.45% NACL infusion  •  atovaquone  •  cefepime  •  NS  •  Pharmacy  •  vancomycin 50 mg/mL  •  diphenhydrAMINE  •  acetaminophen  •  hydrocortisone sodium succinate PF  •  ondansetron  •  generic chemo template  •  fluconazole  •  diphenhydrAMINE  •  allopurinol  •  Ganciclovir  •  dexamethasone  •  acetaminophen  •  NS  •  PICC Line Insertion has been implemented **AND** May use Lidocaine 1% not to exceed 3 mls for local at insertion site **AND** NOTIFY MD **AND** Tip to dwell in the superior vena cava **AND** Do not use PICC Line until placement  verified by Chest X Ray **AND** [CANCELED] DX-CHEST-FOR PICC LINE Perform procedure in: Other(comment f6 below): **AND** If radiologist reading of chest X-ray states any of the following the PICC should be used **AND** Further evaluation of the PICC placement can be retrieved from X-Ray and Imaging **AND** Blood draws through PICC line; draws by RN only **AND** FLUSHING GUIDELINES WHEN IN USE **AND** normal saline PF **AND** FLUSHING GUIDELINES WHEN NOT IN USE **AND** DRESSING MAINTENANCE **AND** Change needleless pressure ports and IV tubing every 72 hours per hospital policy **AND** TUBING **AND** If there is an MD order to remove the PICC line, any RN may remove the PICC line **AND** [] PATIENT EDUCATION MATERIALS **AND** NURSING COMMUNICATION  •  acyclovir  •  acetaminophen  •  benzocaine-menthol  •  benzonatate  •  labetalol  •  levothyroxine  •  ondansetron  •  potassium chloride SA  •  PARoxetine  •  zolpidem    Labs:  Recent Labs      18   0152  18   0145  18   0925  18   0042   WBC  0.2*  0.1*   --   0.1*   RBC  2.77*  2.04*   --   2.67*   HEMOGLOBIN  7.9*  5.8*  7.1*  7.7*   HEMATOCRIT  23.4*  17.2*  21.0*  22.0*   MCV  84.5  84.3   --   82.4   MCH  28.5  28.4   --   28.8   RDW  49.7  50.4*   --   48.9   PLATELETCT  19*  24*   --   23*   MPV   --   11.1   --    --      Recent Labs      18   01518   0053  18   0042   SODIUM  133*  137  137   POTASSIUM  4.4  4.1  3.9   CHLORIDE  108  111  112   CO2  14*  16*  17*   GLUCOSE  112*  138*  133*   BUN  35*  35*  35*     Recent Labs      18   0152  18   0053  18   0042   ALBUMIN  2.9*  2.5*  2.7*   TBILIRUBIN   --   1.0  1.1   ALKPHOSPHAT   --   50  61   TOTPROTEIN   --   4.3*  4.6*   ALTSGPT   --   60*  58*   ASTSGOT   --   49*  39   CREATININE  1.72*  1.74*  1.77*       Imaging:  CXR 3/21 with small right pleural effusion    Micro:  Results     Procedure Component Value Units Date/Time     "Culture Respiratory W/ GRM STN [[id number]] Collected:  03/23/18 1500    Order Status:  Sent Specimen:  Respirate from Sputum     GRAM STAIN [[id number]] Collected:  03/23/18 0300    Order Status:  Completed Specimen:  Respirate Updated:  03/23/18 1451     Significant Indicator .     Source RESP     Site Sputum (coughed)     Gram Stain Result Few WBCs.  Few mixed bacteria, no predominant organism seen.  Specimen Quality Score: 1+      CULTURE RESPIRATORY W/ GRM STN [[id number]] Collected:  03/23/18 0300    Order Status:  Completed Updated:  03/23/18 0321    BLOOD CULTURE [[id number]] Collected:  03/17/18 0907    Order Status:  Completed Specimen:  Blood from Peripheral Updated:  03/22/18 1100     Significant Indicator NEG     Source BLD     Site PERIPHERAL     Blood Culture No growth after 5 days of incubation.    Narrative:       Protective  Per Hospital Policy: Only change Specimen Src: to \"Line\" if  specified by physician order.    BLOOD CULTURE [[id number]] Collected:  03/17/18 0907    Order Status:  Completed Specimen:  Blood from Peripheral Updated:  03/22/18 1100     Significant Indicator NEG     Source BLD     Site PERIPHERAL     Blood Culture No growth after 5 days of incubation.    Narrative:       Protective  Per Hospital Policy: Only change Specimen Src: to \"Line\" if  specified by physician order.    BLOOD CULTURE [[id number]] Collected:  03/21/18 1049    Order Status:  Completed Specimen:  Blood from Peripheral Updated:  03/22/18 0739     Significant Indicator NEG     Source BLD     Site PERIPHERAL     Blood Culture No Growth    Note: Blood cultures are incubated for 5 days and  are monitored continuously.Positive blood cultures  are called to the RN and reported as soon as  they are identified.      Narrative:       Protective  Per Hospital Policy: Only change Specimen Src: to \"Line\" if  specified by physician order.    BLOOD CULTURE [[id number]] Collected:  03/21/18 1049    Order Status:  Completed Specimen: " " Blood from Peripheral Updated:  03/22/18 0739     Significant Indicator NEG     Source BLD     Site PERIPHERAL     Blood Culture No Growth    Note: Blood cultures are incubated for 5 days and  are monitored continuously.Positive blood cultures  are called to the RN and reported as soon as  they are identified.      Narrative:       Protective  Per Hospital Policy: Only change Specimen Src: to \"Line\" if  specified by physician order.    C DIFF TOXIN [[id number]]  (Abnormal) Collected:  03/21/18 1306    Order Status:  Completed Updated:  03/21/18 1901     C.Diff Toxin A&B Positive (A)     Comment: TOXIN POSITIVE  Toxin detected by EIA; C. difficile detected by PCR.  If clinically correlated, treatment indicated per guidelines.  Test of cure is not recommended.         Narrative:       Special Contact [id number] SHARMIN ALICEA  Does this patient have risk factors for C-diff?->Yes  Has patient taken stool softeners or laxatives in the last 5  days?->No    CDIFF BY PCR WITH TOXIN [[id number]] Collected:  03/21/18 1306    Order Status:  Completed Specimen:  Stool from Stool Updated:  03/21/18 1859     C Diff by PCR See Toxin     027-NAP1-BI Presumptive Negative     Comment: Presumptive 027/NAP1/BI target DNA sequences are NOT DETECTED.       Narrative:       Special Contact [id number] SHARMIN ALICEA  Does this patient have risk factors for C-diff?->Yes  Has patient taken stool softeners or laxatives in the last 5  days?->No    URINALYSIS [[id number]]  (Abnormal) Collected:  03/21/18 1254    Order Status:  Completed Specimen:  Urine from Urine, Clean Catch Updated:  03/21/18 1414     Color Yellow     Character Cloudy (A)     Specific Gravity 1.015     Ph 5.0     Glucose Negative mg/dL      Ketones Negative mg/dL      Protein 100 (A) mg/dL      Bilirubin Negative     Urobilinogen, Urine 0.2     Nitrite Negative     Leukocyte Esterase Negative     Occult Blood Small (A)     Micro Urine Req Microscopic    " Narrative:       Special Contact [id number] SHARMIN ALICEA  Does this patient have risk factors for C-diff?->Yes  Has patient taken stool softeners or laxatives in the last 5  days?->No    URINALYSIS [[id number]]  (Abnormal) Collected:  03/17/18 1330    Order Status:  Completed Specimen:  Urine from Urine, Clean Catch Updated:  03/17/18 1413     Color Yellow     Character Clear     Specific Gravity 1.007     Ph 5.5     Glucose Negative mg/dL      Ketones Negative mg/dL      Protein 30 (A) mg/dL      Bilirubin Negative     Urobilinogen, Urine 0.2     Nitrite Negative     Leukocyte Esterase Negative     Occult Blood Small (A)     Micro Urine Req Microscopic    Narrative:       [id number] LEE PINO DEANNE          Assessment:  Active Hospital Problems    Diagnosis   • *Pancytopenia (CMS-[facility]) [D61.818]   • Thrombocytopenia (CMS-[facility]) [D69.6]   • Neutropenic fever (CMS-[facility]) [D70.9, R50.81]   • Maxillary sinusitis [J32.0]   • Acute renal failure (ARF) (CMS-[facility]) [N17.9]   • Hyponatremia [E87.1]   • Essential hypertension [I10]   • 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    Repeat Bcx 3/21 - NGTD    Clostridium difficile diarrhea  Repeat C diff + 3/21  Continue PO vanco QID x 14 days then BID while on IV abx    Cough, new  Repeat CXR  Recent CXR with no PNA but small pleural effusion  Sputum cx ordered  Abx per above    ROHITH, worse  Renally dose abx as needed  Avoid nephrotoxins  Monitor closely once Campath  DC'd bactrim given worsening ROHITH   Transitioned to atovaquone for PCP prophylaxis while on campath  Nephrology allie Melendez

## 2018-03-24 NOTE — PROGRESS NOTES
Chemotherapy Verification - SECONDARY RN       Height = 177.8 cm  Weight = 85 kg  BSA = 2.05 m2       Medication: Alemtuzumab  Dose: 10 mg Fixed dose  Calculated Dose: 10 mg Fixed dose, Order dose 10 mg Fixed dose                             (In mg/m2, AUC, mg/kg)                                      I confirm that this process was performed independently.

## 2018-03-24 NOTE — PROGRESS NOTES
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 Negative for chills and fever.   HENT: Negative for congestion, nosebleeds and sore throat.    Respiratory: Negative for cough, hemoptysis, shortness of breath and wheezing.    Cardiovascular: Positive for leg swelling. Negative for chest pain, palpitations and orthopnea.   Gastrointestinal: Negative for abdominal pain, diarrhea, nausea and vomiting.   Genitourinary: Negative for dysuria, flank pain, frequency, hematuria and urgency.   Musculoskeletal: Negative for back pain, joint pain and myalgias.   Skin: Negative for itching and rash.   All other systems reviewed and are negative.      Physical Exam:  Physical Exam   Constitutional: He appears well-developed and well-nourished.   HENT:   Head: Atraumatic.   Mouth/Throat: Oropharynx is clear and moist.   Eyes: Conjunctivae and EOM are normal. Pupils are equal, round, and reactive to light.   Neck: Neck supple.   Cardiovascular: Normal rate and regular rhythm.  Exam reveals no gallop and no friction rub.    Pulmonary/Chest: Effort normal and breath sounds normal. No respiratory distress. He has no wheezes. He has no rales.   Abdominal: Soft. Bowel sounds are normal. He exhibits no distension.   Musculoskeletal: He exhibits edema.   Nursing note and vitals reviewed.      Labs:        Invalid input(s): [id number]      Recent Labs      03/22/18   0152  03/23/18   0053  03/24/18   0042   SODIUM  133*  137  137   POTASSIUM  4.4  4.1  3.9   CHLORIDE  108  111  112   CO2  14*  16*  17*   BUN  35*  35*  35*   CREATININE  1.72*  1.74*  1.77*   CALCIUM  6.8*   6.5*  6.5*     Recent Labs      18   0152  18   0053  18   004   ALTSGPT   --   60*  58*   ASTSGOT   --   49*  39   ALKPHOSPHAT   --   50  61   TBILIRUBIN   --   1.0  1.1   GLUCOSE  112*  138*  133*     Recent Labs      18   0152  18   0145  18   0925  18   0042   RBC  2.77*  2.04*   --   2.67*   HEMOGLOBIN  7.9*  5.8*  7.1*  7.7*   HEMATOCRIT  23.4*  17.2*  21.0*  22.0*   PLATELETCT  19*  24*   --   23*     Recent Labs      18   01518   0053  03/23/18   0145  18   0042   WBC  0.2*   --   0.1*  0.1*   ASTSGOT   --   49*   --   39   ALTSGPT   --   60*   --   58*   ALKPHOSPHAT   --   50   --   61   TBILIRUBIN   --   1.0   --   1.1           Hemodynamics:  Temp (24hrs), Av.8 °C (98.3 °F), Min:36.6 °C (97.8 °F), Max:37.1 °C (98.7 °F)  Temperature: 36.6 °C (97.8 °F)  Pulse  Av.4  Min: 64  Max: 110   Blood Pressure : 154/97     Respiratory:    Respiration: 18, Pulse Oximetry: 96 %        RUL Breath Sounds: Clear, RML Breath Sounds: Clear, RLL Breath Sounds: Clear, GREGORIA Breath Sounds: Clear, LLL Breath Sounds: Clear  Fluids:    Intake/Output Summary (Last 24 hours) at 18 1258  Last data filed at 18 2359   Gross per 24 hour   Intake             1625 ml   Output                0 ml   Net             1625 ml     Weight: 92.5 kg (203 lb 14.8 oz)  GI/Nutrition:  Orders Placed This Encounter   Procedures   • DIET ORDER     Standing Status:   Standing     Number of Occurrences:   1     Order Specific Question:   Diet:     Answer:   Cardiac [6]     Medical Decision Making, by Problem:  Active Hospital Problems    Diagnosis   • *Pancytopenia (CMS-[facility]) [D61.818]   • Thrombocytopenia (CMS-[facility]) [D69.6]   • Neutropenic fever (CMS-[facility]) [D70.9, R50.81]   • Maxillary sinusitis [J32.0]   • Acute renal failure (ARF) (CMS-[facility]) [N17.9]   • Hyponatremia [E87.1]   • Essential hypertension [I10]   • Diffuse follicle center lymphoma of lymph nodes of multiple  regions (CMS-[facility])- non hodgkins- dx-2005, RT/chemo rx- 2010 dr roman  [C82.58]   • DNR (do not resuscitate) [Z66]   • Clostridium difficile colitis [A04.72]   • Anemia [D64.9]   • 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  4.Anemia: low Hb level stable  5.Metabolic acidosis:mild  6.Volume:overloaded, d/c IVF, check BNP      Recs: d/c IVF, low na diet, daily BMP, check BNP

## 2018-03-24 NOTE — PROGRESS NOTES
Assumed care of patient. No pain or nausea at this time. Pt up self to bedside commode. He continues to have productive cough with yellow sputum, administered benzonatate (tessalon) for cough. Call light within reach, no needs at this time.

## 2018-03-24 NOTE — PROGRESS NOTES
Chemotherapy Verification - PRIMARY RN  Cycle 1 Day 9      Height = 177.8 cm  Weight = 92.5 kg  BSA = 2.14 m2       Medication: Alemtuzumab  Dose: 10 mg fixed dose  Calculated Dose: 10 mg fixed dose (rounds to 9.9 in EPIC, okay per pharmacy. Physician aware of weight change and ok to give dose per Dr. Horta)                             (In mg/m2, AUC, mg/kg)       I confirm this process was performed independently with the BSA and all final chemotherapy dosing calculations congruent.  Any discrepancies of 5% or greater have been addressed with the chemotherapy pharmacist. The resolution of the discrepancy has been documented in the EPIC progress notes.

## 2018-03-24 NOTE — PROGRESS NOTES
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 Alemtuzumab in T-cell large granular lymphocytic leukaemia: a phase 2 study. Lancet Haematol. 2016 Jan; 3(1):e22-e29.     Allergies:  Nitroglycerin and Losartan     /97   Pulse 79   Temp 36.6 °C (97.8 °F)   Resp 18   Ht 1.778 m (5' 10\")   Wt 85 kg (187 lb 6.3 oz)   SpO2 96%   BMI 26.89 kg/m²  Body surface area is 2.05 meters squared.      Labs 3/24/18  ANC (none - WBC = 0.1) Plt = 23k   Hgb = 7.7  SCr = 1.77 mg/dL         CrCl ~42 mL/min (no renal dose adjustment recommended)     LFTs = 39/58/61  (no dose adjustment for LFT elevation)    TBili = 1.1    MD aware of current labs. No fevers in last 24 hours. Continue treatment per Dr. Horta.  Per MD will transfuse if hemoglobin less than 7 and/or if platelets less than 10, or if bleeding.     3/11/2018 04:30   Cmv Ab Igm <8.0   CMV IgG Qnt <0.20      2/25/18 ECHO LVEF = 65%  3/16/18 TSH = 6.7 T3 free 1.38     Monitor during treatment - CD4+ lymphocyte counts (after treatment until recovery); CMV antigen (routinely during and for 2 months after treatment); consider TSH at baseline and then every 2 to 3 months during alemtuzumab treatment      ID following    Premeds: Diphenhydramine 50 mg IV                   Acetaminophen 650 mg PO                   Hydrocortisone 100 mg IV (verified Dr. Mitchell wants this in addition to oral prednisone)    Drug Order   (Drug name, dose, route, IV Fluid & volume, frequency, number of doses) Cycle: C1 Day 9 of 10      Previous treatment: s/p RCVP and Rituxan/bendamustine at [place] with long term remission.      Medication = " Alemtuzumab  Base Dose= 10 mg fixed dose  Calc Dose: N/A  Final Dose = 10 mg (EPIC rounds to 9.9 mg)  Route = IV   Fluid & Volume =  mL  Admin Duration = Over 2 hours     Days 2-10       <5% difference, ok to treat with final written dose     By my signature below, I confirm this process was performed independently with the BSA and all final chemotherapy dosing calculations congruent. I have reviewed the above chemotherapy order and that my calculation of the final dose and BSA (when applicable) corroborate those calculations of the  pharmacist. Discrepancies of 5% or greater in the written dose have been addressed and documented within the [facility] Progress notes.    Stewart Mora, JackieD

## 2018-03-25 NOTE — PROGRESS NOTES
Alexandra from Lab called with critical result of WBC 0.1 and platelets 14 at 0324. Critical lab result read back to Alexandra.   This critical lab result is within parameters established by [facility] for this patient.

## 2018-03-25 NOTE — PROGRESS NOTES
liberty Caruso from Lab called with critical result of Ca 6.4 at 0400. Critical lab result read back to Zuly.   This critical lab result is within parameters established by [facility] for this patient

## 2018-03-25 NOTE — PROGRESS NOTES
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 Negative for chills and fever.   HENT: Negative for congestion, nosebleeds and sore throat.    Respiratory: Negative for cough, hemoptysis, shortness of breath and wheezing.    Cardiovascular: Positive for leg swelling. Negative for chest pain, palpitations and orthopnea.   Gastrointestinal: Positive for diarrhea. Negative for abdominal pain, nausea and vomiting.   Genitourinary: Negative for dysuria, flank pain, frequency, hematuria and urgency.   Musculoskeletal: Negative for back pain, joint pain and myalgias.   Skin: Negative for itching and rash.   All other systems reviewed and are negative.      Physical Exam:  Physical Exam   Constitutional: He appears well-developed and well-nourished.   HENT:   Head: Atraumatic.   Mouth/Throat: Oropharynx is clear and moist.   Eyes: Conjunctivae and EOM are normal. Pupils are equal, round, and reactive to light.   Neck: Neck supple.   Cardiovascular: Normal rate and regular rhythm.  Exam reveals no gallop and no friction rub.    Pulmonary/Chest: Effort normal and breath sounds normal. No respiratory distress. He has no wheezes. He has no rales.   Abdominal: Soft. Bowel sounds are normal. He exhibits no distension.   Musculoskeletal: He exhibits edema.   Nursing note and vitals reviewed.      Labs:        Invalid input(s): [id number]  Recent Labs      03/25/18   0311   BNPBTYPENAT  161*     Recent Labs      03/23/18   0053  03/24/18   0042  03/25/18   0311   SODIUM  137  137  134*   POTASSIUM  4.1  3.9  4.2   CHLORIDE  111  112  109    CO2  16*  17*  17*   BUN  35*  35*  33*   CREATININE  1.74*  1.77*  1.68*   CALCIUM  6.5*  6.5*  6.4*     Recent Labs      18   0053  03/24/18   0042  03/25/18   0311   ALTSGPT  60*  58*   --    ASTSGOT  49*  39   --    ALKPHOSPHAT  50  61   --    TBILIRUBIN  1.0  1.1   --    GLUCOSE  138*  133*  115*     Recent Labs      18   0145  18   0925  18   00418   031   RBC  2.04*   --   2.67*  2.46*   HEMOGLOBIN  5.8*  7.1*  7.7*  7.1*   HEMATOCRIT  17.2*  21.0*  22.0*  20.5*   PLATELETCT  24*   --   23*  14*     Recent Labs      18   0053  03/23/18   01418   0311   WBC   --   0.1*  0.1*  0.1*   ASTSGOT  49*   --   39   --    ALTSGPT  60*   --   58*   --    ALKPHOSPHAT  50   --   61   --    TBILIRUBIN  1.0   --   1.1   --            Hemodynamics:  Temp (24hrs), Av.9 °C (98.4 °F), Min:36.6 °C (97.8 °F), Max:37.2 °C (98.9 °F)  Temperature: 37 °C (98.6 °F)  Pulse  Av.9  Min: 64  Max: 110   Blood Pressure : 131/78     Respiratory:    Respiration: 18, Pulse Oximetry: 95 %        RLL Breath Sounds: Expiratory Wheezes, LLL Breath Sounds: Expiratory Wheezes  Fluids:  No intake or output data in the 24 hours ending 18 1206  Weight: 92.5 kg (203 lb 14.8 oz)  GI/Nutrition:  Orders Placed This Encounter   Procedures   • DIET ORDER     Standing Status:   Standing     Number of Occurrences:   1     Order Specific Question:   Diet:     Answer:   Cardiac [6]     Medical Decision Making, by Problem:  Active Hospital Problems    Diagnosis   • *Pancytopenia (CMS-HCC) [D61.818]   • Thrombocytopenia (CMS-HCC) [D69.6]   • Neutropenic fever (CMS-HCC) [D70.9, R50.81]   • Maxillary sinusitis [J32.0]   • Acute renal failure (ARF) (CMS-[facility]) [N17.9]   • Hyponatremia [E87.1]   • Essential hypertension [I10]   • 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  4.Anemia: low Hb level stable  5.Metabolic acidosis:added Na HCO3  6.Volume:overloaded, d/c IVF, BNP at 160      Recs: low na diet, daily BMP,

## 2018-03-25 NOTE — PROGRESS NOTES
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 Alemtuzumab in T-cell large granular lymphocytic leukaemia: a phase 2 study. Lancet Haematol. 2016 Jan; 3(1):e22-e29.     Allergies:  Nitroglycerin and Losartan     /81   Pulse 65   Temp 37.2 °C (98.9 °F)   Resp 18   Ht 1.778 m (5' 10\")   Wt 92.5 kg (203 lb 14.8 oz)   SpO2 97%   BMI 29.26 kg/m²  Body surface area is 2.14 meters squared.    3/25/18-  ANC~ 0(WBC = 0.1) Plt = 14k   Hgb = 7.1     SCr = 1.68mg/dL CrCl ~ 48mL/min (no renal dose adjustment recommended)       3/24/18-  LFTs = 39/58/61   TBili = 1.1    MD aware of current labs. No fevers in last 24 hours. Continue treatment per Dr. Horta.  Per MD will transfuse if hemoglobin less than 7 and/or if platelets less than 10, or if bleeding.     3/11/2018 04:30   Cmv Ab Igm <8.0   CMV IgG Qnt <0.20      2/25/18 ECHO LVEF = 65%  3/16/18 TSH = 6.7 T3 free 1.38     Monitor during treatment - CD4+ lymphocyte counts (after treatment until recovery); CMV antigen (routinely during and for 2 months after treatment); consider TSH at baseline and then every 2 to 3 months during alemtuzumab treatment      ID following    Premeds: Diphenhydramine 50 mg IV                   Acetaminophen 650 mg PO                   Hydrocortisone 100 mg IV (verified Dr. Mitchell wants this in addition to oral prednisone)    Drug Order   (Drug name, dose, route, IV Fluid & volume, frequency, number of doses) Cycle: C1 Day 10 of 10      Previous treatment: s/p RCVP and Rituxan/bendamustine at [place] with long term remission.      Medication = Alemtuzumab  Base Dose= 10 mg fixed dose  Calc Dose: " N/A  Final Dose = 10 mg (EPIC rounds to 9.9 mg)  Route = IV   Fluid & Volume =  mL  Admin Duration = Over 2 hours     Days 2-10       <5% difference, ok to treat with final written dose     By my signature below, I confirm this process was performed independently with the BSA and all final chemotherapy dosing calculations congruent. I have reviewed the above chemotherapy order and that my calculation of the final dose and BSA (when applicable) corroborate those calculations of the  pharmacist. Discrepancies of 5% or greater in the written dose have been addressed and documented within the [facility] Progress notes.    FALGUNI Matthew, Pharm.D.

## 2018-03-25 NOTE — PROGRESS NOTES
Infectious Disease Progress Note    Author: Gricel Murguia M.D. Date & Time of service: 3/25/2018  8:34 AM    Chief Complaint:  FU neutropenic fever    Interval History:  76 y/o WM admitted with febrile neutropenia    2/28 Tmax 100.5, having coughing fit and SOB, plan for BM biopsy  3/1Tmax 100.2, WBC 0.3, had a BM this am, cough better, repeat CXR with no infiltrate   3/2 Tmax 100.5, shortness of breath with exertion, had bloody nose this am, plts 22, denies abd pain or diarrhea, path neg for lyphoma  3/3/2018-Tmax 99.6. WBC 0.3 platelets 17 creatinine 1.38  3/4/2018-Tmax 99.2 WBCs 0.3 creatinine 1.38 complains of some shortness of breath  3/5 AF WBC 0.3 [place] [facility] [place] complaints  3/6 AF WBC 0.3 no positive cxs No new complaints  3/7 AF WBC 0.4 no complaints except fatigue and CORRALES  3/8 AF (99.8) no CBC transferred to Oncology floor-no acute events  3/9 AF WBC 0.5 feels weak-no new complaints  3/10 AF (99.9) plan for Campath noted  3/12/2018-Tmax 98.4 WBC 0.7 creatinine 1.55  3/13/2018-no fevers. Remains neutropenic. No new issues overnight  3/14/2018-Tmax 98.6 WBC 0.6 creatinine 1.44 no new issues overnight  3/15/2018-no fevers WBC 0.6 ambulating in the halls  3/16/2018-no fevers. No new issues overnight. WBC 0.5  3/21 Tmax 103, WBC 0.1, ID recalled for recurrent fevers, pt started having watery diarrhea yesterday with poor appetite but no abdominal pain, C diff pending. Denies any cough, SOB or new skin rash  3/22 Tmax 102.7, WBC 0.2, has night sweats, poor appetite did not have dinner, nausea but no vomiting  3/23 AF, WBC 0.1, had 3 watery BM overnight, no night sweats, denies abd pain, nausea or vomiting, getting blood transfusion  3/24 AF, WBC 0.1, has slept well in 2 nights due to productive cough of whitish sputum, diarrhea improving, appetite improved  3/25 AF, WBC 0.1, cough improved with tessalon perles, repeat CXR unremarkable, diarrhea starting to form  Labs Reviewed, Medications Reviewed,  Radiology Reviewed and Wound Reviewed.    Review of Systems:  Review of Systems   Constitutional: Negative for diaphoresis and fever.   Respiratory: Positive for cough and sputum production. Negative for shortness of breath.         Better   Cardiovascular: Negative for chest pain.   Gastrointestinal: Positive for diarrhea. Negative for abdominal pain, nausea and vomiting.        Improved   Genitourinary: Negative for dysuria.   Skin: Negative for rash.   Neurological: Positive for weakness.   All other systems reviewed and are negative.      Hemodynamics:  Temp (24hrs), Av.8 °C (98.3 °F), Min:36.6 °C (97.8 °F), Max:37.2 °C (98.9 °F)  Temperature: 37.2 °C (98.9 °F)  Pulse  Av.1  Min: 64  Max: 110   Blood Pressure : 132/81       Physical Exam:  Physical Exam   Constitutional: He is oriented to person, place, and time. He appears well-developed. No distress.   Chronically ill   HENT:   Head: Normocephalic and atraumatic.   Eyes: EOM are normal. Pupils are equal, round, and reactive to light.   Neck: Neck supple.   Cardiovascular: Normal rate.    Pulmonary/Chest: Effort normal. No respiratory distress. He has no wheezes. He has no rales.   Abdominal: Soft. He exhibits no distension. There is no tenderness.   Musculoskeletal: He exhibits no edema.   LUE PICC   Neurological: He is alert and oriented to person, place, and time.   Skin: No rash noted.   ecchymosis   Nursing note and vitals reviewed.      Meds:    Current Facility-Administered Medications:   •  atovaquone  •  cefepime  •  NS  •  Pharmacy  •  vancomycin 50 mg/mL  •  diphenhydrAMINE  •  acetaminophen  •  hydrocortisone sodium succinate PF  •  ondansetron  •  generic chemo template  •  fluconazole  •  diphenhydrAMINE  •  allopurinol  •  Ganciclovir  •  dexamethasone  •  acetaminophen  •  PICC Line Insertion has been implemented **AND** May use Lidocaine 1% not to exceed 3 mls for local at insertion site **AND** NOTIFY MD **AND** Tip to dwell in the  superior vena cava **AND** Do not use PICC Line until placement verified by Chest X Ray **AND** [CANCELED] DX-CHEST-FOR PICC LINE Perform procedure in: Other(comment f6 below): **AND** If radiologist reading of chest X-ray states any of the following the PICC should be used **AND** Further evaluation of the PICC placement can be retrieved from X-Ray and Imaging **AND** Blood draws through PICC line; draws by RN only **AND** FLUSHING GUIDELINES WHEN IN USE **AND** normal saline PF **AND** FLUSHING GUIDELINES WHEN NOT IN USE **AND** DRESSING MAINTENANCE **AND** Change needleless pressure ports and IV tubing every 72 hours per hospital policy **AND** TUBING **AND** If there is an MD order to remove the PICC line, any RN may remove the PICC line **AND** [] PATIENT EDUCATION MATERIALS **AND** NURSING COMMUNICATION  •  acyclovir  •  acetaminophen  •  benzocaine-menthol  •  benzonatate  •  labetalol  •  levothyroxine  •  ondansetron  •  potassium chloride SA  •  PARoxetine  •  zolpidem    Labs:  Recent Labs      18   0145  18   0925  18   WBC  0.1*   --   0.1*  0.1*   RBC  2.04*   --   2.67*  2.46*   HEMOGLOBIN  5.8*  7.1*  7.7*  7.1*   HEMATOCRIT  17.2*  21.0*  22.0*  20.5*   MCV  84.3   --   82.4  83.3   MCH  28.4   --   28.8  28.9   RDW  50.4*   --   48.9  48.7   PLATELETCT  24*   --   23*  14*   MPV  11.1   --    --   12.2     Recent Labs      18   0053  18   00418   031   SODIUM  137  137  134*   POTASSIUM  4.1  3.9  4.2   CHLORIDE  111  112  109   CO2  16*  17*  17*   GLUCOSE  138*  133*  115*   BUN  35*  35*  33*     Recent Labs      18   0053  18   00418   0311   ALBUMIN  2.5*  2.7*   --    TBILIRUBIN  1.0  1.1   --    ALKPHOSPHAT  50  61   --    TOTPROTEIN  4.3*  4.6*   --    ALTSGPT  60*  58*   --    ASTSGOT  49*  39   --    CREATININE  1.74*  1.77*  1.68*       Imaging:  CXR 3/21 with small right pleural  "effusion    Micro:  Results     Procedure Component Value Units Date/Time    CULTURE RESPIRATORY W/ GRM STN [[id number]] Collected:  03/23/18 0300    Order Status:  Completed Specimen:  Respirate Updated:  03/24/18 1641     Significant Indicator NEG     Source RESP     Site Sputum (coughed)     Respiratory Culture Rare growth usual upper respiratory valencia.     Gram Stain Result Few WBCs.  Few mixed bacteria, no predominant organism seen.  Specimen Quality Score: 1+      Culture Respiratory W/ GRM STN [[id number]] Collected:  03/23/18 1500    Order Status:  Sent Specimen:  Respirate from Sputum     GRAM STAIN [[id number]] Collected:  03/23/18 0300    Order Status:  Completed Specimen:  Respirate Updated:  03/23/18 1451     Significant Indicator .     Source RESP     Site Sputum (coughed)     Gram Stain Result Few WBCs.  Few mixed bacteria, no predominant organism seen.  Specimen Quality Score: 1+      BLOOD CULTURE [[id number]] Collected:  03/17/18 0907    Order Status:  Completed Specimen:  Blood from Peripheral Updated:  03/22/18 1100     Significant Indicator NEG     Source BLD     Site PERIPHERAL     Blood Culture No growth after 5 days of incubation.    Narrative:       Protective  Per Hospital Policy: Only change Specimen Src: to \"Line\" if  specified by physician order.    BLOOD CULTURE [[id number]] Collected:  03/17/18 0907    Order Status:  Completed Specimen:  Blood from Peripheral Updated:  03/22/18 1100     Significant Indicator NEG     Source BLD     Site PERIPHERAL     Blood Culture No growth after 5 days of incubation.    Narrative:       Protective  Per Hospital Policy: Only change Specimen Src: to \"Line\" if  specified by physician order.    BLOOD CULTURE [[id number]] Collected:  03/21/18 1049    Order Status:  Completed Specimen:  Blood from Peripheral Updated:  03/22/18 0739     Significant Indicator NEG     Source BLD     Site PERIPHERAL     Blood Culture No Growth    Note: Blood cultures are incubated " "for 5 days and  are monitored continuously.Positive blood cultures  are called to the RN and reported as soon as  they are identified.      Narrative:       Protective  Per Hospital Policy: Only change Specimen Src: to \"Line\" if  specified by physician order.    BLOOD CULTURE [[id number]] Collected:  03/21/18 1049    Order Status:  Completed Specimen:  Blood from Peripheral Updated:  03/22/18 0739     Significant Indicator NEG     Source BLD     Site PERIPHERAL     Blood Culture No Growth    Note: Blood cultures are incubated for 5 days and  are monitored continuously.Positive blood cultures  are called to the RN and reported as soon as  they are identified.      Narrative:       Protective  Per Hospital Policy: Only change Specimen Src: to \"Line\" if  specified by physician order.    C DIFF TOXIN [[id number]]  (Abnormal) Collected:  03/21/18 1306    Order Status:  Completed Updated:  03/21/18 1901     C.Diff Toxin A&B Positive (A)     Comment: TOXIN POSITIVE  Toxin detected by EIA; C. difficile detected by PCR.  If clinically correlated, treatment indicated per guidelines.  Test of cure is not recommended.         Narrative:       Special Contact [id number] SHARMIN MCDUFFIE.  Does this patient have risk factors for C-diff?->Yes  Has patient taken stool softeners or laxatives in the last 5  days?->No    CDIFF BY PCR WITH TOXIN [[id number]] Collected:  03/21/18 1306    Order Status:  Completed Specimen:  Stool from Stool Updated:  03/21/18 1859     C Diff by PCR See Toxin     027-NAP1-BI Presumptive Negative     Comment: Presumptive 027/NAP1/BI target DNA sequences are NOT DETECTED.       Narrative:       Special Contact [id number] SHARMIN MCDUFFIE.  Does this patient have risk factors for C-diff?->Yes  Has patient taken stool softeners or laxatives in the last 5  days?->No    URINALYSIS [[id number]]  (Abnormal) Collected:  03/21/18 1254    Order Status:  Completed Specimen:  Urine from Urine, Clean Catch " Updated:  03/21/18 1414     Color Yellow     Character Cloudy (A)     Specific Gravity 1.015     Ph 5.0     Glucose Negative mg/dL      Ketones Negative mg/dL      Protein 100 (A) mg/dL      Bilirubin Negative     Urobilinogen, Urine 0.2     Nitrite Negative     Leukocyte Esterase Negative     Occult Blood Small (A)     Micro Urine Req Microscopic    Narrative:       Special Contact [id number] SHARMIN ALICEA  Does this patient have risk factors for C-diff?->Yes  Has patient taken stool softeners or laxatives in the last 5  days?->No          Assessment:  Active Hospital Problems    Diagnosis   • *Pancytopenia (CMS-[facility]) [D61.818]   • Thrombocytopenia (CMS-HCC) [D69.6]   • Neutropenic fever (CMS-HCC) [D70.9, R50.81]   • Maxillary sinusitis [J32.0]   • Acute renal failure (ARF) (CMS-HCC) [N17.9]   • Hyponatremia [E87.1]   • Essential hypertension [I10]   • 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    Repeat Bcx 3/21 - NGTD    Clostridium difficile diarrhea, improving  Repeat C diff + 3/21  Continue PO vanco QID x 14 days then BID while on IV abx    Cough  Recent CXR with no PNA but small pleural effusion  Sputum cx - URF  Abx per above    ROHITH  Renally dose abx as needed  Avoid nephrotoxins  Monitor closely once Campath  DC'd bactrim given worsening ROHITH   Transitioned to atovaquone for PCP prophylaxis while on campath  Nephrology eval bunny    DW Dr Guzman

## 2018-03-25 NOTE — PROGRESS NOTES
Assumed care of pt at 0655. Rc'd report from MELINA Stinson. Pt is Aox4. Pt is up self to BSC.  Denies pain and nausea. PICC with + blood return in place. Hourly rounding in place. Call light within reach. Bed in lowest, locked position.

## 2018-03-25 NOTE — PROGRESS NOTES
Chemotherapy Verification - PRIMARY RN  Cycle 1 Day 10       Height = 177.8 cm  Weight = 92.5 kg  BSA = 2.14 m2       Medication: Alemtuzumab  Dose: 10 mg fixed dose Calculated Dose: 10 mg fixed dose (EPIC rounds to 9.9 mg okay per pharmacy).                            (In mg/m2, AUC, mg/kg)         I confirm this process was performed independently with the BSA and all final chemotherapy dosing calculations congruent.  Any discrepancies of 5% or greater have been addressed with the chemotherapy pharmacist. The resolution of the discrepancy has been documented in the EPIC progress notes.

## 2018-03-25 NOTE — PROGRESS NOTES
Oncology/Hematology Progress Note               Author: Miquel Horta    Cc:  T-cell LGL Date & Time created: 3/24/2018  7:10 PM     Interval History:  He is still having the diarrhea, but it does seem to finally be slowing down. He said less swelling in the legs, and is up 18 pounds since his admission. The nephrologist has stopped his fluids for now. He still has the occasional mild cough.      PMHx, PSurgHx, SocHx, FAMHx:  All reviewed and no changes    Review of Systems:  Review of Systems   Constitutional: Positive for malaise/fatigue. Negative for chills, fever and weight loss.   HENT: Negative for ear pain, nosebleeds and sore throat.    Eyes: Negative for blurred vision, double vision and photophobia.   Respiratory: Positive for cough and sputum production. Negative for hemoptysis and shortness of breath.    Cardiovascular: Positive for leg swelling. Negative for chest pain, palpitations and orthopnea.   Gastrointestinal: Positive for abdominal pain and diarrhea. Negative for constipation, heartburn, nausea and vomiting.   Genitourinary: Negative for dysuria, frequency, hematuria and urgency.   Skin: Negative for itching and rash.   Neurological: Positive for weakness.       Physical Exam:  Physical Exam   Constitutional: He is oriented to person, place, and time. He appears well-developed and well-nourished. No distress.   ECOG=2   HENT:   Head: Normocephalic and atraumatic.   Mouth/Throat: Oropharynx is clear and moist. No oropharyngeal exudate.   Eyes: Conjunctivae and EOM are normal. Right eye exhibits no discharge. Left eye exhibits no discharge. No scleral icterus.   Neck: Normal range of motion. Neck supple. No tracheal deviation present. No thyromegaly present.   Cardiovascular: Normal rate, regular rhythm and normal heart sounds.  Exam reveals no gallop and no friction rub.    No murmur heard.  Pulmonary/Chest: Effort normal and breath sounds normal. No respiratory distress. He has no wheezes. He  has no rales.   Abdominal: Soft. Bowel sounds are normal. He exhibits no distension. There is no tenderness. There is no rebound and no guarding.   Musculoskeletal: Normal range of motion. He exhibits edema.   3+ bilateral lower extremity edema, left>right   Lymphadenopathy:     He has no cervical adenopathy.   Neurological: He is alert and oriented to person, place, and time.   Skin: Skin is warm. No rash noted. He is not diaphoretic. No erythema.   Psychiatric: He has a normal mood and affect. His behavior is normal. Thought content normal.       Labs:        Invalid input(s): [id number]      Recent Labs      18   004   SODIUM  133*  137  137   POTASSIUM  4.4  4.1  3.9   CHLORIDE  108  111  112   CO2  14*  16*  17*   BUN  35*  35*  35*   CREATININE  1.72*  1.74*  1.77*   CALCIUM  6.8*  6.5*  6.5*     Recent Labs      18   004   ALTSGPT   --   60*  58*   ASTSGOT   --   49*  39   ALKPHOSPHAT   --   50  61   TBILIRUBIN   --   1.0  1.1   GLUCOSE  112*  138*  133*     Recent Labs      185  18   0925  18   004   RBC  2.77*  2.04*   --   2.67*   HEMOGLOBIN  7.9*  5.8*  7.1*  7.7*   HEMATOCRIT  23.4*  17.2*  21.0*  22.0*   PLATELETCT  19*  24*   --   23*     Recent Labs      183  18   0145  18   0042   WBC  0.2*   --   0.1*  0.1*   ASTSGOT   --   49*   --   39   ALTSGPT   --   60*   --   58*   ALKPHOSPHAT   --   50   --   61   TBILIRUBIN   --   1.0   --   1.1     Recent Labs      18   0042   SODIUM  133*  137  137   POTASSIUM  4.4  4.1  3.9   CHLORIDE  108  111  112   CO2  14*  16*  17*   GLUCOSE  112*  138*  133*   BUN  35*  35*  35*   CREATININE  1.72*  1.74*  1.77*   CALCIUM  6.8*  6.5*  6.5*     Hemodynamics:  Temp (24hrs), Av.8 °C (98.2 °F), Min:36.6 °C (97.8 °F), Max:37.1 °C (98.7 °F)  Temperature: 36.6 °C  (97.9 °F)  Pulse  Av.4  Min: 64  Max: 110   Blood Pressure : 130/84     Respiratory:    Respiration: 18, Pulse Oximetry: 95 %        RUL Breath Sounds: Clear, RML Breath Sounds: Clear, RLL Breath Sounds: Expiratory Wheezes, GREGORIA Breath Sounds: Clear, LLL Breath Sounds: Expiratory Wheezes  Fluids:    Intake/Output Summary (Last 24 hours) at 18 1148  Last data filed at 18 0600   Gross per 24 hour   Intake                0 ml   Output              925 ml   Net             -925 ml     Weight: 92.5 kg (203 lb 14.8 oz)  GI/Nutrition:  Orders Placed This Encounter   Procedures   • DIET ORDER     Standing Status:   Standing     Number of Occurrences:   1     Order Specific Question:   Diet:     Answer:   Cardiac [6]     Medical Decision Making, by Problem:  Active Hospital Problems    Diagnosis   • *Pancytopenia (CMS-HCC) [D61.818]   • Thrombocytopenia (CMS-[facility]) [D69.6]   • Neutropenic fever (CMS-[facility]) [D70.9, R50.81]   • Maxillary sinusitis [J32.0]   • Acute renal failure (ARF) (CMS-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  T cell LGL/Leukemia-- presented with pancytopenia. Admitted to Hospital since . Admitted for neutropenic fever.  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   He seems to have an aggressive form of this disease. Campath started on 3/16/18, per Dr. Klein's discussion with [facility].  He seems to be tolerating well.     Monitoring for Campath side effects. These can include autoimmune side effects such as pneumonitis,  Hypothyroidism, kidney disease, etc. sometimes can see in autoimmune ITP or hemolytic picture with this treatment.  Can certainly see bone marrow suppression (but in his case we must treat  through the low blood counts).  Also will be at high risk for opportunistic infections.   represents HSV prophylaxis, and PCP prophylaxis.    Monitor during treatment:  CD4+ lymphocyte counts (after treatment until recovery); CMV antigen baseline neg (routinely during and for 2 months after treatment-- maybe at least every 2 weeks); consider TSH at baseline ( slight elevation TSH and free T3)  and then every 2 to 3 months during alemtuzumab treatment    -- Day 9 of 10 today  --Continue Campath as ordered-- not sure how long it will take him to respond to this treatment. 50% responded by 3 months. He may have prolonged cytopenias waiting for response. Despite fevers, we will proceed forward with Campath. His infections are not likely to improve on a whole, unless we can get improvement in his counts.  --Continue prophylactic acyclovir  --Check CMV PCR and serologies every 2 weeks. Next check due on March 29  --Continue prophylactic Bactrim DS every MWF      2.  Anemia--  ROSE MARIE positive IGG, but normal Ret count, LDH, and hapto.  Bili stable 2.0 range. There may be a small component of hemolysis. Not Brisk . Patient put on 20 mg per day of prednisone on 3/11, then stopped on 3/21.  --Hemoglobin stable  -- monitor   --Transfuse if hemoglobin less than 7    3.  Thrombocytopenia--related to problem #1. No obvious signs of bleeding. We will continue to monitor.  --Transfuse if platelets less than 20 while febrile, or at any level if bleeding  --All blood products CMV negative and irradiated    4. Renal insufficiency--baseline creatinine at 1.2-1.5.  Uric acid level normal at 3.1. Creatinine up to 1.77  --Total body fluid up  --Nephrology following, and stopped IV fluids for now.    5. Neutropenic fever--  on cefepime, acyclovir, and fluconazole.  ID following patient. Fevers could be related to infection, infusion reaction, or tumor. Pancultured.   --Continue broad-spectrum antibiotics for prolonged  cytopenias  --Continue to monitor.    6. 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  Given IVIG on 3/12/18     7. Elevated Baseline TSH-- high prior to starting Campath.  Monitor.  Likely due to stress.   --TSH has gone up a little bit. We will continue to monitor and consider replacement if it continues to go up.      High complexicity/Drug monitoring       Quality-Core Measures   Reviewed items::  Labs reviewed, Medications reviewed and Radiology images reviewed  Joshi catheter::  No Joshi  DVT prophylaxis pharmacological::  Contraindicated - High bleeding risk

## 2018-03-25 NOTE — PROGRESS NOTES
Oncology/Hematology Progress Note               Author: Miquel Horta    Cc:  T-cell LGL Date & Time created: 3/25/2018  10:57 AM     Interval History:  The diarrhea continues to improve. His cough continues to improve as well. He still has the same swelling in the legs, but it's not getting any worse. He has no fevers or chills.       PMHx, PSurgHx, SocHx, FAMHx:  All reviewed and no changes    Review of Systems:  Review of Systems   Constitutional: Positive for malaise/fatigue. Negative for chills, fever and weight loss.   HENT: Negative for ear pain, nosebleeds and sore throat.    Eyes: Negative for blurred vision, double vision and photophobia.   Respiratory: Positive for cough and sputum production. Negative for hemoptysis and shortness of breath.    Cardiovascular: Positive for leg swelling. Negative for chest pain, palpitations and orthopnea.   Gastrointestinal: Positive for abdominal pain and diarrhea. Negative for constipation, heartburn, nausea and vomiting.   Genitourinary: Negative for dysuria, frequency, hematuria and urgency.   Skin: Negative for itching and rash.   Neurological: Positive for weakness.       Physical Exam:  Physical Exam   Constitutional: He is oriented to person, place, and time. He appears well-developed and well-nourished. No distress.   ECOG=2   HENT:   Head: Normocephalic and atraumatic.   Mouth/Throat: Oropharynx is clear and moist. No oropharyngeal exudate.   Eyes: Conjunctivae and EOM are normal. Right eye exhibits no discharge. Left eye exhibits no discharge. No scleral icterus.   Neck: Normal range of motion. Neck supple. No tracheal deviation present. No thyromegaly present.   Cardiovascular: Normal rate, regular rhythm and normal heart sounds.  Exam reveals no gallop and no friction rub.    No murmur heard.  Pulmonary/Chest: Effort normal and breath sounds normal. No respiratory distress. He has no wheezes. He has no rales.   Abdominal: Soft. Bowel sounds are normal. He  exhibits no distension. There is no tenderness. There is no rebound and no guarding.   Musculoskeletal: Normal range of motion. He exhibits edema.   3+ bilateral lower extremity edema, left>right   Lymphadenopathy:     He has no cervical adenopathy.   Neurological: He is alert and oriented to person, place, and time.   Skin: Skin is warm. No rash noted. He is not diaphoretic. No erythema.   Psychiatric: He has a normal mood and affect. His behavior is normal. Thought content normal.       Labs:        Invalid input(s): [id number]  Recent Labs      18   BNPBTYPENAT  161*     Recent Labs      18   0042  18   SODIUM  137  137  134*   POTASSIUM  4.1  3.9  4.2   CHLORIDE  111  112  109   CO2  16*  17*  17*   BUN  35*  35*  33*   CREATININE  1.74*  1.77*  1.68*   CALCIUM  6.5*  6.5*  6.4*     Recent Labs      18   ALTSGPT  60*  58*   --    ASTSGOT  49*  39   --    ALKPHOSPHAT  50  61   --    TBILIRUBIN  1.0  1.1   --    GLUCOSE  138*  133*  115*     Recent Labs      185  18   0925  18   RBC  2.04*   --   2.67*  2.46*   HEMOGLOBIN  5.8*  7.1*  7.7*  7.1*   HEMATOCRIT  17.2*  21.0*  22.0*  20.5*   PLATELETCT  24*   --   23*  14*     Recent Labs      185  18   WBC   --   0.1*  0.1*  0.1*   ASTSGOT  49*   --   39   --    ALTSGPT  60*   --   58*   --    ALKPHOSPHAT  50   --   61   --    TBILIRUBIN  1.0   --   1.1   --      Recent Labs      18   SODIUM  137  137  134*   POTASSIUM  4.1  3.9  4.2   CHLORIDE  111  112  109   CO2  16*  17*  17*   GLUCOSE  138*  133*  115*   BUN  35*  35*  33*   CREATININE  1.74*  1.77*  1.68*   CALCIUM  6.5*  6.5*  6.4*     Hemodynamics:  Temp (24hrs), Av.9 °C (98.4 °F), Min:36.6 °C (97.8 °F), Max:37.2 °C (98.9 °F)  Temperature: 37 °C (98.6  °F)  Pulse  Av.9  Min: 64  Max: 110   Blood Pressure : 131/78     Respiratory:    Respiration: 18, Pulse Oximetry: 95 %        RLL Breath Sounds: Expiratory Wheezes, LLL Breath Sounds: Expiratory Wheezes  Fluids:    Intake/Output Summary (Last 24 hours) at 18 1148  Last data filed at 18 0600   Gross per 24 hour   Intake                0 ml   Output              925 ml   Net             -925 ml     Weight: 92.5 kg (203 lb 14.8 oz)  GI/Nutrition:  Orders Placed This Encounter   Procedures   • DIET ORDER     Standing Status:   Standing     Number of Occurrences:   1     Order Specific Question:   Diet:     Answer:   Cardiac [6]     Medical Decision Making, by Problem:  Active Hospital Problems    Diagnosis   • *Pancytopenia (CMS-[facility]) [D61.818]   • Thrombocytopenia (CMS-[facility]) [D69.6]   • Neutropenic fever (CMS-[facility]) [D70.9, R50.81]   • Maxillary sinusitis [J32.0]   • Acute renal failure (ARF) (CMS-[facility]) [N17.9]   • Hyponatremia [E87.1]   • Essential hypertension [I10]   • 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  T cell LGL/Leukemia-- presented with pancytopenia. Admitted to Hospital since . Admitted for neutropenic fever.  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   He seems to have an aggressive form of this disease. Campath started on 3/16/18, per Dr. Klein's discussion with [place].        Monitoring for Campath side effects. These can include autoimmune side effects such as pneumonitis,  Hypothyroidism, kidney disease, etc. sometimes can see in autoimmune ITP or hemolytic picture with this treatment.  Can certainly see bone marrow suppression (but in his case we must treat through the low blood counts).  Also will be at high risk for opportunistic infections.   represents HSV  prophylaxis, and PCP prophylaxis.      Monitor during treatment:  CD4+ lymphocyte counts (after treatment until recovery); CMV antigen baseline neg (routinely during and for 2 months after treatment); consider TSH at baseline ( slight elevation TSH and free T3)  and then every 2 to 3 months during alemtuzumab treatment    -- Day 10 of 10 today  --Continue Campath as ordered-- not sure how long it will take him to respond to this treatment. 50% responded by 3 months. He may have prolonged cytopenias waiting for response.   --Continue prophylactic acyclovir  --Check CMV PCR and serologies at least every 2 weeks. Next check due on March 29  --Continue prophylaxis for PCP--ID stopped the Bactrim and changed him to atovaquone      2.  Anemia--  ROSE MARIE positive IGG, but normal Ret count, LDH, and hapto.  Bili stable 2.0 range. There may be a small component of hemolysis. Not Brisk. Patient put on 20 mg per day of prednisone on 3/11, then stopped on 3/21.  --Hemoglobin stable  -- monitor   --Transfuse if hemoglobin less than 7    3.  Thrombocytopenia--related to problem #1. No obvious signs of bleeding. We will continue to monitor.  --Transfuse if platelets less than 20 while febrile, or at any level if bleeding  --All blood products CMV negative and irradiated    4. Renal insufficiency--baseline creatinine at 1.2-1.5.  Uric acid level normal at 3.1. Creatinine got up to 1.77.  --Total body fluid up  --Nephrology following, and stopped IV fluids for now.  --Creatinine 1.68 today    5. Neutropenic fever--  on cefepime, acyclovir, and fluconazole.  ID following patient. Fevers could be related to infection, infusion reaction, or tumor. Pancultured.   --Continue broad-spectrum antibiotics for prolonged cytopenias  --Continue to monitor.    6. 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  Given IVIG on 3/12/18     7. Elevated Baseline TSH-- high prior to starting Campath.  Monitor.  Likely due to stress.   --TSH has  gone up a little bit. We will continue to monitor and consider replacement if it continues to go up.      High complexicity/Drug monitoring       Quality-Core Measures   Reviewed items::  Labs reviewed, Medications reviewed and Radiology images reviewed  Joshi catheter::  No Joshi  DVT prophylaxis pharmacological::  Contraindicated - High bleeding risk

## 2018-03-25 NOTE — PROGRESS NOTES
Assumed care of pt at 1900. Bedside report received. AAox4, VSS. Denies pain or nausea at this time. Up self to BSC. BLE edema noted to be 3+. PICC with +blood return for IV abx. POC discussed, call light in reach, pt calls for assistance, all needs met at this time.

## 2018-03-25 NOTE — PROGRESS NOTES
Chemotherapy Verification - SECONDARY RN   Day 10/10    Height = 177.8 cm  Weight = 92.5kg  BSA = 2.13 m2       Medication: Alemtuzumab  Dose: 10mg- fixed dose  Calculated Dose: 10mg- fixed dose                             (In mg/m2, AUC, mg/kg)         I confirm that this process was performed independently.

## 2018-03-26 PROBLEM — J90 PLEURAL EFFUSION: Status: ACTIVE | Noted: 2018-01-01

## 2018-03-26 NOTE — CARE PLAN
Problem: Safety  Goal: Will remain free from falls  Outcome: PROGRESSING AS EXPECTED      Problem: Infection  Goal: Will remain free from infection  Outcome: PROGRESSING AS EXPECTED      Problem: Respiratory:  Goal: Respiratory status will improve  Outcome: PROGRESSING AS EXPECTED    Intervention: Assess and monitor pulmonary status  Pt still has cough, not much mucous produced. PRN cough medicine given. Pt to sit up when taking deep breaths.       Problem: Urinary Elimination:  Goal: Ability to reestablish a normal urinary elimination pattern will improve    Intervention: Assist patient to sit on commode or toilet for voiding  Pt using commode, no signs of urine retention.

## 2018-03-26 NOTE — PROGRESS NOTES
Pt resting comfortably overnight. Pt continues to have loose stools. No pain present. Cough present, PRN medication given. Will continue to monitor.

## 2018-03-26 NOTE — PROGRESS NOTES
Infectious Disease Progress Note    Author: Santa Lynne M.D. Date & Time of service: 3/26/2018  1:38 PM    Chief Complaint:  FU neutropenic fever    Interval History:  76 y/o WM admitted with febrile neutropenia    2/28 Tmax 100.5, having coughing fit and SOB, plan for BM biopsy  3/1Tmax 100.2, WBC 0.3, had a BM this am, cough better, repeat CXR with no infiltrate   3/2 Tmax 100.5, shortness of breath with exertion, had bloody nose this am, plts 22, denies abd pain or diarrhea, path neg for lyphoma  3/3/2018-Tmax 99.6. WBC 0.3 platelets 17 creatinine 1.38  3/4/2018-Tmax 99.2 WBCs 0.3 creatinine 1.38 complains of some shortness of breath  3/5 AF WBC 0.3 transferred to [facility] no new complaints  3/6 AF WBC 0.3 no positive cxs No new complaints  3/7 AF WBC 0.4 no complaints except fatigue and CORRALES  3/8 AF (99.8) no CBC transferred to Oncology floor-no acute events  3/9 AF WBC 0.5 feels weak-no new complaints  3/10 AF (99.9) plan for Campath noted  3/12/2018-Tmax 98.4 WBC 0.7 creatinine 1.55  3/13/2018-no fevers. Remains neutropenic. No new issues overnight  3/14/2018-Tmax 98.6 WBC 0.6 creatinine 1.44 no new issues overnight  3/15/2018-no fevers WBC 0.6 ambulating in the halls  3/16/2018-no fevers. No new issues overnight. WBC 0.5  3/21 Tmax 103, WBC 0.1, ID recalled for recurrent fevers, pt started having watery diarrhea yesterday with poor appetite but no abdominal pain, C diff pending. Denies any cough, SOB or new skin rash  3/22 Tmax 102.7, WBC 0.2, has night sweats, poor appetite did not have dinner, nausea but no vomiting  3/23 AF, WBC 0.1, had 3 watery BM overnight, no night sweats, denies abd pain, nausea or vomiting, getting blood transfusion  3/24 AF, WBC 0.1, has slept well in 2 nights due to productive cough of whitish sputum, diarrhea improving, appetite improved  3/25 AF, WBC 0.1, cough improved with tessalon perles, repeat CXR unremarkable, diarrhea starting to form  3/26 AF WBC 0.1 less  diarrhea-confused today  Labs Reviewed, Medications Reviewed, Radiology Reviewed and Wound Reviewed.    Review of Systems:  Review of Systems   Constitutional: Negative for diaphoresis and fever.   Respiratory: Positive for cough and sputum production. Negative for shortness of breath.         Better   Cardiovascular: Positive for leg swelling. Negative for chest pain.   Gastrointestinal: Positive for diarrhea. Negative for abdominal pain, nausea and vomiting.        Improved   Genitourinary: Negative for dysuria.   Skin: Negative for rash.   Neurological: Positive for weakness.   All other systems reviewed and are negative.      Hemodynamics:  Temp (24hrs), Av.8 °C (98.2 °F), Min:36.4 °C (97.5 °F), Max:36.9 °C (98.4 °F)  Temperature: 36.8 °C (98.2 °F)  Pulse  Av.8  Min: 64  Max: 110   Blood Pressure : 125/82       Physical Exam:  Physical Exam   Constitutional: He is oriented to person, place, and time. He appears well-developed. No distress.   Chronically ill   HENT:   Head: Normocephalic and atraumatic.   Eyes: EOM are normal. Pupils are equal, round, and reactive to light.   Neck: Neck supple.   Cardiovascular: Normal rate.    Pulmonary/Chest: Effort normal. No respiratory distress. He has no wheezes. He has no rales.   Decreased BS   Abdominal: Soft. He exhibits no distension. There is no tenderness.   Musculoskeletal: He exhibits edema.   LUE PICC   Neurological: He is alert and oriented to person, place, and time.   Skin: No rash noted.   ecchymosis   Nursing note and vitals reviewed.      Meds:    Current Facility-Administered Medications:   •  Respiratory Care per Protocol  •  albuterol  •  albuterol  •  guaiFENesin  •  furosemide  •  famotidine  •  calcium carbonate  •  sodium bicarbonate  •  atovaquone  •  cefepime  •  NS  •  Pharmacy  •  vancomycin 50 mg/mL  •  fluconazole  •  diphenhydrAMINE  •  allopurinol  •  Ganciclovir  •  dexamethasone  •  acetaminophen  •  PICC Line Insertion has been  implemented **AND** May use Lidocaine 1% not to exceed 3 mls for local at insertion site **AND** NOTIFY MD **AND** Tip to dwell in the superior vena cava **AND** Do not use PICC Line until placement verified by Chest X Ray **AND** [CANCELED] DX-CHEST-FOR PICC LINE Perform procedure in: Other(comment f6 below): **AND** If radiologist reading of chest X-ray states any of the following the PICC should be used **AND** Further evaluation of the PICC placement can be retrieved from X-Ray and Imaging **AND** Blood draws through PICC line; draws by RN only **AND** FLUSHING GUIDELINES WHEN IN USE **AND** normal saline PF **AND** FLUSHING GUIDELINES WHEN NOT IN USE **AND** DRESSING MAINTENANCE **AND** Change needleless pressure ports and IV tubing every 72 hours per hospital policy **AND** TUBING **AND** If there is an MD order to remove the PICC line, any RN may remove the PICC line **AND** [] PATIENT EDUCATION MATERIALS **AND** NURSING COMMUNICATION  •  acyclovir  •  acetaminophen  •  benzocaine-menthol  •  benzonatate  •  labetalol  •  levothyroxine  •  ondansetron  •  potassium chloride SA  •  PARoxetine  •  zolpidem    Labs:  Recent Labs      18   0407   WBC  0.1*  0.1*  0.1*   RBC  2.67*  2.46*  2.62*   HEMOGLOBIN  7.7*  7.1*  7.3*   HEMATOCRIT  22.0*  20.5*  22.0*   MCV  82.4  83.3  84.0   MCH  28.8  28.9  27.9   RDW  48.9  48.7  49.5   PLATELETCT  23*  14*  10*   MPV   --   12.2   --    NEUTSPOLYS   --    --   cancel   LYMPHOCYTES   --    --   cancel   MONOCYTES   --    --   cancel   EOSINOPHILS   --    --   cancel   BASOPHILS   --    --   cancel     Recent Labs      18   0311   SODIUM  137  134*   POTASSIUM  3.9  4.2   CHLORIDE  112  109   CO2  17*  17*   GLUCOSE  133*  115*   BUN  35*  33*     Recent Labs      18   0042  18   0311   ALBUMIN  2.7*   --    TBILIRUBIN  1.1   --    ALKPHOSPHAT  61   --    TOTPROTEIN  4.6*   --    ALTSGPT   "58*   --    ASTSGOT  39   --    CREATININE  1.77*  1.68*       Imaging:  CXR 3/21 with small right pleural effusion    Micro:  Results     Procedure Component Value Units Date/Time    BLOOD CULTURE [[id number]] Collected:  03/21/18 1049    Order Status:  Completed Specimen:  Blood from Peripheral Updated:  03/26/18 1300     Significant Indicator NEG     Source BLD     Site PERIPHERAL     Blood Culture No growth after 5 days of incubation.    Narrative:       Protective  Per Hospital Policy: Only change Specimen Src: to \"Line\" if  specified by physician order.    BLOOD CULTURE [[id number]] Collected:  03/21/18 1049    Order Status:  Completed Specimen:  Blood from Peripheral Updated:  03/26/18 1300     Significant Indicator NEG     Source BLD     Site PERIPHERAL     Blood Culture No growth after 5 days of incubation.    Narrative:       Protective  Per Hospital Policy: Only change Specimen Src: to \"Line\" if  specified by physician order.    CULTURE RESPIRATORY W/ GRM STN [[id number]] Collected:  03/23/18 0300    Order Status:  Completed Specimen:  Respirate Updated:  03/25/18 1029     Gram Stain Result Few WBCs.  Few mixed bacteria, no predominant organism seen.  Specimen Quality Score: 1+       Significant Indicator NEG     Source RESP     Site Sputum (coughed)     Respiratory Culture Rare growth usual upper respiratory valencia.    Culture Respiratory W/ GRM STN [[id number]] Collected:  03/23/18 1500    Order Status:  Sent Specimen:  Respirate from Sputum     GRAM STAIN [[id number]] Collected:  03/23/18 0300    Order Status:  Completed Specimen:  Respirate Updated:  03/23/18 1451     Significant Indicator .     Source RESP     Site Sputum (coughed)     Gram Stain Result Few WBCs.  Few mixed bacteria, no predominant organism seen.  Specimen Quality Score: 1+      BLOOD CULTURE [[id number]] Collected:  03/17/18 0907    Order Status:  Completed Specimen:  Blood from Peripheral Updated:  03/22/18 1100     Significant Indicator " "NEG     Source BLD     Site PERIPHERAL     Blood Culture No growth after 5 days of incubation.    Narrative:       Protective  Per Hospital Policy: Only change Specimen Src: to \"Line\" if  specified by physician order.    BLOOD CULTURE [[id number]] Collected:  03/17/18 0907    Order Status:  Completed Specimen:  Blood from Peripheral Updated:  03/22/18 1100     Significant Indicator NEG     Source BLD     Site PERIPHERAL     Blood Culture No growth after 5 days of incubation.    Narrative:       Protective  Per Hospital Policy: Only change Specimen Src: to \"Line\" if  specified by physician order.    C DIFF TOXIN [[id number]]  (Abnormal) Collected:  03/21/18 1306    Order Status:  Completed Updated:  03/21/18 1901     C.Diff Toxin A&B Positive (A)     Comment: TOXIN POSITIVE  Toxin detected by EIA; C. difficile detected by PCR.  If clinically correlated, treatment indicated per guidelines.  Test of cure is not recommended.         Narrative:       Special Contact [id number] SHARMIN MCDUFFIE.  Does this patient have risk factors for C-diff?->Yes  Has patient taken stool softeners or laxatives in the last 5  days?->No    CDIFF BY PCR WITH TOXIN [[id number]] Collected:  03/21/18 1306    Order Status:  Completed Specimen:  Stool from Stool Updated:  03/21/18 1859     C Diff by PCR See Toxin     027-NAP1-BI Presumptive Negative     Comment: Presumptive 027/NAP1/BI target DNA sequences are NOT DETECTED.       Narrative:       Special Contact [id number] SHARMIN ALICEA  Does this patient have risk factors for C-diff?->Yes  Has patient taken stool softeners or laxatives in the last 5  days?->No    URINALYSIS [[id number]]  (Abnormal) Collected:  03/21/18 1254    Order Status:  Completed Specimen:  Urine from Urine, Clean Catch Updated:  03/21/18 1414     Color Yellow     Character Cloudy (A)     Specific Gravity 1.015     Ph 5.0     Glucose Negative mg/dL      Ketones Negative mg/dL      Protein 100 (A) mg/dL      " Bilirubin Negative     Urobilinogen, Urine 0.2     Nitrite Negative     Leukocyte Esterase Negative     Occult Blood Small (A)     Micro Urine Req Microscopic    Narrative:       Special Contact [id number] SHARMIN PHOENIX FROYAlvin  Does this patient have risk factors for C-diff?->Yes  Has patient taken stool softeners or laxatives in the last 5  days?->No          Assessment:  Active Hospital Problems    Diagnosis   • *Pancytopenia (CMS-[facility]) [D61.818]   • Thrombocytopenia (CMS-HCC) [D69.6]   • Neutropenic fever (CMS-HCC) [D70.9, R50.81]   • Maxillary sinusitis [J32.0]   • Acute renal failure (ARF) (CMS-HCC) [N17.9]   • Hyponatremia [E87.1]   • Essential hypertension [I10]   • 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    Repeat Bcx 3/21 - NGTD  Monitor closely once [place]    Clostridium difficile diarrhea, improving  C diff + 3/21  Continue PO vanco QID x 14 days then BID while on IV abx    Cough  Recent CXR with no PNA but small pleural effusion  Sputum cx - URF  Abx per above    ROHITH  Renally dose abx as needed  Avoid nephrotoxins=dc'd bactrim given worsening ROHITH   Transitioned to atovaquone for PCP prophylaxis while on campath  Nephrology evedilberto hollis    DW Dr Guzman

## 2018-03-26 NOTE — PROGRESS NOTES
Palliative Care Advance Care Planning Progress Note  Request placed with chaplaincy services for a  to visit patient for supportive spiritual care visit based on our initial visit 3/24/18    Thank you for allowing Palliative Care to participate in this patient's care. Please call our team with questions and/or additional needs.    Marina RAMIREZ  Palliative Care Nurse Practitioner  [phone]

## 2018-03-26 NOTE — PROGRESS NOTES
Pt care resumed at change of shift, VSS, pt denying pain at this time. Pt oriented to room, instructed to use call bell when wanting to exit bed, call bell within reach.

## 2018-03-26 NOTE — PROGRESS NOTES
[facility] Hospitalist Progress Note    Date of Service: 3/25/2018    Chief Complaint  77 y.o. male admitted 3/4/2018 with severe pancytopenia and neutropenic fever.  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    Interval Problem Update  Patient with C diff over past week, diarrhea is improving per patient until he had a milkshake yesterday and then stool got more liquid again.  He feels better and is in good spirits.  He is looking forward to today being his last dose of chemotherapy.    Consultants/Specialty  onc - Horta    Disposition  tbd        Review of Systems   Constitutional: Negative for chills and fever.   HENT: Negative for congestion.    Eyes: Negative for blurred vision and photophobia.   Respiratory: Negative for cough and shortness of breath.    Cardiovascular: Positive for leg swelling. Negative for chest pain and claudication.   Gastrointestinal: Positive for diarrhea. Negative for abdominal pain, constipation, heartburn, nausea and vomiting.   Genitourinary: Negative for dysuria and hematuria.   Musculoskeletal: Negative for joint pain and myalgias.   Skin: Negative for itching and rash.   Neurological: Negative for dizziness, sensory change, speech change, weakness and headaches.   Psychiatric/Behavioral: Negative for depression. The patient is not nervous/anxious and does not have insomnia.       Physical Exam  Laboratory/Imaging   Hemodynamics  Temp (24hrs), Av.9 °C (98.5 °F), Min:36.6 °C (97.8 °F), Max:37.2 °C (98.9 °F)   Temperature: 36.9 °C (98.4 °F)  Pulse  Av.8  Min: 64  Max: 110    Blood Pressure : 134/73      Respiratory      Respiration: 18, Pulse Oximetry: 94 %        RUL Breath Sounds: Clear, RML Breath Sounds: Clear, RLL Breath Sounds: Clear, GREGORIA Breath Sounds: Clear, LLL Breath Sounds: Clear    Fluids  No intake or output data in the 24 hours ending 18 1848    Nutrition  Orders Placed This Encounter   Procedures   • DIET ORDER     Standing Status:   Standing      Number of Occurrences:   1     Order Specific Question:   Diet:     Answer:   Cardiac [6]     Physical Exam   Constitutional: He is oriented to person, place, and time. He appears well-developed and well-nourished. No distress.   HENT:   Head: Normocephalic and atraumatic.   Eyes: Conjunctivae are normal. No scleral icterus.   Neck: Neck supple. No JVD present.   Cardiovascular: Normal rate, regular rhythm and normal heart sounds.  Exam reveals no gallop and no friction rub.    No murmur heard.  Pulmonary/Chest: Effort normal and breath sounds normal. No respiratory distress. He has no wheezes. He exhibits no tenderness.   Abdominal: Soft. Bowel sounds are normal. He exhibits no distension and no mass. There is no tenderness.   Musculoskeletal: He exhibits edema (BLE). He exhibits no tenderness.   Neurological: He is alert and oriented to person, place, and time. No cranial nerve deficit.   Skin: Skin is warm and dry. He is not diaphoretic. No erythema. No pallor.   Psychiatric: He has a normal mood and affect. His behavior is normal.   Nursing note and vitals reviewed.      Recent Labs      03/23/18   0145  03/23/18   0925  03/24/18   0042  03/25/18 0311   WBC  0.1*   --   0.1*  0.1*   RBC  2.04*   --   2.67*  2.46*   HEMOGLOBIN  5.8*  7.1*  7.7*  7.1*   HEMATOCRIT  17.2*  21.0*  22.0*  20.5*   MCV  84.3   --   82.4  83.3   MCH  28.4   --   28.8  28.9   MCHC  33.7   --   35.0  34.6   RDW  50.4*   --   48.9  48.7   PLATELETCT  24*   --   23*  14*   MPV  11.1   --    --   12.2     Recent Labs      03/23/18   0053  03/24/18   0042  03/25/18 0311   SODIUM  137  137  134*   POTASSIUM  4.1  3.9  4.2   CHLORIDE  111  112  109   CO2  16*  17*  17*   GLUCOSE  138*  133*  115*   BUN  35*  35*  33*   CREATININE  1.74*  1.77*  1.68*   CALCIUM  6.5*  6.5*  6.4*         Recent Labs      03/25/18   0311   BNPBTYPENAT  161*              Assessment/Plan     * Pancytopenia (CMS-HCC)   Assessment & Plan    Secondary to severe  bone marrow infiltrate.   T cell LGL - leukemia  Campath start admin 3/16 discussed with Dr. Horta, patient will be here for the duration of campath administration.          Thrombocytopenia (CMS-HCC)- (present on admission)   Assessment & Plan    Transfuse prn if less than 10K. No bleeding.        Neutropenic fever (CMS-HCC)- (present on admission)   Assessment & Plan    ANC 0  On Cefepime and fluconazole.   Persistent neutropenia febrile again to 103, cough and diarrhea  Cdiff positive    blood culture, CXR, UA all okay continue to monitor  ID following        Maxillary sinusitis- (present on admission)   Assessment & Plan    Resolved          Acute renal failure (ARF) (CMS-HCC)- (present on admission)   Assessment & Plan    Unfortunately continues to be Worsening with diarrhea/cdiff, still worse, nephrology consulted hold bicarbonate drip due to edema and 16 pound weight gain  Avoid nephrotoxins   Renally dose all medications         Hyponatremia- (present on admission)   Assessment & Plan    Mild, stable            Essential hypertension- (present on admission)   Assessment & Plan    Monitor blood pressure.          DNR (do not resuscitate)   Assessment & Plan    Patient request.  He would like to complete an advanced directive, recommend POLST as well.  Palliative care consult requested.        Clostridium difficile colitis   Assessment & Plan    Oral vancomycin added, special contact isolation.  ID following.  Decrease IV fluids patient getting quite edematous discussed with nephrology  Stools starting to solidify          Anemia- (present on admission)   Assessment & Plan    Monitor H&H.  Transfuse if hemoglobin <7 g/dl.   When Transfused give PRBC irradiated and CMV free product.        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 10 days of daily infusion.  Patient will remain inpatient to complete campath discussed with Dr. Horta  Severe neutropenia            Quality-Core Measures   Reviewed items::  Labs reviewed, Medications reviewed and Radiology images reviewed  Joshi catheter::  No Joshi  DVT prophylaxis pharmacological::  Contraindicated - High bleeding risk  DVT prophylaxis - mechanical:  SCDs  Antibiotics:  Treating active infection/contamination beyond 24 hours perioperative coverage

## 2018-03-26 NOTE — THERAPY
"Occupational Therapy Treatment completed with focus on ADLs, ADL transfers, patient education and upper extremity function.  Functional Status:  S ADLs and mobility with FWW  Plan of Care: Will benefit from Occupational Therapy 1 times per week  Discharge Recommendations:  Equipment Front-Wheel Walker, Bedside Commode, Grab Bars and Will Continue to Assess for Equipment Needs. Post-acute therapy Discharge to home with outpatient or home health for additional skilled therapy services.    Patient seen for OT treat focused on in room ADLs and mobility with FWW. Patient required S, rest breaks, and task modifications t/o session . Patient limited by respiratory and demos safe carryover of education related to energy conservation and ADL modification, such as using BSC instead of walking to toilet. Patient will remain on OT caseload and will follow patient for DC needs only to promote safe DC home once cleared.     See \"Rehab Therapy-Acute\" Patient Summary Report for complete documentation.   "

## 2018-03-26 NOTE — PROGRESS NOTES
Assumed care of pt at 0645. Rc'd report from MELINA Mukherjee. Pt is Aox4. Pt is up self.  Denies pain and nausea. PICC line to JARROD, with + blood return. Platelet transfusion running. Cough worsening this AM, wheezes noted, patient does not sound notably wet to lungs, BLE has decreased since yesterday, updated physician. Rc'd order for CT scan. Hourly rounding in place. Call light within reach. Bed in lowest, locked position. Patient's loose stools have decreased and are becoming seedy and more formed.

## 2018-03-26 NOTE — PROGRESS NOTES
Dr. Kiran called about pt's WBC of 0.1 and Platelet count of 10. MD gave telephone order, new order to follow.

## 2018-03-26 NOTE — THERAPY
"Occupational Therapy Treatment Held    OT treat held 2/2 patient and nurse report difficulty with respiratory. Will continue to follow later in am.     See \"Rehab Therapy-Acute\" Patient Summary Report for complete documentation.   "

## 2018-03-26 NOTE — PROGRESS NOTES
Slava from Lab called with critical result of WBC at 0.1 and platelet at 10. Critical lab result read back to Slava.   This critical lab result is within parameters established by [facility] Policy for this patient

## 2018-03-26 NOTE — PROGRESS NOTES
Oncology/Hematology Progress Note               Author: Hung Brennanayana    Cc:  T-cell LGL Date & Time created: 3/26/2018  11:42 AM     Interval History:  Worsening cough this morning  No worsening SOB       PMHx, PSurgHx, SocHx, FAMHx:  All reviewed and no changes    Review of Systems:  Review of Systems   Constitutional: Positive for malaise/fatigue. Negative for chills, fever and weight loss.   HENT: Negative for ear pain, nosebleeds and sore throat.    Eyes: Negative for blurred vision, double vision and photophobia.   Respiratory: Positive for cough and sputum production. Negative for hemoptysis and shortness of breath.    Cardiovascular: Positive for leg swelling. Negative for chest pain, palpitations and orthopnea.   Gastrointestinal: Negative for abdominal pain, constipation, diarrhea, heartburn, nausea and vomiting.   Genitourinary: Negative for dysuria, frequency, hematuria and urgency.   Skin: Negative for itching and rash.   Neurological: Positive for weakness.       Physical Exam:  Physical Exam   Constitutional: He is oriented to person, place, and time. He appears well-developed and well-nourished. No distress.   ECOG=2   HENT:   Head: Normocephalic and atraumatic.   Mouth/Throat: Oropharynx is clear and moist. No oropharyngeal exudate.   Eyes: Conjunctivae and EOM are normal. Right eye exhibits no discharge. Left eye exhibits no discharge. No scleral icterus.   Neck: Normal range of motion. Neck supple. No tracheal deviation present. No thyromegaly present.   Cardiovascular: Normal rate, regular rhythm and normal heart sounds.  Exam reveals no gallop and no friction rub.    No murmur heard.  Pulmonary/Chest: Effort normal and breath sounds normal. No respiratory distress. He has no wheezes. He has no rales.   Abdominal: Soft. Bowel sounds are normal. He exhibits no distension. There is no tenderness. There is no rebound and no guarding.   Musculoskeletal: Normal range of motion. He exhibits edema.   3+  bilateral lower extremity edema, left>right   Lymphadenopathy:     He has no cervical adenopathy.   Neurological: He is alert and oriented to person, place, and time.   Skin: Skin is warm. No rash noted. He is not diaphoretic. No erythema.   Psychiatric: He has a normal mood and affect. His behavior is normal. Thought content normal.       Labs:        Invalid input(s): [id number]  Recent Labs      18   BNPBTYPENAT  161*     Recent Labs      18   SODIUM  137  134*   POTASSIUM  3.9  4.2   CHLORIDE  112  109   CO2  17*  17*   BUN  35*  33*   CREATININE  1.77*  1.68*   CALCIUM  6.5*  6.4*     Recent Labs      18   ALTSGPT  58*   --    ASTSGOT  39   --    ALKPHOSPHAT  61   --    TBILIRUBIN  1.1   --    GLUCOSE  133*  115*     Recent Labs      18   0407   RBC  2.67*  2.46*  2.62*   HEMOGLOBIN  7.7*  7.1*  7.3*   HEMATOCRIT  22.0*  20.5*  22.0*   PLATELETCT  23*  14*  10*     Recent Labs      18   0407   WBC  0.1*  0.1*  0.1*   NEUTSPOLYS   --    --   cancel   LYMPHOCYTES   --    --   cancel   MONOCYTES   --    --   cancel   EOSINOPHILS   --    --   cancel   BASOPHILS   --    --   cancel   ASTSGOT  39   --    --    ALTSGPT  58*   --    --    ALKPHOSPHAT  61   --    --    TBILIRUBIN  1.1   --    --      Recent Labs      18   SODIUM  137  134*   POTASSIUM  3.9  4.2   CHLORIDE  112  109   CO2  17*  17*   GLUCOSE  133*  115*   BUN  35*  33*   CREATININE  1.77*  1.68*   CALCIUM  6.5*  6.4*     Hemodynamics:  Temp (24hrs), Av.8 °C (98.2 °F), Min:36.4 °C (97.5 °F), Max:36.9 °C (98.4 °F)  Temperature: 36.8 °C (98.2 °F)  Pulse  Av.8  Min: 64  Max: 110   Blood Pressure : 125/82     Respiratory:    Respiration: 16, Pulse Oximetry: 92 %, O2 Daily Delivery Respiratory : Room Air with O2 Available     Given By:: Mouthpiece  RUL Breath Sounds:  Clear, RML Breath Sounds: Clear, RLL Breath Sounds: Diminished, GREGORIA Breath Sounds: Clear, LLL Breath Sounds: Diminished  Fluids:    Intake/Output Summary (Last 24 hours) at 03/19/18 1148  Last data filed at 03/19/18 0600   Gross per 24 hour   Intake                0 ml   Output              925 ml   Net             -925 ml        GI/Nutrition:  Orders Placed This Encounter   Procedures   • DIET ORDER     Standing Status:   Standing     Number of Occurrences:   1     Order Specific Question:   Diet:     Answer:   Cardiac [6]     Medical Decision Making, by Problem:  Active Hospital Problems    Diagnosis   • *Pancytopenia (CMS-HCC) [D61.818]   • Thrombocytopenia (CMS-HCC) [D69.6]   • Neutropenic fever (CMS-[facility]) [D70.9, R50.81]   • Maxillary sinusitis [J32.0]   • Acute renal failure (ARF) (CMS-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  T cell LGL/Leukemia-- presented with pancytopenia. Admitted to Hospital since March 4th. Admitted for neutropenic fever.  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   He seems to have an aggressive form of this disease. Campath started on 3/16/18, per Dr. Klein's discussion with [place].        Monitoring for Campath side effects. These can include autoimmune side effects such as pneumonitis,  Hypothyroidism, kidney disease, etc. sometimes can see in autoimmune ITP or hemolytic picture with this treatment.  Can certainly see bone marrow suppression (but in his case we must treat through the low blood counts).  Also will be at high risk for opportunistic infections.   represents HSV prophylaxis, and PCP prophylaxis.      Monitor during treatment:  CD4+ lymphocyte counts (after treatment until  recovery); CMV antigen baseline neg (routinely during and for 2 months after treatment); consider TSH at baseline ( slight elevation TSH and free T3)  and then every 2 to 3 months during alemtuzumab treatment    -- Day 10 of 10 on 3/25/18, day 11  --. 50% responded by 3 months. He may have prolonged cytopenias waiting for response.   --Continue prophylactic acyclovir  --Check CMV PCR and serologies at least every 2 weeks. Next check due on March 29  --Continue prophylaxis for PCP--ID stopped the Bactrim and changed him to atovaquone  Worsening cough today, will check CT thorax w/o contrast, Pneumonitis due to Campath      2.  Anemia--  ROSE MARIE positive IGG, but normal Ret count, LDH, and hapto.  Bili stable 2.0 range. There may be a small component of hemolysis. Not Brisk. Patient put on 20 mg per day of prednisone on 3/11, then stopped on 3/21.  --Hemoglobin stable  -- monitor   --Transfuse if hemoglobin less than 7    3.  Thrombocytopenia--related to problem #1. No obvious signs of bleeding. We will continue to monitor.  --Transfuse if platelets less than 20 while febrile, or at any level if bleeding  --All blood products CMV negative and irradiated    4. Renal insufficiency--baseline creatinine at 1.2-1.5.  Uric acid level normal at 3.1. Creatinine got up to 1.77.  --Total body fluid up  --Nephrology following, and stopped IV fluids for now.  --Creatinine 1.68 today    5. Neutropenic fever--  on cefepime, acyclovir, and fluconazole.  ID following patient. Fevers could be related to infection, infusion reaction, or tumor. Pancultured.   --Continue broad-spectrum antibiotics for prolonged cytopenias  --Continue to monitor.      6. 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  Given IVIG on 3/12/18     7. Elevated Baseline TSH-- high prior to starting Campath.  Monitor.  Likely due to stress.   --TSH has gone up a little bit. We will continue to monitor and consider replacement if it continues to go  up.      High complexicity/Drug monitoring       Quality-Core Measures   Reviewed items::  Labs reviewed, Medications reviewed and Radiology images reviewed  Joshi catheter::  No Joshi  DVT prophylaxis pharmacological::  Contraindicated - High bleeding risk

## 2018-03-26 NOTE — PROGRESS NOTES
Nephrology Progress Note, Adult, Complex               Author: Fadi Najjar Date & Time created: 3/26/2018  1:12 PM     Interval History:  78 y/o male with T cell LGL/leukemia admitted with neutropenic fever/pancytopenia  CKD III, Non oliguric  (+) worsening pedal edema    Review of Systems:  Review of Systems   Constitutional: Positive for malaise/fatigue. Negative for chills and fever.   HENT: Negative for sore throat.    Respiratory: Positive for shortness of breath. Negative for cough, hemoptysis and wheezing.    Cardiovascular: Positive for leg swelling. Negative for chest pain, palpitations and orthopnea.   Gastrointestinal: Negative for abdominal pain, nausea and vomiting.   Genitourinary: Positive for frequency. Negative for dysuria and urgency.       Physical Exam:  Physical Exam   Constitutional: He appears well-developed and well-nourished.   HENT:   Head: Atraumatic.   Mouth/Throat: Oropharynx is clear and moist.   Eyes: Conjunctivae are normal.   Cardiovascular: Normal rate and regular rhythm.  Exam reveals no gallop and no friction rub.    Pulmonary/Chest: Effort normal and breath sounds normal. He has no wheezes.   Abdominal: Soft. Bowel sounds are normal. He exhibits no distension.   Musculoskeletal: He exhibits edema.   Neurological: He is alert.   Nursing note and vitals reviewed.      Labs:        Invalid input(s): [id number]  Recent Labs      03/25/18 0311   BNPBTYPENAT  161*     Recent Labs      03/24/18 0042  03/25/18 0311   SODIUM  137  134*   POTASSIUM  3.9  4.2   CHLORIDE  112  109   CO2  17*  17*   BUN  35*  33*   CREATININE  1.77*  1.68*   CALCIUM  6.5*  6.4*     Recent Labs      03/24/18   0042  03/25/18 0311   ALTSGPT  58*   --    ASTSGOT  39   --    ALKPHOSPHAT  61   --    TBILIRUBIN  1.1   --    GLUCOSE  133*  115*     Recent Labs      03/24/18   0042  03/25/18 0311 03/26/18   0407   RBC  2.67*  2.46*  2.62*   HEMOGLOBIN  7.7*  7.1*  7.3*   HEMATOCRIT  22.0*  20.5*  22.0*    PLATELETCT  23*  14*  10*     Recent Labs      18   0042  18   0311  18   0407   WBC  0.1*  0.1*  0.1*   NEUTSPOLYS   --    --   cancel   LYMPHOCYTES   --    --   cancel   MONOCYTES   --    --   cancel   EOSINOPHILS   --    --   cancel   BASOPHILS   --    --   cancel   ASTSGOT  39   --    --    ALTSGPT  58*   --    --    ALKPHOSPHAT  61   --    --    TBILIRUBIN  1.1   --    --            Hemodynamics:  Temp (24hrs), Av.8 °C (98.2 °F), Min:36.4 °C (97.5 °F), Max:36.9 °C (98.4 °F)  Temperature: 36.8 °C (98.2 °F)  Pulse  Av.8  Min: 64  Max: 110   Blood Pressure : 125/82     Respiratory:    Respiration: 16, Pulse Oximetry: 92 %, O2 Daily Delivery Respiratory : Room Air with O2 Available     Given By:: Mouthpiece  RUL Breath Sounds: Clear, RML Breath Sounds: Clear, RLL Breath Sounds: Diminished, GREGORIA Breath Sounds: Clear, LLL Breath Sounds: Diminished  Fluids:    Intake/Output Summary (Last 24 hours) at 18 1312  Last data filed at 18 0600   Gross per 24 hour   Intake             1241 ml   Output                0 ml   Net             1241 ml        GI/Nutrition:  Orders Placed This Encounter   Procedures   • DIET ORDER     Standing Status:   Standing     Number of Occurrences:   1     Order Specific Question:   Diet:     Answer:   Cardiac [6]     Medical Decision Making, by Problem:  Active Hospital Problems    Diagnosis   • *Pancytopenia (CMS-[facility]) [D61.818]   • Thrombocytopenia (CMS-[facility]) [D69.6]   • Neutropenic fever (CMS-[facility]) [D70.9, R50.81]   • Maxillary sinusitis [J32.0]   • Acute renal failure (ARF) (CMS-[facility]) [N17.9]   • Hyponatremia [E87.1]   • Essential hypertension [I10]   • 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  4.Anemia: low Hb level stable  5.Metabolic acidosis:added Na HCO3  6.Volume:overloaded:start Lasix  no need for HD  Lasix  Daily labs  Renal dose all meds  Avoid nephrotoxins  Prognosis guarded  D/W Dr Guzman

## 2018-03-27 NOTE — PROGRESS NOTES
Infectious Disease Progress Note    Author: Santa Lynne M.D. Date & Time of service: 3/27/2018  11:23 AM    Chief Complaint:  FU neutropenic fever    Interval History:  76 y/o WM admitted with febrile neutropenia    2/28 Tmax 100.5, having coughing fit and SOB, plan for BM biopsy  3/1Tmax 100.2, WBC 0.3, had a BM this am, cough better, repeat CXR with no infiltrate   3/2 Tmax 100.5, shortness of breath with exertion, had bloody nose this am, plts 22, denies abd pain or diarrhea, path neg for lyphoma  3/3/2018-Tmax 99.6. WBC 0.3 platelets 17 creatinine 1.38  3/4/2018-Tmax 99.2 WBCs 0.3 creatinine 1.38 complains of some shortness of breath  3/5 AF WBC 0.3 [place] [facility] [place] new complaints  3/6 AF WBC 0.3 no positive cxs No new complaints  3/7 AF WBC 0.4 no complaints except fatigue and CORRALES  3/8 AF (99.8) no CBC transferred to Oncology floor-no acute events  3/9 AF WBC 0.5 feels weak-no new complaints  3/10 AF (99.9) plan for Campath noted  3/12/2018-Tmax 98.4 WBC 0.7 creatinine 1.55  3/13/2018-no fevers. Remains neutropenic. No new issues overnight  3/14/2018-Tmax 98.6 WBC 0.6 creatinine 1.44 no new issues overnight  3/15/2018-no fevers WBC 0.6 ambulating in the halls  3/16/2018-no fevers. No new issues overnight. WBC 0.5  3/21 Tmax 103, WBC 0.1, ID recalled for recurrent fevers, pt started having watery diarrhea yesterday with poor appetite but no abdominal pain, C diff pending. Denies any cough, SOB or new skin rash  3/22 Tmax 102.7, WBC 0.2, has night sweats, poor appetite did not have dinner, nausea but no vomiting  3/23 AF, WBC 0.1, had 3 watery BM overnight, no night sweats, denies abd pain, nausea or vomiting, getting blood transfusion  3/24 AF, WBC 0.1, has slept well in 2 nights due to productive cough of whitish sputum, diarrhea improving, appetite improved  3/25 AF, WBC 0.1, cough improved with tessalon perles, repeat CXR unremarkable, diarrhea starting to form  3/26 AF WBC 0.1 less  diarrhea-confused today  3/27 AF WBC 0.1 eating better-no new complaints. Less pain in legs with diuresis  Labs Reviewed, Medications Reviewed, Radiology Reviewed and Wound Reviewed.    Review of Systems:  Review of Systems   Constitutional: Negative for diaphoresis and fever.   Respiratory: Positive for cough. Negative for sputum production and shortness of breath.         Better   Cardiovascular: Positive for leg swelling. Negative for chest pain.   Gastrointestinal: Positive for diarrhea. Negative for abdominal pain, nausea and vomiting.        Improved   Genitourinary: Negative for dysuria.   Skin: Negative for rash.   Neurological: Positive for weakness.   All other systems reviewed and are negative.      Hemodynamics:  Temp (24hrs), Av.1 °C (98.7 °F), Min:36.4 °C (97.6 °F), Max:37.7 °C (99.8 °F)  Temperature: 36.9 °C (98.4 °F)  Pulse  Av  Min: 64  Max: 110   Blood Pressure : 131/87       Physical Exam:  Physical Exam   Constitutional: He is oriented to person, place, and time. He appears well-developed. No distress.   Chronically ill   HENT:   Head: Normocephalic and atraumatic.   Eyes: EOM are normal. Pupils are equal, round, and reactive to light.   Neck: Neck supple.   Cardiovascular: Normal rate.    Pulmonary/Chest: Effort normal. No respiratory distress. He has no wheezes. He has no rales.   Decreased BS   Abdominal: Soft. He exhibits no distension. There is no tenderness.   Musculoskeletal: He exhibits edema.   LUE PICC   Neurological: He is alert and oriented to person, place, and time.   Skin: No rash noted.   ecchymosis   Nursing note and vitals reviewed.      Meds:    Current Facility-Administered Medications:   •  Respiratory Care per Protocol  •  albuterol  •  albuterol  •  guaiFENesin  •  furosemide  •  famotidine  •  oxyCODONE immediate-release  •  calcium carbonate  •  sodium bicarbonate  •  atovaquone  •  cefepime  •  NS  •  Pharmacy  •  vancomycin 50 mg/mL  •  fluconazole  •   diphenhydrAMINE  •  allopurinol  •  Ganciclovir  •  dexamethasone  •  acetaminophen  •  PICC Line Insertion has been implemented **AND** May use Lidocaine 1% not to exceed 3 mls for local at insertion site **AND** NOTIFY MD **AND** Tip to dwell in the superior vena cava **AND** Do not use PICC Line until placement verified by Chest X Ray **AND** [CANCELED] DX-CHEST-FOR PICC LINE Perform procedure in: Other(comment f6 below): **AND** If radiologist reading of chest X-ray states any of the following the PICC should be used **AND** Further evaluation of the PICC placement can be retrieved from X-Ray and Imaging **AND** Blood draws through PICC line; draws by RN only **AND** FLUSHING GUIDELINES WHEN IN USE **AND** normal saline PF **AND** FLUSHING GUIDELINES WHEN NOT IN USE **AND** DRESSING MAINTENANCE **AND** Change needleless pressure ports and IV tubing every 72 hours per hospital policy **AND** TUBING **AND** If there is an MD order to remove the PICC line, any RN may remove the PICC line **AND** [] PATIENT EDUCATION MATERIALS **AND** NURSING COMMUNICATION  •  acyclovir  •  acetaminophen  •  benzocaine-menthol  •  benzonatate  •  labetalol  •  levothyroxine  •  ondansetron  •  potassium chloride SA  •  PARoxetine  •  zolpidem    Labs:  Recent Labs      187  186   WBC  0.1*  0.1*  0.1*   RBC  2.46*  2.62*  2.73*   HEMOGLOBIN  7.1*  7.3*  7.7*   HEMATOCRIT  20.5*  22.0*  22.5*   MCV  83.3  84.0  82.4   MCH  28.9  27.9  28.2   RDW  48.7  49.5  48.7   PLATELETCT  14*  10*  19*   MPV  12.2   --   10.7   NEUTSPOLYS   --   cancel  CANCEL   LYMPHOCYTES   --   cancel  CANCEL   MONOCYTES   --   cancel  CANCEL   EOSINOPHILS   --   cancel  CANCEL   BASOPHILS   --   cancel  CANCEL     Recent Labs      18   0046   SODIUM  134*  133*   POTASSIUM  4.2  4.5   CHLORIDE  109  105   CO2  17*  18*   GLUCOSE  115*  84   BUN  33*  34*     Recent Labs      18    "0311  03/27/18   0046   CREATININE  1.68*  2.02*       Imaging:  CXR 3/21 with small right pleural effusion    Micro:  Results     Procedure Component Value Units Date/Time    BLOOD CULTURE [[id number]] Collected:  03/21/18 1049    Order Status:  Completed Specimen:  Blood from Peripheral Updated:  03/26/18 1300     Significant Indicator NEG     Source BLD     Site PERIPHERAL     Blood Culture No growth after 5 days of incubation.    Narrative:       Protective  Per Hospital Policy: Only change Specimen Src: to \"Line\" if  specified by physician order.    BLOOD CULTURE [[id number]] Collected:  03/21/18 1049    Order Status:  Completed Specimen:  Blood from Peripheral Updated:  03/26/18 1300     Significant Indicator NEG     Source BLD     Site PERIPHERAL     Blood Culture No growth after 5 days of incubation.    Narrative:       Protective  Per Hospital Policy: Only change Specimen Src: to \"Line\" if  specified by physician order.    CULTURE RESPIRATORY W/ GRM STN [[id number]] Collected:  03/23/18 0300    Order Status:  Completed Specimen:  Respirate Updated:  03/25/18 1029     Gram Stain Result Few WBCs.  Few mixed bacteria, no predominant organism seen.  Specimen Quality Score: 1+       Significant Indicator NEG     Source RESP     Site Sputum (coughed)     Respiratory Culture Rare growth usual upper respiratory valencia.    Culture Respiratory W/ GRM STN [[id number]] Collected:  03/23/18 1500    Order Status:  Sent Specimen:  Respirate from Sputum     GRAM STAIN [[id number]] Collected:  03/23/18 0300    Order Status:  Completed Specimen:  Respirate Updated:  03/23/18 1451     Significant Indicator .     Source RESP     Site Sputum (coughed)     Gram Stain Result Few WBCs.  Few mixed bacteria, no predominant organism seen.  Specimen Quality Score: 1+      BLOOD CULTURE [[id number]] Collected:  03/17/18 0907    Order Status:  Completed Specimen:  Blood from Peripheral Updated:  03/22/18 1100     Significant Indicator NEG     " "Source BLD     Site PERIPHERAL     Blood Culture No growth after 5 days of incubation.    Narrative:       Protective  Per Hospital Policy: Only change Specimen Src: to \"Line\" if  specified by physician order.    BLOOD CULTURE [[id number]] Collected:  03/17/18 0907    Order Status:  Completed Specimen:  Blood from Peripheral Updated:  03/22/18 1100     Significant Indicator NEG     Source BLD     Site PERIPHERAL     Blood Culture No growth after 5 days of incubation.    Narrative:       Protective  Per Hospital Policy: Only change Specimen Src: to \"Line\" if  specified by physician order.    C DIFF TOXIN [[id number]]  (Abnormal) Collected:  03/21/18 1306    Order Status:  Completed Updated:  03/21/18 1901     C.Diff Toxin A&B Positive (A)     Comment: TOXIN POSITIVE  Toxin detected by EIA; C. difficile detected by PCR.  If clinically correlated, treatment indicated per guidelines.  Test of cure is not recommended.         Narrative:       Special Contact [id number] SHARMIN MCDUFFIE.  Does this patient have risk factors for C-diff?->Yes  Has patient taken stool softeners or laxatives in the last 5  days?->No    CDIFF BY PCR WITH TOXIN [[id number]] Collected:  03/21/18 1306    Order Status:  Completed Specimen:  Stool from Stool Updated:  03/21/18 1859     C Diff by PCR See Toxin     027-NAP1-BI Presumptive Negative     Comment: Presumptive 027/NAP1/BI target DNA sequences are NOT DETECTED.       Narrative:       Special Contact [id number] SHARMIN ALICEA  Does this patient have risk factors for C-diff?->Yes  Has patient taken stool softeners or laxatives in the last 5  days?->No    URINALYSIS [[id number]]  (Abnormal) Collected:  03/21/18 1254    Order Status:  Completed Specimen:  Urine from Urine, Clean Catch Updated:  03/21/18 1414     Color Yellow     Character Cloudy (A)     Specific Gravity 1.015     Ph 5.0     Glucose Negative mg/dL      Ketones Negative mg/dL      Protein 100 (A) mg/dL      Bilirubin " Negative     Urobilinogen, Urine 0.2     Nitrite Negative     Leukocyte Esterase Negative     Occult Blood Small (A)     Micro Urine Req Microscopic    Narrative:       Special Contact [id number] SHARMIN PHOENIX FROYAlvin  Does this patient have risk factors for C-diff?->Yes  Has patient taken stool softeners or laxatives in the last 5  days?->No          Assessment:  Active Hospital Problems    Diagnosis   • *Pancytopenia (CMS-HCC) [D61.818]   • Thrombocytopenia (CMS-HCC) [D69.6]   • Neutropenic fever (CMS-HCC) [D70.9, R50.81]   • Maxillary sinusitis [J32.0]   • Acute renal failure (ARF) (CMS-HCC) [N17.9]   • Hyponatremia [E87.1]   • Essential hypertension [I10]   • 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    Repeat Bcx 3/21 - NGTD    Clostridium difficile diarrhea, improving  C diff + 3/21  Continue PO vanco QID x 14 days then BID while on IV abx    Cough  Recent CXR with no PNA but small pleural effusion  Sputum cx - URF  Abx per above    ROHITH  Renally dose abx as needed  Avoid nephrotoxins=dc'd bactrim given worsening ROHITH   Transitioned to atovaquone for PCP prophylaxis while on campath  Nephrology allie BELTRE RN

## 2018-03-27 NOTE — PROGRESS NOTES
Patient AAOx4. Continues on contact precautions. Up self to BSC. Assist x1 to ambulate.  Coughing noted, prn meds given.  Denies pain or nausea. No further needs at this time.  Will continue to monitor.

## 2018-03-27 NOTE — ASSESSMENT & PLAN NOTE
Likely d/t fluid resuscitation efforts for renal dysfunction  Now on diuresis and should decrease in size

## 2018-03-27 NOTE — PROGRESS NOTES
Oncology/Hematology Progress Note               Author: Hung Brennanayana    Cc:  T-cell LGL Date & Time created: 3/27/2018  1:15 PM     Interval History:  Resting comfortable in bed  Moderate right pleural effusion on Ct scan, no sings of pneumonitis  Remains pancytopenic      PMHx, PSurgHx, SocHx, FAMHx:  All reviewed and no changes    Review of Systems:  Review of Systems   Constitutional: Positive for malaise/fatigue. Negative for chills, fever and weight loss.   HENT: Negative for ear pain, nosebleeds and sore throat.    Eyes: Negative for blurred vision, double vision and photophobia.   Respiratory: Positive for cough and sputum production. Negative for hemoptysis and shortness of breath.    Cardiovascular: Positive for leg swelling. Negative for chest pain, palpitations and orthopnea.   Gastrointestinal: Negative for abdominal pain, constipation, diarrhea, heartburn, nausea and vomiting.   Genitourinary: Negative for dysuria, frequency, hematuria and urgency.   Skin: Negative for itching and rash.   Neurological: Positive for weakness.       Physical Exam:  Physical Exam   Constitutional: He is oriented to person, place, and time. He appears well-developed and well-nourished. No distress.   ECOG=2   HENT:   Head: Normocephalic and atraumatic.   Mouth/Throat: Oropharynx is clear and moist. No oropharyngeal exudate.   Eyes: Conjunctivae and EOM are normal. Right eye exhibits no discharge. Left eye exhibits no discharge. No scleral icterus.   Neck: Normal range of motion. Neck supple. No tracheal deviation present. No thyromegaly present.   Cardiovascular: Normal rate, regular rhythm and normal heart sounds.  Exam reveals no gallop and no friction rub.    No murmur heard.  Pulmonary/Chest: Effort normal and breath sounds normal. No respiratory distress. He has no wheezes. He has no rales.   Abdominal: Soft. Bowel sounds are normal. He exhibits no distension. There is no tenderness. There is no rebound and no  guarding.   Musculoskeletal: Normal range of motion. He exhibits edema.   3+ bilateral lower extremity edema, left>right   Lymphadenopathy:     He has no cervical adenopathy.   Neurological: He is alert and oriented to person, place, and time.   Skin: Skin is warm. No rash noted. He is not diaphoretic. No erythema.   Psychiatric: He has a normal mood and affect. His behavior is normal. Thought content normal.       Labs:        Invalid input(s): [id number]  Recent Labs      18   BNPBTYPENAT  161*     Recent Labs      18   0046   SODIUM  134*  133*   POTASSIUM  4.2  4.5   CHLORIDE  109  105   CO2  17*  18*   BUN  33*  34*   CREATININE  1.68*  2.02*   CALCIUM  6.4*  6.6*     Recent Labs      18   0046   GLUCOSE  115*  84     Recent Labs      18   0407  18   0046   RBC  2.46*  2.62*  2.73*   HEMOGLOBIN  7.1*  7.3*  7.7*   HEMATOCRIT  20.5*  22.0*  22.5*   PLATELETCT  14*  10*  19*     Recent Labs      18   0407  18   0046   WBC  0.1*  0.1*  0.1*   NEUTSPOLYS   --   cancel  CANCEL   LYMPHOCYTES   --   cancel  CANCEL   MONOCYTES   --   cancel  CANCEL   EOSINOPHILS   --   cancel  CANCEL   BASOPHILS   --   cancel  CANCEL     Recent Labs      18   0046   SODIUM  134*  133*   POTASSIUM  4.2  4.5   CHLORIDE  109  105   CO2  17*  18*   GLUCOSE  115*  84   BUN  33*  34*   CREATININE  1.68*  2.02*   CALCIUM  6.4*  6.6*     Hemodynamics:  Temp (24hrs), Av °C (98.6 °F), Min:36.4 °C (97.6 °F), Max:37.7 °C (99.8 °F)  Temperature: 36.7 °C (98.1 °F)  Pulse  Av.2  Min: 64  Max: 110   Blood Pressure : 137/83     Respiratory:    Respiration: 18, Pulse Oximetry: 95 %        RUL Breath Sounds: Clear;Diminished, RML Breath Sounds: Clear;Diminished, RLL Breath Sounds: Diminished, GREGORIA Breath Sounds: Diminished, LLL Breath Sounds: Diminished  Fluids:    Intake/Output Summary (Last 24 hours) at  03/19/18 1148  Last data filed at 03/19/18 0600   Gross per 24 hour   Intake                0 ml   Output              925 ml   Net             -925 ml        GI/Nutrition:  Orders Placed This Encounter   Procedures   • DIET ORDER     Standing Status:   Standing     Number of Occurrences:   1     Order Specific Question:   Diet:     Answer:   Cardiac [6]     Medical Decision Making, by Problem:  Active Hospital Problems    Diagnosis   • *Pancytopenia (CMS-[facility]) [D61.818]   • Thrombocytopenia (CMS-[facility]) [D69.6]   • Neutropenic fever (CMS-[facility]) [D70.9, R50.81]   • Maxillary sinusitis [J32.0]   • Acute renal failure (ARF) (CMS-[facility]) [N17.9]   • Hyponatremia [E87.1]   • Essential hypertension [I10]   • 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  T cell LGL/Leukemia-- presented with pancytopenia. Admitted to Hospital since March 4th. Admitted for neutropenic fever.  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   He seems to have an aggressive form of this disease. Campath started on 3/16/18, per Dr. Klein's discussion with [place].        Monitoring for Campath side effects. These can include autoimmune side effects such as pneumonitis,  Hypothyroidism, kidney disease, etc. sometimes can see in autoimmune ITP or hemolytic picture with this treatment.  Can certainly see bone marrow suppression (but in his case we must treat through the low blood counts).  Also will be at high risk for opportunistic infections.   represents HSV prophylaxis, and PCP prophylaxis.      Monitor during treatment:  CD4+ lymphocyte counts (after treatment until recovery); CMV antigen baseline neg (routinely during and for 2 months after treatment); consider TSH at baseline ( slight elevation TSH and free T3)  and then every 2 to 3 months  during alemtuzumab treatment    -- Day 10 of 10 on 3/25/18, day 12 --. 50% responded by 3 months. He may have prolonged cytopenias --on phylactic acyclovir  --Check CMV PCR and serologies at least every 2 weeks. Next check due on March 29  --Continue prophylaxis for PCP--ID stopped the Bactrim and changed him to atovaquone        2.  Anemia--  ROSE MARIE positive IGG, but normal Ret count, LDH, and hapto.  Bili stable 2.0 range. There may be a small component of hemolysis. Not Brisk. Patient put on 20 mg per day of prednisone on 3/11, then stopped on 3/21.  --Hemoglobin stable  -- monitor   --Transfuse if hemoglobin less than 7    3.  Thrombocytopenia--related to problem #1. No obvious signs of bleeding. We will continue to monitor.  --Transfuse if platelets less than 20 while febrile, or at any level if bleeding  --All blood products CMV negative and irradiated    4. Renal insufficiency--baseline creatinine at 1.2-1.5.  Uric acid level normal at 3.1. Creatinine got up to 2.02, nephro following   --Total body fluid up  --Nephrology following, and stopped IV fluids for now and given lasix      5. Neutropenic fever--  on cefepime, acyclovir, and fluconazole.  ID following patient. Fevers could be related to infection, infusion reaction, or tumor. Pancultured.   --Continue broad-spectrum antibiotics for prolonged cytopenias  --Continue to monitor.      6. 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  Given IVIG on 3/12/18     7. Elevated Baseline TSH-- high prior to starting Campath.  Monitor.  Likely due to stress.   --TSH has gone up a little bit. We will continue to monitor and consider replacement if it continues to go up.      High complexicity/Drug monitoring       Quality-Core Measures   Reviewed items::  Labs reviewed, Medications reviewed and Radiology images reviewed  Joshi catheter::  No Joshi  DVT prophylaxis pharmacological::  Contraindicated - High bleeding risk

## 2018-03-27 NOTE — PROGRESS NOTES
Rcvd call from lab.  WBC 0.1, Hgb 7.7, Plt 19 and Calcium 6.6.  Called on call hospitalist, no new orders.

## 2018-03-27 NOTE — PROGRESS NOTES
[facility] Hospitalist Progress Note    Date of Service: 3/26/2018    Chief Complaint  77 y.o. male admitted 3/4/2018 with severe pancytopenia and neutropenic fever.  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    Interval Problem Update  3/25 Patient with C diff over past week, diarrhea is improving per patient until he had a milkshake yesterday and then stool got more liquid again.  He feels better and is in good spirits.  He is looking forward to today being his last dose of chemotherapy.  3/26 Patient feeling okay other than increased cough, CT chest done to make sure no evidence infection or pneumonitis - showing pleural effusion bilaterally right>left.  Given his recent fluid resuscitation efforts with renal failure - this is likely side effect.  Nephrology started lasix today in effort to decrease edema.  Stools more solid again.    Consultants/Specialty  onc - Mychal FISHER - Guera  Neph - Najjar    Disposition  tbd        Review of Systems   Constitutional: Negative for chills and fever.   HENT: Negative for congestion.    Eyes: Negative for blurred vision and photophobia.   Respiratory: Positive for cough and sputum production. Negative for shortness of breath.    Cardiovascular: Positive for leg swelling. Negative for chest pain and claudication.   Gastrointestinal: Positive for diarrhea. Negative for abdominal pain, constipation, heartburn, nausea and vomiting.   Genitourinary: Negative for dysuria and hematuria.   Musculoskeletal: Negative for joint pain and myalgias.   Skin: Negative for itching and rash.   Neurological: Negative for dizziness, sensory change, speech change, weakness and headaches.   Psychiatric/Behavioral: Negative for depression. The patient is not nervous/anxious and does not have insomnia.       Physical Exam  Laboratory/Imaging   Hemodynamics  Temp (24hrs), Av.8 °C (98.2 °F), Min:36.4 °C (97.6 °F), Max:36.9 °C (98.5 °F)   Temperature: 36.4 °C (97.6 °F)  Pulse  Av.8  Min: 64   Max: 110    Blood Pressure : 138/88      Respiratory      Respiration: 18, Pulse Oximetry: 91 %, O2 Daily Delivery Respiratory : Room Air with O2 Available     Given By:: Mouthpiece  RUL Breath Sounds: Clear, RML Breath Sounds: Clear, RLL Breath Sounds: Diminished, GREGORIA Breath Sounds: Clear, LLL Breath Sounds: Diminished    Fluids    Intake/Output Summary (Last 24 hours) at 03/26/18 2016  Last data filed at 03/26/18 0600   Gross per 24 hour   Intake             1061 ml   Output                0 ml   Net             1061 ml       Nutrition  Orders Placed This Encounter   Procedures   • DIET ORDER     Standing Status:   Standing     Number of Occurrences:   1     Order Specific Question:   Diet:     Answer:   Cardiac [6]     Physical Exam   Constitutional: He is oriented to person, place, and time. He appears well-developed and well-nourished. No distress.   HENT:   Head: Normocephalic and atraumatic.   Eyes: Conjunctivae are normal. No scleral icterus.   Neck: Neck supple. No JVD present.   Cardiovascular: Normal rate, regular rhythm and normal heart sounds.  Exam reveals no gallop and no friction rub.    No murmur heard.  Pulmonary/Chest: Effort normal and breath sounds normal. No respiratory distress. He has no wheezes. He exhibits no tenderness.   Decreased at the bases, no crackles heard.     Abdominal: Soft. Bowel sounds are normal. He exhibits no distension and no mass. There is no tenderness.   Musculoskeletal: He exhibits edema (BLE). He exhibits no tenderness.   Neurological: He is alert and oriented to person, place, and time. No cranial nerve deficit.   Skin: Skin is warm and dry. He is not diaphoretic. No erythema. No pallor.   Psychiatric: He has a normal mood and affect. His behavior is normal.   Nursing note and vitals reviewed.      Recent Labs      03/24/18   0042  03/25/18   0311  03/26/18   0407   WBC  0.1*  0.1*  0.1*   RBC  2.67*  2.46*  2.62*   HEMOGLOBIN  7.7*  7.1*  7.3*   HEMATOCRIT  22.0*   20.5*  22.0*   MCV  82.4  83.3  84.0   MCH  28.8  28.9  27.9   MCHC  35.0  34.6  33.2*   RDW  48.9  48.7  49.5   PLATELETCT  23*  14*  10*   MPV   --   12.2   --      Recent Labs      03/24/18   0042  03/25/18   0311   SODIUM  137  134*   POTASSIUM  3.9  4.2   CHLORIDE  112  109   CO2  17*  17*   GLUCOSE  133*  115*   BUN  35*  33*   CREATININE  1.77*  1.68*   CALCIUM  6.5*  6.4*         Recent Labs      03/25/18   0311   BNPBTYPENAT  161*              Assessment/Plan     * Pancytopenia ([facility])   Assessment & Plan    Secondary to severe bone marrow infiltrate.   T cell LGL - leukemia  Campath start admin 3/16 discussed with Dr. Horta, patient will be here for the duration of campath administration.          Thrombocytopenia (CMS-HCC)- (present on admission)   Assessment & Plan    Transfuse prn if less than 10K. No bleeding.  Received 1 unit 3/26        Neutropenic fever (CMS-HCC)- (present on admission)   Assessment & Plan    ANC 0  On Cefepime and fluconazole.   Persistent neutropenia febrile, cough and diarrhea  Cdiff positive    blood culture, CXR, UA all okay continue to monitor  ID following        Maxillary sinusitis- (present on admission)   Assessment & Plan    Resolved          Acute renal failure (ARF) (CMS-HCC)- (present on admission)   Assessment & Plan    IV diuresis started per nephrology  Avoid nephrotoxins   Renally dose all medications         Hyponatremia- (present on admission)   Assessment & Plan    Mild, stable            Essential hypertension- (present on admission)   Assessment & Plan    Monitor blood pressure.          Pleural effusion   Assessment & Plan    Likely d/t fluid resuscitation efforts for renal dysfunction  Now on diuresis and should decrease in size          DNR (do not resuscitate)   Assessment & Plan    Patient request.  He would like to complete an advanced directive, recommend POLST as well.  Palliative care consult requested.        Clostridium difficile colitis    Assessment & Plan    Oral vancomycin added, special contact isolation.  ID following.  Stools starting to solidify          Anemia- (present on admission)   Assessment & Plan    Monitor H&H.  Transfuse if hemoglobin <7 g/dl.   When Transfused give PRBC irradiated and CMV free product.        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  Severe neutropenia            Quality-Core Measures   Reviewed items::  Labs reviewed, Medications reviewed and Radiology images reviewed  Joshi catheter::  No Joshi  DVT prophylaxis pharmacological::  Contraindicated - High bleeding risk  DVT prophylaxis - mechanical:  SCDs  Antibiotics:  Treating active infection/contamination beyond 24 hours perioperative coverage

## 2018-03-27 NOTE — PROGRESS NOTES
Nephrology Progress Note, Adult, Complex               Author: Fadi Najjar Date & Time created: 3/27/2018  11:45 AM     Interval History:  78 y/o male with T cell LGL/leukemia admitted with neutropenic fever/pancytopenia  CKD III, Non oliguric  SOB is better    Review of Systems:  Review of Systems   Constitutional: Positive for malaise/fatigue. Negative for chills and fever.   Respiratory: Positive for shortness of breath. Negative for cough, hemoptysis and wheezing.    Cardiovascular: Positive for leg swelling. Negative for chest pain, palpitations and orthopnea.   Gastrointestinal: Negative for abdominal pain, nausea and vomiting.   Genitourinary: Positive for frequency. Negative for dysuria and urgency.       Physical Exam:  Physical Exam   Constitutional: He appears well-developed and well-nourished.   HENT:   Head: Atraumatic.   Mouth/Throat: Oropharynx is clear and moist.   Eyes: Conjunctivae are normal.   Cardiovascular: Normal rate and regular rhythm.  Exam reveals no gallop and no friction rub.    Pulmonary/Chest: Effort normal and breath sounds normal. He has no wheezes.   Abdominal: Soft. Bowel sounds are normal. He exhibits no distension.   Musculoskeletal: He exhibits edema.   Neurological: He is alert.   Nursing note and vitals reviewed.      Labs:        Invalid input(s): [id number]  Recent Labs      03/25/18 0311   BNPBTYPENAT  161*     Recent Labs      03/25/18 0311  03/27/18   0046   SODIUM  134*  133*   POTASSIUM  4.2  4.5   CHLORIDE  109  105   CO2  17*  18*   BUN  33*  34*   CREATININE  1.68*  2.02*   CALCIUM  6.4*  6.6*     Recent Labs      03/25/18 0311  03/27/18   0046   GLUCOSE  115*  84     Recent Labs      03/25/18   0311  03/26/18   0407  03/27/18   0046   RBC  2.46*  2.62*  2.73*   HEMOGLOBIN  7.1*  7.3*  7.7*   HEMATOCRIT  20.5*  22.0*  22.5*   PLATELETCT  14*  10*  19*     Recent Labs      03/25/18 0311 03/26/18   0407  03/27/18   0046   WBC  0.1*  0.1*  0.1*   NEUTSPOLYS    --   cancel  CANCEL   LYMPHOCYTES   --   cancel  CANCEL   MONOCYTES   --   cancel  CANCEL   EOSINOPHILS   --   cancel  CANCEL   BASOPHILS   --   cancel  CANCEL           Hemodynamics:  Temp (24hrs), Av.1 °C (98.7 °F), Min:36.4 °C (97.6 °F), Max:37.7 °C (99.8 °F)  Temperature: 36.9 °C (98.4 °F)  Pulse  Av  Min: 64  Max: 110   Blood Pressure : 131/87     Respiratory:    Respiration: 19, Pulse Oximetry: 97 %        RUL Breath Sounds: Clear;Diminished, RML Breath Sounds: Clear;Diminished, RLL Breath Sounds: Diminished, GREGORIA Breath Sounds: Diminished, LLL Breath Sounds: Diminished  Fluids:    Intake/Output Summary (Last 24 hours) at 18 1145  Last data filed at 18 1000   Gross per 24 hour   Intake              400 ml   Output                0 ml   Net              400 ml        GI/Nutrition:  Orders Placed This Encounter   Procedures   • DIET ORDER     Standing Status:   Standing     Number of Occurrences:   1     Order Specific Question:   Diet:     Answer:   Cardiac [6]     Medical Decision Making, by Problem:  Active Hospital Problems    Diagnosis   • *Pancytopenia (CMS-[facility]) [D61.818]   • Thrombocytopenia (CMS-[facility]) [D69.6]   • Neutropenic fever (CMS-[facility]) [D70.9, R50.81]   • Maxillary sinusitis [J32.0]   • Acute renal failure (ARF) (CMS-[facility]) [N17.9]   • Hyponatremia [E87.1]   • Essential hypertension [I10]   • 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  4.Anemia: Hb level stable  5.Metabolic acidosis:added Na HCO3  6.Volume:overloaded:better  no need for HD  Continue  lasix  Daily labs  Renal dose all meds  Avoid nephrotoxins  Prognosis guarded  D/W Dr Guzman

## 2018-03-27 NOTE — CARE PLAN
Problem: Bowel/Gastric:  Goal: Normal bowel function is maintained or improved  Outcome: PROGRESSING AS EXPECTED  Cdiff positive. Stool frequency decreasing - states 3 Bms yesterday and one today.     Problem: Respiratory:  Goal: Respiratory status will improve  Outcome: PROGRESSING AS EXPECTED  States cough improving. Not coughing much this Am, denies need for PRN interventions.

## 2018-03-28 NOTE — PROGRESS NOTES
Assumed care of patient @ 0800. Bedside report received. Patient AO x4. VSS,  Pain 0/10. Fall precautions in place, PIV infusing. POC discussed with patient, all questions answered, call light within reach, hourly rounding in place.

## 2018-03-28 NOTE — PROGRESS NOTES
Pt noted to have slow answers to questions and LOC down from 4 to 2. Rapid response called pt sent down to CT

## 2018-03-28 NOTE — PROGRESS NOTES
"AAOx4, up self to BS, 1assist with walker for any further. Calling appropriately. VSS /83   Pulse (!) 105   Temp 36.7 °C (98.1 °F)   Resp 18   Ht 1.778 m (5' 10\")   Wt 92.5 kg (203 lb 14.8 oz)   SpO2 95%   BMI 29.26 kg/m²   On Room air. Persistent productive cough. Refused any prns for cough this am. Tessalon pearls and cough syrup given x1 each throughout afternoon. Patient reports 3 bowel movements thus far this shift. Denies any pain/nausea. Offered to walk patient in halls, pt refused today. Hourly rounding and falls precautions in place .  "

## 2018-03-28 NOTE — PROGRESS NOTES
[facility] Hospitalist Progress Note    Date of Service: 3/28/2018    Chief Complaint  77 y.o. male admitted 3/4/2018 with severe pancytopenia and neutropenic fever.  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    Interval Problem Update  3/25 Patient with C diff over past week, diarrhea is improving per patient until he had a milkshake yesterday and then stool got more liquid again.  He feels better and is in good spirits.  He is looking forward to today being his last dose of chemotherapy.  3/26 Patient feeling okay other than increased cough, CT chest done to make sure no evidence infection or pneumonitis - showing pleural effusion bilaterally right>left.  Given his recent fluid resuscitation efforts with renal failure - this is likely side effect.  Nephrology started lasix today in effort to decrease edema.  Stools more solid again.  3/27 Patient states he is feeling better, explained the fluid accumulation in his chest should resolve with diuresis only and hopefully will not need another thoracentesis.  Diarrhea is improving.  ANC 0.  3/28 Patient was doing well this am when examined, had a cough and had refused to take the tessalon in the am, he was in his normal state of health.  20 minutes after examination RN came to me and said he was confused and nose bleeding while sitting on bedside commode.  Patient was significantly confused, had received benadryl prior to receiving platelets but had received this multiple times prior without issue.  Platelets started and patient down to CT head to r/o bleed - no bleed seen.  Patient had slight drop in bp but improved while platelets running.  He is significantly volume overload and is in the process of diuresis guided by nephrology so fluid bolus not given at that time, mentation greatly improved to baseline for about 3 hours and he again had change in mental status.  Repeat infection suspected with his change in status and blood cultures and UA and CXR ordered.  He  is afebrile, lactic acid is elevated significantly at 6.5.  Sepsis protocol initiated, nephrology and oncology notified.  32 minutes of critical care spent with patient not including procedures.    Consultants/Specialty  onc - Mychal/Pasha FISHER - Guera  Neph - Najjar    Disposition  tbd        Review of Systems   Constitutional: Negative for chills and fever.   HENT: Negative for congestion.    Eyes: Negative for blurred vision and photophobia.   Respiratory: Positive for cough and sputum production. Negative for shortness of breath.    Cardiovascular: Positive for leg swelling (better). Negative for chest pain and claudication.   Gastrointestinal: Positive for diarrhea. Negative for abdominal pain, constipation, heartburn, nausea and vomiting.   Genitourinary: Negative for dysuria and hematuria.   Musculoskeletal: Negative for joint pain and myalgias.   Skin: Negative for itching and rash.   Neurological: Negative for dizziness, sensory change, speech change, weakness and headaches.   Psychiatric/Behavioral: Positive for memory loss. Negative for depression. The patient is nervous/anxious. The patient does not have insomnia.       Physical Exam  Laboratory/Imaging   Hemodynamics  Temp (24hrs), Av.6 °C (97.9 °F), Min:36.3 °C (97.4 °F), Max:36.9 °C (98.4 °F)   Temperature: 36.7 °C (98.1 °F)  Pulse  Av  Min: 64  Max: 119    Blood Pressure : 116/74      Respiratory      Respiration: 18, Pulse Oximetry: 98 %        RUL Breath Sounds: Diminished, RML Breath Sounds: Diminished, RLL Breath Sounds: Diminished, GREGORIA Breath Sounds: Diminished, LLL Breath Sounds: Diminished    Fluids    Intake/Output Summary (Last 24 hours) at 18 1628  Last data filed at 18 1500   Gross per 24 hour   Intake           352.67 ml   Output             1800 ml   Net         -1447.33 ml       Nutrition  Orders Placed This Encounter   Procedures   • DIET ORDER     Standing Status:   Standing     Number of Occurrences:   1      Order Specific Question:   Diet:     Answer:   Cardiac [6]     Physical Exam   Constitutional: He appears well-developed and well-nourished. No distress.   HENT:   Head: Normocephalic and atraumatic.   Eyes: Conjunctivae are normal. No scleral icterus.   Neck: Neck supple. No JVD present.   Cardiovascular: Normal rate, regular rhythm and normal heart sounds.  Exam reveals no gallop and no friction rub.    No murmur heard.  Pulmonary/Chest: Effort normal and breath sounds normal. No respiratory distress. He has no wheezes. He exhibits no tenderness.   Decreased at the bases, no crackles heard.     Abdominal: Soft. Bowel sounds are normal. He exhibits no distension and no mass. There is no tenderness.   Musculoskeletal: He exhibits edema (BLE). He exhibits no tenderness.   Neurological: He is alert. No cranial nerve deficit.   Oriented early in day, severe decrease in mentation to a&0 x 1      Skin: Skin is warm and dry. He is not diaphoretic. No erythema. No pallor.   Psychiatric: He has a normal mood and affect. His behavior is normal.   Nursing note and vitals reviewed.      Recent Labs      03/26/18   0407  03/27/18   0046  03/28/18   0105   WBC  0.1*  0.1*  0.2*   RBC  2.62*  2.73*  2.87*   HEMOGLOBIN  7.3*  7.7*  8.0*   HEMATOCRIT  22.0*  22.5*  23.4*   MCV  84.0  82.4  81.5   MCH  27.9  28.2  27.9   MCHC  33.2*  34.2  34.2   RDW  49.5  48.7  46.3   PLATELETCT  10*  19*  9*   MPV   --   10.7  11.4     Recent Labs      03/27/18   0046  03/28/18   1011   SODIUM  133*  133*   POTASSIUM  4.5  4.1   CHLORIDE  105  99   CO2  18*  21   GLUCOSE  84  99   BUN  34*  40*   CREATININE  2.02*  2.28*   CALCIUM  6.6*  6.9*                      Assessment/Plan     * Pancytopenia (CMS-HCC)   Assessment & Plan    Secondary to severe bone marrow infiltrate.   T cell LGL - leukemia  Campath start admin 3/16 discussed with Dr. Horta, patient will be here for the duration of campath administration.          Thrombocytopenia  (CMS-HCC)- (present on admission)   Assessment & Plan    Transfuse prn if less than 10K. No bleeding.  Received 1 unit 3/26        Neutropenic fever (CMS-HCC)- (present on admission)   Assessment & Plan    ANC 0  On Cefepime and fluconazole.   Persistent neutropenia febrile, cough and diarrhea  Cdiff positive    blood culture, CXR, UA all okay continue to monitor  ID following        Maxillary sinusitis- (present on admission)   Assessment & Plan    Resolved          Acute renal failure (ARF) (CMS-HCC)- (present on admission)   Assessment & Plan    IV diuresis started 3/26  Cr increased but good urinary output  Avoid nephrotoxins   Renally dose all medications         Hyponatremia- (present on admission)   Assessment & Plan    Mild, stable            Essential hypertension- (present on admission)   Assessment & Plan    Monitor blood pressure.          Pleural effusion   Assessment & Plan    Likely d/t fluid resuscitation efforts for renal dysfunction  Now on diuresis and should decrease in size          DNR (do not resuscitate)   Assessment & Plan    Patient request.  He would like to complete an advanced directive, recommend POLST as well.  Palliative care consult requested.        Clostridium difficile colitis   Assessment & Plan    Oral vancomycin added, special contact isolation.  ID following.  Stools thickening.          Anemia- (present on admission)   Assessment & Plan    Monitor H&H.  Transfuse if hemoglobin <7 g/dl.   When Transfused give PRBC irradiated and CMV free product.        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  Severe  neutropenia            Quality-Core Measures   Reviewed items::  Labs reviewed, Medications reviewed and Radiology images reviewed  Joshi catheter::  No Joshi  DVT prophylaxis pharmacological::  Contraindicated - High bleeding risk  DVT prophylaxis - mechanical:  SCDs  Antibiotics:  Treating active infection/contamination beyond 24 hours perioperative coverage

## 2018-03-28 NOTE — PROGRESS NOTES
[facility] Hospitalist Progress Note    Date of Service: 3/27/2018    Chief Complaint  77 y.o. male admitted 3/4/2018 with severe pancytopenia and neutropenic fever.  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    Interval Problem Update  3/25 Patient with C diff over past week, diarrhea is improving per patient until he had a milkshake yesterday and then stool got more liquid again.  He feels better and is in good spirits.  He is looking forward to today being his last dose of chemotherapy.  3/26 Patient feeling okay other than increased cough, CT chest done to make sure no evidence infection or pneumonitis - showing pleural effusion bilaterally right>left.  Given his recent fluid resuscitation efforts with renal failure - this is likely side effect.  Nephrology started lasix today in effort to decrease edema.  Stools more solid again.  3/27 Patient states he is feeling better, explained the fluid accumulation in his chest should resolve with diuresis only and hopefully will not need another thoracentesis.  Diarrhea is improving.  ANC 0.    Consultants/Specialty  onc - Mychal/Pasha    ID - Guera  Neph - Najjar    Disposition  tbd        Review of Systems   Constitutional: Negative for chills and fever.   HENT: Negative for congestion.    Eyes: Negative for blurred vision and photophobia.   Respiratory: Positive for cough and sputum production. Negative for shortness of breath.    Cardiovascular: Positive for leg swelling (better). Negative for chest pain and claudication.   Gastrointestinal: Positive for diarrhea. Negative for abdominal pain, constipation, heartburn, nausea and vomiting.   Genitourinary: Negative for dysuria and hematuria.   Musculoskeletal: Negative for joint pain and myalgias.   Skin: Negative for itching and rash.   Neurological: Negative for dizziness, sensory change, speech change, weakness and headaches.   Psychiatric/Behavioral: Negative for depression. The patient is not nervous/anxious  and does not have insomnia.       Physical Exam  Laboratory/Imaging   Hemodynamics  Temp (24hrs), Av.1 °C (98.8 °F), Min:36.7 °C (98.1 °F), Max:37.7 °C (99.8 °F)   Temperature: 36.7 °C (98.1 °F)  Pulse  Av.2  Min: 64  Max: 110    Blood Pressure : 137/83      Respiratory      Respiration: 18, Pulse Oximetry: 95 %        RUL Breath Sounds: Clear;Diminished, RML Breath Sounds: Clear;Diminished, RLL Breath Sounds: Diminished, GREGORIA Breath Sounds: Diminished, LLL Breath Sounds: Diminished    Fluids    Intake/Output Summary (Last 24 hours) at 18 1808  Last data filed at 18 1600   Gross per 24 hour   Intake              800 ml   Output                0 ml   Net              800 ml       Nutrition  Orders Placed This Encounter   Procedures   • DIET ORDER     Standing Status:   Standing     Number of Occurrences:   1     Order Specific Question:   Diet:     Answer:   Cardiac [6]     Physical Exam   Constitutional: He is oriented to person, place, and time. He appears well-developed and well-nourished. No distress.   HENT:   Head: Normocephalic and atraumatic.   Eyes: Conjunctivae are normal. No scleral icterus.   Neck: Neck supple. No JVD present.   Cardiovascular: Normal rate, regular rhythm and normal heart sounds.  Exam reveals no gallop and no friction rub.    No murmur heard.  Pulmonary/Chest: Effort normal and breath sounds normal. No respiratory distress. He has no wheezes. He exhibits no tenderness.   Decreased at the bases, no crackles heard.     Abdominal: Soft. Bowel sounds are normal. He exhibits no distension and no mass. There is no tenderness.   Musculoskeletal: He exhibits edema (BLE). He exhibits no tenderness.   Neurological: He is alert and oriented to person, place, and time. No cranial nerve deficit.   Skin: Skin is warm and dry. He is not diaphoretic. No erythema. No pallor.   Psychiatric: He has a normal mood and affect. His behavior is normal.   Nursing note and vitals reviewed.       Recent Labs      03/25/18   0311  03/26/18   0407  03/27/18   0046   WBC  0.1*  0.1*  0.1*   RBC  2.46*  2.62*  2.73*   HEMOGLOBIN  7.1*  7.3*  7.7*   HEMATOCRIT  20.5*  22.0*  22.5*   MCV  83.3  84.0  82.4   MCH  28.9  27.9  28.2   MCHC  34.6  33.2*  34.2   RDW  48.7  49.5  48.7   PLATELETCT  14*  10*  19*   MPV  12.2   --   10.7     Recent Labs      03/25/18   0311  03/27/18   0046   SODIUM  134*  133*   POTASSIUM  4.2  4.5   CHLORIDE  109  105   CO2  17*  18*   GLUCOSE  115*  84   BUN  33*  34*   CREATININE  1.68*  2.02*   CALCIUM  6.4*  6.6*         Recent Labs      03/25/18   0311   BNPBTYPENAT  161*              Assessment/Plan     * Pancytopenia ([facility])   Assessment & Plan    Secondary to severe bone marrow infiltrate.   T cell LGL - leukemia  Campath start admin 3/16 discussed with Dr. Horta, patient will be here for the duration of campath administration.          Thrombocytopenia (CMS-HCC)- (present on admission)   Assessment & Plan    Transfuse prn if less than 10K. No bleeding.  Received 1 unit 3/26        Neutropenic fever (CMS-HCC)- (present on admission)   Assessment & Plan    ANC 0  On Cefepime and fluconazole.   Persistent neutropenia febrile, cough and diarrhea  Cdiff positive    blood culture, CXR, UA all okay continue to monitor  ID following        Maxillary sinusitis- (present on admission)   Assessment & Plan    Resolved          Acute renal failure (ARF) (CMS-HCC)- (present on admission)   Assessment & Plan    IV diuresis started 3/26  Cr increased but good urinary output  Avoid nephrotoxins   Renally dose all medications         Hyponatremia- (present on admission)   Assessment & Plan    Mild, stable            Essential hypertension- (present on admission)   Assessment & Plan    Monitor blood pressure.          Pleural effusion   Assessment & Plan    Likely d/t fluid resuscitation efforts for renal dysfunction  Now on diuresis and should decrease in size          DNR (do not  resuscitate)   Assessment & Plan    Patient request.  He would like to complete an advanced directive, recommend POLST as well.  Palliative care consult requested.        Clostridium difficile colitis   Assessment & Plan    Oral vancomycin added, special contact isolation.  ID following.  Stools thickening.          Anemia- (present on admission)   Assessment & Plan    Monitor H&H.  Transfuse if hemoglobin <7 g/dl.   When Transfused give PRBC irradiated and CMV free product.        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  Severe neutropenia            Quality-Core Measures   Reviewed items::  Labs reviewed, Medications reviewed and Radiology images reviewed  Joshi catheter::  No Joshi  DVT prophylaxis pharmacological::  Contraindicated - High bleeding risk  DVT prophylaxis - mechanical:  SCDs  Antibiotics:  Treating active infection/contamination beyond 24 hours perioperative coverage

## 2018-03-28 NOTE — PROGRESS NOTES
"I examined the patient 3/28/2018 4:40 PM  Vital Signs:/74   Pulse 85   Temp 36.7 °C (98.1 °F)   Resp 18   Ht 1.778 m (5' 10\")   Wt 92.5 kg (203 lb 14.8 oz)   SpO2 98%   BMI 29.26 kg/m²   Cardiac examination significant for Tachycardia  Pulmonary examination significant for Crackles  Capillary refill is brisk  Peripheral Pulse is 2+   Skin is pale    "

## 2018-03-28 NOTE — PROGRESS NOTES
Nephrology Progress Note, Adult, Complex               Author: Fadi Najjar Date & Time created: 3/28/2018  1:32 PM     Interval History:  76 y/o male with T cell LGL/leukemia admitted with neutropenic fever/pancytopenia  CKD III, Non oliguric  pt became disoriented today    Review of Systems:  Review of Systems   Unable to perform ROS: Mental acuity       Physical Exam:  Physical Exam   Constitutional: He appears well-developed and well-nourished.   HENT:   Head: Atraumatic.   Nose: Nose normal.   Eyes: Conjunctivae are normal.   Cardiovascular: Normal rate and regular rhythm.  Exam reveals no gallop and no friction rub.    Pulmonary/Chest: Effort normal and breath sounds normal. He has no wheezes.   Musculoskeletal: He exhibits edema.   Neurological: He is disoriented.   Nursing note and vitals reviewed.      Labs:        Invalid input(s): [id number]      Recent Labs      18   0046  18   1011   SODIUM  133*  133*   POTASSIUM  4.5  4.1   CHLORIDE  105  99   CO2  18*  21   BUN  34*  40*   CREATININE  2.02*  2.28*   CALCIUM  6.6*  6.9*     Recent Labs      18   0046  18   1011   GLUCOSE  84  99     Recent Labs      18   0407  18   0046  18   0105   RBC  2.62*  2.73*  2.87*   HEMOGLOBIN  7.3*  7.7*  8.0*   HEMATOCRIT  22.0*  22.5*  23.4*   PLATELETCT  10*  19*  9*     Recent Labs      18   0407  18   0046  18   0105   WBC  0.1*  0.1*  0.2*   NEUTSPOLYS  cancel  CANCEL  CANCEL   LYMPHOCYTES  cancel  CANCEL  CANCEL   MONOCYTES  cancel  CANCEL  CANCEL   EOSINOPHILS  cancel  CANCEL  CANCEL   BASOPHILS  cancel  CANCEL  CANCEL           Hemodynamics:  Temp (24hrs), Av.6 °C (97.9 °F), Min:36.3 °C (97.4 °F), Max:36.9 °C (98.4 °F)  Temperature: 36.8 °C (98.2 °F)  Pulse  Av.1  Min: 64  Max: 119   Blood Pressure : 108/62     Respiratory:    Respiration: 18, Pulse Oximetry: 95 %        RUL Breath Sounds: Diminished, RML Breath Sounds: Diminished, RLL Breath  Sounds: Diminished, GREGORIA Breath Sounds: Diminished, LLL Breath Sounds: Diminished  Fluids:    Intake/Output Summary (Last 24 hours) at 03/28/18 1332  Last data filed at 03/28/18 1300   Gross per 24 hour   Intake           752.67 ml   Output             1000 ml   Net          -247.33 ml        GI/Nutrition:  Orders Placed This Encounter   Procedures   • DIET ORDER     Standing Status:   Standing     Number of Occurrences:   1     Order Specific Question:   Diet:     Answer:   Cardiac [6]     Medical Decision Making, by Problem:  Active Hospital Problems    Diagnosis   • *Pancytopenia (CMS-[facility]) [D61.818]   • Thrombocytopenia (CMS-[facility]) [D69.6]   • Neutropenic fever (CMS-[facility]) [D70.9, R50.81]   • Maxillary sinusitis [J32.0]   • Acute renal failure (ARF) (CMS-HCC) [N17.9]   • Hyponatremia [E87.1]   • Essential hypertension [I10]   • 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  4.Anemia: Hb level stable  5.decreas LOC : plan for head CT  6.Volume:overloaded:better  no need for HD  Continue lasix  Daily labs  Renal dose all meds  Avoid nephrotoxins  Prognosis guarded  D/W Dr Guzman

## 2018-03-28 NOTE — PROGRESS NOTES
Labs resulted.  Patient plt 9.  Called on call hospitalist for orders.  Transfuse 1 unit platelets ordered.

## 2018-03-28 NOTE — PROGRESS NOTES
Oncology/Hematology Progress Note               Author: Hung Ushavish    Cc:  T-cell LGL Date & Time created: 3/28/2018  2:18 PM     Interval History:  Resting comfortable in bedte right pleural effusion on Ct scan, no sings of pneumonitis  Modera  Remains pancytopenic, needs platelet today    Addendum he got a short episode of confusion this morning, CT head with no sings of bleeding. Back to baseline. Getting platelet      PMHx, PSurgHx, SocHx, FAMHx:  All reviewed and no changes    Review of Systems:  Review of Systems   Constitutional: Positive for malaise/fatigue. Negative for chills, fever and weight loss.   HENT: Negative for ear pain, nosebleeds and sore throat.    Eyes: Negative for blurred vision, double vision and photophobia.   Respiratory: Positive for cough and sputum production. Negative for hemoptysis and shortness of breath.    Cardiovascular: Positive for leg swelling. Negative for chest pain, palpitations and orthopnea.   Gastrointestinal: Negative for abdominal pain, constipation, diarrhea, heartburn, nausea and vomiting.   Genitourinary: Negative for dysuria, frequency, hematuria and urgency.   Skin: Negative for itching and rash.   Neurological: Positive for weakness.       Physical Exam:  Physical Exam   Constitutional: He is oriented to person, place, and time. He appears well-developed and well-nourished. No distress.   ECOG=2   HENT:   Head: Normocephalic and atraumatic.   Mouth/Throat: Oropharynx is clear and moist. No oropharyngeal exudate.   Eyes: Conjunctivae and EOM are normal. Right eye exhibits no discharge. Left eye exhibits no discharge. No scleral icterus.   Neck: Normal range of motion. Neck supple. No tracheal deviation present. No thyromegaly present.   Cardiovascular: Normal rate, regular rhythm and normal heart sounds.  Exam reveals no gallop and no friction rub.    No murmur heard.  Pulmonary/Chest: Effort normal and breath sounds normal. No respiratory distress. He has no  wheezes. He has no rales.   Abdominal: Soft. Bowel sounds are normal. He exhibits no distension. There is no tenderness. There is no rebound and no guarding.   Musculoskeletal: Normal range of motion. He exhibits edema.   3+ bilateral lower extremity edema, left>right   Lymphadenopathy:     He has no cervical adenopathy.   Neurological: He is alert and oriented to person, place, and time.   Skin: Skin is warm. No rash noted. He is not diaphoretic. No erythema.   Psychiatric: He has a normal mood and affect. His behavior is normal. Thought content normal.       Labs:        Invalid input(s): [id number]      Recent Labs      18   0046  18   1011   SODIUM  133*  133*   POTASSIUM  4.5  4.1   CHLORIDE  105  99   CO2  18*  21   BUN  34*  40*   CREATININE  2.02*  2.28*   CALCIUM  6.6*  6.9*     Recent Labs      18   0046  18   1011   GLUCOSE  84  99     Recent Labs      18   0407  18   0046  18   0105   RBC  2.62*  2.73*  2.87*   HEMOGLOBIN  7.3*  7.7*  8.0*   HEMATOCRIT  22.0*  22.5*  23.4*   PLATELETCT  10*  19*  9*     Recent Labs      18   0407  18   0046  18   0105   WBC  0.1*  0.1*  0.2*   NEUTSPOLYS  cancel  CANCEL  CANCEL   LYMPHOCYTES  cancel  CANCEL  CANCEL   MONOCYTES  cancel  CANCEL  CANCEL   EOSINOPHILS  cancel  CANCEL  CANCEL   BASOPHILS  cancel  CANCEL  CANCEL     Recent Labs      18   0046  18   1011   SODIUM  133*  133*   POTASSIUM  4.5  4.1   CHLORIDE  105  99   CO2  18*  21   GLUCOSE  84  99   BUN  34*  40*   CREATININE  2.02*  2.28*   CALCIUM  6.6*  6.9*     Hemodynamics:  Temp (24hrs), Av.6 °C (97.9 °F), Min:36.3 °C (97.4 °F), Max:36.9 °C (98.4 °F)  Temperature: 36.8 °C (98.2 °F)  Pulse  Av.1  Min: 64  Max: 119   Blood Pressure : 108/62     Respiratory:    Respiration: 18, Pulse Oximetry: 95 %        RUL Breath Sounds: Diminished, RML Breath Sounds: Diminished, RLL Breath Sounds: Diminished, GREGORIA Breath Sounds:  Diminished, LLL Breath Sounds: Diminished  Fluids:    Intake/Output Summary (Last 24 hours) at 03/19/18 1148  Last data filed at 03/19/18 0600   Gross per 24 hour   Intake                0 ml   Output              925 ml   Net             -925 ml        GI/Nutrition:  Orders Placed This Encounter   Procedures   • DIET ORDER     Standing Status:   Standing     Number of Occurrences:   1     Order Specific Question:   Diet:     Answer:   Cardiac [6]     Medical Decision Making, by Problem:  Active Hospital Problems    Diagnosis   • *Pancytopenia (CMS-[facility]) [D61.818]   • Thrombocytopenia (CMS-[facility]) [D69.6]   • Neutropenic fever (CMS-[facility]) [D70.9, R50.81]   • Maxillary sinusitis [J32.0]   • Acute renal failure (ARF) (CMS-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) [D80.1]       Plan:  1.  T cell LGL/Leukemia-- presented with pancytopenia. Admitted to Hospital since March 4th. Admitted for neutropenic fever.  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   He seems to have an aggressive form of this disease. Campath started on 3/16/18, per Dr. Klein's discussion with [facility].        Monitoring for Campath side effects. These can include autoimmune side effects such as pneumonitis,  Hypothyroidism, kidney disease, etc. sometimes can see in autoimmune ITP or hemolytic picture with this treatment.  Can certainly see bone marrow suppression (but in his case we must treat through the low blood counts).  Also will be at high risk for opportunistic infections.   represents HSV prophylaxis, and PCP prophylaxis.      Monitor during treatment:  CD4+ lymphocyte counts (after treatment until recovery); CMV antigen baseline neg (routinely during and for 2 months after  treatment); consider TSH at baseline ( slight elevation TSH and free T3)  and then every 2 to 3 months during alemtuzumab treatment    -- Day 10 of 10 on 3/25/18, day 13 --. 50% responded by 3 months. He may have prolonged cytopenias --on phylactic acyclovir  --Check CMV PCR and serologies at least every 2 weeks. Next check due on March 29  --Continue prophylaxis for PCP--ID stopped the Bactrim and changed him to atovaquone        2.  Anemia--  ROSE MARIE positive IGG, but normal Ret count, LDH, and hapto.  Bili stable 2.0 range. There may be a small component of hemolysis. Not Brisk. Patient put on 20 mg per day of prednisone on 3/11, then stopped on 3/21.  --Hemoglobin stable  -- monitor   --Transfuse if hemoglobin less than 7 g/dl    3.  Thrombocytopenia--related to problem #1. No obvious signs of bleeding. We will continue to monitor.  --Transfuse if platelets less than 20 while febrile, or at any level if bleeding  --All blood products CMV negative and irradiated  --getting platelet transfusion today.    4. Renal insufficiency--baseline creatinine at 1.2-1.5.  Uric acid level normal at 3.1. Creatinine got up to 2.02, nephro following   --Total body fluid up  --Nephrology following, and stopped IV fluids for now and given lasix      5. Neutropenic fever--  on cefepime, acyclovir, and fluconazole.  ID following patient. Fevers could be related to infection, infusion reaction, or tumor. Pancultured.   --Continue broad-spectrum antibiotics for prolonged cytopenias  --Continue to monitor.      6. 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  Given IVIG on 3/12/18     7. Elevated Baseline TSH-- high prior to starting Campath.  Monitor.  Likely due to stress.   --TSH has gone up a little bit. We will continue to monitor and consider replacement if it continues to go up.      High complexicity/Drug monitoring       Quality-Core Measures   Reviewed items::  Labs reviewed, Medications reviewed and Radiology images  reviewed  Joshi catheter::  No Joshi  DVT prophylaxis pharmacological::  Contraindicated - High bleeding risk

## 2018-03-28 NOTE — PROGRESS NOTES
AAOx4. Up self to [facility], 1 assist with walker for ambulation.  Continues on contact precautions. Persistent cough.  Refused PRN's. Denies pain or nausea. PICC flushes with + blood return.  No further needs at this time. Will continue to monitor.

## 2018-03-29 NOTE — PROGRESS NOTES
[facility] Hospitalist Progress Note    Date of Service: 3/29/2018    Chief Complaint  77 y.o. male admitted 3/4/2018 with pancytopenia and neutropenic fever in part related to pts chemo regimen of Campath .  PMHx of Tcell LGL leukemia, Stg III CKD, HTN, Hypogammaglobulinemia.  Dx of CDiff and treated x 2 weeks.  ID has been following.  Cultures neg.  Now on empiric therapy.      Interval Problem Update  ROS limited by mentation    Consultants/Specialty  Heme/Onc  ID  Pulmonology    Disposition  Critically ill in ICU  Prognosis is very guarded        Review of Systems   Unable to perform ROS: Mental status change      Physical Exam  Laboratory/Imaging   Hemodynamics  Temp (24hrs), Av.6 °C (97.9 °F), Min:36.3 °C (97.4 °F), Max:36.8 °C (98.3 °F)   Temperature: 36.8 °C (98.3 °F), Monitored Temp: 37.9 °C (100.2 °F)  Pulse  Av.3  Min: 64  Max: 119 Heart Rate (Monitored): 89  Blood Pressure : 116/74, NIBP: 126/72      Respiratory      Respiration: 19, Pulse Oximetry: 99 %     Work Of Breathing / Effort: Accessory Muscle Use;Moderate  RUL Breath Sounds: Clear;Diminished;Expiratory Wheezes, RML Breath Sounds: Diminished, RLL Breath Sounds: Diminished, GREGORIA Breath Sounds: Clear;Diminished;Expiratory Wheezes, LLL Breath Sounds: Diminished    Fluids    Intake/Output Summary (Last 24 hours) at 18 0605  Last data filed at 18 0200   Gross per 24 hour   Intake          3847.67 ml   Output             2675 ml   Net          1172.67 ml       Nutrition  Orders Placed This Encounter   Procedures   • DIET NPO     Standing Status:   Standing     Number of Occurrences:   1     Order Specific Question:   Restrict to:     Answer:   Sips with Medications [3]     Physical Exam   Constitutional: He appears well-developed and well-nourished. No distress.   HENT:   Head: Normocephalic and atraumatic.   Some blood from gums   Eyes: Conjunctivae are normal.   Neck: No JVD present.   Cardiovascular: Normal rate.  Exam reveals no  gallop.    No murmur heard.  Pulmonary/Chest: No stridor. No respiratory distress. He has no wheezes. He has rales.   tachypnic   Abdominal: Soft. There is no tenderness. There is no rebound and no guarding.   Musculoskeletal: He exhibits no edema.   Neurological: He is alert.   Oriented x 1   Skin: Skin is warm and dry. No rash noted. He is not diaphoretic.   Nursing note and vitals reviewed.      Recent Labs      03/27/18   0046  03/28/18   0105  03/29/18   0300   WBC  0.1*  0.2*  0.1*   RBC  2.73*  2.87*  2.03*   HEMOGLOBIN  7.7*  8.0*  5.9*   HEMATOCRIT  22.5*  23.4*  16.8*   MCV  82.4  81.5  82.8   MCH  28.2  27.9  29.1   MCHC  34.2  34.2  35.1   RDW  48.7  46.3  47.3   PLATELETCT  19*  9*  10*   MPV  10.7  11.4  11.2     Recent Labs      03/28/18   1011  03/28/18   1551  03/29/18   0300   SODIUM  133*  134*  136   POTASSIUM  4.1  4.2  4.0   CHLORIDE  99  100  103   CO2  21  19*  16*   GLUCOSE  99  88  66   BUN  40*  46*  45*   CREATININE  2.28*  2.36*  2.49*   CALCIUM  6.9*  7.3*  6.5*     Recent Labs      03/28/18   1651   APTT  50.2*   INR  1.54*                  Assessment/Plan     * Pancytopenia (CMS-[facility])   Assessment & Plan    Secondary to severe bone marrow infiltrate.   T cell LGL - leukemia  Campath start admin 3/16 discussed with Dr. Horta, patient will be here for the duration of campath administration.          Thrombocytopenia (CMS-[facility])- (present on admission)   Assessment & Plan    Critical   Transfusing empirically for count < 10  Oozing from mucous membranes        Neutropenic fever (CMS-[facility])- (present on admission)   Assessment & Plan    ANC 0  On Cefepime and fluconazole.   Persistent neutropenia febrile, cough and diarrhea  Cdiff positive: completed po vanc x 14days now on prophylaxis  Cultures neg  ID following  Presently on: Atovaqone, acyclovir, po vanc, cefepime, diflucan        Maxillary sinusitis- (present on admission)   Assessment & Plan    Resolved          Acute renal failure (ARF)  (CMS-[facility])- (present on admission)   Assessment & Plan    IV diuresis started 3/26  Cr increased but good urinary output  Avoid nephrotoxins   Renally dose all medications         Hyponatremia- (present on admission)   Assessment & Plan    Mild, stable            Essential hypertension- (present on admission)   Assessment & Plan    Monitor blood pressure.          Pleural effusion   Assessment & Plan    Likely d/t fluid resuscitation efforts for renal dysfunction  Now on diuresis and should decrease in size          DNR (do not resuscitate)   Assessment & Plan    Patient request.  He would like to complete an advanced directive, recommend POLST as well.  Palliative care consult requested.        Clostridium difficile colitis   Assessment & Plan    Completed 2 weeks po vanc now on prophylactic therapy while on Abxs          Anemia- (present on admission)   Assessment & Plan    Monitor H&H.  Transfuse if hemoglobin <7 g/dl.   When Transfused give PRBC irradiated and CMV free product.        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  Severe neutropenia            Quality-Core Measures   Reviewed items::  Labs reviewed, Medications reviewed, Radiology images reviewed and EKG reviewed  DVT prophylaxis pharmacological::  Contraindicated - High bleeding risk  DVT prophylaxis - mechanical:  SCDs  Ulcer Prophylaxis::  Yes  Antibiotics:  Treating active infection/contamination beyond 24 hours perioperative coverage

## 2018-03-29 NOTE — PROGRESS NOTES
Dr. Callejas paged at 0430 for updates.     Discussed patient's trending lactic acid results, corrected calcium level, CBC critical results of Hgb 5.9, WBC 0.1, and plts 10.     New telephone orders received to give patient 2 units of PRBCs, 1 unit platelets, and 1 amp of calcium gluconate.     RN to redraw lactic acid per protocol.

## 2018-03-29 NOTE — CONSULTS
DATE OF SERVICE:  03/28/2018    REQUESTING PHYSICIAN:  Joellen Guzman DO    REASON FOR REQUEST:  Lactic acidosis, acute hypoxic respiratory failure and   distress.    HISTORY OF PRESENT ILLNESS:  This is a 77-year-old male, currently unable to   offer significant history.  All information taken from chart review as well as   sign out.  It appears that this patient has a previous history of   non-Hodgkin's lymphoma treated in 2010, had presented on 03/04/2018 to [facility], found to have pancytopenia, neutropenic fevers, and was transferred   to [facility] for further evaluation.  The patient had bone marrow   biopsy, which showed T-cell leukemia.  He subsequently carried the diagnoses   of septic shock, T-cell leukemia, pancytopenia, neutropenic fever, C. diff   positive March 21st, acute kidney injury along this hospitalization.    Furthermore, the patient has a chronic history of hypertension, depression and   hypothyroidism.  Patient had been on the oncology floor, being treated for C.   diff infection that was identified on March 21 and had been improving, guided   by infectious disease, has been on treatment for his T-cell leukemia as well.    He is also on prophylaxis for PCP, previously on Bactrim, now on atovaquone   as well as prophylaxis with Diflucan.  The patient had been extremely volume   overloaded during his course, and being diuresed over the last 2-3 days during   which patient had a slight bump in his creatinine.  Earlier today, he was   also noted to have a drop in his platelets from 19,000 down to 9000, was given   1 unit of platelets, had transient period x2 to 3 with altered mentation that   spontaneously resolved.  However, this evening he was also noted to become   somewhat tachycardic and hypotensive and lactate was drawn showing lactic acid   level of 6.5.  Sepsis was called and patient transferred to the intensive   care unit.  At this present time, the patient's heart rate is  approximately   97, blood pressure is in the 140 systolic.  He is unable to give me any   significant history.  Does have some increased work of breathing, but on   questioning denies any headache, visual changes, chest pain or discomfort,   sputum production, nausea, vomiting, abdominal pain, or discomfort.  Further   of note, there was no mention of any fevers, albeit he is severely   immunocompromised.  Patient is currently on antibiotic therapy with cefepime,   also on acyclovir, as mentioned before atovaquone, fluconazole and p.o.   vancomycin for his C. diff.  The patient had blood cultures x2 drawn.    Urinalysis pending.  Sputum Gram stain and culture will be sent if able to   expectorate.  No further information currently available.    ALLERGIES:  NITROGLYCERIN.    OUTPATIENT MEDICATIONS:  Include Synthroid, lisinopril, paroxetine, turmeric.    FAMILY HISTORY:  Unobtainable.    PAST MEDICAL HISTORY:  As indicated above in HPI.    SOCIAL HISTORY:  Unobtainable.    REVIEW OF SYSTEMS:  Unobtainable given clinical state.    PHYSICAL EXAMINATION:  VITAL SIGNS:  Temperature 98.1, pulse rate 85, blood pressure 116/74, satting   98% on room air.  GENERAL:  Patient in mild distress.  Appears to have possible underlying   chills.  Poor historian.  HEENT:  Normocephalic, atraumatic, anicteric.  CARDIOVASCULAR:  Regular rate and rhythm.  RESPIRATORY:  Diminished right base greater than the left.  Otherwise, no   wheezes or rhonchi.  ABDOMEN:  Soft and nondistended.  Positive bowel sounds.  EXTREMITIES:  With diffuse anasarca bilaterally all the way up through thighs.  NEUROLOGIC:  Cranial nerves II-XII grossly intact.  PSYCHIATRIC:  Normal affect and behavior, moderately encephalopathic.    RESULTS:  White count 0.2 with undeterminable absolute neutrophil count, H and   H of 8 and 23, platelet count of 9.  Sodium 134, potassium 4.2, chloride 100,   bicarbonate 19, anion gap 15, glucose 88, BUN 46, creatinine 2.36, up  from   2.28 earlier, and 2.02 yesterday, calcium 7.3 with an albumin of 2.7, ,   , total bilirubin 2.1, ammonia level 2.7.  Lactic acid 6.5.  INR 1.54,   PTT of 50.    IMAGING:  CT head from earlier this morning without evidence of acute   hemorrhage or mass effect.    CT chest from March 26 showed trace left pleural effusion, small to moderate   right-sided pleural effusion.  No obvious infiltrates or other acute   abnormality.    ASSESSMENT:  1.  Severe sepsis with lactic acidosis, hypotension, and tachycardia.  2.  Immunocompromised state.  3.  Acute kidney injury.  4.  Pancytopenia.  5.  Recent diagnosis of C. diff on March 21.  6.  T-cell leukemia.  7.  History of non-Hodgkin's lymphoma in 2010.  8.  History of chronic hypothyroidism.  9.  History of chronic hypertension, currently hypotensive.  10.  Chronic depression.  11.  Do not resuscitate/do not intubate.  12.  Incomplete medical database.    PLAN:  Again, this is a 77-year-old male with multiple comorbidities, previous   non-Hodgkin's lymphoma treated in 2010, and subsequent development of T-cell   leukemia, apparently pancytopenic, status post septic shock, acute kidney   injury, recent diagnosis of C. diff being treated with oral vancomycin on   multiple prophylaxes due to severely immunocompromised state now with   transient episodes of altered mentation with a negative CT head.  An elevation   in lactic acid as well as transaminases, hypotension, tachycardia concerning   for severe sepsis/septic shock.  At this present time, patient has been moved   to the intensive care unit.  He has currently responded to initial volume   resuscitation.  I have placed a bedside ultrasound on him.  His IVC was   completely collapsible.  His cardiac visualization showed hyperdynamic LV with   near complete collapse of RV as well as LV on each contraction suggesting   significant intravascular depletion.  At this present time, we will get a stat   chest  x-ray given concern of underlying increased work of breathing.  He is   on room air, presently saturating 98%.  He did receive a transfusion earlier   certainly could have concern of underlying TRALI.  Did have a pleural effusion   noted on CT 2 days ago.  At this present time, we will hold further diuresis.    Lasix has been discontinued.  We will initiate albumin therapy 50 g of 25%   every 6 hours for volume resuscitation.  He does have diffuse anasarca;   however, this is all third spaced volume.  He is currently on antibiotics per   infectious disease including cefepime at this present time, oral vancomycin   for C. diff, as well as acyclovir, Diflucan and atovaquone.  The cultures have   been drawn and we will follow these up.  We will check repeat lactic acid in   4 hours.  We will further check repeat CBC and CMP in the a.m.  His   transaminitis is likely secondary to ischemic hepatitis given intravascular   depletion and not suggestive of underlying congestive hepatopathy.  No gross   evidence of rhabdomyolysis on examination; however, cannot check a CPK.  The   patient will be maintained on isolation for immunocompromised state as well as   C. diff.  Abdominal exam is completely benign at this time and diarrhea had   been improving over the last several days making this unlikely to be a   complication of his underlying clostridium difficile.    Prognosis is extremely guarded.    Total critical care time not including billable procedures is 40 minutes.    Patient is critically ill, currently being resuscitated for ensuing septic   shock with severe sepsis at this present time, has a PICC line in place.    Pressors being initiated to maintain MAP greater than 65.  Continue with   volume expansion with albumin and concern for ensuing hypoxic respiratory   failure given work of breathing.       ____________________________________     MD JAMIR Cifuentes / PARKER    DD:  03/28/2018 18:11:41  DT:   03/28/2018 22:17:29    D#:  [id number]  Job#:  [id number]

## 2018-03-29 NOTE — PROGRESS NOTES
Oncology/Hematology Progress Note               Author: Hungjaneen Pak    Cc:  T-cell LGL Date & Time created: 3/29/2018  1:43 PM     Interval History:  Patient transferred to ICU with AMS, neutropenic fever, signs of sepsis      PMHx, PSurgHx, SocHx, FAMHx:  All reviewed and no changes    Review of Systems:  Review of Systems   Constitutional: Positive for malaise/fatigue. Negative for chills, fever and weight loss.   HENT: Negative for ear pain, nosebleeds and sore throat.    Eyes: Negative for blurred vision, double vision and photophobia.   Respiratory: Positive for cough and sputum production. Negative for hemoptysis and shortness of breath.    Cardiovascular: Positive for leg swelling. Negative for chest pain, palpitations and orthopnea.   Gastrointestinal: Negative for abdominal pain, constipation, diarrhea, heartburn, nausea and vomiting.   Genitourinary: Negative for dysuria, frequency, hematuria and urgency.   Skin: Negative for itching and rash.   Neurological: Positive for weakness.       Physical Exam:  Physical Exam   Constitutional: He is oriented to person, place, and time. He appears well-developed and well-nourished. No distress.   ECOG=2   HENT:   Head: Normocephalic and atraumatic.   Mouth/Throat: Oropharynx is clear and moist. No oropharyngeal exudate.   Eyes: Conjunctivae and EOM are normal. Right eye exhibits no discharge. Left eye exhibits no discharge. No scleral icterus.   Neck: Normal range of motion. Neck supple. No tracheal deviation present. No thyromegaly present.   Cardiovascular: Normal rate, regular rhythm and normal heart sounds.  Exam reveals no gallop and no friction rub.    No murmur heard.  Pulmonary/Chest: Effort normal and breath sounds normal. No respiratory distress. He has no wheezes. He has no rales.   Abdominal: Soft. Bowel sounds are normal. He exhibits no distension. There is no tenderness. There is no rebound and no guarding.   Musculoskeletal: Normal range of motion.  He exhibits edema.   3+ bilateral lower extremity edema, left>right   Lymphadenopathy:     He has no cervical adenopathy.   Neurological: He is alert and oriented to person, place, and time.   Skin: Skin is warm. No rash noted. He is not diaphoretic. No erythema.   Psychiatric: He has a normal mood and affect. His behavior is normal. Thought content normal.       Labs:        Invalid input(s): [id number]      Recent Labs      03/28/18   1011  03/28/18   1551  03/29/18   0300   SODIUM  133*  134*  136   POTASSIUM  4.1  4.2  4.0   CHLORIDE  99  100  103   CO2  21  19*  16*   BUN  40*  46*  45*   CREATININE  2.28*  2.36*  2.49*   CALCIUM  6.9*  7.3*  6.5*     Recent Labs      03/28/18   1011  03/28/18   1551  03/29/18   0300   ALTSGPT   --   102*   --    ASTSGOT   --   124*   --    ALKPHOSPHAT   --   77   --    TBILIRUBIN   --   2.1*   --    GLUCOSE  99  88  66     Recent Labs      03/28/18   0105  03/28/18   1651  03/29/18   0300  03/29/18   1151   RBC  2.87*   --   2.03*  2.19*   HEMOGLOBIN  8.0*   --   5.9*  6.5*   HEMATOCRIT  23.4*   --   16.8*  18.3*   PLATELETCT  9*   --   10*  16*   PROTHROMBTM   --   18.2*   --    --    APTT   --   50.2*   --    --    INR   --   1.54*   --    --      Recent Labs      03/27/18   0046  03/28/18   0105  03/28/18   1551  03/29/18   0300  03/29/18   1151   WBC  0.1*  0.2*   --   0.1*  0.2*   NEUTSPOLYS  CANCEL  CANCEL   --   cancel   --    LYMPHOCYTES  CANCEL  CANCEL   --   cancel   --    MONOCYTES  CANCEL  CANCEL   --   cancel   --    EOSINOPHILS  CANCEL  CANCEL   --   cancel   --    BASOPHILS  CANCEL  CANCEL   --   cancel   --    ASTSGOT   --    --   124*   --    --    ALTSGPT   --    --   102*   --    --    ALKPHOSPHAT   --    --   77   --    --    TBILIRUBIN   --    --   2.1*   --    --      Recent Labs      03/28/18   1011  03/28/18   1551  03/29/18   0300   SODIUM  133*  134*  136   POTASSIUM  4.1  4.2  4.0   CHLORIDE  99  100  103   CO2  21  19*  16*   GLUCOSE  99  88  66    BUN  40*  46*  45*   CREATININE  2.28*  2.36*  2.49*   CALCIUM  6.9*  7.3*  6.5*     Hemodynamics:  Temp (24hrs), Av.6 °C (99.7 °F), Min:36.7 °C (98.1 °F), Max:38.5 °C (101.3 °F)  Temperature: (!) 38.5 °C (101.3 °F), Monitored Temp: 38.3 °C (100.9 °F)  Pulse  Av.5  Min: 64  Max: 128Heart Rate (Monitored): (!) 112  Blood Pressure : (!) 169/83, NIBP: 153/82     Respiratory:    Respiration: (!) 46, Pulse Oximetry: 93 %     Work Of Breathing / Effort: Accessory Muscle Use;Moderate  RUL Breath Sounds: Fine Crackles;Expiratory Wheezes, RML Breath Sounds: Diminished, RLL Breath Sounds: Diminished, GREGORIA Breath Sounds: Fine Crackles;Expiratory Wheezes, LLL Breath Sounds: Diminished  Fluids:    Intake/Output Summary (Last 24 hours) at 18 1148  Last data filed at 18 0600   Gross per 24 hour   Intake                0 ml   Output              925 ml   Net             -925 ml     Weight: 89.1 kg (196 lb 6.9 oz)  GI/Nutrition:  Orders Placed This Encounter   Procedures   • DIET NPO     Standing Status:   Standing     Number of Occurrences:   1     Order Specific Question:   Restrict to:     Answer:   Sips with Medications [3]     Medical Decision Making, by Problem:  Active Hospital Problems    Diagnosis   • *Pancytopenia (CMS-[facility]) [D61.818]   • Thrombocytopenia (CMS-[facility]) [D69.6]   • Neutropenic fever (CMS-[facility]) [D70.9, R50.81]   • Maxillary sinusitis [J32.0]   • Acute renal failure (ARF) (CMS-[facility]) [N17.9]   • Hyponatremia [E87.1]   • Essential hypertension [I10]   • 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  T cell LGL/Leukemia-- presented with pancytopenia. Admitted to Hospital since . Admitted for neutropenic fever.  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   He seems to have an aggressive form  of this disease. Campath started on 3/16/18, per Dr. Klein's discussion with [place].        Monitoring for Campath side effects. These can include autoimmune side effects such as pneumonitis,  Hypothyroidism, kidney disease, etc. sometimes can see in autoimmune ITP or hemolytic picture with this treatment.  Can certainly see bone marrow suppression (but in his case we must treat through the low blood counts).  Also will be at high risk for opportunistic infections.   represents HSV prophylaxis, and PCP prophylaxis.      Monitor during treatment:  CD4+ lymphocyte counts (after treatment until recovery); CMV antigen baseline neg (routinely during and for 2 months after treatment); consider TSH at baseline ( slight elevation TSH and free T3)  and then every 2 to 3 months during alemtuzumab treatment    -- Day 10 of 10 on 3/25/18, day 14 --. 50% responded by 3 months. He may have prolonged cytopenias --on phylactic acyclovir  --Check CMV PCR and serologies at least every 2 weeks. Next check due on March 29  --Continue prophylaxis for PCP--ID stopped the Bactrim and changed him to atovaquone  -poor prognosis  --will check CMV PCR today        2.  Anemia--  ROSE MARIE positive IGG, but normal Ret count, LDH, and hapto.  Bili stable 2.0 range. There may be a small component of hemolysis. Not Brisk. Patient put on 20 mg per day of prednisone on 3/11, then stopped on 3/21.  --Hemoglobin stable  -- monitor   --Transfuse if hemoglobin less than 7 g/dl    3.  Thrombocytopenia--related to problem #1. No obvious signs of bleeding. We will continue to monitor.  --Transfuse if platelets less than 20 while febrile, or at any level if bleeding  --All blood products CMV negative and irradiated  --getting platelet transfusion today.    4. Renal insufficiency--baseline creatinine at 1.2-1.5.  Uric acid level normal at 3.1. Creatinine got up to 2.02, nephro following   --Total body fluid up  --Nephrology following, and stopped  IV fluids for now and given lasix      5. Neutropenic fever--  on cefepime, acyclovir, and fluconazole.  ID following patient. Fevers could be related to infection, infusion reaction, or tumor. Pancultured.   --on cefepim  And fluconazle  --ID on board      6. 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  Given IVIG on 3/12/18     7. Elevated Baseline TSH-- high prior to starting Campath.  Monitor.  Likely due to stress.   --TSH has gone up a little bit. We will continue to monitor and consider replacement if it continues to go up.    8. C diff on PO vanco      High complexicity/Drug monitoring       Quality-Core Measures   Reviewed items::  Labs reviewed, Medications reviewed and Radiology images reviewed  Joshi catheter::  No Joshi  DVT prophylaxis pharmacological::  Contraindicated - High bleeding risk

## 2018-03-29 NOTE — PROGRESS NOTES
Infectious Disease Progress Note    Author: Gricel Murguia M.D. Date & Time of service: 3/29/2018  9:52 AM    Chief Complaint:  FU neutropenic fever    Interval History:  78 y/o WM admitted with febrile neutropenia    2/28 Tmax 100.5, having coughing fit and SOB, plan for BM biopsy  3/1Tmax 100.2, WBC 0.3, had a BM this am, cough better, repeat CXR with no infiltrate   3/2 Tmax 100.5, shortness of breath with exertion, had bloody nose this am, plts 22, denies abd pain or diarrhea, path neg for lyphoma  3/3/2018-Tmax 99.6. WBC 0.3 platelets 17 creatinine 1.38  3/4/2018-Tmax 99.2 WBCs 0.3 creatinine 1.38 complains of some shortness of breath  3/5 AF WBC 0.3 transferred to [facility] no new complaints  3/6 AF WBC 0.3 no positive cxs No new complaints  3/7 AF WBC 0.4 no complaints except fatigue and CORRALES  3/8 AF (99.8) no CBC transferred to Oncology floor-no acute events  3/9 AF WBC 0.5 feels weak-no new complaints  3/10 AF (99.9) plan for Campath noted  3/12/2018-Tmax 98.4 WBC 0.7 creatinine 1.55  3/13/2018-no fevers. Remains neutropenic. No new issues overnight  3/14/2018-Tmax 98.6 WBC 0.6 creatinine 1.44 no new issues overnight  3/15/2018-no fevers WBC 0.6 ambulating in the halls  3/16/2018-no fevers. No new issues overnight. WBC 0.5  3/21 Tmax 103, WBC 0.1, ID recalled for recurrent fevers, pt started having watery diarrhea yesterday with poor appetite but no abdominal pain, C diff pending. Denies any cough, SOB or new skin rash  3/22 Tmax 102.7, WBC 0.2, has night sweats, poor appetite did not have dinner, nausea but no vomiting  3/23 AF, WBC 0.1, had 3 watery BM overnight, no night sweats, denies abd pain, nausea or vomiting, getting blood transfusion  3/24 AF, WBC 0.1, has slept well in 2 nights due to productive cough of whitish sputum, diarrhea improving, appetite improved  3/25 AF, WBC 0.1, cough improved with tessalon perles, repeat CXR unremarkable, diarrhea starting to form  3/26 AF WBC 0.1 less  diarrhea-confused today  3/27 AF WBC 0.1 eating better-no new complaints. Less pain in legs with diuresis  3/28 AF WBC 0.2 up to commode-no new complaints  3/29 Tmax 100.4, WBC 0.1, transferred to ICU overnight with altered mental status and fevers, remains confused, having hemoptysis and epistaxis, getting blood transfusions  Labs Reviewed, Medications Reviewed, Radiology Reviewed and Wound Reviewed.    Review of Systems:  Review of Systems   Unable to perform ROS: Mental status change   Pt confused    Hemodynamics:  Temp (24hrs), Av °C (98.6 °F), Min:36.3 °C (97.4 °F), Max:38.1 °C (100.6 °F)  Temperature: (!) 38.1 °C (100.6 °F), Monitored Temp: 38.1 °C (100.6 °F)  Pulse  Av.9  Min: 64  Max: 128Heart Rate (Monitored): (!) 119  Blood Pressure : 156/76, NIBP: 157/78       Physical Exam:  Physical Exam   Constitutional: He appears well-developed. No distress.   Chronically ill   HENT:   Head: Normocephalic and atraumatic.   Coretrak    Hemoptysis - blood in mouth   Eyes: EOM are normal. Pupils are equal, round, and reactive to light.   Neck: Neck supple.   Cardiovascular: Normal rate.    Pulmonary/Chest: Effort normal. No respiratory distress. He has no wheezes. He has no rales.   Decreased BS   Abdominal: Soft. He exhibits no distension. There is no tenderness.   Musculoskeletal: He exhibits edema.   LUE PICC   Neurological:   Confused   Skin: No rash noted.   ecchymosis   Nursing note and vitals reviewed.      Meds:    Current Facility-Administered Medications:   •  sodium bicarbonate 150 mEq in D5W infusion  •  Action is required: Protocol 751 Tube Feeding Enteral Feeding has been implemented **AND** Protocol 751 Document tube feeding in I&O doc flow sheet **AND** Protocol 751 elevate HOB **AND** Protocol 751 Nutrition (Dietary) Consult **AND** [START ON 2018] Weigh Patient **AND** Protocol 751 Tube Placement **AND** Protocol 751 Diet Tube Feeding **AND** Protocol 751 Instructions **AND** Protocol 751  Goal Rate **AND** Protocol 751 Start Rate **AND** Protocol 751 Order to be Separately Placed by RN **AND** [START ON 2018] CRP Quantitative (Non-Cardiac) **AND** [START ON 2018] Prealbumin **AND** Pharmacy **AND** Protocol 751 Nursing Communication Tube Occlusion **AND** Protocol 751 Tube Maintenance  •  vancomycin 50 mg/mL  •  [START ON 2018] vancomycin 50 mg/mL  •  sodium chloride  •  albumin human 25%  •  Respiratory Care per Protocol  •  albuterol  •  albuterol  •  guaiFENesin  •  famotidine  •  oxyCODONE immediate-release  •  calcium carbonate  •  sodium bicarbonate  •  atovaquone  •  cefepime  •  fluconazole  •  diphenhydrAMINE  •  allopurinol  •  Ganciclovir  •  dexamethasone  •  acetaminophen  •  PICC Line Insertion has been implemented **AND** May use Lidocaine 1% not to exceed 3 mls for local at insertion site **AND** NOTIFY MD **AND** Tip to dwell in the superior vena cava **AND** Do not use PICC Line until placement verified by Chest X Ray **AND** [CANCELED] DX-CHEST-FOR PICC LINE Perform procedure in: Other(comment f6 below): **AND** If radiologist reading of chest X-ray states any of the following the PICC should be used **AND** Further evaluation of the PICC placement can be retrieved from X-Ray and Imaging **AND** Blood draws through PICC line; draws by RN only **AND** FLUSHING GUIDELINES WHEN IN USE **AND** normal saline PF **AND** FLUSHING GUIDELINES WHEN NOT IN USE **AND** DRESSING MAINTENANCE **AND** Change needleless pressure ports and IV tubing every 72 hours per hospital policy **AND** TUBING **AND** If there is an MD order to remove the PICC line, any RN may remove the PICC line **AND** [] PATIENT EDUCATION MATERIALS **AND** NURSING COMMUNICATION  •  acyclovir  •  acetaminophen  •  benzocaine-menthol  •  benzonatate  •  labetalol  •  levothyroxine  •  ondansetron  •  potassium chloride SA  •  PARoxetine  •  zolpidem    Labs:  Recent Labs      18   0046  18    "0105  03/29/18   0300   WBC  0.1*  0.2*  0.1*   RBC  2.73*  2.87*  2.03*   HEMOGLOBIN  7.7*  8.0*  5.9*   HEMATOCRIT  22.5*  23.4*  16.8*   MCV  82.4  81.5  82.8   MCH  28.2  27.9  29.1   RDW  48.7  46.3  47.3   PLATELETCT  19*  9*  10*   MPV  10.7  11.4  11.2   NEUTSPOLYS  CANCEL  CANCEL  cancel   LYMPHOCYTES  CANCEL  CANCEL  cancel   MONOCYTES  CANCEL  CANCEL  cancel   EOSINOPHILS  CANCEL  CANCEL  cancel   BASOPHILS  CANCEL  CANCEL  cancel     Recent Labs      03/28/18   1011  03/28/18   1551  03/29/18   0300   SODIUM  133*  134*  136   POTASSIUM  4.1  4.2  4.0   CHLORIDE  99  100  103   CO2  21  19*  16*   GLUCOSE  99  88  66   BUN  40*  46*  45*     Recent Labs      03/28/18   1011  03/28/18   1551  03/29/18   0300   ALBUMIN   --   2.7*   --    TBILIRUBIN   --   2.1*   --    ALKPHOSPHAT   --   77   --    TOTPROTEIN   --   4.3*   --    ALTSGPT   --   102*   --    ASTSGOT   --   124*   --    CREATININE  2.28*  2.36*  2.49*       Imaging:  CXR 3/21 with small right pleural effusion    Micro:  Results     Procedure Component Value Units Date/Time    BLOOD CULTURE [[id number]] Collected:  03/28/18 1603    Order Status:  Completed Specimen:  Blood from Peripheral Updated:  03/29/18 0641     Significant Indicator NEG     Source BLD     Site PERIPHERAL     Blood Culture No Growth    Note: Blood cultures are incubated for 5 days and  are monitored continuously.Positive blood cultures  are called to the RN and reported as soon as  they are identified.      Narrative:       Special Contact Isolation  Per Hospital Policy: Only change Specimen Src: to \"Line\" if  specified by physician order.    BLOOD CULTURE [[id number]] Collected:  03/28/18 1603    Order Status:  Completed Specimen:  Blood from Peripheral Updated:  03/29/18 0641     Significant Indicator NEG     Source BLD     Site PERIPHERAL     Blood Culture No Growth    Note: Blood cultures are incubated for 5 days and  are monitored continuously.Positive blood " "cultures  are called to the RN and reported as soon as  they are identified.      Narrative:       Special Contact Isolation  Per Hospital Policy: Only change Specimen Src: to \"Line\" if  specified by physician order.    URINALYSIS [[id number]]  (Abnormal) Collected:  03/28/18 1945    Order Status:  Completed Specimen:  Urine from Urine, Joshi Cath Updated:  03/28/18 2000     Color Yellow     Character Clear     Specific Gravity 1.010     Ph 5.5     Glucose Negative mg/dL      Ketones Negative mg/dL      Protein 30 (A) mg/dL      Bilirubin Negative     Urobilinogen, Urine 0.2     Nitrite Negative     Leukocyte Esterase Negative     Occult Blood Moderate (A)     Micro Urine Req Microscopic    Narrative:       Special Contact [id number] BELLE SAMUEL R    Culture Respiratory W/ GRM STN [[id number]]     Order Status:  No result Specimen:  Respirate from Sputum     BLOOD CULTURE [[id number]] Collected:  03/21/18 1049    Order Status:  Completed Specimen:  Blood from Peripheral Updated:  03/26/18 1300     Significant Indicator NEG     Source BLD     Site PERIPHERAL     Blood Culture No growth after 5 days of incubation.    Narrative:       Protective  Per Hospital Policy: Only change Specimen Src: to \"Line\" if  specified by physician order.    BLOOD CULTURE [[id number]] Collected:  03/21/18 1049    Order Status:  Completed Specimen:  Blood from Peripheral Updated:  03/26/18 1300     Significant Indicator NEG     Source BLD     Site PERIPHERAL     Blood Culture No growth after 5 days of incubation.    Narrative:       Protective  Per Hospital Policy: Only change Specimen Src: to \"Line\" if  specified by physician order.    CULTURE RESPIRATORY W/ GRM STN [[id number]] Collected:  03/23/18 0300    Order Status:  Completed Specimen:  Respirate Updated:  03/25/18 1029     Gram Stain Result Few WBCs.  Few mixed bacteria, no predominant organism seen.  Specimen Quality Score: 1+       Significant Indicator NEG     Source RESP     Site " "Sputum (coughed)     Respiratory Culture Rare growth usual upper respiratory valencia.    Culture Respiratory W/ GRM STN [[id number]] Collected:  03/23/18 1500    Order Status:  Canceled Specimen:  Respirate from Sputum     GRAM STAIN [[id number]] Collected:  03/23/18 0300    Order Status:  Completed Specimen:  Respirate Updated:  03/23/18 1451     Significant Indicator .     Source RESP     Site Sputum (coughed)     Gram Stain Result Few WBCs.  Few mixed bacteria, no predominant organism seen.  Specimen Quality Score: 1+      BLOOD CULTURE [[id number]] Collected:  03/17/18 0907    Order Status:  Completed Specimen:  Blood from Peripheral Updated:  03/22/18 1100     Significant Indicator NEG     Source BLD     Site PERIPHERAL     Blood Culture No growth after 5 days of incubation.    Narrative:       Protective  Per Hospital Policy: Only change Specimen Src: to \"Line\" if  specified by physician order.    BLOOD CULTURE [[id number]] Collected:  03/17/18 0907    Order Status:  Completed Specimen:  Blood from Peripheral Updated:  03/22/18 1100     Significant Indicator NEG     Source BLD     Site PERIPHERAL     Blood Culture No growth after 5 days of incubation.    Narrative:       Protective  Per Hospital Policy: Only change Specimen Src: to \"Line\" if  specified by physician order.          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    Repeat Bcx 3/21 - NGTD  Lactic acidosis    Clostridium difficile diarrhea, improving  C diff + 3/21  Continue PO vanco QID x 14 days (stop date 04/04/18) then BID while on IV abx    Cough  Recent CXR with no PNA but small pleural effusion  Sputum cx - URF  Repeat CXR 3/28 with trace pleural effusions  Abx per above    ROHITH  Renally dose abx as needed  Avoid nephrotoxins=dc'd bactrim   Continue atovaquone for PCP prophylaxis for 12 months after given [facility]  Nephrology eval appreciated    DW RN

## 2018-03-29 NOTE — PROGRESS NOTES
Dana from Lab called with critical result of lactic acid at 6.5. Critical lab result read back to Dana. MELINA Roberson notified of critical lab result at 6.5.  Critical lab result read back by MELINA Roberson.

## 2018-03-29 NOTE — PROGRESS NOTES
Call placed to patient's wife, Nichelle. Discussed events that occurred throughout the evening including additional safety precautions. Discussed current plan of care and current medication regimen and that patient is to undergo blood transfusions at this time per MD order. Questions/concerns addressed by this RN. Per patient's wife, she agrees to a cortrak placement at this time in the event that the patient is unable to take his PO medications.

## 2018-03-29 NOTE — CARE PLAN
Problem: Safety  Goal: Will remain free from injury  Outcome: PROGRESSING AS EXPECTED  Bed rails up x3, pt in line of sight at all times, frequent rounding in place.    Problem: Skin Integrity  Goal: Risk for impaired skin integrity will decrease  Outcome: PROGRESSING AS EXPECTED  Pt on q2hr turn schedule, skin checked, linens changed PRN

## 2018-03-29 NOTE — PROGRESS NOTES
Report received from MELINA Balbuena at 1707. Pt is connected to ICU monitor and transferred to [facility] via hospital bed. VS stable during transfer. Pt arrived at [facility] at 1736. 2 RN skin check complete.

## 2018-03-29 NOTE — PROGRESS NOTES
Patient continues to be confused. This RN notes word salad, and word-finding difficulties. Pt is agitated and restless. Updated physician and oncologist. Orders for blood cultures, lactic acid, UA, PT/INR. Drawn and sent. This RN stayed with patient until patient transferred to ICU. Wife updated regarding POC. Report given to MELINA Balbuena.

## 2018-03-29 NOTE — DIETARY
"Nutrition Support Assessment   Nutrition services:   Day 25 of admit.  Chang Unger is a 77 y.o. male with admitting DX of neutropenic fever and sepsis.      Current problem list:  1. Pancytopenia     Assessment:  Estimated Nutritional Needs based on:   Height: 177.8 cm (5' 10\")  Weight: 89.1 kg (196 lb 6.9 oz)  Weight to Use in Calculations: 85.5 kg (188 lb 7.9 oz)  Ideal Body Weight: 75.3 kg (166 lb)  Percent Ideal Body Weight: 113.6  Body mass index is 28.18 kg/m².     Calculation/Equation: MSJ x 1.2 = 1905 kcal/day  Total Calories / day: 1796 - 2138  (Calories / k - 25)  Total Grams Protein / day: 102.8 - 129  (Grams Protein / k.2 - 1.5)     Evaluation:   1. Patient currently NPO, per discussion in rounds cortrak placed and TF to start.  2. Gastric cortrak placed and verified  3. Patient has been receiving a cardiac diet since admit with adequate PO intake and stable weight.   4. SLP does not appear to be following patient.  NPO status ordered per Dr. Callejas.   5. Patient with history of non-hodgkin's lymphoma treated in .  6. Acute renal failure; Bun: 45, Creatinine: 2.49, GFR: 25   7. Per MD, patient with diffuse anasarca; Albumin solution per MAR and patient received lasix in the last two days.     Recommendations/Plan:  1. Once order received for TF, start Replete with Fiber @ 25 ml/hr and advance per protocol to goal rate of 80 ml/hr.  This will provide: 1920 kcal, 123 grams protein and 1594 ml free water.   2. Fluids per MD.  3. Bi-weekly weights to monitor fluid and nutrition status.   4. Consider formula SLP eval to determine swallow safety when appropriate.     RD monitoring.               "

## 2018-03-29 NOTE — PROGRESS NOTES
"Palliative Care Advance Care Planning Progress Note    General: Mr. Chang Unger is a 77 year old male transferred from [facility] 3/4/18 for pancytopenia, neutropenic fever, and need for hematology evaluation. 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 During this hospital course he developed renal insufficiency. He continues to have pancytopenia, thrombocytopenia, hypogammaglobulinemia, anemia, and neutropenic fever. He was also diagnosed with maxillary sinusitis and Clostridium difficile colitis.  PMH significant for non-Hodgkin's lymphoma treated in 2010 by Dr. Klein and HTN.     Patient seen by myself on 3/24/18 for ACP consult at patient's request to complete an advance directive. POLST and AD have were completed. Choices on POLST indicated: DNR/selective treatment which typically indicates no transfer to ICU, and no artificial nutrition or feeding tube. Ultimately patient did want to leave decisions up to his wife who is a nurse in our ER seeing phychiatric patients. . Duplicate consult order placed last night for ACP as patient required higher level of care and was transferred to the ICU.     Discussion: 11:45 No family at bedside. Assessed patient and discussed with bedside nurse. Unable to assess orientation. Patient speaking unintelligible. He is agitated and shaky with tachycardia, tachypnea, and hypertension. When asked if he had pain he did seem to stated \"yes\" but is very inconsistent. He has not had pain leading up to this point.  Bilateral wrist restraints and Cortrak nasogastric tube in place for medication administration and nutrition. Patient did not want a feeding tube per POLST form. However he ultimately wanted his wife's input when form was completed and was willing to change based on her recommendations. His wife did consent to a feeding tube this morning. " "Appreciate information from bedside RN who reports patient's wife should be arriving soon. Provided business card with cell phone and requested he contact me once she has arrived.    13:00  Met with patient and his wife bedside with Dr. Ledbetter. Provided introductions and discussed involvement thus far. Wife denied qeustions stating, \"I pretty much understand everything. He has been hear for a while. I am a nurse.\" Patient's wife was able to get work covered and is now off today and tomorrow. She is concerned with patient's fast breathing, heart rate, and agitation. Bedside RN expressed that Dr. Maurice ordered some haldol for agitation. We discussed morphine if haldol is ineffective for dyspnea and potential pain symptom management. We discussed assessing patient's clinical response over the next 24 hours and plan to meet again tomorrow around 0900.     I met with patient's wife Willow Unger outside of patient's room. She became very tearful. I asked about her concerns regarding what she is seeing with her  and she stated \"it's not good.\" Therapeutic silence and touch provided while she cried. As she settled, expressed my concern over patient's clinical picture in comparison to last week. Willow understands the gravity of the patient's situation. She is trying to remain hopeful over the next 24 hours that he will respond to current sepsis interventions but understands that patient's prognosis is poor. I offered to reach out to other family as needed. The patient has two children as does she. She declined at this time. Expressed I had reached chaplaincy services for a supportive visit from a . Offered to do this again but she declined. Expressed to let us know if she needs anything. Provided palliative care business card and encouraged her to call with any questions, needs, or concerns.    Outcome: Continue with DNR/full support. Re-evaluate patient's response tomorrow.     Plan: Meet with patient's " wife bedside tomorrow at 0900.     Updated: Dr. Maurice and Dr. Ledbetter.    Recommendations: I do not recommend an ethics or hospice consult at this time because patient marquis wants his wife making decisions on his behalf and she wants to continue DNR/full support with re-evaluation tomorrow. No ethical dilemma currently exists..    Thank you for allowing Palliative Care to participate in this patient's care. Please call our team with questions and/or additional needs.    Total visit time was 50 minutes discussing advance care planning with the patient and his wife at the patient's bedside.    Marina RAMIREZ  Palliative Care Nurse Practitioner  [phone]

## 2018-03-29 NOTE — PROGRESS NOTES
Spoke with Pharmacist regarding patient's PO abx medications and discussed different route of administration r/t pt's NPO status. Pharmacist discussed with MD patient's medication list. Per MD OK to place cortrak at this time if patient does not tolerate PO meds.     Attempted to call patient's wife, Nichelle, twice to update on patient's status. No answer received from patient's wife.     Page placed to Dr. Callejas at [phone]. RN to perform bedside swallow. Discussed that patient passed RN bedside swallow without complications at this time. Per Dr. Callejas, change patient's PO status to NPO sips with meds. Per Dr. Callejas, place cortrak if patient shows signs of aspiration or unable to tolerate PO meds.

## 2018-03-29 NOTE — PROGRESS NOTES
Cortrak Placement    Tube Team verified patient name and medical record number prior to tube placement.  Cortrak tube (43 inches, 10 Afghan) placed at 73 cm in right nare.  Per Cortrak picture, tube appears to be in the stomach.  Nursing Instructions: Awaiting KUB to confirm placement before use for medications or feeding. Once placement confirmed, flush tube with 30 ml of water, and then remove and save stylet, in patient medication drawer.

## 2018-03-29 NOTE — PROGRESS NOTES
Nephrology Progress Note, Adult, Complex               Author: Fadi Najjar Date & Time created: 3/29/2018  3:43 PM     Interval History:  76 y/o male with T cell LGL/leukemia admitted with neutropenic fever/pancytopenia  CKD III, Non oliguric  Last 24h noted, pt became hypotensive transferred to ICU, still confused    Review of Systems:  Review of Systems   Unable to perform ROS: Mental acuity       Physical Exam:  Physical Exam   Constitutional: He appears well-developed and well-nourished.   HENT:   Head: Atraumatic.   Nose: Nose normal.   Eyes: Conjunctivae are normal.   Cardiovascular: Normal rate and regular rhythm.  Exam reveals no gallop and no friction rub.    Pulmonary/Chest: Effort normal and breath sounds normal. He has no wheezes.   Musculoskeletal: He exhibits edema.   Neurological: He is disoriented.   Nursing note and vitals reviewed.      Labs:        Invalid input(s): [id number]      Recent Labs      03/28/18   1011  03/28/18   1551  03/29/18   0300   SODIUM  133*  134*  136   POTASSIUM  4.1  4.2  4.0   CHLORIDE  99  100  103   CO2  21  19*  16*   BUN  40*  46*  45*   CREATININE  2.28*  2.36*  2.49*   CALCIUM  6.9*  7.3*  6.5*     Recent Labs      03/28/18   1011  03/28/18   1551  03/29/18   0300   ALTSGPT   --   102*   --    ASTSGOT   --   124*   --    ALKPHOSPHAT   --   77   --    TBILIRUBIN   --   2.1*   --    GLUCOSE  99  88  66     Recent Labs      03/28/18   0105  03/28/18   1651  03/29/18   0300  03/29/18   1151   RBC  2.87*   --   2.03*  2.19*   HEMOGLOBIN  8.0*   --   5.9*  6.5*   HEMATOCRIT  23.4*   --   16.8*  18.3*   PLATELETCT  9*   --   10*  16*   PROTHROMBTM   --   18.2*   --    --    APTT   --   50.2*   --    --    INR   --   1.54*   --    --      Recent Labs      03/27/18   0046  03/28/18   0105  03/28/18   1551  03/29/18   0300  03/29/18   1151   WBC  0.1*  0.2*   --   0.1*  0.2*   NEUTSPOLYS  CANCEL  CANCEL   --   cancel   --    LYMPHOCYTES  CANCEL  CANCEL   --   cancel   --     MONOCYTES  CANCEL  CANCEL   --   cancel   --    EOSINOPHILS  CANCEL  CANCEL   --   cancel   --    BASOPHILS  CANCEL  CANCEL   --   cancel   --    ASTSGOT   --    --   124*   --    --    ALTSGPT   --    --   102*   --    --    ALKPHOSPHAT   --    --   77   --    --    TBILIRUBIN   --    --   2.1*   --    --            Hemodynamics:  Temp (24hrs), Av.8 °C (100 °F), Min:36.8 °C (98.3 °F), Max:38.5 °C (101.3 °F)  Temperature: 37.6 °C (99.7 °F), Monitored Temp: 38.3 °C (100.9 °F)  Pulse  Av.6  Min: 64  Max: 128Heart Rate (Monitored): (!) 112  Blood Pressure : 153/78, NIBP: 153/82     Respiratory:    Respiration: (!) 30, Pulse Oximetry: 95 %     Work Of Breathing / Effort: Accessory Muscle Use;Moderate  RUL Breath Sounds: Fine Crackles;Expiratory Wheezes, RML Breath Sounds: Diminished, RLL Breath Sounds: Diminished, GREGORIA Breath Sounds: Fine Crackles;Expiratory Wheezes, LLL Breath Sounds: Diminished  Fluids:    Intake/Output Summary (Last 24 hours) at 18 1543  Last data filed at 18 1400   Gross per 24 hour   Intake          5684.25 ml   Output             2385 ml   Net          3299.25 ml     Weight: 89.1 kg (196 lb 6.9 oz)  GI/Nutrition:  Orders Placed This Encounter   Procedures   • DIET NPO     Standing Status:   Standing     Number of Occurrences:   1     Order Specific Question:   Restrict to:     Answer:   Sips with Medications [3]     Medical Decision Making, by Problem:  Active Hospital Problems    Diagnosis   • *Pancytopenia (CMS-[facility]) [D61.818]   • Thrombocytopenia (CMS-[facility]) [D69.6]   • Neutropenic fever (CMS-[facility]) [D70.9, R50.81]   • Maxillary sinusitis [J32.0]   • Acute renal failure (ARF) (CMS-[facility]) [N17.9]   • Hyponatremia [E87.1]   • Essential hypertension [I10]   • 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  4.Anemia: H/H is lower  5.decreas LOC .  6.Volume:overloaded.  no acute need for HD  Lasix as needed  Daily labs  Renal dose all meds  Avoid nephrotoxins  Prognosis poor

## 2018-03-29 NOTE — CARE PLAN
Problem: Safety  Goal: Will remain free from injury  Safety and fall precautions in place and discussed with patient. Proper staff assist in place for turns and mobility. Discussed proper body mechanics with patient prior to mobilizing.   Goal: Will remain free from falls  Fall precautions in place. Bed alarm on, bed brakes set, bed in lowest position, appropriate bed rails up, fall risk armband placed, call light within reach and hourly round in place to assess patient's needs.     Problem: Venous Thromboembolism (VTW)/Deep Vein Thrombosis (DVT) Prevention:  Goal: Patient will participate in Venous Thrombosis (VTE)/Deep Vein Thrombosis (DVT)Prevention Measures  SCDs on and working properly. Monitoring coag labs. Monitoring extremities for edema status, skin temperature and peripheral pulses.     Problem: Fluid Volume:  Goal: Will maintain balanced intake and output  Strict I&O and daily weights in place. Fluid boluses given per sepsis protocol and MD order.

## 2018-03-29 NOTE — PROGRESS NOTES
Dr. Maurice returned page    Dana from Lab called with critical result of lactic acid at 1930. Critical lab result read back to Dana.   Dr. Maurice notified of critical lab result at 1948.  Critical lab result read back by Dr. Mauirce.    Discussed patient's current condition and hemodynamic status.   Order received to give 500 mL NS bolus at this time. RN to recheck lactic acid in 4 hours per protocol.

## 2018-03-30 NOTE — PROGRESS NOTES
[facility] Hospitalist Progress Note    Date of Service: 3/30/2018    Chief Complaint  77 y.o. male admitted 3/4/2018 with pancytopenia and neutropenic fever in part related to pts chemo regimen of Campath .  PMHx of Tcell LGL leukemia, Stg III CKD, HTN, Hypogammaglobulinemia.  Dx of CDiff and treated x 2 weeks.  ID has been following.  Cultures neg.  Now on empiric therapy.   Worsened mental status on 3/29 with STAT head CT showing large SDH  Wife changed pt's status to Comfort care at that point     Interval Problem Update  ROS limited by mentation    Consultants/Specialty  Heme/Onc  ID  Pulmonology    Disposition  Now comfort care status        Review of Systems   Unable to perform ROS: Mental status change      Physical Exam  Laboratory/Imaging   Hemodynamics  Temp (24hrs), Av.1 °C (100.5 °F), Min:37.6 °C (99.7 °F), Max:38.5 °C (101.3 °F)   Temperature: (!) 38.3 °C (100.9 °F), Monitored Temp: 38.1 °C (100.6 °F)  Pulse  Av.8  Min: 64  Max: 128 Heart Rate (Monitored): 82  Blood Pressure : (!) 173/96, NIBP: 107/64      Respiratory      Respiration: (!) 24, Pulse Oximetry: 92 %     Work Of Breathing / Effort: Accessory Muscle Use;Moderate  RUL Breath Sounds: Fine Crackles;Expiratory Wheezes, RML Breath Sounds: Diminished, RLL Breath Sounds: Diminished, GREGORIA Breath Sounds: Fine Crackles;Expiratory Wheezes, LLL Breath Sounds: Diminished    Fluids    Intake/Output Summary (Last 24 hours) at 18 0612  Last data filed at 18 0600   Gross per 24 hour   Intake          3289.25 ml   Output             1825 ml   Net          1464.25 ml       Nutrition  Orders Placed This Encounter   Procedures   • DIET NPO     Standing Status:   Standing     Number of Occurrences:   1     Order Specific Question:   Restrict to:     Answer:   Sips with Medications [3]     Physical Exam   Constitutional: He appears well-developed and well-nourished. No distress.   HENT:   Head: Normocephalic and atraumatic.   Some blood from gums    Eyes: Conjunctivae are normal.   Neck: No JVD present.   Cardiovascular: Normal rate.  Exam reveals no gallop.    No murmur heard.  Pulmonary/Chest: No stridor. No respiratory distress. He has no wheezes. He has rales.   tachypnic   Abdominal: Soft. There is no tenderness. There is no rebound and no guarding.   Musculoskeletal: He exhibits no edema.   Neurological: He is alert.   Oriented x 1   Skin: Skin is warm and dry. No rash noted. He is not diaphoretic.   Nursing note and vitals reviewed.      Recent Labs      03/29/18   0300  03/29/18   1151  03/29/18   1857   WBC  0.1*  0.2*  0.2*   RBC  2.03*  2.19*  2.91*   HEMOGLOBIN  5.9*  6.5*  8.4*   HEMATOCRIT  16.8*  18.3*  24.4*   MCV  82.8  83.6  84.9   MCH  29.1  29.7  28.9   MCHC  35.1  35.5*  34.0   RDW  47.3  46.5  46.8   PLATELETCT  10*  16*  16*   MPV  11.2  10.9  12.3     Recent Labs      03/28/18   1011  03/28/18   1551  03/29/18   0300   SODIUM  133*  134*  136   POTASSIUM  4.1  4.2  4.0   CHLORIDE  99  100  103   CO2  21  19*  16*   GLUCOSE  99  88  66   BUN  40*  46*  45*   CREATININE  2.28*  2.36*  2.49*   CALCIUM  6.9*  7.3*  6.5*     Recent Labs      03/28/18   1651  03/29/18   1945   APTT  50.2*   --    INR  1.54*  2.28*                  Assessment/Plan     * Pancytopenia (CMS-HCC)   Assessment & Plan    Secondary to severe bone marrow infiltrate.   T cell LGL - leukemia  Campath start admin 3/16 discussed with Dr. Horta, patient will be here for the duration of campath administration.          Thrombocytopenia (CMS-HCC)- (present on admission)   Assessment & Plan    Critical   Transfusing empirically for count < 10  Oozing from mucous membranes        Neutropenic fever (CMS-HCC)- (present on admission)   Assessment & Plan    ANC 0  On Cefepime and fluconazole.   Persistent neutropenia febrile, cough and diarrhea  Cdiff positive: completed po vanc x 14days now on prophylaxis  Cultures neg  ID following  Presently on: Atovaqone, acyclovir, po vanc,  cefepime, diflucan        Maxillary sinusitis- (present on admission)   Assessment & Plan    Resolved          Acute renal failure (ARF) (CMS-HCC)- (present on admission)   Assessment & Plan    IV diuresis started 3/26  Cr increased but good urinary output  Avoid nephrotoxins   Renally dose all medications         Hyponatremia- (present on admission)   Assessment & Plan    Mild, stable            Essential hypertension- (present on admission)   Assessment & Plan    Monitor blood pressure.          Pleural effusion   Assessment & Plan    Likely d/t fluid resuscitation efforts for renal dysfunction  Now on diuresis and should decrease in size          DNR (do not resuscitate)   Assessment & Plan    Patient request.  He would like to complete an advanced directive, recommend POLST as well.  Palliative care consult requested.        Clostridium difficile colitis   Assessment & Plan    Completed 2 weeks po vanc now on prophylactic therapy while on Abxs          Anemia- (present on admission)   Assessment & Plan    Monitor H&H.  Transfuse if hemoglobin <7 g/dl.   When Transfused give PRBC irradiated and CMV free product.        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  Severe neutropenia            Quality-Core Measures   Reviewed items::  Labs reviewed, Medications reviewed, Radiology images reviewed and EKG reviewed  DVT prophylaxis pharmacological::  Contraindicated - High bleeding risk  DVT prophylaxis - mechanical:  SCDs  Ulcer Prophylaxis::  Yes  Antibiotics:  Treating active infection/contamination beyond 24 hours perioperative coverage

## 2018-03-30 NOTE — DIETARY
Nutrition Services - Update Comfort Care Status  Patient is now comfort care according to chart.  RD will sign off at this point.  If at any time POC changes and nutrition intervention is needed, please re-consult RD.    RD available PRN

## 2018-03-30 NOTE — PROGRESS NOTES
Dr. Saul notified regarding patient's neuro assessment. Patient's pupils are nonreactive and unequal. Orders to obtain a STAT CT given.

## 2018-03-30 NOTE — PROGRESS NOTES
Pulmonary/Critical Care Medicine   Progress Note    Date of service: 3/29/2018  Time: 1930    Called by night RN, Didi, about patient's change in mental and neuro status.  A STAT CT head was obtained.    CT head reviewed with radiology:  New large left frontoparietal subdural hematoma resulting in 22 mm rightward subfalcine herniation    New small right frontal subdural hematoma    Suprasellar cistern effacement.    No tonsillar herniation    **Spoke to patient's wife on the phone about the findings.  She came to his bedside and made him comfort care.

## 2018-03-30 NOTE — CARE PLAN
Problem: Infection  Goal: Will remain free from infection  Outcome: PROGRESSING SLOWER THAN EXPECTED  Scrub the hub, oral care, hand hygiene promoted, delaney care given    Problem: Pain Management  Goal: Pain level will decrease to patient's comfort goal  Outcome: PROGRESSING AS EXPECTED  CPOT scale initiated for patient's current state, PRN pain medication given when appropriate, repositioning and rest promoted

## 2018-03-30 NOTE — CARE PLAN
Problem: Pain Management  Goal: Pain level will decrease to patient's comfort goal  Pt pain assessed using appropriate pain scale, non-verbal and treated per MAR. Non-pharm interventions in place such as re-positioning PRN.    Problem: Psychosocial Needs:  Goal: Level of anxiety will decrease  Outcome: PROGRESSING SLOWER THAN EXPECTED  Pt spouse updated on POC and reasoning behind therapies and interventions. Pt understands and encouraged to ask questions

## 2018-03-30 NOTE — PROGRESS NOTES
Pulmonary Critical Care Progress Note      Admit Date: 3/04/2018    Chief Complaint: severe sepsis     History of Present Illness: 77-year-old male, currently unable to   offer significant history.  All information taken from chart review as well as   sign out.  It appears that this patient has a previous history of   non-Hodgkin's lymphoma treated in 2010, had presented on 03/04/2018 to [facility], found to have pancytopenia, neutropenic fevers, and was transferred   to [facility] for further evaluation.  The patient had bone marrow   biopsy, which showed T-cell leukemia.  He subsequently carried the diagnoses   of septic shock, T-cell leukemia, pancytopenia, neutropenic fever, C. diff   positive March 21st, acute kidney injury along this hospitalization.    Furthermore, the patient has a chronic history of hypertension, depression and   hypothyroidism.  Patient had been on the oncology floor, being treated for C.   diff infection that was identified on March 21 and had been improving, guided   by infectious disease, has been on treatment for his T-cell leukemia as well.    He is also on prophylaxis for PCP, previously on Bactrim, now on atovaquone   as well as prophylaxis with Diflucan.  The patient had been extremely volume   overloaded during his course, and being diuresed over the last 2-3 days during   which patient had a slight bump in his creatinine.  Earlier today, he was   also noted to have a drop in his platelets from 19,000 down to 9000, was given   1 unit of platelets, had transient period x2 to 3 with altered mentation that   spontaneously resolved.  However, this evening he was also noted to become   somewhat tachycardic and hypotensive and lactate was drawn showing lactic acid   level of 6.5.  Sepsis was called and patient transferred to the intensive   care unit.  At this present time, the patient's heart rate is approximately   97, blood pressure is in the 140 systolic.  He is unable to  give me any   significant history.  Does have some increased work of breathing, but on   questioning denies any headache, visual changes, chest pain or discomfort,   sputum production, nausea, vomiting, abdominal pain, or discomfort.  Further   of note, there was no mention of any fevers, albeit he is severely   immunocompromised.  Patient is currently on antibiotic therapy with cefepime,   also on acyclovir, as mentioned before atovaquone, fluconazole and p.o.   vancomycin for his C. diff.  The patient had blood cultures x2 drawn.    Urinalysis pending.  Sputum Gram stain and culture will be sent if able to   expectorate.  No further information currently available.        Interval Events:  24 hour interval history reviewed   Tm 102.9  +1.4L over last 24hr, grossly + since admit  No cxr this am  Further deterioration over night, repeat head ct showed new large left frontoparietal sbd w 22mm rightward subalcine herniation, new right sbd    Pt placed on comfort measures  Presently appears to be without distress    Review of Systems   Unable to perform ROS: Acuity of condition     Physical Exam   Constitutional: He appears well-developed and well-nourished.   HENT:   Head: Normocephalic and atraumatic.   Eyes: Conjunctivae are normal. Pupils are equal, round, and reactive to light.   Neck: No tracheal deviation present.   Cardiovascular: Normal rate and regular rhythm.    Pulmonary/Chest: He has no wheezes. He has rales.   Abdominal: Soft. He exhibits no distension. There is no tenderness.   Musculoskeletal: He exhibits edema.   Neurological: A cranial nerve deficit is present.   Skin: Skin is warm and dry.   Psychiatric:   encephalopathic   Nursing note and vitals reviewed.  unchange    PFSH:  No change.    Respiratory:     Pulse Oximetry: 92 %  Chest Tube Drains:                  Invalid input(s): [id number]    HemoDynamics:  Pulse: 90, Heart Rate (Monitored): 90  Blood Pressure : (!) 173/96, NIBP: 118/64                Neuro:  GCS Total [place] Coma Score: 7           Fluids:  Intake/Output       03/28/18 0700 - 03/29/18 0659 03/29/18 0700 - 03/30/18 0659 03/30/18 0700 - 03/31/18 0659      7010-0692 0815-5408 Total [id number] 1900-0659 Total [id number] 1900-0659 Total       Intake    P.O.  --  150 150  --  -- --  --  -- --    P.O. -- 150 150 -- -- -- -- -- --    I.V.  --  3625 3625  700  340 1040  --  -- --    IV Piggyback Volume (IV Piggyback) -- 600 600 -- -- -- -- -- --    NS BOLUS -- 2775 2775 500 -- 500 -- -- --    IV Volume (PRBC) -- -- -- -- 300 300 -- -- --    IV Volume (NS) -- 250 250 200 40 240 -- -- --    Blood  352.7  -- 352.7  1749.3  500 2249.3  --  -- --    Volume (RELEASE RED BLOOD CELLS) -- -- -- 749.3 -- 749.3 -- -- --    Volume (RELEASE PLATELET PHERESIS) -- -- -- 500 -- 500 -- -- --    Volume (RELEASE RED BLOOD CELLS) -- -- -- 500 -- 500 -- -- --    Volume (RELEASE RED BLOOD CELLS) -- -- -- -- 500 500 -- -- --    Volume (RELEASE PLATELET PHERESIS) 352.7 -- 352.7 -- -- -- -- -- --    Total Intake 352.7 3775 4127.7 2449.3 840 3289.3 -- -- --       Output    Urine  1800  1610 3410  1025  800 1825  350  -- 350    Indwelling Cathether -- 1610 1610 7801 882 6337 350 -- 350    Void (ml) 1800 -- 1800 -- -- -- -- -- --    Stool  --  -- --  --  -- --  --  -- --    Number of Times Stooled -- 4 x 4 x 1 x -- 1 x -- -- --    Total Output 1800 1610 3410 9323 119 2584 350 -- 350       Net I/O     -1447.3 2165 717.7 1424.3 40 1464.3 -350 -- -350           Recent Labs      03/28/18   1011  03/28/18   1551  03/29/18   0300   SODIUM  133*  134*  136   POTASSIUM  4.1  4.2  4.0   CHLORIDE  99  100  103   CO2  21  19*  16*   BUN  40*  46*  45*   CREATININE  2.28*  2.36*  2.49*   CALCIUM  6.9*  7.3*  6.5*       GI/Nutrition:    Liver Function  Recent Labs      03/28/18   1011  03/28/18   1551  03/29/18   0300   ALTSGPT   --   102*   --    ASTSGOT   --   124*   --    ALKPHOSPHAT   --   77   --    TBILIRUBIN   --   2.1*   --     GLUCOSE  99  88  66       Heme:  Recent Labs      18   1651  18   0300  18   1151  18   1857  18   1945   RBC   --   2.03*  2.19*  2.91*   --    HEMOGLOBIN   --   5.9*  6.5*  8.4*   --    HEMATOCRIT   --   16.8*  18.3*  24.4*   --    PLATELETCT   --   10*  16*  16*   --    PROTHROMBTM  18.2*   --    --    --   24.8*   APTT  50.2*   --    --    --    --    INR  1.54*   --    --    --   2.28*       Infectious Disease:  Monitored Temp  Av.2 °C (100.8 °F)  Min: 37.5 °C (99.5 °F)  Max: 39.4 °C (102.9 °F)  Temp  Av.9 °C (100.3 °F)  Min: 37.6 °C (99.7 °F)  Max: 38.3 °C (100.9 °F)  Micro: cultures reviewed  Recent Labs      18   0105  18   1551  18   0300  18   1151  18   1857   WBC  0.2*   --   0.1*  0.2*  0.2*   NEUTSPOLYS  CANCEL   --   cancel   --    --    LYMPHOCYTES  CANCEL   --   cancel   --    --    MONOCYTES  CANCEL   --   cancel   --    --    EOSINOPHILS  CANCEL   --   cancel   --    --    BASOPHILS  CANCEL   --   cancel   --    --    ASTSGOT   --   124*   --    --    --    ALTSGPT   --   102*   --    --    --    ALKPHOSPHAT   --   77   --    --    --    TBILIRUBIN   --   2.1*   --    --    --      Current Facility-Administered Medications   Medication Dose Frequency Provider Last Rate Last Dose   • MD ALERT...adult comfort care   PRN Aletha Saul M.D.       • atropine 1 % ophthalmic solution 2 Drop  2 Drop Q4HRS PRN Aletha Saul M.D.       • morphine (ROXANOL) 20 MG/ML oral conc 10 mg  10 mg Q HOUR PRN Marina L Krayk, A.P.R.N.        Or   • morphine (pf) 10 mg/ml 10 MG/ML injection 5 mg  5 mg Q30 MIN PRN OLIVER LimonPAlvinRAlvinN.   5 mg at 18 0900    Or   • morphine (pf) 10 mg/ml 10 MG/ML injection 10 mg  10 mg Q30 MIN PRN OLIVER LimonP.R.N.       • LORazepam (ATIVAN) 2 MG/ML oral conc 2-4 mg  2-4 mg Q HOUR PRN Aletha Saul M.D.        Or   • LORazepam (ATIVAN) injection 2-4 mg  2-4 mg Q HOUR PRN Aletha HANCOCK  RAJAT Saul.       • artificial tears 1.4 % ophthalmic solution 2 Drop  2 Drop Q6HRS PRN Aletha Saul M.D.         Last reviewed on 3/4/2018  7:57 PM by Marbella Alvarado, Pharmacy Intern    Quality  Measures:   Labs reviewed, Radiology images reviewed and Medications reviewed                      Assessment/Plan:  -  Severe sepsis with lactic acidosis, hypotension, and tachycardia.   - sepsis protocol   - albumin/ivf   - abx   - f/u cultures   - pressors to maintain MAP >65    -  Acute Anemia unknown etiology    - ? Blood loss vs dilution vs hemolysisi    - 2u prbcs, serial h/h and keep >7    -  Immunocompromised state.  -  Acute kidney injury.   - ongoing   - renally dose meds   - minimize nephrotoxic substances     -  Pancytopenia.    -  Recent diagnosis of C. diff on March 21.    -  T-cell leukemia.    -  History of non-Hodgkin's lymphoma in 2010.    -  History of chronic hypothyroidism.    -  History of chronic hypertension, currently hypotensive.    -  Chronic depression.    -  Do not resuscitate/do not intubate.    -  Incomplete medical database.    Titrate opiates and anxiolytics for patient comfort, insure no suffering, pain, or anxiety.     Discussed patient condition and risk of morbidity and/or mortality with RN, RT, Therapies, Pharmacy and hospitalist.

## 2018-03-30 NOTE — DISCHARGE PLANNING
Reviewed pt's chart. Pt has been moved to CC. TC to pt's spouse, Nichelle, [phone]. Assessed for needs. Provided spouse with this SW's direct contact information. Dicussed mortuary resources.

## 2018-03-30 NOTE — PROGRESS NOTES
Palliative Care Advance Care Planning Progress Note  Reviewed EMR. Patient is now comfort care based on discussion with Dr. Saul and patient's wife. Stat CT revealed new large left frontoparietal subdural hematoma resulting in 22 mm rightward subfalcine herniation. Patient's wife no longer at bedside. Discussed with bedside nurse. Left 'Difficult Time' packet with business card. Bedside RN Anay encouraged to call with any needs today.        Patient resting comfortably. Updated comfort care orders and requested bedside RN remove nasogastric tube. Patient has IV access with PICC line for IV morphine. Roxanol can be given sublingual as well for symptom management.     Plan: DNR/comfort care.     Updated: Bedside RN and Dr. Ledbetter.    Recommendations: I recommend a hospice consult. This was already placed.    Thank you for allowing Palliative Care to participate in this patient's care. Please call our team with questions and/or additional needs.    Marina RAMIREZ  Palliative Care Nurse Practitioner  [phone]

## 2018-03-30 NOTE — PROGRESS NOTES
Oncology/Hematology Progress Note               Author: Hung Brennanayana    Cc:  T-cell LGL Date & Time created: 3/30/2018  2:20 PM     Interval History:  Patient transferred to ICU with AMS, neutropenic fever, signs of sepsis  3/29/18 hemodynamically unstable, patient transitioned to hospice      PMHx, PSurgHx, SocHx, FAMHx:  All reviewed and no changes    Review of Systems:  Review of Systems   Constitutional: Positive for malaise/fatigue. Negative for chills, fever and weight loss.   HENT: Negative for ear pain, nosebleeds and sore throat.    Eyes: Negative for blurred vision, double vision and photophobia.   Respiratory: Positive for cough and sputum production. Negative for hemoptysis and shortness of breath.    Cardiovascular: Positive for leg swelling. Negative for chest pain, palpitations and orthopnea.   Gastrointestinal: Negative for abdominal pain, constipation, diarrhea, heartburn, nausea and vomiting.   Genitourinary: Negative for dysuria, frequency, hematuria and urgency.   Skin: Negative for itching and rash.   Neurological: Positive for weakness.       Physical Exam:  Physical Exam   Constitutional: He is oriented to person, place, and time. He appears well-developed and well-nourished. No distress.   ECOG=2   HENT:   Head: Normocephalic and atraumatic.   Mouth/Throat: Oropharynx is clear and moist. No oropharyngeal exudate.   Eyes: Conjunctivae and EOM are normal. Right eye exhibits no discharge. Left eye exhibits no discharge. No scleral icterus.   Neck: Normal range of motion. Neck supple. No tracheal deviation present. No thyromegaly present.   Cardiovascular: Normal rate, regular rhythm and normal heart sounds.  Exam reveals no gallop and no friction rub.    No murmur heard.  Pulmonary/Chest: Effort normal and breath sounds normal. No respiratory distress. He has no wheezes. He has no rales.   Abdominal: Soft. Bowel sounds are normal. He exhibits no distension. There is no tenderness. There is no  rebound and no guarding.   Musculoskeletal: Normal range of motion. He exhibits edema.   3+ bilateral lower extremity edema, left>right   Lymphadenopathy:     He has no cervical adenopathy.   Neurological: He is alert and oriented to person, place, and time.   Skin: Skin is warm. No rash noted. He is not diaphoretic. No erythema.   Psychiatric: He has a normal mood and affect. His behavior is normal. Thought content normal.       Labs:        Invalid input(s): [id number]      Recent Labs      03/28/18   1011  03/28/18   1551  03/29/18   0300   SODIUM  133*  134*  136   POTASSIUM  4.1  4.2  4.0   CHLORIDE  99  100  103   CO2  21  19*  16*   BUN  40*  46*  45*   CREATININE  2.28*  2.36*  2.49*   CALCIUM  6.9*  7.3*  6.5*     Recent Labs      03/28/18   1011  03/28/18   1551  03/29/18   0300   ALTSGPT   --   102*   --    ASTSGOT   --   124*   --    ALKPHOSPHAT   --   77   --    TBILIRUBIN   --   2.1*   --    GLUCOSE  99  88  66     Recent Labs      03/28/18   1651  03/29/18   0300  03/29/18   1151  03/29/18   1857  03/29/18   1945   RBC   --   2.03*  2.19*  2.91*   --    HEMOGLOBIN   --   5.9*  6.5*  8.4*   --    HEMATOCRIT   --   16.8*  18.3*  24.4*   --    PLATELETCT   --   10*  16*  16*   --    PROTHROMBTM  18.2*   --    --    --   24.8*   APTT  50.2*   --    --    --    --    INR  1.54*   --    --    --   2.28*     Recent Labs      03/28/18   0105  03/28/18   1551  03/29/18   0300  03/29/18   1151  03/29/18   1857   WBC  0.2*   --   0.1*  0.2*  0.2*   NEUTSPOLYS  CANCEL   --   cancel   --    --    LYMPHOCYTES  CANCEL   --   cancel   --    --    MONOCYTES  CANCEL   --   cancel   --    --    EOSINOPHILS  CANCEL   --   cancel   --    --    BASOPHILS  CANCEL   --   cancel   --    --    ASTSGOT   --   124*   --    --    --    ALTSGPT   --   102*   --    --    --    ALKPHOSPHAT   --   77   --    --    --    TBILIRUBIN   --   2.1*   --    --    --      Recent Labs      03/28/18   1011  03/28/18   1551  03/29/18   0306    SODIUM  133*  134*  136   POTASSIUM  4.1  4.2  4.0   CHLORIDE  99  100  103   CO2  21  19*  16*   GLUCOSE  99  88  66   BUN  40*  46*  45*   CREATININE  2.28*  2.36*  2.49*   CALCIUM  6.9*  7.3*  6.5*     Hemodynamics:  Temp (24hrs), Av.9 °C (100.3 °F), Min:37.6 °C (99.7 °F), Max:38.3 °C (100.9 °F)  Temperature: (!) 38.3 °C (100.9 °F), Monitored Temp: 38.5 °C (101.3 °F)  Pulse  Av.8  Min: 64  Max: 128Heart Rate (Monitored): 90  Blood Pressure : (!) 173/96, NIBP: 118/64     Respiratory:    Respiration: (!) 50, Pulse Oximetry: 92 %     Work Of Breathing / Effort: Accessory Muscle Use;Moderate  RUL Breath Sounds: Inspiratory Wheezes;Crackles, RML Breath Sounds: Diminished;Crackles, RLL Breath Sounds: Diminished, GREGORIA Breath Sounds: Inspiratory Wheezes;Crackles, LLL Breath Sounds: Diminished;Crackles  Fluids:    Intake/Output Summary (Last 24 hours) at 18 1148  Last data filed at 18 0600   Gross per 24 hour   Intake                0 ml   Output              925 ml   Net             -925 ml        GI/Nutrition:  No orders of the defined types were placed in this encounter.    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  T cell LGL/Leukemia-- presented with pancytopenia. Admitted to Hospital since . Admitted for neutropenic fever.  Diagnosed with LGL leukemia on BMbx from  after Nicola review.   He seems to have an aggressive form of this disease. Campath started on 3/16/18,  per Dr. Klein's discussion with [place].        Monitoring for Campath side effects. These can include autoimmune side effects such as pneumonitis,  Hypothyroidism, kidney disease, etc. sometimes can see in autoimmune ITP or hemolytic picture with this treatment.  Can certainly see bone marrow suppression (but in his case we must treat through the low blood counts).  Also will be at high risk for opportunistic infections.   represents HSV prophylaxis, and PCP prophylaxis.      Monitor during treatment:  CD4+ lymphocyte counts (after treatment until recovery); CMV antigen baseline neg (routinely during and for 2 months after treatment); consider TSH at baseline ( slight elevation TSH and free T3)  and then every 2 to 3 months during alemtuzumab treatment    -- Day 10 of 10 on 3/25/18, day 14 --. 50% responded by 3 months. He may have prolonged cytopenias --on phylactic acyclovir  --Check CMV PCR and serologies at least every 2 weeks. Next check due on March 29  --Continue prophylaxis for PCP--ID stopped the Bactrim and changed him to atovaquone  -poor prognosis  --will check CMV PCR today  -3/30 decision of hospice was made due to significant deterioration in performance status.        2.  Anemia--  ROSE MARIE positive IGG, but normal Ret count, LDH, and hapto.  Bili stable 2.0 range. There may be a small component of hemolysis. Not Brisk. Patient put on 20 mg per day of prednisone on 3/11, then stopped on 3/21.  --Hemoglobin stable  -- monitor   --Transfuse if hemoglobin less than 7 g/dl    3.  Thrombocytopenia--related to problem #1. No obvious signs of bleeding. We will continue to monitor.  --Transfuse if platelets less than 20 while febrile, or at any level if bleeding  --All blood products CMV negative and irradiated  --getting platelet transfusion today.    4. Renal insufficiency--baseline creatinine at 1.2-1.5.  Uric acid level normal at 3.1. Creatinine got up to 2.02, nephro following   --Total body  fluid up  --Nephrology following, and stopped IV fluids for now and given lasix      5. Neutropenic fever--  on cefepime, acyclovir, and fluconazole.  ID following patient. Fevers could be related to infection, infusion reaction, or tumor. Pancultured.   --on cefepim  And fluconazle  --ID on board      6. 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  Given IVIG on 3/12/18     7. Elevated Baseline TSH-- high prior to starting Campath.  Monitor.  Likely due to stress.   --TSH has gone up a little bit. We will continue to monitor and consider replacement if it continues to go up.    8. C diff on PO vanco      High complexicity/Drug monitoring       Quality-Core Measures   Reviewed items::  Labs reviewed, Medications reviewed and Radiology images reviewed  Joshi catheter::  No Joshi  DVT prophylaxis pharmacological::  Contraindicated - High bleeding risk

## 2018-03-31 NOTE — DISCHARGE PLANNING
Received choice form from [facility].  Referral sent to [facility] at 1747on 03-30-18.

## 2018-03-31 NOTE — DISCHARGE PLANNING
Copy of choice [facility] at [phone].

## 2018-03-31 NOTE — DISCHARGE SUMMARY
Hospital Medicine Death Summary            Date of Note  3/31/2018    Primary Care Physician  Hector Burnett M.D.    Death Date: 03/31/18  Death Time: 0715    Cause of Death  Cardiopulmonary arrest secondary to spontaneous subdural hematoma resulting in subfalcine herniation secondary to thrombocytopenia secondary to T cell Large Granular Lymphocyte Leukemia.    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-2005, RT/chemo rx- 2010 dr roman    • Essential hypertension   • Claustrophobia- paxil rx; no depression   • Hypothyroidism due to acquired atrophy of thyroid   • Mixed hyperlipidemia- mild no meds   • Maxillary sinusitis   • Neck pain   • Neutropenic fever (CMS-HCC)   • Normochromic normocytic anemia   • Hyponatremia   • Acute renal failure (ARF) (CMS-HCC)   • Sepsis (CMS-HCC)   • Hypokalemia   • Thrombocytopenia (CMS-HCC)   • Pancytopenia (CMS-HCC)   • Hypogammaglobulinemia (CMS-HCC)   • Anemia   • Clostridium difficile colitis   • DNR (do not resuscitate)   • Pleural effusion       History of Presenting Illness and Hospital Course  77 y.o. male with history of follicular lymphoma in remission admitted 3/4/2018 with neutropenic fevers.  He was transferred from [facility] for further evaluation of his severe pancytopenia and neutropenic fever. When he had presented there, bone marrow biopsy revealed hypercellular bone marrow with lymphocytic cells and T cell positive stains. It was not consistent with a relapse from his prior follicular lymphoma for which he had received Bexxar at [facility] in the past. He was continued on cefepime and fluconazole. ID and heme/onc followed him throughout. Final pathology returned as being positive for T cell Large Granular Lymphocyte Leukemia. Oncology discussed case with [facility] and it was decided that he would have a 10 day course of Campath (alemtuzumab).  He was started on prophylactic acyclovir and bactrim. He was supported with transfusions for his anemia, bicarb drip for his metabolic acidosis and nephrology was consulted for his persistent renal insufficiency. He was found to be positive for C diff and started on oral vancomycin. Unfortunately, he remained pancytopenic and fevers persisted. He completed his 10 day course of campath. Patient requested to be made DNR/DNI but with full support and new palliative care consultation was placed. He was transferred to the ICU for closer monitoring and resuscitation for his severe sepsis, metabolic encephalopathy 2/2 lactic acidosis and persistent anemia despite transfusions. When he had a sudden change in his mental status, stat CT revealed a new large left frontoparietal subdural bleed that resulted in subfalcine herniation. It was at this time that he was made comfort care and transitioned out of the ICU.     Consultants  Pulmonary/critical care  Palliative care  Hematology/oncology  Nephrology  Infectious disease    Procedures  PICC line placement 3/9    CXR-  1.  There is no acute cardiopulmonary process.    LE venous duplex-   no DVT or SVT in either leg where imaged    CT chest-  1.  Bibasilar atelectasis, right greater than left. There is a small to moderate right and minimal left pleural effusion.  2.  Small amount of pericardial fluid.  3.  Atherosclerotic vascular calcification.  4.  Small amount of ascites within the upper abdomen.  5.  Atrophic kidneys, right greater than left.  6.  Retroperitoneal adenopathy.  7.  Stable right adrenal mass.    CT head-  1.  Cerebral atrophy.  2. Motion artifact on some images. No intracranial mass effect or acute hemorrhage.  3. Maxillary sinus fluid collections/possible sinusitis.    CXR-  Small right and trace left pleural effusions. Lung base atelectasis.    CT max/face-  New large left frontoparietal subdural hematoma resulting in 22 mm rightward subfalcine  herniation  New small right frontal subdural hematoma  Suprasellar cistern effacement.  No tonsillar herniation

## 2018-03-31 NOTE — PROGRESS NOTES
2 RN skin complete  Pt has redness on the scrotum and bleeding  Scab to L shin  Bruising and redness to bilateral upper extremity  Skin tear and redness with blanching on sacrum

## 2018-03-31 NOTE — PROGRESS NOTES
Expiration paperwork partially filled out.   requesting additional paperwork.   Medical records notified.  Medical records stated  needs to request paperwork through a letterhead.   notified.   demanding same paperwork.  NAM notified.  NAM currently getting in touch with .  Report given to dayshift RN.

## 2018-03-31 NOTE — PROGRESS NOTES
Per Donor [employer] support,  after giving  Information, Pt is not a a donor applicant per Osiel Arredondo from Donor Support.    Awake

## 2018-03-31 NOTE — PROGRESS NOTES
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 Now on comfort care related to large SDH resulting in subfalcine herniation.     Discussion: Appreciate call form bedside RN Anay notifying me that patient's wife is at bedside. Met with patient's wife. She was tearful but accepting of patient's prognosis. She shared stories about their relationship, patient's work for [employer], and his life. She also discussed his family dynamics with his two sons and expressed a somewhat tenuous relationship. Offered to assist with reaching out to family and facilitating visits that she thinks would be therapeutic. She declined at this time.     We discussed hospice referral. Explained hospice care in detail. Patient's vital signs have stabilized somewhat. She expressed she would not be able to care for him at home. She then shared stories about her daughter who  at a 44 on hospice care. Discussed that if patient remains stable and is not immanent, other locations could be explored. Answered many questions. Also explained bereavement support from hospice care to support her in the grief process. Willow provided verbal choice for [facility] care. She expressed she will be visiting intermittently. She expressed she does not feel that he can sense her presence. Explained it is not fully understood what he might sense and not sense. Discussed therapeutic things for Willow to say based on how she feels including letting him know that she will be okay and that it is okay for him to go. She stated she has been doing this.     We discussed the No One Dies Alone program if she is unable to visit. She expressed that he was some what private and likely would not want that. Explained      Provided therapeutic communication including active listening and therapeutic silence/touch throughout encounter. Ensured Willow has palliative care contact information  and encouraged her to call  with any questions or needs.     Outcome: Continue with DNR/comfort care.     Plan: Send hospice choice for [facility].     Thank you for allowing Palliative Care to participate in this patient's care. Please call our team with questions and/or additional needs.    Total visit time was 45 minutes discussing advance care planning.    Marina RAMIREZ  Palliative Care Nurse Practitioner  [phone]

## 2018-03-31 NOTE — PROGRESS NOTES
Pt escorted up to floor with liliana salamanca ICU nurse Lina  Pt transferred to our hospital bed  Bed in lowest and locked position  Bed alarm in place

## 2018-03-31 NOTE — HOSPICE
Hospice choice faxed to [facility] at #[phone] for [facility] based on discussion with patient's wife earlier today.

## 2018-04-02 LAB
BACTERIA BLD CULT: NORMAL
BACTERIA BLD CULT: NORMAL
SIGNIFICANT IND 70042: NORMAL
SIGNIFICANT IND 70042: NORMAL
SITE SITE: NORMAL
SITE SITE: NORMAL
SOURCE SOURCE: NORMAL
SOURCE SOURCE: NORMAL

## 2019-06-24 NOTE — PROGRESS NOTES
CBC/Type and Screen/CMP/Results in MD note Oncology/Hematology Progress Note               Author: Miquel Horta    Cc:  T-cell LGL Date & Time created: 3/20/2018  2:10 PM     Interval History:  He is doing about the same. He still has the seen slight cough. He has no nausea or vomiting. His diarrhea is staying away, although he did have a more loose stool today. He is pain frequently.      PMHx, PSurgHx, SocHx, FAMHx:  All reviewed and no changes    Review of Systems:  Review of Systems   Constitutional: Positive for malaise/fatigue. Negative for chills, fever and weight loss.   HENT: Negative for ear pain, nosebleeds and sore throat.    Eyes: Negative for blurred vision, double vision and photophobia.   Respiratory: Positive for cough and sputum production. Negative for hemoptysis and shortness of breath.    Cardiovascular: Negative for chest pain, palpitations, orthopnea and leg swelling.   Gastrointestinal: Negative for abdominal pain, constipation, diarrhea, heartburn, nausea and vomiting.   Genitourinary: Negative for dysuria, frequency, hematuria and urgency.   Skin: Negative for itching and rash.   Neurological: Positive for weakness.       Physical Exam:  Physical Exam   Constitutional: He is oriented to person, place, and time. He appears well-developed and well-nourished. No distress.   HENT:   Head: Normocephalic and atraumatic.   Mouth/Throat: Oropharynx is clear and moist. No oropharyngeal exudate.   Eyes: Conjunctivae and EOM are normal. Right eye exhibits no discharge. Left eye exhibits no discharge. No scleral icterus.   Neck: Normal range of motion. Neck supple. No tracheal deviation present. No thyromegaly present.   Cardiovascular: Normal rate, regular rhythm and normal heart sounds.  Exam reveals no gallop and no friction rub.    No murmur heard.  Pulmonary/Chest: Effort normal and breath sounds normal. No respiratory distress. He has no wheezes. He has no rales.   Abdominal: Soft. Bowel sounds are normal. He exhibits no distension.  There is no tenderness. There is no rebound and no guarding.   Musculoskeletal: Normal range of motion. He exhibits edema.   Trace bilateral lower extremity edema   Lymphadenopathy:     He has no cervical adenopathy.   Neurological: He is alert and oriented to person, place, and time.   Skin: Skin is warm. No rash noted. He is not diaphoretic. No erythema.   Psychiatric: He has a normal mood and affect. His behavior is normal. Thought content normal.       Labs:        Invalid input(s): [id number]      Recent Labs      18   0300   SODIUM  135  133*   POTASSIUM  4.9  4.9   CHLORIDE  107  105   CO2  22  19*   BUN  34*  35*   CREATININE  1.48*  1.67*   CALCIUM  7.3*  7.4*     Recent Labs      18   030   ALTSGPT  37   --    ASTSGOT  28   --    ALKPHOSPHAT  53   --    TBILIRUBIN  1.2   --    GLUCOSE  122*  103*     Recent Labs      18   00018   0300   RBC  2.65*  2.83*  2.71*   HEMOGLOBIN  7.5*  8.1*  7.4*   HEMATOCRIT  22.2*  23.7*  22.7*   PLATELETCT  25*  25*  26*     Recent Labs      18   0300   WBC  0.3*  0.3*  0.2*   NEUTSPOLYS  93.60*  CANCEL  cancel   LYMPHOCYTES  1.80*  CANCEL  cancel   MONOCYTES  0.90  CANCEL  cancel   EOSINOPHILS  0.00  CANCEL  cancel   BASOPHILS  0.00  CANCEL  cancel   ASTSGOT  28   --    --    ALTSGPT  37   --    --    ALKPHOSPHAT  53   --    --    TBILIRUBIN  1.2   --    --      Recent Labs      18   0300   SODIUM  135  133*   POTASSIUM  4.9  4.9   CHLORIDE  107  105   CO2  22  19*   GLUCOSE  122*  103*   BUN  34*  35*   CREATININE  1.48*  1.67*   CALCIUM  7.3*  7.4*     Hemodynamics:  Temp (24hrs), Av °C (98.6 °F), Min:36.5 °C (97.7 °F), Max:37.4 °C (99.3 °F)  Temperature: 36.8 °C (98.2 °F)  Pulse  Av.8  Min: 64  Max: 110   Blood Pressure : 140/80     Respiratory:    Respiration: 18, Pulse Oximetry: 92 %        RUL Breath Sounds: Clear,  RML Breath Sounds: Clear, RLL Breath Sounds: Diminished, GREGORIA Breath Sounds: Clear, LLL Breath Sounds: Diminished  Fluids:    Intake/Output Summary (Last 24 hours) at 03/19/18 1148  Last data filed at 03/19/18 0600   Gross per 24 hour   Intake                0 ml   Output              925 ml   Net             -925 ml        GI/Nutrition:  Orders Placed This Encounter   Procedures   • DIET ORDER     Standing Status:   Standing     Number of Occurrences:   1     Order Specific Question:   Diet:     Answer:   Cardiac [6]     Medical Decision Making, by Problem:  Active Hospital Problems    Diagnosis   • *Pancytopenia (CMS-HCC) [D61.818]   • Thrombocytopenia (CMS-HCC) [D69.6]   • Neutropenic fever (CMS-HCC) [D70.9, R50.81]   • Maxillary sinusitis [J32.0]   • Acute renal failure (ARF) (CMS-HCC) [N17.9]   • Hyponatremia [E87.1]   • Essential hypertension [I10]   • 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  T cell LGL/Leukemia-- presented with pancytopenia.  Campath started on 3/16/18, per Dr. Klein's discussion with Kimberly.  He seems to be tolerating well.     Monitor for Campath side effects. These can include autoimmune side effects such as pneumonitis,  Hypothyroidism, kidney disease, etc. sometimes can see in autoimmune ITP or hemolytic picture with this treatment.  Can certainly see bone marrow suppression (but in his case we must treat through the low blood counts).  Also will be at high risk for opportunistic infections.   represents HSV prophylaxis, and PCP prophylaxis.    Monitor during treatment:  CD4+ lymphocyte counts (after treatment until recovery); CMV antigen baseline neg (routinely during and for 2 months after treatment); consider TSH at baseline ( slight elevation TSH and free T3)  and then every 2 to 3 months  during alemtuzumab treatment    -- Day 5 of 10 today  --Continue Campath as ordered  --Continue prophylactic acyclovir  --Continue prophylactic Bactrim DS every MWF      2.  Anemia--  ROSE MARIE positive IGG, but normal Ret count, LDH, and hapto.  Bili stable 2.0 range. There may be a small component of hemolysis. Not Brisk . Patient put on 20 mg per day of prednisone on 3/11.  --DC prednisone for now (already getting Solu-Cortef 100 mg per day with Campath)  -- monitor   --Transfuse if hemoglobin less than 7    3.  Thrombocytopenia--related to problem #1. No obvious signs of bleeding. We will continue to monitor.  --Transfuse if platelets less than 10, or if bleeding  --All blood products CMV negative and irradiated    4. Renal insufficiency--baseline creatinine at 1.2-1.5.  It increased today to 1.67. Uric acid level normal at 3.1   --Continue IV fluids  --May need to increase IV fluids if starts to climb    5. Neutropenic fever--  on acyclovir, and fluconazole.  ID following patient. He spiked again on 3/17/18 100.8 ?? Due to treatment . Pancultured. Was on cefepime, but infectious disease discontinued on 3/20.  --Continue antibiotics  --Continue to monitor.  --If spikes fever, may need to add back cefepime    6. Severe hypogammaglobulinemia-- possibly Due to lymphoma . Given IVIG on 3/12/18     7. Elevated Baseline TSH--high prior to starting Campath.  Monitor.  Likely due to stress.   --Will Check again later this week,  Also with a free T4.         High complexicity/Drug monitoring       Quality-Core Measures   Reviewed items::  Labs reviewed and Medications reviewed  Joshi catheter::  No Joshi  DVT prophylaxis pharmacological::  Contraindicated - High bleeding risk               CBC/Type and Screen/INR/PT/PTT/CMP/Results in MD note

## 2021-04-02 NOTE — PROGRESS NOTES
[facility] Hospitalist Progress Note    Date of Service: 3/15/2018    Chief Complaint  77 y.o. male admitted 3/4/2018 with severe pancytopenia and neutropenic fever.  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    Interval Problem Update  3/13 Patient feeling okay today, neutropenia is worsening along with renal function.  Chemo delivery is pending.  Hopefully with initiation of chemo renal function will improve.  Likely all issues are tied to new diagnosis of leukemia.  3/14 Patient without complaints today, states urinating frequently.  He is well hydrated now and renal function is improving, will decrease IVF rate to 75cc and continue to follow creatinine.  He may need rate increase with chemotherapy but for now is well hydrated.    Chemotherapy not yet available.  3/15 Patient states he feels okay, has ambulated in hallways with assistance.  States his urinary stream is strong and he need not go quite so often since decrease in rate of fluids.  Campath delivered this afternoon and will start administration tomorrow for 10 days.    Consultants/Specialty  Oncology - [facility]  ID - Jason    Disposition  Tbd, will have prolonged hospital course secondary to neutropenia that will worsen with chemotherapy.        Review of Systems   Constitutional: Positive for malaise/fatigue. Negative for chills and fever.   HENT: Negative for congestion.    Eyes: Negative for blurred vision and photophobia.   Respiratory: Negative for cough and shortness of breath.    Cardiovascular: Negative for chest pain, claudication and leg swelling.   Gastrointestinal: Negative for abdominal pain, constipation, diarrhea, heartburn, nausea and vomiting.   Genitourinary: Positive for frequency. Negative for dysuria and hematuria.   Musculoskeletal: Negative for joint pain and myalgias.   Skin: Negative for itching and rash.   Neurological: Negative for dizziness, sensory change, speech change, weakness and headaches.   Psychiatric/Behavioral:  Negative for depression. The patient is not nervous/anxious and does not have insomnia.       Physical Exam  Laboratory/Imaging   Hemodynamics  Temp (24hrs), Av.9 °C (98.4 °F), Min:36.7 °C (98 °F), Max:37.2 °C (98.9 °F)   Temperature: 36.8 °C (98.3 °F)  Pulse  Av.7  Min: 66  Max: 110    Blood Pressure : 126/72      Respiratory      Respiration: 16, Pulse Oximetry: 96 %        RUL Breath Sounds: Clear, RML Breath Sounds: Diminished, RLL Breath Sounds: Diminished, GREGORIA Breath Sounds: Clear, LLL Breath Sounds: Diminished    Fluids    Intake/Output Summary (Last 24 hours) at 03/15/18 1547  Last data filed at 03/15/18 1500   Gross per 24 hour   Intake             2268 ml   Output             2200 ml   Net               68 ml       Nutrition  Orders Placed This Encounter   Procedures   • DIET ORDER     Standing Status:   Standing     Number of Occurrences:   1     Order Specific Question:   Diet:     Answer:   Cardiac [6]     Physical Exam   Constitutional: He is oriented to person, place, and time. He appears well-developed and well-nourished. No distress.   HENT:   Head: Normocephalic and atraumatic.   Eyes: Conjunctivae are normal. No scleral icterus.   Neck: Neck supple. No JVD present.   Cardiovascular: Normal rate, regular rhythm and normal heart sounds.  Exam reveals no gallop and no friction rub.    No murmur heard.  Pulmonary/Chest: Effort normal and breath sounds normal. No respiratory distress. He has no wheezes. He exhibits no tenderness.   Abdominal: Soft. Bowel sounds are normal. He exhibits no distension and no mass. There is no tenderness.   Musculoskeletal: He exhibits no edema or tenderness.   Neurological: He is alert and oriented to person, place, and time. No cranial nerve deficit.   Skin: Skin is warm and dry. He is not diaphoretic. No erythema. No pallor.   Psychiatric: He has a normal mood and affect. His behavior is normal.   Nursing note and vitals reviewed.      Recent Labs       03/13/18   0047  03/14/18   0058  03/15/18   0044   WBC  0.8*  0.6*  0.6*   RBC  2.62*  2.59*  2.87*   HEMOGLOBIN  7.3*  7.0*  8.1*   HEMATOCRIT  21.3*  21.2*  23.5*   MCV  81.3*  81.9  81.9   MCH  27.9  27.0  28.2   MCHC  34.3  33.0*  34.5   RDW  47.5  47.9  46.3   PLATELETCT  20*  25*  29*   MPV   --   11.8  12.9     Recent Labs      03/13/18   1110  03/14/18   0058  03/15/18   0044   SODIUM  134*  134*  132*  132*   POTASSIUM  4.0  4.9  4.9  4.9   CHLORIDE  107  109  107  107   CO2  20  19*  20  20   GLUCOSE  106*  115*  101*  101*   BUN  17  22  24*  24*   CREATININE  1.21  1.44*  1.29  1.29   CALCIUM  7.3*  7.6*  7.3*  7.3*                      Assessment/Plan     * [place] [place]   [place]essment & Plan    Secondary to severe bone marrow infiltrate.   T cell LGL - leukemia  Awaiting chemo delivery - Campath delivered PM 3/15, will start admin 3/16 am.          Thrombocytopenia (CMS-HCC)- (present on admission)   Assessment & Plan    Transfuse prn if less than 10K.         Neutropenic fever (CMS-HCC)- (present on admission)   Assessment & Plan    On Cefepime and fluconazole. Stop date 3/17/2018.  Persistent neutropenia but afebrile          Maxillary sinusitis- (present on admission)   Assessment & Plan    On abx         Acute renal failure (ARF) (CMS-HCC)- (present on admission)   Assessment & Plan    On IVF  Avoid nephrotoxins   Renally dose all medications         Hyponatremia- (present on admission)   Assessment & Plan    Mild, stable            Essential hypertension- (present on admission)   Assessment & Plan    Monitor blood pressure.          Anemia- (present on admission)   Assessment & Plan    Monitor H&H.  Transfuse if hemoglobin <7.5 g/dl.   Will need irradiated and CMV free product.        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            Quality-Core Measures   Reviewed items::  Labs reviewed, Medications reviewed and Radiology images reviewed  Joshi catheter::  No Joshi  DVT prophylaxis pharmacological::  Contraindicated - High bleeding risk  DVT prophylaxis - mechanical:  SCDs  Antibiotics:  Treating active infection/contamination beyond 24 hours perioperative coverage         O-L Flap Text: The defect edges were debeveled with a #15 scalpel blade.  Given the location of the defect, shape of the defect and the proximity to free margins an O-L flap was deemed most appropriate.  Using a sterile surgical marker, an appropriate advancement flap was drawn incorporating the defect and placing the expected incisions within the relaxed skin tension lines where possible.    The area thus outlined was incised deep to adipose tissue with a #15 scalpel blade.  The skin margins were undermined to an appropriate distance in all directions utilizing iris scissors.

## 2024-02-26 NOTE — PROGRESS NOTES
[facility]   Phone: [phone]   Fax: [phone]      Physical Therapy Daily Treatment Note    Date: 2024  Patient Name: Monico Gomez  : 1965   MRN: [id number]  DOInjury: 2023   DOSx: 2023  Referring Provider: Kasi Harvey DO  [address]  [address]  [place] [address]      Medical Diagnosis:   R hip pain  R ankle pain    Outcome Measure: LEFS 84% limitation    S: Pt reports that he is improving and tolerated WB better overall.    O: please refer to PT Eval  Time 405-500     Visit 10/12 Repeat outcome measure at mid point and end.    Pain 6/10     Modalities      Manual            Stretch                  Exercise      Rocker board  x30 Fwd/Lateral TE         Exercises X30 supine SLR arom  X30 supine SLR blue band  X30 side-lying hip abd  X30 side-lying clamshell blue band  X30 prone hip ext  X30 prone leg curl blue band   NR  TE   Canadian Estim X15 min 5/5 dorsiflexion NR          Performed Today     Seated rocker DF stretch 10x30s holds  TE   Seated Canadian DF, standing Canadian DF, Canadian DF c stepping forward/back X5 min ea  NR   Step ups foam SL X30 forward/lateral 25%  NR   Marching c toe raise X30 3s holds  NR                                       A:  Tolerated well. Pt was progressed today c stability and functional strength needed for ADLs. He tolerated the session well c moderate fatigue and no incr in pain.    P: Continue with rehab plan & progress appropriately following orthopedic physician's protocol.   Rodriguez Baez PT DPT [id number]    Treatment Charges: Mins Units   Initial Evaluation     Re-Evaluation     Ther Exercise         TE 8 1   Manual Therapy     MT     Ther Activities        TA     Gait Training          GT     Neuro Re-education NR 47 3   Modalities     Non-Billable Service Time     Other     Total Time/Units 55 4       Pulmonary Critical Care Progress Note      Admit Date: 3/04/2018    Chief Complaint: severe sepsis     History of Present Illness: 77-year-old male, currently unable to   offer significant history.  All information taken from chart review as well as   sign out.  It appears that this patient has a previous history of   non-Hodgkin's lymphoma treated in 2010, had presented on 03/04/2018 to [facility], found to have pancytopenia, neutropenic fevers, and was transferred   to [facility] for further evaluation.  The patient had bone marrow   biopsy, which showed T-cell leukemia.  He subsequently carried the diagnoses   of septic shock, T-cell leukemia, pancytopenia, neutropenic fever, C. diff   positive March 21st, acute kidney injury along this hospitalization.    Furthermore, the patient has a chronic history of hypertension, depression and   hypothyroidism.  Patient had been on the oncology floor, being treated for C.   diff infection that was identified on March 21 and had been improving, guided   by infectious disease, has been on treatment for his T-cell leukemia as well.    He is also on prophylaxis for PCP, previously on Bactrim, now on atovaquone   as well as prophylaxis with Diflucan.  The patient had been extremely volume   overloaded during his course, and being diuresed over the last 2-3 days during   which patient had a slight bump in his creatinine.  Earlier today, he was   also noted to have a drop in his platelets from 19,000 down to 9000, was given   1 unit of platelets, had transient period x2 to 3 with altered mentation that   spontaneously resolved.  However, this evening he was also noted to become   somewhat tachycardic and hypotensive and lactate was drawn showing lactic acid   level of 6.5.  Sepsis was called and patient transferred to the intensive   care unit.  At this present time, the patient's heart rate is approximately   97, blood pressure is in the 140 systolic.  He is unable to  give me any   significant history.  Does have some increased work of breathing, but on   questioning denies any headache, visual changes, chest pain or discomfort,   sputum production, nausea, vomiting, abdominal pain, or discomfort.  Further   of note, there was no mention of any fevers, albeit he is severely   immunocompromised.  Patient is currently on antibiotic therapy with cefepime,   also on acyclovir, as mentioned before atovaquone, fluconazole and p.o.   vancomycin for his C. diff.  The patient had blood cultures x2 drawn.    Urinalysis pending.  Sputum Gram stain and culture will be sent if able to   expectorate.  No further information currently available.        Interval Events:  24 hour interval history reviewed   Tm 100.9  +1.1L over last 24hr, grossly + since admit  No cxr this am  Wbc 0.1  Hb 8-> 5.9  Plat 9->10  Na 136  K 4  Cr 2.36->2.49  LA 6.5->5.2->6.5    Acyclovir  Atovaquone  Diflucan  Cefepime  Oral vanco    Review of Systems   Unable to perform ROS: Acuity of condition     Physical Exam   Constitutional: He appears well-developed and well-nourished.   HENT:   Head: Normocephalic and atraumatic.   Eyes: Conjunctivae are normal. Pupils are equal, round, and reactive to light.   Neck: No tracheal deviation present.   Cardiovascular: Normal rate and regular rhythm.    Pulmonary/Chest: He has no wheezes. He has rales.   Abdominal: Soft. He exhibits no distension. There is no tenderness.   Musculoskeletal: He exhibits edema.   Neurological: No cranial nerve deficit.   Skin: Skin is warm and dry.   Psychiatric:   encephalopathic   Nursing note and vitals reviewed.      PFSH:  No change.    Respiratory:     Pulse Oximetry: 99 %  Chest Tube Drains:                  Invalid input(s): [id number]    HemoDynamics:  Pulse: 89, Heart Rate (Monitored): 89  Blood Pressure : 116/74, NIBP: 126/72               Neuro:  GCS Total Aisha Coma Score: 14           Fluids:  Intake/Output       03/27/18 0700 -  18 0659 18 0700 - 18 0659 18 07 - 18 0659      1900-0659 Total  Total  Total       Intake    P.O.  400  -- 400  --  150 150  --  -- --    P.O. 400 -- 400 -- 150 150 -- -- --    I.V.  400  -- 400  --  3345 3345  --  -- --    IV Piggyback Volume (IV Piggyback) -- -- -- -- 400 400 -- -- --    NS BOLUS -- -- -- -- 2775 2775 -- -- --    IV Volume (NS) 400 -- 400 -- 170 170 -- -- --    Blood  --  -- --  352.7  -- 352.7  --  -- --    Volume (RELEASE PLATELET PHERESIS) -- -- -- 352.7 -- 352.7 -- -- --    Total Intake 800 -- 800 352.7 3495 3847.7 -- -- --       Output    Urine  --  -- --  1800  875 2675  --  -- --    Indwelling Cathether -- -- -- -- 875 875 -- -- --    Void (ml) -- -- -- 1800 -- 1800 -- -- --    Stool  --  -- --  --  -- --  --  -- --    Number of Times Stooled 1 x -- 1 x -- -- -- -- -- --    Total Output -- -- -- 9400 300 2670 -- -- --       Net I/O     800 -- 800 -1447.3 2620 1172.7 -- -- --        Weight: 89.1 kg (196 lb 6.9 oz)  Recent Labs      18   1011  18   1551  18   0300   SODIUM  133*  134*  136   POTASSIUM  4.1  4.2  4.0   CHLORIDE  99  100  103   CO2  21  19*  16*   BUN  40*  46*  45*   CREATININE  2.28*  2.36*  2.49*   CALCIUM  6.9*  7.3*  6.5*       GI/Nutrition:    Liver Function  Recent Labs      18   1011  18   1551  18   0300   ALTSGPT   --   102*   --    ASTSGOT   --   124*   --    ALKPHOSPHAT   --   77   --    TBILIRUBIN   --   2.1*   --    GLUCOSE  99  88  66       Heme:  Recent Labs      18   0046  18   0105  18   1651  18   0300   RBC  2.73*  2.87*   --   2.03*   HEMOGLOBIN  7.7*  8.0*   --   5.9*   HEMATOCRIT  22.5*  23.4*   --   16.8*   PLATELETCT  19*  9*   --   10*   PROTHROMBTM   --    --   18.2*   --    APTT   --    --   50.2*   --    INR   --    --   1.54*   --        Infectious Disease:  Monitored Temp  Av.9 °C (100.2 °F)  Min: 37.5 °C  (99.5 °F)  Max: 38.3 °C (100.9 °F)  Temp  Av.6 °C (97.9 °F)  Min: 36.3 °C (97.4 °F)  Max: 36.8 °C (98.3 °F)  Micro: cultures reviewed  Recent Labs      18   0046  18   0105  18   1551  18   0300   WBC  0.1*  0.2*   --   0.1*   NEUTSPOLYS  CANCEL  CANCEL   --   cancel   LYMPHOCYTES  CANCEL  CANCEL   --   cancel   MONOCYTES  CANCEL  CANCEL   --   cancel   EOSINOPHILS  CANCEL  CANCEL   --   cancel   BASOPHILS  CANCEL  CANCEL   --   cancel   ASTSGOT   --    --   124*   --    ALTSGPT   --    --   102*   --    ALKPHOSPHAT   --    --   77   --    TBILIRUBIN   --    --   2.1*   --      Current Facility-Administered Medications   Medication Dose Frequency Provider Last Rate Last Dose   • vancomycin 50 mg/mL oral soln 125 mg  125 mg Q6HRS Santa Lynne M.D.   125 mg at 18 2325   • [START ON 2018] vancomycin 50 mg/mL oral soln 125 mg  125 mg Q12HRS Santa Lynne M.D.       • sodium chloride (OCEAN) 0.65 % nasal spray 2 Spray  2 Spray PRN KVNG Kumar.OAlvin       • albumin human 25% solution 50 g  50 g Q6HRS Malcolm Maurice M.D. 150 mL/hr at 18 0551 50 g at 18 0551   • Respiratory Care per Protocol   Continuous RT KVNG Kumar.O.       • albuterol inhaler 2 Puff  2 Puff Q4H PRN (RT) FALGUNI KumarO.       • albuterol (PROVENTIL) 2.5mg/0.5ml nebulizer solution 2.5 mg  2.5 mg Q2HRS PRN (RT) KVNG Kumar.O.       • guaiFENesin (ROBITUSSIN) 100 MG/5ML solution 200 mg  10 mL Q4HRS PRN KVNG Kumar.O.   200 mg at 18 1700   • famotidine (PEPCID) tablet 20 mg  20 mg DAILY KVNG Kumar.O.   20 mg at 18 0955   • oxyCODONE immediate-release (ROXICODONE) tablet 2.5-5 mg  2.5-5 mg Q4HRS PRN KVNG Kumar.O.   2.5 mg at 18 1839   • calcium carbonate (TUMS) chewable tab 1,000 mg  1,000 mg Q2HRS PRN Joellen Guzman D.O.       • sodium bicarbonate (SODIUM BICARBONATE) tablet 650 mg  650 mg TID Billie Grimes M.D.   650 mg at 18 2158   •  atovaquone (MEPRON) 750 MG/5ML suspension 750 mg  750 mg BID Gricel Murguia M.D.   750 mg at 03/28/18 2158   • cefepime (MAXIPIME) 2 g in  mL IVPB  2 g Q12HRS Raquel Melendez M.D.   Stopped at 03/28/18 2232   • fluconazole (DIFLUCAN) tablet 100 mg  100 mg DAILY Marisabel Gomez M.D.   100 mg at 03/28/18 0956   • diphenhydrAMINE (BENADRYL) tablet/capsule 25 mg  25 mg QDAY PRN Bethany Orta M.D.   25 mg at 03/28/18 0955   • allopurinol (ZYLOPRIM) tablet 100 mg  100 mg DAILY Maggie Mitchell M.D.   100 mg at 03/28/18 0956   • Ganciclovir 0.15 % GEL 1 Drop  1 Drop DAILY Lacey Peterson M.D.   1 Drop at 03/28/18 0900   • dexamethasone (DECADRON) 0.1 % ophthalmic solution 1 Drop  1 Drop BID Lacey Peterson M.D.   1 Drop at 03/28/18 2156   • acetaminophen (TYLENOL) tablet 650 mg  650 mg Q4HRS PRN Lacey Peterson M.D.   650 mg at 03/28/18 0956   • normal saline PF 10-20 mL  10-20 mL PRN Michael Girard M.D.       • acyclovir (ZOVIRAX) capsule 400 mg  400 mg BID Michael Girard M.D.   400 mg at 03/28/18 2159   • acetaminophen (TYLENOL) tablet 650 mg  650 mg Q6HRS PRN Thong Oliveira M.D.   650 mg at 03/22/18 0437   • benzocaine-menthol (CEPACOL) lozenge 1 Lozenge  1 Lozenge Q2HRS PRN Thong Oliveira M.D.       • benzonatate (TESSALON) capsule 100 mg  100 mg Q4HRS PRN Thong Oliveira M.D.   100 mg at 03/27/18 1301   • labetalol (NORMODYNE,TRANDATE) injection 10 mg  10 mg Q4HRS PRN Thong Oliveira M.D.   10 mg at 03/22/18 1227   • levothyroxine (SYNTHROID) tablet 100 mcg  100 mcg AM ES Thong Oliveira M.D.   100 mcg at 03/28/18 0519   • ondansetron (ZOFRAN) syringe/vial injection 4 mg  4 mg Q4HRS PRN Thong Oliveira M.D.   4 mg at 03/09/18 1535   • potassium chloride SA (Kdur) tablet 40 mEq  40 mEq BID Thong Oliveira M.D.   Stopped at 03/28/18 2100   • PARoxetine (PAXIL) tablet 20 mg  20 mg QSUN-WED Thong Oliveira M.D.   20 mg at 03/28/18 0956   • zolpidem (AMBIEN) tablet 5 mg  5 mg HS PRN Thong Oliveira M.D.          Last reviewed on 3/4/2018  7:57 PM by Marbella Alvarado, Pharmacy Intern    Quality  Measures:  Labs reviewed, Radiology images reviewed and Medications reviewed                      Assessment/Plan:  -  Severe sepsis with lactic acidosis, hypotension, and tachycardia.   - sepsis protocol   - albumin/ivf   - abx   - f/u cultures   - pressors to maintain MAP >65    -  Acute Anemia unknown etiology    - ? Blood loss vs dilution vs hemolysisi    - 2u prbcs, serial h/h and keep >7    -  Immunocompromised state.  -  Acute kidney injury.   - ongoing   - renally dose meds   - minimize nephrotoxic substances     -  Pancytopenia.    -  Recent diagnosis of C. diff on March 21.    -  T-cell leukemia.    -  History of non-Hodgkin's lymphoma in 2010.    -  History of chronic hypothyroidism.    -  History of chronic hypertension, currently hypotensive.    -  Chronic depression.    -  Do not resuscitate/do not intubate.    -  Incomplete medical database.    Addendum:  Repeat Hb post 2 u prbcs was 6.5, I ordered additional 2u prbcs, platelets up to 16 from 10 post platelet transfusion. Lactic acid up to 9!!!, given additional volume with transfusions, this since started to trend down but continue to remain very elevate.d Pt remains very encephalopathic, tremulous in appearance.     Pt critically ill from cardiac, hematologic, and neurologic standpoints. He requires continuous titration of therapies, repeated blood transfusions today (total of 4u prbcs and 1 platelet so far), remains with very high lactate and worsening encephalopathy and neurologic status.  Very high likelihood of death with current trend and without current aggressive therapies.     Discussed patient condition and risk of morbidity and/or mortality with RN, RT, Therapies, Pharmacy and hospitalist.    The patient remains critically ill.  Critical care time = 32 minutes in directly providing and coordinating critical care and extensive data review.  No time  overlap and excludes procedures.